# Patient Record
Sex: FEMALE | Race: WHITE | NOT HISPANIC OR LATINO | Employment: UNEMPLOYED | URBAN - METROPOLITAN AREA
[De-identification: names, ages, dates, MRNs, and addresses within clinical notes are randomized per-mention and may not be internally consistent; named-entity substitution may affect disease eponyms.]

---

## 2017-01-26 ENCOUNTER — CONVERSION ENCOUNTER (OUTPATIENT)
Dept: FAMILY MEDICINE CLINIC | Facility: CLINIC | Age: 52
End: 2017-01-26

## 2017-01-26 ENCOUNTER — HOSPITAL ENCOUNTER (OUTPATIENT)
Dept: OTHER | Facility: HOSPITAL | Age: 52
Discharge: HOME | End: 2017-01-26
Attending: INTERNAL MEDICINE | Admitting: INTERNAL MEDICINE

## 2017-01-26 LAB
CRP SERPL-MCNC: 0.57 MG/DL (ref 0–0.7)
ERYTHROCYTE [SEDIMENTATION RATE] IN BLOOD BY WESTERGREN METHOD: 24 MM/HR (ref 0–30)

## 2017-01-27 LAB — CHROMATIN AB SERPL-ACNC: <20 [IU]/ML (ref 0–20)

## 2017-02-17 ENCOUNTER — CONVERSION ENCOUNTER (OUTPATIENT)
Dept: FAMILY MEDICINE CLINIC | Facility: CLINIC | Age: 52
End: 2017-02-17

## 2017-05-17 ENCOUNTER — CONVERSION ENCOUNTER (OUTPATIENT)
Dept: FAMILY MEDICINE CLINIC | Facility: CLINIC | Age: 52
End: 2017-05-17

## 2017-05-17 LAB
ALBUMIN SERPL-MCNC: 4.3 G/DL (ref 3.6–5.1)
ALBUMIN/GLOB SERPL: ABNORMAL {RATIO} (ref 1–2.5)
ALP SERPL-CCNC: 78 UNITS/L (ref 33–130)
ALT SERPL-CCNC: 11 UNITS/L (ref 6–29)
AST SERPL-CCNC: 15 UNITS/L (ref 10–35)
BASOPHILS # BLD AUTO: ABNORMAL 10*3/MM3 (ref 0–200)
BASOPHILS NFR BLD AUTO: 0.6 %
BILIRUB SERPL-MCNC: 0.3 MG/DL (ref 0.2–1.2)
BILIRUB UR QL STRIP: NEGATIVE
BUN SERPL-MCNC: 15 MG/DL (ref 7–25)
BUN/CREAT SERPL: ABNORMAL (ref 6–22)
CALCIUM SERPL-MCNC: 9.2 MG/DL (ref 8.6–10.4)
CHLORIDE SERPL-SCNC: 106 MMOL/L (ref 98–110)
CO2 CONTENT VENOUS: 24 MMOL/L (ref 20–31)
COLOR UR: YELLOW
CONV BACTERIA IN URINE MICRO: ABNORMAL /HPF
CONV HYALINE CASTS IN URINE MICRO: ABNORMAL
CONV NEUTROPHILS/100 LEUKOCYTES IN BODY FLUID BY MANUAL COUNT: 60.8 %
CONV PROTEIN IN URINE BY AUTOMATED TEST STRIP: NEGATIVE
CONV TOTAL PROTEIN: 7.1 G/DL (ref 6.1–8.1)
CREAT UR-MCNC: 1.07 MG/DL (ref 0.5–1.05)
CRP SERPL-MCNC: 0.83 MG/DL
EOSINOPHIL # BLD AUTO: 1.6 %
EOSINOPHIL # BLD AUTO: ABNORMAL 10*3/MM3 (ref 15–500)
ERYTHROCYTE [DISTWIDTH] IN BLOOD BY AUTOMATED COUNT: 14.6 % (ref 11–15)
ERYTHROCYTE [SEDIMENTATION RATE] IN BLOOD BY WESTERGREN METHOD: 22 MM/HR
GLOBULIN UR ELPH-MCNC: ABNORMAL G/DL (ref 1.9–3.7)
GLUCOSE SERPL-MCNC: 83 MG/DL (ref 65–99)
GLUCOSE UR QL: NEGATIVE G/DL
HCT VFR BLD AUTO: 39.2 % (ref 35–45)
HGB BLD-MCNC: 13.2 G/DL (ref 11.7–15.5)
HGB UR QL STRIP: NEGATIVE
KETONES UR QL STRIP: NEGATIVE
LEUKOCYTE ESTERASE UR QL STRIP: NEGATIVE
LYMPHOCYTES # BLD AUTO: ABNORMAL 10*3/MM3 (ref 850–3900)
LYMPHOCYTES NFR BLD AUTO: 31 %
MCH RBC QN AUTO: 30.4 PG (ref 27–33)
MCHC RBC AUTO-ENTMCNC: ABNORMAL % (ref 32–36)
MCV RBC AUTO: 90.3 FL (ref 80–100)
MONOCYTES # BLD AUTO: ABNORMAL 10*3/MICROLITER (ref 200–950)
MONOCYTES NFR BLD AUTO: 6 %
NEUTROPHILS # BLD AUTO: ABNORMAL 10*3/MM3 (ref 1500–7800)
NITRITE UR QL STRIP: NEGATIVE
PH UR STRIP.AUTO: ABNORMAL [PH] (ref 5–8)
PLATELET # BLD AUTO: ABNORMAL 10*3/MM3 (ref 140–400)
PMV BLD AUTO: 10.3 FL (ref 7.5–12.5)
POTASSIUM SERPL-SCNC: 4 MMOL/L (ref 3.5–5.3)
RBC # BLD AUTO: ABNORMAL 10*6/MM3 (ref 3.8–5.1)
RBC #/AREA URNS HPF: ABNORMAL /[HPF]
SODIUM SERPL-SCNC: 139 MMOL/L (ref 135–146)
SP GR UR: 1.02 (ref 1–1.03)
SQUAMOUS #/AREA URNS HPF: ABNORMAL /HPF
WBC # BLD AUTO: ABNORMAL K/UL (ref 3.8–10.8)
WBC #/AREA URNS HPF: ABNORMAL CELLS/HPF

## 2017-05-18 ENCOUNTER — CONVERSION ENCOUNTER (OUTPATIENT)
Dept: FAMILY MEDICINE CLINIC | Facility: CLINIC | Age: 52
End: 2017-05-18

## 2017-05-23 ENCOUNTER — CONVERSION ENCOUNTER (OUTPATIENT)
Dept: FAMILY MEDICINE CLINIC | Facility: CLINIC | Age: 52
End: 2017-05-23

## 2017-06-01 ENCOUNTER — HOSPITAL ENCOUNTER (OUTPATIENT)
Dept: PHYSICAL THERAPY | Facility: HOSPITAL | Age: 52
Setting detail: RECURRING SERIES
Discharge: HOME | End: 2017-07-10
Attending: INTERNAL MEDICINE | Admitting: INTERNAL MEDICINE

## 2017-06-15 ENCOUNTER — CONVERSION ENCOUNTER (OUTPATIENT)
Dept: FAMILY MEDICINE CLINIC | Facility: CLINIC | Age: 52
End: 2017-06-15

## 2017-07-17 ENCOUNTER — CONVERSION ENCOUNTER (OUTPATIENT)
Dept: FAMILY MEDICINE CLINIC | Facility: CLINIC | Age: 52
End: 2017-07-17

## 2017-07-17 ENCOUNTER — HOSPITAL ENCOUNTER (OUTPATIENT)
Dept: ORTHOPEDIC SURGERY | Facility: CLINIC | Age: 52
Discharge: HOME | End: 2017-07-17
Attending: ORTHOPAEDIC SURGERY | Admitting: ORTHOPAEDIC SURGERY

## 2017-08-14 ENCOUNTER — CONVERSION ENCOUNTER (OUTPATIENT)
Dept: FAMILY MEDICINE CLINIC | Facility: CLINIC | Age: 52
End: 2017-08-14

## 2017-08-24 ENCOUNTER — CONVERSION ENCOUNTER (OUTPATIENT)
Dept: FAMILY MEDICINE CLINIC | Facility: CLINIC | Age: 52
End: 2017-08-24

## 2017-08-31 ENCOUNTER — HOSPITAL ENCOUNTER (OUTPATIENT)
Dept: PREADMISSION TESTING | Facility: HOSPITAL | Age: 52
Discharge: HOME | End: 2017-08-31
Attending: ORTHOPAEDIC SURGERY | Admitting: ORTHOPAEDIC SURGERY

## 2017-08-31 LAB
ANION GAP SERPL CALC-SCNC: 13.7 MMOL/L (ref 10–20)
BACTERIA SPEC AEROBE CULT: NORMAL
BASOPHILS # BLD AUTO: 0.1 10*3/UL (ref 0–0.2)
BASOPHILS NFR BLD AUTO: 1 % (ref 0–2)
BUN SERPL-MCNC: 7 MG/DL (ref 8–20)
BUN/CREAT SERPL: 7 (ref 5.4–26.2)
CALCIUM SERPL-MCNC: 9.5 MG/DL (ref 8.9–10.3)
CHLORIDE SERPL-SCNC: 104 MMOL/L (ref 101–111)
CONV CO2: 25 MMOL/L (ref 22–32)
CREAT UR-MCNC: 1 MG/DL (ref 0.4–1)
DIFFERENTIAL METHOD BLD: (no result)
EOSINOPHIL # BLD AUTO: 0.1 10*3/UL (ref 0–0.3)
EOSINOPHIL # BLD AUTO: 1 % (ref 0–3)
ERYTHROCYTE [DISTWIDTH] IN BLOOD BY AUTOMATED COUNT: 13.7 % (ref 11.5–14.5)
GLUCOSE SERPL-MCNC: 92 MG/DL (ref 65–99)
HCT VFR BLD AUTO: 39.2 % (ref 35–49)
HGB BLD-MCNC: 13 G/DL (ref 12–15)
LYMPHOCYTES # BLD AUTO: 3.6 10*3/UL (ref 0.8–4.8)
LYMPHOCYTES NFR BLD AUTO: 36 % (ref 18–42)
Lab: NORMAL
MCH RBC QN AUTO: 30.7 PG (ref 26–32)
MCHC RBC AUTO-ENTMCNC: 33.3 G/DL (ref 32–36)
MCV RBC AUTO: 92.2 FL (ref 80–94)
MICRO REPORT STATUS: NORMAL
MONOCYTES # BLD AUTO: 0.7 10*3/UL (ref 0.1–1.3)
MONOCYTES NFR BLD AUTO: 7 % (ref 2–11)
NEUTROPHILS # BLD AUTO: 5.6 10*3/UL (ref 2.3–8.6)
NEUTROPHILS NFR BLD AUTO: 55 % (ref 50–75)
NRBC BLD AUTO-RTO: 0 /100{WBCS}
NRBC/RBC NFR BLD MANUAL: 0 10*3/UL
PLATELET # BLD AUTO: 297 10*3/UL (ref 150–450)
PMV BLD AUTO: 9 FL (ref 7.4–10.4)
POTASSIUM SERPL-SCNC: 3.7 MMOL/L (ref 3.6–5.1)
RBC # BLD AUTO: 4.25 10*6/UL (ref 4–5.4)
SODIUM SERPL-SCNC: 139 MMOL/L (ref 136–144)
SPECIMEN SOURCE: NORMAL
WBC # BLD AUTO: 10.1 10*3/UL (ref 4.5–11.5)

## 2017-09-01 ENCOUNTER — HOSPITAL ENCOUNTER (OUTPATIENT)
Dept: PREOP | Facility: HOSPITAL | Age: 52
Setting detail: HOSPITAL OUTPATIENT SURGERY
Discharge: HOME | End: 2017-09-01
Attending: ORTHOPAEDIC SURGERY | Admitting: ORTHOPAEDIC SURGERY

## 2017-09-01 LAB
BACTERIA SPEC AEROBE CULT: NORMAL
GRAM STN SPEC: NORMAL
Lab: NORMAL
MICRO REPORT STATUS: NORMAL
SPECIMEN SOURCE: NORMAL

## 2017-09-18 ENCOUNTER — HOSPITAL ENCOUNTER (OUTPATIENT)
Dept: LAB | Facility: HOSPITAL | Age: 52
Discharge: HOME | End: 2017-09-18
Attending: INTERNAL MEDICINE | Admitting: INTERNAL MEDICINE

## 2017-09-18 ENCOUNTER — CONVERSION ENCOUNTER (OUTPATIENT)
Dept: FAMILY MEDICINE CLINIC | Facility: CLINIC | Age: 52
End: 2017-09-18

## 2017-09-18 LAB
ALBUMIN SERPL-MCNC: 3.9 G/DL (ref 3.5–4.8)
ALBUMIN/GLOB SERPL: 1.3 {RATIO} (ref 1–1.7)
ALP SERPL-CCNC: 42 IU/L (ref 32–91)
ALT SERPL-CCNC: 11 IU/L (ref 14–54)
ANION GAP SERPL CALC-SCNC: 10.3 MMOL/L (ref 10–20)
AST SERPL-CCNC: 18 IU/L (ref 15–41)
BASOPHILS # BLD AUTO: 0.1 10*3/UL (ref 0–0.2)
BASOPHILS NFR BLD AUTO: 1 % (ref 0–2)
BILIRUB SERPL-MCNC: 0.3 MG/DL (ref 0.3–1.2)
BILIRUB UR QL STRIP: NEGATIVE MG/DL
BUN SERPL-MCNC: 14 MG/DL (ref 8–20)
BUN/CREAT SERPL: 14 (ref 5.4–26.2)
CALCIUM SERPL-MCNC: 9.8 MG/DL (ref 8.9–10.3)
CASTS URNS QL MICRO: NORMAL /[LPF]
CHLORIDE SERPL-SCNC: 102 MMOL/L (ref 101–111)
COLOR UR: YELLOW
CONV BACTERIA IN URINE MICRO: NEGATIVE
CONV CLARITY OF URINE: CLEAR
CONV CO2: 29 MMOL/L (ref 22–32)
CONV HYALINE CASTS IN URINE MICRO: 2 /[LPF] (ref 0–5)
CONV PROTEIN IN URINE BY AUTOMATED TEST STRIP: NEGATIVE MG/DL
CONV SMALL ROUND CELLS: NORMAL /[HPF]
CONV TOTAL PROTEIN: 6.9 G/DL (ref 6.1–7.9)
CONV UROBILINOGEN IN URINE BY AUTOMATED TEST STRIP: 0.2 MG/DL
CREAT UR-MCNC: 1 MG/DL (ref 0.4–1)
CRP SERPL-MCNC: 1.88 MG/DL (ref 0–0.7)
CULTURE INDICATED?: NORMAL
DIFFERENTIAL METHOD BLD: (no result)
EOSINOPHIL # BLD AUTO: 0.2 10*3/UL (ref 0–0.3)
EOSINOPHIL # BLD AUTO: 2 % (ref 0–3)
ERYTHROCYTE [DISTWIDTH] IN BLOOD BY AUTOMATED COUNT: 13.6 % (ref 11.5–14.5)
ERYTHROCYTE [SEDIMENTATION RATE] IN BLOOD BY WESTERGREN METHOD: 22 MM/HR (ref 0–30)
GLOBULIN UR ELPH-MCNC: 3 G/DL (ref 2.5–3.8)
GLUCOSE SERPL-MCNC: 84 MG/DL (ref 65–99)
GLUCOSE UR QL: NEGATIVE MG/DL
HCT VFR BLD AUTO: 40.6 % (ref 35–49)
HGB BLD-MCNC: 13.6 G/DL (ref 12–15)
HGB UR QL STRIP: NEGATIVE
KETONES UR QL STRIP: NEGATIVE MG/DL
LEUKOCYTE ESTERASE UR QL STRIP: NEGATIVE
LYMPHOCYTES # BLD AUTO: 1.8 10*3/UL (ref 0.8–4.8)
LYMPHOCYTES NFR BLD AUTO: 22 % (ref 18–42)
MCH RBC QN AUTO: 30.9 PG (ref 26–32)
MCHC RBC AUTO-ENTMCNC: 33.5 G/DL (ref 32–36)
MCV RBC AUTO: 92.2 FL (ref 80–94)
MONOCYTES # BLD AUTO: 0.4 10*3/UL (ref 0.1–1.3)
MONOCYTES NFR BLD AUTO: 5 % (ref 2–11)
NEUTROPHILS # BLD AUTO: 5.7 10*3/UL (ref 2.3–8.6)
NEUTROPHILS NFR BLD AUTO: 70 % (ref 50–75)
NITRITE UR QL STRIP: NEGATIVE
NRBC BLD AUTO-RTO: 0 /100{WBCS}
NRBC/RBC NFR BLD MANUAL: 0 10*3/UL
PH UR STRIP.AUTO: 6 [PH] (ref 4.5–8)
PLATELET # BLD AUTO: 281 10*3/UL (ref 150–450)
PMV BLD AUTO: 8.5 FL (ref 7.4–10.4)
POTASSIUM SERPL-SCNC: 4.3 MMOL/L (ref 3.6–5.1)
RBC # BLD AUTO: 4.41 10*6/UL (ref 4–5.4)
RBC #/AREA URNS HPF: 2 /[HPF] (ref 0–3)
SODIUM SERPL-SCNC: 137 MMOL/L (ref 136–144)
SP GR UR: 1.02 (ref 1–1.03)
SPERM URNS QL MICRO: NORMAL /[HPF]
SQUAMOUS SPT QL MICRO: 2 /[HPF] (ref 0–5)
UNIDENT CRYS URNS QL MICRO: NORMAL /[HPF]
WBC # BLD AUTO: 8.1 10*3/UL (ref 4.5–11.5)
WBC #/AREA URNS HPF: 1 /[HPF] (ref 0–5)
YEAST SPEC QL WET PREP: NORMAL /[HPF]

## 2017-10-10 ENCOUNTER — CONVERSION ENCOUNTER (OUTPATIENT)
Dept: FAMILY MEDICINE CLINIC | Facility: CLINIC | Age: 52
End: 2017-10-10

## 2017-10-10 ENCOUNTER — HOSPITAL ENCOUNTER (OUTPATIENT)
Dept: LAB | Facility: HOSPITAL | Age: 52
Discharge: HOME | End: 2017-10-10
Attending: INTERNAL MEDICINE | Admitting: INTERNAL MEDICINE

## 2017-10-10 LAB
ALBUMIN SERPL-MCNC: 4.1 G/DL (ref 3.5–4.8)
ALBUMIN/GLOB SERPL: 1.4 {RATIO} (ref 1–1.7)
ALP SERPL-CCNC: 52 IU/L (ref 32–91)
ALT SERPL-CCNC: 13 IU/L (ref 14–54)
ANION GAP SERPL CALC-SCNC: 13 MMOL/L (ref 10–20)
AST SERPL-CCNC: 18 IU/L (ref 15–41)
BASOPHILS # BLD AUTO: 0.1 10*3/UL (ref 0–0.2)
BASOPHILS NFR BLD AUTO: 1 % (ref 0–2)
BILIRUB SERPL-MCNC: 0.4 MG/DL (ref 0.3–1.2)
BUN SERPL-MCNC: 16 MG/DL (ref 8–20)
BUN/CREAT SERPL: 17.8 (ref 5.4–26.2)
CALCIUM SERPL-MCNC: 9.6 MG/DL (ref 8.9–10.3)
CHLORIDE SERPL-SCNC: 105 MMOL/L (ref 101–111)
CONV CO2: 25 MMOL/L (ref 22–32)
CONV TOTAL PROTEIN: 7.1 G/DL (ref 6.1–7.9)
CREAT UR-MCNC: 0.9 MG/DL (ref 0.4–1)
CRP SERPL-MCNC: 0.23 MG/DL (ref 0–0.7)
DIFFERENTIAL METHOD BLD: (no result)
EOSINOPHIL # BLD AUTO: 0.1 10*3/UL (ref 0–0.3)
EOSINOPHIL # BLD AUTO: 1 % (ref 0–3)
ERYTHROCYTE [DISTWIDTH] IN BLOOD BY AUTOMATED COUNT: 13.4 % (ref 11.5–14.5)
ERYTHROCYTE [SEDIMENTATION RATE] IN BLOOD BY WESTERGREN METHOD: 15 MM/HR (ref 0–30)
GLOBULIN UR ELPH-MCNC: 3 G/DL (ref 2.5–3.8)
GLUCOSE SERPL-MCNC: 88 MG/DL (ref 65–99)
HCT VFR BLD AUTO: 42.1 % (ref 35–49)
HGB BLD-MCNC: 14.2 G/DL (ref 12–15)
LYMPHOCYTES # BLD AUTO: 2.3 10*3/UL (ref 0.8–4.8)
LYMPHOCYTES NFR BLD AUTO: 25 % (ref 18–42)
MCH RBC QN AUTO: 30.4 PG (ref 26–32)
MCHC RBC AUTO-ENTMCNC: 33.7 G/DL (ref 32–36)
MCV RBC AUTO: 90 FL (ref 80–94)
MONOCYTES # BLD AUTO: 0.5 10*3/UL (ref 0.1–1.3)
MONOCYTES NFR BLD AUTO: 5 % (ref 2–11)
NEUTROPHILS # BLD AUTO: 6.3 10*3/UL (ref 2.3–8.6)
NEUTROPHILS NFR BLD AUTO: 68 % (ref 50–75)
NRBC BLD AUTO-RTO: 0 /100{WBCS}
NRBC/RBC NFR BLD MANUAL: 0 10*3/UL
PLATELET # BLD AUTO: 291 10*3/UL (ref 150–450)
PMV BLD AUTO: 8.4 FL (ref 7.4–10.4)
POTASSIUM SERPL-SCNC: 4 MMOL/L (ref 3.6–5.1)
RBC # BLD AUTO: 4.68 10*6/UL (ref 4–5.4)
SODIUM SERPL-SCNC: 139 MMOL/L (ref 136–144)
WBC # BLD AUTO: 9.3 10*3/UL (ref 4.5–11.5)

## 2017-10-10 PROCEDURE — 86481 TB AG RESPONSE T-CELL SUSP: CPT

## 2017-10-11 ENCOUNTER — LAB REQUISITION (OUTPATIENT)
Dept: LAB | Facility: HOSPITAL | Age: 52
End: 2017-10-11

## 2017-10-11 DIAGNOSIS — Z00.00 ROUTINE GENERAL MEDICAL EXAMINATION AT A HEALTH CARE FACILITY: ICD-10-CM

## 2017-10-11 LAB
CONV HIV-1/ HIV-2: NORMAL
CONV HIV-1/ HIV-2: NORMAL
HAV IGM SERPL QL IA: NONREACTIVE
HBV CORE IGM SERPL QL IA: NONREACTIVE
HBV SURFACE AG SERPL QL IA: NONREACTIVE
HCV AB SER DONR QL: NORMAL
HCV AB SER DONR QL: NORMAL

## 2017-10-12 LAB
TSPOT INTERPRETATION: NEGATIVE
TSPOT INTERPRETATION: NORMAL
TSPOT NIL CONTROL: 0
TSPOT PANEL A: 0
TSPOT PANEL B: 0
TSPOT POS CONTROL: 166

## 2017-10-16 ENCOUNTER — CONVERSION ENCOUNTER (OUTPATIENT)
Dept: FAMILY MEDICINE CLINIC | Facility: CLINIC | Age: 52
End: 2017-10-16

## 2017-11-02 ENCOUNTER — CONVERSION ENCOUNTER (OUTPATIENT)
Dept: FAMILY MEDICINE CLINIC | Facility: CLINIC | Age: 52
End: 2017-11-02

## 2018-01-04 ENCOUNTER — CONVERSION ENCOUNTER (OUTPATIENT)
Dept: FAMILY MEDICINE CLINIC | Facility: CLINIC | Age: 53
End: 2018-01-04

## 2018-01-17 ENCOUNTER — CONVERSION ENCOUNTER (OUTPATIENT)
Dept: FAMILY MEDICINE CLINIC | Facility: CLINIC | Age: 53
End: 2018-01-17

## 2018-01-22 ENCOUNTER — HOSPITAL ENCOUNTER (OUTPATIENT)
Dept: PHYSICAL THERAPY | Facility: HOSPITAL | Age: 53
Setting detail: RECURRING SERIES
Discharge: HOME | End: 2018-04-18
Attending: ORTHOPAEDIC SURGERY | Admitting: ORTHOPAEDIC SURGERY

## 2018-01-24 ENCOUNTER — HOSPITAL ENCOUNTER (OUTPATIENT)
Dept: LAB | Facility: HOSPITAL | Age: 53
Discharge: HOME | End: 2018-01-24
Attending: INTERNAL MEDICINE | Admitting: INTERNAL MEDICINE

## 2018-01-24 ENCOUNTER — CONVERSION ENCOUNTER (OUTPATIENT)
Dept: FAMILY MEDICINE CLINIC | Facility: CLINIC | Age: 53
End: 2018-01-24

## 2018-01-24 LAB
ALBUMIN SERPL-MCNC: 4.4 G/DL (ref 3.5–4.8)
ALBUMIN/GLOB SERPL: 1.6 {RATIO} (ref 1–1.7)
ALP SERPL-CCNC: 46 IU/L (ref 32–91)
ALT SERPL-CCNC: 12 IU/L (ref 14–54)
ANION GAP SERPL CALC-SCNC: 11.8 MMOL/L (ref 10–20)
AST SERPL-CCNC: 19 IU/L (ref 15–41)
BASOPHILS # BLD AUTO: 0 10*3/UL (ref 0–0.2)
BASOPHILS NFR BLD AUTO: 0 % (ref 0–2)
BILIRUB SERPL-MCNC: 0.1 MG/DL (ref 0.3–1.2)
BUN SERPL-MCNC: 10 MG/DL (ref 8–20)
BUN/CREAT SERPL: 10 (ref 5.4–26.2)
CALCIUM SERPL-MCNC: 9.9 MG/DL (ref 8.9–10.3)
CHLORIDE SERPL-SCNC: 107 MMOL/L (ref 101–111)
CONV CO2: 25 MMOL/L (ref 22–32)
CONV TOTAL PROTEIN: 7.1 G/DL (ref 6.1–7.9)
CREAT UR-MCNC: 1 MG/DL (ref 0.4–1)
CRP SERPL-MCNC: 0.13 MG/DL (ref 0–0.7)
DIFFERENTIAL METHOD BLD: (no result)
EOSINOPHIL # BLD AUTO: 0.1 10*3/UL (ref 0–0.3)
EOSINOPHIL # BLD AUTO: 1 % (ref 0–3)
ERYTHROCYTE [DISTWIDTH] IN BLOOD BY AUTOMATED COUNT: 13.9 % (ref 11.5–14.5)
ERYTHROCYTE [SEDIMENTATION RATE] IN BLOOD BY WESTERGREN METHOD: 10 MM/HR (ref 0–30)
GLOBULIN UR ELPH-MCNC: 2.7 G/DL (ref 2.5–3.8)
GLUCOSE SERPL-MCNC: 98 MG/DL (ref 65–99)
HCT VFR BLD AUTO: 42.4 % (ref 35–49)
HGB BLD-MCNC: 14.1 G/DL (ref 12–15)
LYMPHOCYTES # BLD AUTO: 2.7 10*3/UL (ref 0.8–4.8)
LYMPHOCYTES NFR BLD AUTO: 33 % (ref 18–42)
MCH RBC QN AUTO: 30.9 PG (ref 26–32)
MCHC RBC AUTO-ENTMCNC: 33.3 G/DL (ref 32–36)
MCV RBC AUTO: 93 FL (ref 80–94)
MONOCYTES # BLD AUTO: 0.5 10*3/UL (ref 0.1–1.3)
MONOCYTES NFR BLD AUTO: 6 % (ref 2–11)
NEUTROPHILS # BLD AUTO: 5 10*3/UL (ref 2.3–8.6)
NEUTROPHILS NFR BLD AUTO: 60 % (ref 50–75)
NRBC BLD AUTO-RTO: 0 /100{WBCS}
NRBC/RBC NFR BLD MANUAL: 0 10*3/UL
PLATELET # BLD AUTO: 307 10*3/UL (ref 150–450)
PMV BLD AUTO: 8.7 FL (ref 7.4–10.4)
POTASSIUM SERPL-SCNC: 3.8 MMOL/L (ref 3.6–5.1)
RBC # BLD AUTO: 4.56 10*6/UL (ref 4–5.4)
SODIUM SERPL-SCNC: 140 MMOL/L (ref 136–144)
WBC # BLD AUTO: 8.4 10*3/UL (ref 4.5–11.5)

## 2018-04-17 ENCOUNTER — CONVERSION ENCOUNTER (OUTPATIENT)
Dept: FAMILY MEDICINE CLINIC | Facility: CLINIC | Age: 53
End: 2018-04-17

## 2018-04-23 ENCOUNTER — HOSPITAL ENCOUNTER (OUTPATIENT)
Dept: PAIN MEDICINE | Facility: HOSPITAL | Age: 53
Discharge: HOME | End: 2018-04-23
Attending: PHYSICAL MEDICINE & REHABILITATION | Admitting: PHYSICAL MEDICINE & REHABILITATION

## 2018-04-23 ENCOUNTER — CONVERSION ENCOUNTER (OUTPATIENT)
Dept: FAMILY MEDICINE CLINIC | Facility: CLINIC | Age: 53
End: 2018-04-23

## 2018-05-14 ENCOUNTER — CONVERSION ENCOUNTER (OUTPATIENT)
Dept: FAMILY MEDICINE CLINIC | Facility: CLINIC | Age: 53
End: 2018-05-14

## 2018-05-14 ENCOUNTER — HOSPITAL ENCOUNTER (OUTPATIENT)
Dept: PAIN MEDICINE | Facility: HOSPITAL | Age: 53
Discharge: HOME | End: 2018-05-14
Attending: PHYSICAL MEDICINE & REHABILITATION | Admitting: PHYSICAL MEDICINE & REHABILITATION

## 2018-08-14 ENCOUNTER — CONVERSION ENCOUNTER (OUTPATIENT)
Dept: FAMILY MEDICINE CLINIC | Facility: CLINIC | Age: 53
End: 2018-08-14

## 2018-10-16 ENCOUNTER — CONVERSION ENCOUNTER (OUTPATIENT)
Dept: FAMILY MEDICINE CLINIC | Facility: CLINIC | Age: 53
End: 2018-10-16

## 2018-10-16 ENCOUNTER — HOSPITAL ENCOUNTER (OUTPATIENT)
Dept: FAMILY MEDICINE CLINIC | Facility: CLINIC | Age: 53
Setting detail: SPECIMEN
Discharge: HOME | End: 2018-10-16
Attending: FAMILY MEDICINE | Admitting: FAMILY MEDICINE

## 2018-10-16 LAB
ALBUMIN SERPL-MCNC: 4.1 G/DL (ref 3.5–4.8)
ALBUMIN/GLOB SERPL: 1.4 {RATIO} (ref 1–1.7)
ALP SERPL-CCNC: 56 IU/L (ref 32–91)
ALT SERPL-CCNC: 15 IU/L (ref 14–54)
ANION GAP SERPL CALC-SCNC: 11.9 MMOL/L (ref 10–20)
AST SERPL-CCNC: 17 IU/L (ref 15–41)
BASOPHILS # BLD AUTO: 0 10*3/UL (ref 0–0.2)
BASOPHILS NFR BLD AUTO: 0 % (ref 0–2)
BILIRUB SERPL-MCNC: 0.6 MG/DL (ref 0.3–1.2)
BUN SERPL-MCNC: 14 MG/DL (ref 8–20)
BUN/CREAT SERPL: 15.6 (ref 5.4–26.2)
CALCIUM SERPL-MCNC: 9.5 MG/DL (ref 8.9–10.3)
CHLORIDE SERPL-SCNC: 105 MMOL/L (ref 101–111)
CHOLEST SERPL-MCNC: 210 MG/DL
CHOLEST/HDLC SERPL: 4.3 {RATIO}
CONV CO2: 26 MMOL/L (ref 22–32)
CONV LDL CHOLESTEROL DIRECT: 152 MG/DL (ref 0–100)
CONV TOTAL PROTEIN: 7.1 G/DL (ref 6.1–7.9)
CREAT UR-MCNC: 0.9 MG/DL (ref 0.4–1)
DIFFERENTIAL METHOD BLD: (no result)
EOSINOPHIL # BLD AUTO: 0.1 10*3/UL (ref 0–0.3)
EOSINOPHIL # BLD AUTO: 1 % (ref 0–3)
ERYTHROCYTE [DISTWIDTH] IN BLOOD BY AUTOMATED COUNT: 15.5 % (ref 11.5–14.5)
GLOBULIN UR ELPH-MCNC: 3 G/DL (ref 2.5–3.8)
GLUCOSE SERPL-MCNC: 87 MG/DL (ref 65–99)
HCT VFR BLD AUTO: 41.4 % (ref 35–49)
HDLC SERPL-MCNC: 49 MG/DL
HGB BLD-MCNC: 14.3 G/DL (ref 12–15)
LDLC/HDLC SERPL: 3.1 {RATIO}
LIPID INTERPRETATION: ABNORMAL
LYMPHOCYTES # BLD AUTO: 2.1 10*3/UL (ref 0.8–4.8)
LYMPHOCYTES NFR BLD AUTO: 26 % (ref 18–42)
MCH RBC QN AUTO: 30.8 PG (ref 26–32)
MCHC RBC AUTO-ENTMCNC: 34.5 G/DL (ref 32–36)
MCV RBC AUTO: 89.4 FL (ref 80–94)
MONOCYTES # BLD AUTO: 0.4 10*3/UL (ref 0.1–1.3)
MONOCYTES NFR BLD AUTO: 6 % (ref 2–11)
NEUTROPHILS # BLD AUTO: 5.5 10*3/UL (ref 2.3–8.6)
NEUTROPHILS NFR BLD AUTO: 67 % (ref 50–75)
NRBC BLD AUTO-RTO: 0 /100{WBCS}
NRBC/RBC NFR BLD MANUAL: 0 10*3/UL
PLATELET # BLD AUTO: 257 10*3/UL (ref 150–450)
PMV BLD AUTO: 7.9 FL (ref 7.4–10.4)
POTASSIUM SERPL-SCNC: 3.9 MMOL/L (ref 3.6–5.1)
RBC # BLD AUTO: 4.63 10*6/UL (ref 4–5.4)
SODIUM SERPL-SCNC: 139 MMOL/L (ref 136–144)
TRIGL SERPL-MCNC: 87 MG/DL
TSH SERPL-ACNC: 0.53 UIU/ML (ref 0.34–5.6)
VLDLC SERPL CALC-MCNC: 9.1 MG/DL
WBC # BLD AUTO: 8.1 10*3/UL (ref 4.5–11.5)

## 2018-10-17 LAB — 25(OH)D3 SERPL-MCNC: 40 NG/ML (ref 30–100)

## 2018-10-25 ENCOUNTER — CONVERSION ENCOUNTER (OUTPATIENT)
Dept: FAMILY MEDICINE CLINIC | Facility: CLINIC | Age: 53
End: 2018-10-25

## 2018-10-25 ENCOUNTER — HOSPITAL ENCOUNTER (OUTPATIENT)
Dept: ORTHOPEDIC SURGERY | Facility: CLINIC | Age: 53
Discharge: HOME | End: 2018-10-25
Attending: NEUROLOGICAL SURGERY | Admitting: NEUROLOGICAL SURGERY

## 2018-11-26 ENCOUNTER — CONVERSION ENCOUNTER (OUTPATIENT)
Dept: FAMILY MEDICINE CLINIC | Facility: CLINIC | Age: 53
End: 2018-11-26

## 2018-12-17 ENCOUNTER — HOSPITAL ENCOUNTER (OUTPATIENT)
Dept: LAB | Facility: HOSPITAL | Age: 53
Discharge: HOME | End: 2018-12-17
Attending: INTERNAL MEDICINE | Admitting: INTERNAL MEDICINE

## 2018-12-17 LAB
ALBUMIN SERPL-MCNC: 4.3 G/DL (ref 3.5–4.8)
ALBUMIN/GLOB SERPL: 1.8 {RATIO} (ref 1–1.7)
ALP SERPL-CCNC: 50 IU/L (ref 32–91)
ALT SERPL-CCNC: 14 IU/L (ref 14–54)
ANION GAP SERPL CALC-SCNC: 13.7 MMOL/L (ref 10–20)
AST SERPL-CCNC: 21 IU/L (ref 15–41)
BASOPHILS # BLD AUTO: 0.1 10*3/UL (ref 0–0.2)
BASOPHILS NFR BLD AUTO: 1 % (ref 0–2)
BILIRUB SERPL-MCNC: 0.5 MG/DL (ref 0.3–1.2)
BILIRUB UR QL STRIP: NEGATIVE MG/DL
BUN SERPL-MCNC: 17 MG/DL (ref 8–20)
BUN/CREAT SERPL: 18.9 (ref 5.4–26.2)
CALCIUM SERPL-MCNC: 9 MG/DL (ref 8.9–10.3)
CASTS URNS QL MICRO: NORMAL /[LPF]
CHLORIDE SERPL-SCNC: 99 MMOL/L (ref 101–111)
COLOR UR: YELLOW
CONV BACTERIA IN URINE MICRO: NEGATIVE
CONV CLARITY OF URINE: CLEAR
CONV CO2: 24 MMOL/L (ref 22–32)
CONV HYALINE CASTS IN URINE MICRO: NORMAL /[LPF] (ref 0–5)
CONV PROTEIN IN URINE BY AUTOMATED TEST STRIP: NEGATIVE MG/DL
CONV SMALL ROUND CELLS: NORMAL /[HPF]
CONV TOTAL PROTEIN: 6.7 G/DL (ref 6.1–7.9)
CONV UROBILINOGEN IN URINE BY AUTOMATED TEST STRIP: 0.2 MG/DL
CREAT UR-MCNC: 0.9 MG/DL (ref 0.4–1)
CRP SERPL-MCNC: 0.19 MG/DL (ref 0–0.7)
CULTURE INDICATED?: NORMAL
DIFFERENTIAL METHOD BLD: (no result)
EOSINOPHIL # BLD AUTO: 0.2 10*3/UL (ref 0–0.3)
EOSINOPHIL # BLD AUTO: 2 % (ref 0–3)
ERYTHROCYTE [DISTWIDTH] IN BLOOD BY AUTOMATED COUNT: 14.7 % (ref 11.5–14.5)
ERYTHROCYTE [SEDIMENTATION RATE] IN BLOOD BY WESTERGREN METHOD: 4 MM/HR (ref 0–30)
GLOBULIN UR ELPH-MCNC: 2.4 G/DL (ref 2.5–3.8)
GLUCOSE SERPL-MCNC: 82 MG/DL (ref 65–99)
GLUCOSE UR QL: NEGATIVE MG/DL
HCT VFR BLD AUTO: 38.2 % (ref 35–49)
HGB BLD-MCNC: 12.9 G/DL (ref 12–15)
HGB UR QL STRIP: NEGATIVE
KETONES UR QL STRIP: NEGATIVE MG/DL
LEUKOCYTE ESTERASE UR QL STRIP: NEGATIVE
LYMPHOCYTES # BLD AUTO: 3.2 10*3/UL (ref 0.8–4.8)
LYMPHOCYTES NFR BLD AUTO: 30 % (ref 18–42)
MCH RBC QN AUTO: 32.9 PG (ref 26–32)
MCHC RBC AUTO-ENTMCNC: 33.7 G/DL (ref 32–36)
MCV RBC AUTO: 97.6 FL (ref 80–94)
MONOCYTES # BLD AUTO: 0.7 10*3/UL (ref 0.1–1.3)
MONOCYTES NFR BLD AUTO: 6 % (ref 2–11)
NEUTROPHILS # BLD AUTO: 6.5 10*3/UL (ref 2.3–8.6)
NEUTROPHILS NFR BLD AUTO: 61 % (ref 50–75)
NITRITE UR QL STRIP: NEGATIVE
NRBC BLD AUTO-RTO: 0 /100{WBCS}
NRBC/RBC NFR BLD MANUAL: 0 10*3/UL
PH UR STRIP.AUTO: 6 [PH] (ref 4.5–8)
PLATELET # BLD AUTO: 263 10*3/UL (ref 150–450)
PMV BLD AUTO: 8.6 FL (ref 7.4–10.4)
POTASSIUM SERPL-SCNC: 3.7 MMOL/L (ref 3.6–5.1)
RBC # BLD AUTO: 3.91 10*6/UL (ref 4–5.4)
RBC #/AREA URNS HPF: 1 /[HPF] (ref 0–3)
SODIUM SERPL-SCNC: 133 MMOL/L (ref 136–144)
SP GR UR: 1 (ref 1–1.03)
SPERM URNS QL MICRO: NORMAL /[HPF]
SQUAMOUS SPT QL MICRO: 2 /[HPF] (ref 0–5)
UNIDENT CRYS URNS QL MICRO: NORMAL /[HPF]
WBC # BLD AUTO: 10.6 10*3/UL (ref 4.5–11.5)
WBC #/AREA URNS HPF: 1 /[HPF] (ref 0–5)
YEAST SPEC QL WET PREP: NORMAL /[HPF]

## 2018-12-24 ENCOUNTER — CONVERSION ENCOUNTER (OUTPATIENT)
Dept: FAMILY MEDICINE CLINIC | Facility: CLINIC | Age: 53
End: 2018-12-24

## 2019-01-17 ENCOUNTER — CONVERSION ENCOUNTER (OUTPATIENT)
Dept: FAMILY MEDICINE CLINIC | Facility: CLINIC | Age: 54
End: 2019-01-17

## 2019-02-20 ENCOUNTER — CONVERSION ENCOUNTER (OUTPATIENT)
Dept: FAMILY MEDICINE CLINIC | Facility: CLINIC | Age: 54
End: 2019-02-20

## 2019-05-07 ENCOUNTER — HOSPITAL ENCOUNTER (OUTPATIENT)
Dept: LAB | Facility: HOSPITAL | Age: 54
Discharge: HOME | End: 2019-05-07
Attending: INTERNAL MEDICINE | Admitting: INTERNAL MEDICINE

## 2019-05-07 LAB — CRP SERPL-MCNC: 0.24 MG/DL (ref 0–0.7)

## 2019-05-13 ENCOUNTER — HOSPITAL ENCOUNTER (OUTPATIENT)
Dept: RHEUMATOLOGY | Facility: CLINIC | Age: 54
Discharge: HOME | End: 2019-05-13
Attending: NURSE PRACTITIONER | Admitting: NURSE PRACTITIONER

## 2019-05-23 ENCOUNTER — CONVERSION ENCOUNTER (OUTPATIENT)
Dept: FAMILY MEDICINE CLINIC | Facility: CLINIC | Age: 54
End: 2019-05-23

## 2019-06-04 VITALS
HEART RATE: 69 BPM | BODY MASS INDEX: 28.29 KG/M2 | WEIGHT: 153.13 LBS | HEART RATE: 84 BPM | DIASTOLIC BLOOD PRESSURE: 65 MMHG | DIASTOLIC BLOOD PRESSURE: 75 MMHG | HEART RATE: 77 BPM | HEART RATE: 60 BPM | OXYGEN SATURATION: 97 % | OXYGEN SATURATION: 100 % | BODY MASS INDEX: 25.44 KG/M2 | DIASTOLIC BLOOD PRESSURE: 75 MMHG | BODY MASS INDEX: 28.09 KG/M2 | HEART RATE: 58 BPM | WEIGHT: 156.2 LBS | BODY MASS INDEX: 26.43 KG/M2 | DIASTOLIC BLOOD PRESSURE: 65 MMHG | BODY MASS INDEX: 28.03 KG/M2 | DIASTOLIC BLOOD PRESSURE: 70 MMHG | BODY MASS INDEX: 27.15 KG/M2 | HEART RATE: 71 BPM | BODY MASS INDEX: 27.69 KG/M2 | SYSTOLIC BLOOD PRESSURE: 109 MMHG | WEIGHT: 158.2 LBS | WEIGHT: 193 LBS | HEIGHT: 64 IN | HEART RATE: 62 BPM | WEIGHT: 194 LBS | SYSTOLIC BLOOD PRESSURE: 105 MMHG | HEIGHT: 64 IN | HEIGHT: 64 IN | WEIGHT: 164.2 LBS | OXYGEN SATURATION: 98 % | DIASTOLIC BLOOD PRESSURE: 69 MMHG | DIASTOLIC BLOOD PRESSURE: 79 MMHG | BODY MASS INDEX: 26.02 KG/M2 | SYSTOLIC BLOOD PRESSURE: 114 MMHG | WEIGHT: 164.8 LBS | HEIGHT: 64 IN | DIASTOLIC BLOOD PRESSURE: 73 MMHG | SYSTOLIC BLOOD PRESSURE: 125 MMHG | HEIGHT: 64 IN | DIASTOLIC BLOOD PRESSURE: 70 MMHG | HEART RATE: 102 BPM | HEIGHT: 64 IN | DIASTOLIC BLOOD PRESSURE: 72 MMHG | HEART RATE: 62 BPM | HEART RATE: 50 BPM | DIASTOLIC BLOOD PRESSURE: 59 MMHG | WEIGHT: 153.8 LBS | BODY MASS INDEX: 28.85 KG/M2 | WEIGHT: 159 LBS | OXYGEN SATURATION: 97 % | SYSTOLIC BLOOD PRESSURE: 137 MMHG | RESPIRATION RATE: 16 BRPM | SYSTOLIC BLOOD PRESSURE: 113 MMHG | WEIGHT: 191 LBS | BODY MASS INDEX: 27.14 KG/M2 | HEIGHT: 64 IN | SYSTOLIC BLOOD PRESSURE: 114 MMHG | WEIGHT: 193 LBS | HEIGHT: 64 IN | SYSTOLIC BLOOD PRESSURE: 107 MMHG | HEART RATE: 94 BPM | HEIGHT: 64 IN | SYSTOLIC BLOOD PRESSURE: 134 MMHG | DIASTOLIC BLOOD PRESSURE: 70 MMHG | HEART RATE: 79 BPM | WEIGHT: 169 LBS | HEART RATE: 69 BPM | HEART RATE: 71 BPM | WEIGHT: 152 LBS | OXYGEN SATURATION: 98 % | WEIGHT: 164.5 LBS | HEART RATE: 61 BPM | WEIGHT: 196 LBS | BODY MASS INDEX: 25.95 KG/M2 | WEIGHT: 153 LBS | SYSTOLIC BLOOD PRESSURE: 128 MMHG | DIASTOLIC BLOOD PRESSURE: 71 MMHG | SYSTOLIC BLOOD PRESSURE: 95 MMHG | HEART RATE: 60 BPM | HEART RATE: 62 BPM | OXYGEN SATURATION: 99 % | SYSTOLIC BLOOD PRESSURE: 118 MMHG | HEART RATE: 53 BPM | HEART RATE: 61 BPM | HEIGHT: 64 IN | DIASTOLIC BLOOD PRESSURE: 72 MMHG | SYSTOLIC BLOOD PRESSURE: 136 MMHG | WEIGHT: 169 LBS | HEART RATE: 47 BPM | BODY MASS INDEX: 26.12 KG/M2 | DIASTOLIC BLOOD PRESSURE: 72 MMHG | WEIGHT: 176 LBS | HEIGHT: 64 IN | DIASTOLIC BLOOD PRESSURE: 75 MMHG | HEART RATE: 73 BPM | SYSTOLIC BLOOD PRESSURE: 117 MMHG | SYSTOLIC BLOOD PRESSURE: 130 MMHG | HEIGHT: 64 IN | WEIGHT: 149 LBS | HEART RATE: 66 BPM | SYSTOLIC BLOOD PRESSURE: 111 MMHG | WEIGHT: 154.8 LBS | DIASTOLIC BLOOD PRESSURE: 71 MMHG | SYSTOLIC BLOOD PRESSURE: 113 MMHG | SYSTOLIC BLOOD PRESSURE: 122 MMHG | BODY MASS INDEX: 26.67 KG/M2 | WEIGHT: 176 LBS | DIASTOLIC BLOOD PRESSURE: 72 MMHG | DIASTOLIC BLOOD PRESSURE: 61 MMHG | SYSTOLIC BLOOD PRESSURE: 106 MMHG | WEIGHT: 152.4 LBS | RESPIRATION RATE: 16 BRPM | DIASTOLIC BLOOD PRESSURE: 75 MMHG | SYSTOLIC BLOOD PRESSURE: 122 MMHG | SYSTOLIC BLOOD PRESSURE: 115 MMHG | DIASTOLIC BLOOD PRESSURE: 78 MMHG | DIASTOLIC BLOOD PRESSURE: 78 MMHG | DIASTOLIC BLOOD PRESSURE: 92 MMHG | BODY MASS INDEX: 28.85 KG/M2 | WEIGHT: 162.2 LBS | SYSTOLIC BLOOD PRESSURE: 124 MMHG | WEIGHT: 195 LBS | OXYGEN SATURATION: 97 % | HEART RATE: 58 BPM | BODY MASS INDEX: 30.05 KG/M2 | HEIGHT: 64 IN | BODY MASS INDEX: 26.26 KG/M2 | WEIGHT: 176 LBS | SYSTOLIC BLOOD PRESSURE: 152 MMHG | SYSTOLIC BLOOD PRESSURE: 107 MMHG | BODY MASS INDEX: 26.28 KG/M2 | HEART RATE: 66 BPM | SYSTOLIC BLOOD PRESSURE: 110 MMHG | HEIGHT: 64 IN | DIASTOLIC BLOOD PRESSURE: 80 MMHG | DIASTOLIC BLOOD PRESSURE: 70 MMHG | OXYGEN SATURATION: 96 % | HEART RATE: 81 BPM

## 2019-06-04 VITALS
DIASTOLIC BLOOD PRESSURE: 61 MMHG | HEART RATE: 53 BPM | OXYGEN SATURATION: 98 % | BODY MASS INDEX: 29.71 KG/M2 | HEIGHT: 64 IN | WEIGHT: 174 LBS | SYSTOLIC BLOOD PRESSURE: 93 MMHG

## 2019-06-06 NOTE — PROGRESS NOTES
Visit Type:  Follow-up Visit  Referring Provider:  Kamran Garcia MD  Primary Provider:  Jose CHOI MD    CC:  chronic pain .    History of Present Illness:            She is in for followup on her chronic pain issues related to DDD.  She also has history of hypertension and depression.  She has been more negative of mood and under more stress.  The patient has not had any chest pain or dyspnea. The patient has   not had any headaches or dizziness. They have not seen any change in bladder function or bowel function.  She is able to do her ADLs independently.  She is in counselling for the depression.  Sleep has been less than okay, aggravated by the poor mood       Past Medical History:     Reviewed history from 01/17/2019 and no changes required:        DDD        Hypertension        right fibula fracture after fall, October 2015        hyperlipidemia        low back pain        left leg pain        scoliosis        9 tattoos        Depression        Inflammatory Arthritis        Anxiety        Physical Therapy-[facility]        Pain Management--Dr. Samuel Facet Block L5-S1        RLS        Fibromyalgia    Past Surgical History:     Reviewed history from 10/25/2018 and no changes required:        bilateral bunyonectomy 1998        tubal ligation 1988        open reduction of right fibula leg 1/2016        Hardware removed in ankle    Family History Summary:      Reviewed history Last on 05/13/2019 and no changes required:05/23/2019  Father - Has FH High Cholesterol - Entered On: 11/16/2016  Father - Has FH Hypertension - Entered On: 11/16/2016  Mother - Has FH High Cholesterol - Entered On: 11/16/2016  Mother - Has FH Hypertension - Entered On: 11/16/2016  Daughter - Has Family History Unknown - Entered On: 10/22/2015    General Comments - FH:  FH Cancer Grandparents  FH Diabetes Grandparents  FH Heart Disease Grandmother    Social History:     Reviewed history from 02/20/2019 and no  changes required:        Patient currently smokes every day.        Patient has been counseled to quit.        Passive Smoke: Y        Alcohol Use: N        Drug Use: N        HIV/High Risk: N        Regular Exercise: N        Hx Domestic Abuse: N        Zoroastrianism Affecting Care: N              Active Medications (reviewed today):  MUCINEX MAXIMUM STRENGTH 1200 MG ORAL TABLET EXTENDED RELEASE 12 HOUR (GUAIFENESIN) take 1 po bid prn cough/congestion  CHANTIX CONTINUING MONTH RENÉ 1 MG ORAL TABLET (VARENICLINE TARTRATE) take 1 po bid  CHANTIX STARTING MONTH RENÉ 0.5 MG X 11 & 1 MG X 42 ORAL TABLET (VARENICLINE TARTRATE) take 1 po daily x 3 days then 1 po bid refill as a continuing rené  LYRICA 150 MG CAPS (PREGABALIN) TAKE ONE CAPSULE BY MOUTH TWICE A DAY  ROPINIROLE HCL 0.5 MG ORAL TABLET (ROPINIROLE HCL) take 1 po qhs  ATORVASTATIN 20 MG TABLET (ATORVASTATIN CALCIUM) TAKE ONE TABLET BY MOUTH EVERY NIGHT AT BEDTIME  STAHIST AD 25-60 MG ORAL TABLET (CHLORCYCLIZINE-PSEUDOEPHED) take 1 po q8 hrs prn congestion or drainage  OXYCODONE HCL 10 MG ORAL TABLET (OXYCODONE HCL) take 1 po qid prn back pain  FOLIC ACID 1 MG TABLET (FOLIC ACID) TAKE ONE TABLET BY MOUTH DAILY  METHOTREXATE 2.5 MG TABLET (METHOTREXATE SODIUM) TAKE 5 TABLETS BY MOUTH WEEKLY ALL AT ONCE  PLAQUENIL 200 MG ORAL TABLET (HYDROXYCHLOROQUINE SULFATE) Take 1 1/2 tabs po qd  VITAMIN D 2000 UNIT ORAL TABLET (CHOLECALCIFEROL) 1 tab po daily  SULFASALAZINE 500 MG ORAL TABLET (SULFASALAZINE) Take 2 tab po bid  ARIPIPRAZOLE 10 MG TABLET (ARIPIPRAZOLE) TAKE ONE TABLET BY MOUTH DAILY  CLONAZEPAM 1 MG ORAL TABLET (CLONAZEPAM) take 1 po tid prn anxiety  BUSPIRONE HCL 15 MG ORAL TABLET (BUSPIRONE HCL) TAKE ONE TABLET BY MOUTH TWICE A DAY  LEXAPRO 20 MG ORAL TABLET (ESCITALOPRAM OXALATE) TAKE ONE TABLET BY MOUTH DAILY  ATENOLOL 50 MG TABLET (ATENOLOL) TAKE ONE TABLET BY MOUTH DAILY    Current Allergies (reviewed today):  PENICILLIN (Critical)    Current Medications  (including medications started today):   MUCINEX MAXIMUM STRENGTH 1200 MG ORAL TABLET EXTENDED RELEASE 12 HOUR (GUAIFENESIN) take 1 po bid prn cough/congestion  CHANTIX CONTINUING MONTH RENÉ 1 MG ORAL TABLET (VARENICLINE TARTRATE) take 1 po bid  CHANTIX STARTING MONTH RENÉ 0.5 MG X 11 & 1 MG X 42 ORAL TABLET (VARENICLINE TARTRATE) take 1 po daily x 3 days then 1 po bid refill as a continuing rené  LYRICA 150 MG CAPS (PREGABALIN) TAKE ONE CAPSULE BY MOUTH TWICE A DAY  ROPINIROLE HCL 0.5 MG ORAL TABLET (ROPINIROLE HCL) take 1 po qhs  ATORVASTATIN 20 MG TABLET (ATORVASTATIN CALCIUM) TAKE ONE TABLET BY MOUTH EVERY NIGHT AT BEDTIME  STAHIST AD 25-60 MG ORAL TABLET (CHLORCYCLIZINE-PSEUDOEPHED) take 1 po q8 hrs prn congestion or drainage  OXYCODONE HCL 10 MG ORAL TABLET (OXYCODONE HCL) take 1 po qid prn back pain  FOLIC ACID 1 MG TABLET (FOLIC ACID) TAKE ONE TABLET BY MOUTH DAILY  METHOTREXATE 2.5 MG TABLET (METHOTREXATE SODIUM) TAKE 5 TABLETS BY MOUTH WEEKLY ALL AT ONCE  PLAQUENIL 200 MG ORAL TABLET (HYDROXYCHLOROQUINE SULFATE) Take 1 1/2 tabs po qd  VITAMIN D 2000 UNIT ORAL TABLET (CHOLECALCIFEROL) 1 tab po daily  SULFASALAZINE 500 MG ORAL TABLET (SULFASALAZINE) Take 2 tab po bid  ARIPIPRAZOLE 10 MG TABLET (ARIPIPRAZOLE) TAKE ONE TABLET BY MOUTH DAILY  CLONAZEPAM 1 MG ORAL TABLET (CLONAZEPAM) take 1 po tid prn anxiety  BUSPIRONE HCL 15 MG ORAL TABLET (BUSPIRONE HCL) TAKE ONE TABLET BY MOUTH TWICE A DAY  LEXAPRO 20 MG ORAL TABLET (ESCITALOPRAM OXALATE) TAKE ONE TABLET BY MOUTH DAILY  ATENOLOL 50 MG TABLET (ATENOLOL) TAKE ONE TABLET BY MOUTH DAILY      Risk Factors:     Smoked Tobacco Use:  Current every day smoker     Cigarettes:  Yes -- <1 pack(s) per day,Smokeless Tobacco Use:  Never     Counseled to quit/cut down:  yes  Passive smoke exposure:  yes  Drug use:  no  HIV high-risk behavior:  no  Caffeine use:  0 drinks per day  Alcohol use:  no  Exercise:  no  Seatbelt use:  100 %  Sun Exposure:  occasionally    Family  History Risk Factors:     Family History of MI in females < 65 years old:  yes     Family History of MI in males < 55 years old:  yes      Review of Systems        See HPI      Vital Signs:    Patient Profile:    53 Years Old Female  Height:     64 inches (162.56 cm)  Weight:     174.0 pounds  BMI:        29.86     O2 Sat:     98 %  Temp:       98.1 degrees F oral  Pulse rate: 53 / minute  BP Sittin / 61  (left arm)    Cuff size:  large      Problems: Active problems were reviewed with the patient during this visit.  Medications: Medications were reviewed with the patient during this visit.  Allergies: Allergies were reviewed with the patient during this visit.        Vitals Entered By: Kaylyn Aleixs CMA (May 23, 2019 3:09 PM)      Physical Exam    General:      well developed, well nourished, in no acute distress.    Ears:      large , painful cerumen plug in R ear  L ear okay  Mouth:      no deformity or lesions with good dentition.    Lungs:      clear bilaterally to auscultation.    Heart:      non-displaced PMI, chest non-tender; regular rate and rhythm, S1, S2 without murmurs, rubs, or gallops  Abdomen:       normal bowel sounds; no hepatosplenomegaly no ventral,umbilical hernias or masses noted.    Msk:      gait normal  Extremities:      no clubbing, cyanosis, edema, or deformity noted with normal full range of motion of joints of all four extremities   Neurologic:      no focal deficits, cranial nerves II-XII grossly intact with normal sensation, reflexes, coordination, muscle strength and tone.    Psych:      depressed affect.        Blood Pressure:  Today's BP: 93/61 mm Hg    Labwork:   Most Recent Lab Results:   LDL: 152 mg/dL 10/16/2018        Impression & Recommendations:    Problem # 1:  Hypertension, essential (ICD-401.9) ([id number])  change lexapro to cymbalta  refer to ENT for the ear issue  see back 4 mos or so  call for concerns  Her updated medication list for this problem includes:      Atenolol 50 Mg Tablet (Atenolol) ..... Take one tablet by mouth daily      Problem # 2:  DEPRESSION (ICD-311) (OCQ94-J30.9)    Her updated medication list for this problem includes:     Clonazepam 1 Mg Oral Tablet (Clonazepam) ..... Take 1 po tid prn anxiety     Buspirone Hcl 15 Mg Oral Tablet (Buspirone hcl) ..... Take one tablet by mouth twice a day     Lexapro 20 Mg Oral Tablet (Escitalopram oxalate) ..... Take one tablet by mouth daily      Problem # 3:  Cerumen impaction, bilateral (ICD-380.4) (KDB00-R85.23)      Patient Instructions:  1)  4 mos                        Medication Administration    Orders Added:  1)  ENT Office Consult [ENTOC]  2)  Ofc Vst, Est Level IV [54257]  ]      Electronically signed by Kamran Gracia MD on 05/23/2019 at 5:25 PM  ________________________________________________________________________       Disclaimer: Converted Note message may not contain all data elements that existed in the legacy source system. Please see [employer] for the original note details.

## 2019-06-27 RX ORDER — FOLIC ACID 1 MG/1
TABLET ORAL
Qty: 30 TABLET | Refills: 3 | Status: SHIPPED | OUTPATIENT
Start: 2019-06-27 | End: 2019-09-16 | Stop reason: SDUPTHER

## 2019-07-10 DIAGNOSIS — G89.4 CHRONIC PAIN SYNDROME: Primary | ICD-10-CM

## 2019-07-10 RX ORDER — OXYCODONE HYDROCHLORIDE 10 MG/1
10 TABLET ORAL 4 TIMES DAILY PRN
Refills: 0 | COMMUNITY
Start: 2019-04-16 | End: 2019-07-10 | Stop reason: SDUPTHER

## 2019-07-10 RX ORDER — OXYCODONE HYDROCHLORIDE 10 MG/1
10 TABLET ORAL EVERY 6 HOURS PRN
Qty: 120 TABLET | Refills: 0 | Status: SHIPPED | OUTPATIENT
Start: 2019-07-10 | End: 2019-08-06 | Stop reason: SDUPTHER

## 2019-07-18 RX ORDER — ROPINIROLE 0.5 MG/1
TABLET, FILM COATED ORAL
Qty: 30 TABLET | Refills: 0 | Status: SHIPPED | OUTPATIENT
Start: 2019-07-18 | End: 2019-08-14 | Stop reason: SDUPTHER

## 2019-07-18 RX ORDER — BUSPIRONE HYDROCHLORIDE 15 MG/1
TABLET ORAL
Qty: 60 TABLET | Refills: 1 | Status: SHIPPED | OUTPATIENT
Start: 2019-07-18 | End: 2019-10-01 | Stop reason: SDUPTHER

## 2019-08-06 DIAGNOSIS — F41.9 ANXIETY: Primary | ICD-10-CM

## 2019-08-06 DIAGNOSIS — G89.4 CHRONIC PAIN SYNDROME: ICD-10-CM

## 2019-08-06 RX ORDER — CLONAZEPAM 1 MG/1
1 TABLET ORAL 3 TIMES DAILY PRN
Qty: 60 TABLET | Refills: 0 | Status: SHIPPED | OUTPATIENT
Start: 2019-08-06 | End: 2019-08-23 | Stop reason: SDUPTHER

## 2019-08-06 RX ORDER — OXYCODONE HYDROCHLORIDE 10 MG/1
10 TABLET ORAL EVERY 6 HOURS PRN
Qty: 120 TABLET | Refills: 0 | Status: SHIPPED | OUTPATIENT
Start: 2019-08-06 | End: 2019-09-05 | Stop reason: SDUPTHER

## 2019-08-06 RX ORDER — CLONAZEPAM 1 MG/1
TABLET ORAL
COMMUNITY
Start: 2015-10-28 | End: 2019-08-06 | Stop reason: SDUPTHER

## 2019-08-14 RX ORDER — ROPINIROLE 0.5 MG/1
TABLET, FILM COATED ORAL
Qty: 30 TABLET | Refills: 0 | Status: SHIPPED | OUTPATIENT
Start: 2019-08-14 | End: 2019-09-13 | Stop reason: SDUPTHER

## 2019-08-20 ENCOUNTER — TELEPHONE (OUTPATIENT)
Dept: FAMILY MEDICINE CLINIC | Facility: CLINIC | Age: 54
End: 2019-08-20

## 2019-08-20 NOTE — TELEPHONE ENCOUNTER
Per [facility] does  not require a PA for this medication dosage form and strength at the quanity days and supply requested .

## 2019-08-21 RX ORDER — ARIPIPRAZOLE 10 MG/1
TABLET ORAL
Qty: 30 TABLET | Refills: 2 | Status: SHIPPED | OUTPATIENT
Start: 2019-08-21 | End: 2019-08-23

## 2019-08-23 ENCOUNTER — OFFICE VISIT (OUTPATIENT)
Dept: FAMILY MEDICINE CLINIC | Facility: CLINIC | Age: 54
End: 2019-08-23

## 2019-08-23 VITALS
DIASTOLIC BLOOD PRESSURE: 71 MMHG | WEIGHT: 177.8 LBS | HEIGHT: 64 IN | BODY MASS INDEX: 30.35 KG/M2 | SYSTOLIC BLOOD PRESSURE: 110 MMHG | HEART RATE: 62 BPM | OXYGEN SATURATION: 94 %

## 2019-08-23 DIAGNOSIS — F41.9 ANXIETY: ICD-10-CM

## 2019-08-23 DIAGNOSIS — F32.A DEPRESSION, UNSPECIFIED DEPRESSION TYPE: ICD-10-CM

## 2019-08-23 DIAGNOSIS — G89.29 CHRONIC MIDLINE LOW BACK PAIN, WITH SCIATICA PRESENCE UNSPECIFIED: Primary | ICD-10-CM

## 2019-08-23 DIAGNOSIS — M54.5 CHRONIC MIDLINE LOW BACK PAIN, WITH SCIATICA PRESENCE UNSPECIFIED: Primary | ICD-10-CM

## 2019-08-23 PROCEDURE — 99213 OFFICE O/P EST LOW 20 MIN: CPT | Performed by: FAMILY MEDICINE

## 2019-08-23 RX ORDER — ARIPIPRAZOLE 20 MG/1
20 TABLET ORAL DAILY
Qty: 30 TABLET | Refills: 3 | Status: SHIPPED | OUTPATIENT
Start: 2019-08-23 | End: 2020-01-08

## 2019-08-23 RX ORDER — CARISOPRODOL 350 MG/1
350 TABLET ORAL 3 TIMES DAILY PRN
Qty: 90 TABLET | Refills: 0 | Status: SHIPPED | OUTPATIENT
Start: 2019-08-23 | End: 2019-09-23 | Stop reason: SDUPTHER

## 2019-08-23 RX ORDER — FOLIC ACID 1 MG/1
TABLET ORAL EVERY 24 HOURS
COMMUNITY
Start: 2018-06-27 | End: 2019-08-23 | Stop reason: SDUPTHER

## 2019-08-23 RX ORDER — ROPINIROLE 0.5 MG/1
TABLET, FILM COATED ORAL EVERY 24 HOURS
COMMUNITY
Start: 2018-12-08 | End: 2019-08-23 | Stop reason: SDUPTHER

## 2019-08-23 RX ORDER — ATORVASTATIN CALCIUM 20 MG/1
TABLET, FILM COATED ORAL
COMMUNITY
Start: 2019-04-10 | End: 2019-12-03

## 2019-08-23 RX ORDER — CLONAZEPAM 1 MG/1
1 TABLET ORAL 3 TIMES DAILY PRN
Qty: 60 TABLET | Refills: 1 | Status: SHIPPED | OUTPATIENT
Start: 2019-08-23 | End: 2019-10-03 | Stop reason: SDUPTHER

## 2019-08-23 RX ORDER — ARIPIPRAZOLE 10 MG/1
TABLET ORAL EVERY 24 HOURS
COMMUNITY
Start: 2017-10-10 | End: 2019-08-23 | Stop reason: SDUPTHER

## 2019-08-23 RX ORDER — DULOXETIN HYDROCHLORIDE 60 MG/1
CAPSULE, DELAYED RELEASE ORAL
COMMUNITY
Start: 2017-09-05 | End: 2019-10-21 | Stop reason: SDUPTHER

## 2019-08-23 RX ORDER — OXYCODONE HYDROCHLORIDE 10 MG/1
TABLET ORAL EVERY 6 HOURS
COMMUNITY
Start: 2016-06-21 | End: 2019-08-23 | Stop reason: SDUPTHER

## 2019-08-23 RX ORDER — BUSPIRONE HYDROCHLORIDE 15 MG/1
TABLET ORAL EVERY 12 HOURS
COMMUNITY
Start: 2017-09-05 | End: 2019-08-23 | Stop reason: SDUPTHER

## 2019-08-23 RX ORDER — HYDROXYCHLOROQUINE SULFATE 200 MG/1
300 TABLET, FILM COATED ORAL DAILY
COMMUNITY
Start: 2017-05-23 | End: 2019-12-14 | Stop reason: SDUPTHER

## 2019-08-23 RX ORDER — ATENOLOL 50 MG/1
TABLET ORAL EVERY 24 HOURS
COMMUNITY
Start: 2017-12-29 | End: 2019-09-13 | Stop reason: SDUPTHER

## 2019-08-23 NOTE — PROGRESS NOTES
"Subjective   Briana Alas is a 53 y.o. female.     She is in for follow-up on her chronic lower back pain.  She does not feel that her depression and anxiety medication are working as well as they had been, as she has been having more episodes of anxiety and emotional outbursts.  She is under a lot of stress and she is not sleeping well as a result.           /71 (BP Location: Left arm, Patient Position: Sitting, Cuff Size: Adult)   Pulse 62   Ht 162.6 cm (64\")   Wt 80.6 kg (177 lb 12.8 oz)   SpO2 94%   BMI 30.52 kg/m²       Chief Complaint   Patient presents with   • Back Pain     chronic pain , 6 th month f/u           Current Outpatient Medications:   •  ARIPiprazole (ABILIFY) 20 MG tablet, Take 1 tablet by mouth Daily., Disp: 30 tablet, Rfl: 3  •  atenolol (TENORMIN) 50 MG tablet, Daily., Disp: , Rfl:   •  atorvastatin (LIPITOR) 20 MG tablet, ATORVASTATIN CALCIUM 20 MG TABS, Disp: , Rfl:   •  busPIRone (BUSPAR) 15 MG tablet, TAKE ONE TABLET BY MOUTH TWICE A DAY, Disp: 60 tablet, Rfl: 1  •  clonazePAM (KlonoPIN) 1 MG tablet, Take 1 tablet by mouth 3 (Three) Times a Day As Needed for Seizures., Disp: 60 tablet, Rfl: 1  •  DULoxetine (CYMBALTA) 60 MG capsule, DULOXETINE HCL 60 MG CPEP, Disp: , Rfl:   •  folic acid (FOLVITE) 1 MG tablet, TAKE ONE TABLET BY MOUTH DAILY, Disp: 30 tablet, Rfl: 3  •  hydroxychloroquine (PLAQUENIL) 200 MG tablet, Daily., Disp: , Rfl:   •  methotrexate 2.5 MG tablet, TAKE 5 TABLETS BY MOUTH WEEKLY ALL AT ONCE, Disp: 60 tablet, Rfl: 0  •  oxyCODONE (ROXICODONE) 10 MG tablet, Take 1 tablet by mouth Every 6 (Six) Hours As Needed for Severe Pain ., Disp: 120 tablet, Rfl: 0  •  rOPINIRole (REQUIP) 0.5 MG tablet, TAKE ONE TABLET BY MOUTH EVERY NIGHT AT BEDTIME, Disp: 30 tablet, Rfl: 0  •  carisoprodol (SOMA) 350 MG tablet, Take 1 tablet by mouth 3 (Three) Times a Day As Needed for Muscle Spasms., Disp: 90 tablet, Rfl: 0        The following portions of the patient's history were " reviewed and updated as appropriate: allergies, current medications, past family history, past medical history, past social history, past surgical history and problem list.    Review of Systems   Constitutional: Negative for activity change, fatigue and fever.   HENT: Negative for congestion, sinus pressure, sinus pain, sore throat and trouble swallowing.    Eyes: Negative for visual disturbance.   Respiratory: Negative for chest tightness, shortness of breath and wheezing.    Cardiovascular: Negative for chest pain.   Gastrointestinal: Negative for abdominal distention, abdominal pain, constipation, diarrhea, nausea and vomiting.   Genitourinary: Negative for difficulty urinating, dysuria, frequency and urgency.   Musculoskeletal: Positive for back pain and myalgias. Negative for neck pain.   Neurological: Negative for dizziness, weakness, light-headedness and numbness.   Psychiatric/Behavioral: Positive for dysphoric mood and sleep disturbance. Negative for agitation, hallucinations and suicidal ideas. The patient is nervous/anxious.        Objective   Physical Exam   Constitutional: She is oriented to person, place, and time. She appears distressed.   Moderate discomfort   Neck: Normal range of motion. Neck supple. No thyromegaly present.   Cardiovascular: Normal rate, regular rhythm and normal heart sounds.   No murmur heard.  Pulmonary/Chest: Effort normal and breath sounds normal. She has no wheezes.   Abdominal: Soft. Bowel sounds are normal. She exhibits no mass. There is no guarding.   Musculoskeletal:   Gait normal despite back pain  Able to get out of chair independently   Lymphadenopathy:     She has no cervical adenopathy.   Neurological: She is alert and oriented to person, place, and time. She displays normal reflexes.   Psychiatric: Judgment and thought content normal.   Anxious and emotional today   Nursing note and vitals reviewed.        Assessment/Plan   Problems Addressed this Visit     None       Visit Diagnoses     Chronic midline low back pain, with sciatica presence unspecified    -  Primary    Depression, unspecified depression type        Relevant Medications    DULoxetine (CYMBALTA) 60 MG capsule    ARIPiprazole (ABILIFY) 20 MG tablet    Anxiety        Relevant Medications    clonazePAM (KlonoPIN) 1 MG tablet        I will adjust her Abilify to 20 mg once a day I will consider upping the Cymbalta but want to give this time first to see if that will work  I am not changing any of her other medication at this time, though I did add some as needed Soma for some episodes of spasm and back pain that are still occurring  I will see her back in 2 months to gauge effect of medication change that we made.

## 2019-08-26 ENCOUNTER — TELEPHONE (OUTPATIENT)
Dept: FAMILY MEDICINE CLINIC | Facility: CLINIC | Age: 54
End: 2019-08-26

## 2019-09-05 DIAGNOSIS — G89.4 CHRONIC PAIN SYNDROME: ICD-10-CM

## 2019-09-05 RX ORDER — OXYCODONE HYDROCHLORIDE 10 MG/1
10 TABLET ORAL EVERY 6 HOURS PRN
Qty: 120 TABLET | Refills: 0 | Status: SHIPPED | OUTPATIENT
Start: 2019-09-05 | End: 2019-10-03 | Stop reason: SDUPTHER

## 2019-09-09 ENCOUNTER — TRANSCRIBE ORDERS (OUTPATIENT)
Dept: LAB | Facility: HOSPITAL | Age: 54
End: 2019-09-09

## 2019-09-09 ENCOUNTER — LAB (OUTPATIENT)
Dept: LAB | Facility: HOSPITAL | Age: 54
End: 2019-09-09

## 2019-09-09 DIAGNOSIS — Z79.899 HIGH RISK MEDICATION USE: Primary | ICD-10-CM

## 2019-09-09 DIAGNOSIS — Z79.899 HIGH RISK MEDICATION USE: ICD-10-CM

## 2019-09-09 LAB
ALBUMIN SERPL-MCNC: 4.1 G/DL (ref 3.5–4.8)
ALBUMIN/GLOB SERPL: 1.4 G/DL (ref 1–1.7)
ALP SERPL-CCNC: 65 U/L (ref 32–91)
ALT SERPL W P-5'-P-CCNC: 19 U/L (ref 14–54)
ANION GAP SERPL CALCULATED.3IONS-SCNC: 13.4 MMOL/L (ref 5–15)
AST SERPL-CCNC: 25 U/L (ref 15–41)
BASOPHILS # BLD AUTO: 0.1 10*3/MM3 (ref 0–0.2)
BASOPHILS NFR BLD AUTO: 0.7 % (ref 0–1.5)
BILIRUB SERPL-MCNC: 0.5 MG/DL (ref 0.3–1.2)
BILIRUB UR QL STRIP: NEGATIVE
BUN BLD-MCNC: 12 MG/DL (ref 8–20)
BUN/CREAT SERPL: 10.9 (ref 5.4–26.2)
CALCIUM SPEC-SCNC: 9.1 MG/DL (ref 8.9–10.3)
CHLORIDE SERPL-SCNC: 103 MMOL/L (ref 101–111)
CLARITY UR: CLEAR
CO2 SERPL-SCNC: 26 MMOL/L (ref 22–32)
COLOR UR: YELLOW
CREAT BLD-MCNC: 1.1 MG/DL (ref 0.4–1)
CRP SERPL-MCNC: 1.93 MG/DL (ref 0–0.7)
DEPRECATED RDW RBC AUTO: 46.8 FL (ref 37–54)
EOSINOPHIL # BLD AUTO: 0.2 10*3/MM3 (ref 0–0.4)
EOSINOPHIL NFR BLD AUTO: 2.6 % (ref 0.3–6.2)
ERYTHROCYTE [DISTWIDTH] IN BLOOD BY AUTOMATED COUNT: 14.1 % (ref 12.3–15.4)
ERYTHROCYTE [SEDIMENTATION RATE] IN BLOOD: 11 MM/HR (ref 0–30)
GFR SERPL CREATININE-BSD FRML MDRD: 52 ML/MIN/1.73
GLOBULIN UR ELPH-MCNC: 2.9 GM/DL (ref 2.5–3.8)
GLUCOSE BLD-MCNC: 95 MG/DL (ref 65–99)
GLUCOSE UR STRIP-MCNC: NEGATIVE MG/DL
HCT VFR BLD AUTO: 40.3 % (ref 34–46.6)
HGB BLD-MCNC: 13.7 G/DL (ref 12–15.9)
HGB UR QL STRIP.AUTO: NEGATIVE
KETONES UR QL STRIP: ABNORMAL
LEUKOCYTE ESTERASE UR QL STRIP.AUTO: NEGATIVE
LYMPHOCYTES # BLD AUTO: 2.4 10*3/MM3 (ref 0.7–3.1)
LYMPHOCYTES NFR BLD AUTO: 27.8 % (ref 19.6–45.3)
MCH RBC QN AUTO: 32.1 PG (ref 26.6–33)
MCHC RBC AUTO-ENTMCNC: 33.9 G/DL (ref 31.5–35.7)
MCV RBC AUTO: 94.8 FL (ref 79–97)
MONOCYTES # BLD AUTO: 0.6 10*3/MM3 (ref 0.1–0.9)
MONOCYTES NFR BLD AUTO: 7.3 % (ref 5–12)
NEUTROPHILS # BLD AUTO: 5.3 10*3/MM3 (ref 1.7–7)
NEUTROPHILS NFR BLD AUTO: 61.6 % (ref 42.7–76)
NITRITE UR QL STRIP: NEGATIVE
NRBC BLD AUTO-RTO: 0.1 /100 WBC (ref 0–0.2)
PH UR STRIP.AUTO: 5.5 [PH] (ref 5–8)
PLATELET # BLD AUTO: 241 10*3/MM3 (ref 140–450)
PMV BLD AUTO: 8.6 FL (ref 6–12)
POTASSIUM BLD-SCNC: 4.4 MMOL/L (ref 3.6–5.1)
PROT SERPL-MCNC: 7 G/DL (ref 6.1–7.9)
PROT UR QL STRIP: NEGATIVE
RBC # BLD AUTO: 4.25 10*6/MM3 (ref 3.77–5.28)
SODIUM BLD-SCNC: 138 MMOL/L (ref 136–144)
SP GR UR STRIP: 1.02 (ref 1–1.03)
UROBILINOGEN UR QL STRIP: ABNORMAL
WBC NRBC COR # BLD: 8.6 10*3/MM3 (ref 3.4–10.8)

## 2019-09-09 PROCEDURE — 81003 URINALYSIS AUTO W/O SCOPE: CPT

## 2019-09-09 PROCEDURE — 85652 RBC SED RATE AUTOMATED: CPT

## 2019-09-09 PROCEDURE — 86140 C-REACTIVE PROTEIN: CPT

## 2019-09-09 PROCEDURE — 85025 COMPLETE CBC W/AUTO DIFF WBC: CPT

## 2019-09-09 PROCEDURE — 36415 COLL VENOUS BLD VENIPUNCTURE: CPT

## 2019-09-09 PROCEDURE — 80053 COMPREHEN METABOLIC PANEL: CPT

## 2019-09-13 RX ORDER — ATENOLOL 50 MG/1
TABLET ORAL
Qty: 30 TABLET | Refills: 2 | Status: SHIPPED | OUTPATIENT
Start: 2019-09-13 | End: 2019-12-03

## 2019-09-13 RX ORDER — PREGABALIN 150 MG/1
CAPSULE ORAL
Qty: 60 CAPSULE | Refills: 2 | Status: SHIPPED | OUTPATIENT
Start: 2019-09-13 | End: 2019-12-03

## 2019-09-13 RX ORDER — ROPINIROLE 0.5 MG/1
TABLET, FILM COATED ORAL
Qty: 30 TABLET | Refills: 0 | Status: SHIPPED | OUTPATIENT
Start: 2019-09-13 | End: 2019-10-14 | Stop reason: SDUPTHER

## 2019-09-16 ENCOUNTER — OFFICE VISIT (OUTPATIENT)
Dept: RHEUMATOLOGY | Facility: CLINIC | Age: 54
End: 2019-09-16

## 2019-09-16 VITALS
BODY MASS INDEX: 30.39 KG/M2 | HEART RATE: 64 BPM | HEIGHT: 64 IN | DIASTOLIC BLOOD PRESSURE: 77 MMHG | SYSTOLIC BLOOD PRESSURE: 121 MMHG | WEIGHT: 178 LBS

## 2019-09-16 DIAGNOSIS — M12.811 ROTATOR CUFF ARTHROPATHY OF RIGHT SHOULDER: ICD-10-CM

## 2019-09-16 DIAGNOSIS — Z79.899 LONG-TERM USE OF HIGH-RISK MEDICATION: ICD-10-CM

## 2019-09-16 DIAGNOSIS — M19.90 INFLAMMATORY ARTHRITIS: Primary | ICD-10-CM

## 2019-09-16 DIAGNOSIS — R79.89 CREATININE ELEVATION: ICD-10-CM

## 2019-09-16 DIAGNOSIS — M35.00 SECONDARY SJOGREN'S SYNDROME (HCC): ICD-10-CM

## 2019-09-16 PROCEDURE — 99214 OFFICE O/P EST MOD 30 MIN: CPT | Performed by: INTERNAL MEDICINE

## 2019-09-16 RX ORDER — SULFASALAZINE 500 MG/1
TABLET ORAL
COMMUNITY
Start: 2017-02-17 | End: 2019-12-03

## 2019-09-16 RX ORDER — FOLIC ACID 1 MG/1
1000 TABLET ORAL DAILY
Qty: 90 TABLET | Refills: 0 | Status: SHIPPED | OUTPATIENT
Start: 2019-09-16 | End: 2022-04-14

## 2019-09-16 NOTE — PROGRESS NOTES
74-year-old female with inflammatory arthritis, history of fibromyalgia syndrome, her treatment consists of methotrexate 12.5 mg p.o. once a week, sulfasalazine 1000 g p.o. twice daily and Plaquenil 300 g p.o. daily.    Today she reports arthralgia present most of the times, the pain is achy rated 5 out of 10, she has about 60 minutes of morning stiffness, worst joints are in her hands, her feet, her ankles and her right shoulder.  This pain is  on and off.  She has not noticed any major or recent joint swelling.    Review of systems is positive for dry eyes and dry mouth.  Otherwise denies fever or chills, no chest pain, shortness of breath or cough, abdominal pain, nausea, vomiting, diarrhea constipation.    Laboratories reviewed for this visit show ESR of 11, CRP 1.13, creatinine 1.1, GFR 52, CBC normal.    Social family history reviewed and unchanged.    Active Ambulatory Problems     Diagnosis Date Noted   • No Active Ambulatory Problems     Resolved Ambulatory Problems     Diagnosis Date Noted   • No Resolved Ambulatory Problems     Past Medical History:   Diagnosis Date   • Anxiety    • Back pain    • Depression    • Hyperlipidemia    • Hypertension    • Restless leg syndrome        Physical examination:  Vitals:    09/16/19 0949   BP: 121/77   Pulse: 64     GENERAL: Well-developed, well-nourished in no acute distress. Alert and oriented x3.  HEENT: Normocephalic, atraumatic. Pupils are equal, round, and reactive to light. Extraocular muscles are intact. Mucous membranes are pink and moist. Nostrils are clear.   NECK: Supple without lymphadenopathy.  LUNGS: Clear to auscultation bilaterally.  HEART: Regular rate and rhythm without murmur, rub or gallop.  CHEST: Respirations easy and unlabored.  EXTREMITIES: No cyanosis, edema or clubbing.  SKIN: Warm, dry and intact.  MSK: Multiple myofascial tender points.  She has generalized tenderness nonspecific to the joints.    Assessment:  1.  Inflammatory arthritis:  Patient currently on triple therapy.  Her examination is challenging because of her active fibromyalgia syndrome.  We will proceed with ultrasound of the hands to evaluate for subclinical synovitis.  Labs reviewed today ESR normal, CRP elevated.  For now we will continue with the same treatment with no changes.    2.  Long-term high-risk medications, medication for management of inflammatory arthritis, monitor for side effects.    3  Elevated creatinine 1.1 GFR 52.  Will monitor creatinine levels on the next visit, recommended to avoid NSAIDs.    4.  Secondary Sjogren's syndrome.  Recommended Systane eyedrops and Biotene to be used regularly.    5.  Right shoulder pain. Rotator cuff tendinopathy.  Discussed about corticosteroid injection, she declines, x-ray was insignificant.  Discussed regarding PT, she will think about it.    Plan:  Continue with sulfasalazine 1000 g p.o. twice daily  Continue Plaquenil 300 g p.o. daily  Continue methotrexate 12.5 mg once a week plus folic acid 1 mg p.o. daily  Ultrasound of the hands to look for subclinical synovitis  RTC in 3 months or sooner if needed.

## 2019-09-20 ENCOUNTER — TELEPHONE (OUTPATIENT)
Dept: FAMILY MEDICINE CLINIC | Facility: CLINIC | Age: 54
End: 2019-09-20

## 2019-09-23 RX ORDER — CARISOPRODOL 350 MG/1
TABLET ORAL
Qty: 90 TABLET | Refills: 0 | Status: SHIPPED | OUTPATIENT
Start: 2019-09-23 | End: 2019-10-21 | Stop reason: SDUPTHER

## 2019-09-24 PROBLEM — M79.7 FIBROMYALGIA: Status: ACTIVE | Noted: 2018-12-24

## 2019-10-01 RX ORDER — BUSPIRONE HYDROCHLORIDE 15 MG/1
TABLET ORAL
Qty: 60 TABLET | Refills: 3 | Status: SHIPPED | OUTPATIENT
Start: 2019-10-01 | End: 2019-12-03

## 2019-10-03 DIAGNOSIS — F41.9 ANXIETY: ICD-10-CM

## 2019-10-03 DIAGNOSIS — G89.4 CHRONIC PAIN SYNDROME: ICD-10-CM

## 2019-10-03 RX ORDER — CLONAZEPAM 1 MG/1
1 TABLET ORAL 3 TIMES DAILY PRN
Qty: 60 TABLET | Refills: 1 | Status: SHIPPED | OUTPATIENT
Start: 2019-10-03 | End: 2019-11-11 | Stop reason: SDUPTHER

## 2019-10-03 RX ORDER — OXYCODONE HYDROCHLORIDE 10 MG/1
10 TABLET ORAL EVERY 6 HOURS PRN
Qty: 120 TABLET | Refills: 0 | Status: SHIPPED | OUTPATIENT
Start: 2019-10-03 | End: 2019-11-04 | Stop reason: SDUPTHER

## 2019-10-10 DIAGNOSIS — M19.90 INFLAMMATORY ARTHRITIS: Primary | ICD-10-CM

## 2019-10-14 RX ORDER — ROPINIROLE 0.5 MG/1
TABLET, FILM COATED ORAL
Qty: 30 TABLET | Refills: 3 | Status: SHIPPED | OUTPATIENT
Start: 2019-10-14 | End: 2019-12-03

## 2019-10-21 RX ORDER — CARISOPRODOL 350 MG/1
TABLET ORAL
Qty: 90 TABLET | Refills: 0 | Status: SHIPPED | OUTPATIENT
Start: 2019-10-21 | End: 2019-11-20 | Stop reason: SDUPTHER

## 2019-10-21 RX ORDER — DULOXETIN HYDROCHLORIDE 60 MG/1
CAPSULE, DELAYED RELEASE ORAL
Qty: 30 CAPSULE | Refills: 3 | Status: SHIPPED | OUTPATIENT
Start: 2019-10-21 | End: 2019-11-13

## 2019-10-24 ENCOUNTER — OFFICE VISIT (OUTPATIENT)
Dept: FAMILY MEDICINE CLINIC | Facility: CLINIC | Age: 54
End: 2019-10-24

## 2019-10-24 VITALS
HEART RATE: 57 BPM | BODY MASS INDEX: 31.18 KG/M2 | DIASTOLIC BLOOD PRESSURE: 70 MMHG | SYSTOLIC BLOOD PRESSURE: 105 MMHG | HEIGHT: 64 IN | WEIGHT: 182.6 LBS | OXYGEN SATURATION: 96 %

## 2019-10-24 DIAGNOSIS — G89.29 CHRONIC PAIN OF BOTH SHOULDERS: Primary | ICD-10-CM

## 2019-10-24 DIAGNOSIS — M25.511 CHRONIC PAIN OF BOTH SHOULDERS: Primary | ICD-10-CM

## 2019-10-24 DIAGNOSIS — E78.5 HYPERLIPIDEMIA, UNSPECIFIED HYPERLIPIDEMIA TYPE: ICD-10-CM

## 2019-10-24 DIAGNOSIS — M25.512 CHRONIC PAIN OF BOTH SHOULDERS: Primary | ICD-10-CM

## 2019-10-24 LAB
ALBUMIN SERPL-MCNC: 4.3 G/DL (ref 3.5–5.2)
ALBUMIN/GLOB SERPL: 1.7 G/DL
ALP SERPL-CCNC: 62 U/L (ref 39–117)
ALT SERPL W P-5'-P-CCNC: 12 U/L (ref 1–33)
ANION GAP SERPL CALCULATED.3IONS-SCNC: 7.9 MMOL/L (ref 5–15)
AST SERPL-CCNC: 16 U/L (ref 1–32)
BILIRUB SERPL-MCNC: 0.2 MG/DL (ref 0.2–1.2)
BUN BLD-MCNC: 14 MG/DL (ref 6–20)
BUN/CREAT SERPL: 13 (ref 7–25)
CALCIUM SPEC-SCNC: 8.9 MG/DL (ref 8.6–10.5)
CHLORIDE SERPL-SCNC: 104 MMOL/L (ref 98–107)
CHOLEST SERPL-MCNC: 129 MG/DL (ref 0–200)
CO2 SERPL-SCNC: 28.1 MMOL/L (ref 22–29)
CREAT BLD-MCNC: 1.08 MG/DL (ref 0.57–1)
GFR SERPL CREATININE-BSD FRML MDRD: 53 ML/MIN/1.73
GLOBULIN UR ELPH-MCNC: 2.5 GM/DL
GLUCOSE BLD-MCNC: 90 MG/DL (ref 65–99)
HDLC SERPL-MCNC: 48 MG/DL (ref 40–60)
LDLC SERPL CALC-MCNC: 62 MG/DL (ref 0–100)
LDLC/HDLC SERPL: 1.29 {RATIO}
POTASSIUM BLD-SCNC: 4.6 MMOL/L (ref 3.5–5.2)
PROT SERPL-MCNC: 6.8 G/DL (ref 6–8.5)
SODIUM BLD-SCNC: 140 MMOL/L (ref 136–145)
TRIGL SERPL-MCNC: 96 MG/DL (ref 0–150)
VLDLC SERPL-MCNC: 19.2 MG/DL (ref 5–40)

## 2019-10-24 PROCEDURE — 36415 COLL VENOUS BLD VENIPUNCTURE: CPT | Performed by: FAMILY MEDICINE

## 2019-10-24 PROCEDURE — 80061 LIPID PANEL: CPT | Performed by: FAMILY MEDICINE

## 2019-10-24 PROCEDURE — 99213 OFFICE O/P EST LOW 20 MIN: CPT | Performed by: FAMILY MEDICINE

## 2019-10-24 PROCEDURE — 80053 COMPREHEN METABOLIC PANEL: CPT | Performed by: FAMILY MEDICINE

## 2019-10-24 NOTE — PROGRESS NOTES
"Subjective   Briana Alas is a 54 y.o. female.     She is in for follow-up on her chronic pain issues,  particularly in her shoulders.  She is not sleeping well due to the pain issues.  She has not had any problem with the increase in Abilify to 20 but still has some mood issues.  She has seen positive response to the Soma for spasms.           /70   Pulse 57   Ht 162.6 cm (64.02\")   Wt 82.8 kg (182 lb 9.6 oz)   SpO2 96%   BMI 31.33 kg/m²       Chief Complaint   Patient presents with   • Pain     medication 2 month f/u   • Shoulder Pain     bilateral           Current Outpatient Medications:   •  ARIPiprazole (ABILIFY) 20 MG tablet, Take 1 tablet by mouth Daily., Disp: 30 tablet, Rfl: 3  •  atenolol (TENORMIN) 50 MG tablet, TAKE ONE TABLET BY MOUTH DAILY, Disp: 30 tablet, Rfl: 2  •  atorvastatin (LIPITOR) 20 MG tablet, ATORVASTATIN CALCIUM 20 MG TABS, Disp: , Rfl:   •  busPIRone (BUSPAR) 15 MG tablet, TAKE ONE TABLET BY MOUTH TWICE A DAY, Disp: 60 tablet, Rfl: 3  •  carisoprodol (SOMA) 350 MG tablet, TAKE ONE TABLET BY MOUTH THREE TIMES A DAY AS NEEDED FOR MUSCLE SPASMS, Disp: 90 tablet, Rfl: 0  •  clonazePAM (KlonoPIN) 1 MG tablet, Take 1 tablet by mouth 3 (Three) Times a Day As Needed for Seizures., Disp: 60 tablet, Rfl: 1  •  DULoxetine (CYMBALTA) 60 MG capsule, TAKE ONE CAPSULE BY MOUTH DAILY, Disp: 30 capsule, Rfl: 3  •  folic acid (FOLVITE) 1 MG tablet, Take 1 tablet by mouth Daily., Disp: 90 tablet, Rfl: 0  •  hydroxychloroquine (PLAQUENIL) 200 MG tablet, Daily., Disp: , Rfl:   •  methotrexate 2.5 MG tablet, TAKE 5 TABLETS BY MOUTH WEEKLY ALL AT ONCE, Disp: 60 tablet, Rfl: 0  •  oxyCODONE (ROXICODONE) 10 MG tablet, Take 1 tablet by mouth Every 6 (Six) Hours As Needed for Severe Pain ., Disp: 120 tablet, Rfl: 0  •  pregabalin (LYRICA) 150 MG capsule, TAKE ONE CAPSULE BY MOUTH TWICE A DAY, Disp: 60 capsule, Rfl: 2  •  rOPINIRole (REQUIP) 0.5 MG tablet, TAKE ONE TABLET BY MOUTH EVERY NIGHT AT " BEDTIME, Disp: 30 tablet, Rfl: 3  •  sulfaSALAzine (AZULFIDINE) 500 MG tablet, sulfasalazine 500 mg tablet  Take 1 tablet twice a day by oral route for 30 days., Disp: , Rfl:         The following portions of the patient's history were reviewed and updated as appropriate: allergies, current medications, past family history, past medical history, past social history, past surgical history and problem list.    Review of Systems   Constitutional: Negative for activity change, fatigue and fever.   HENT: Negative for congestion, sinus pressure, sinus pain, sore throat and trouble swallowing.    Eyes: Negative for visual disturbance.   Respiratory: Negative for chest tightness, shortness of breath and wheezing.    Cardiovascular: Negative for chest pain.   Gastrointestinal: Negative for abdominal distention, abdominal pain, constipation, diarrhea, nausea and vomiting.   Genitourinary: Negative for difficulty urinating, dysuria, frequency and urgency.   Musculoskeletal: Negative for back pain and neck pain.   Neurological: Negative for dizziness, weakness, light-headedness and numbness.   Psychiatric/Behavioral: Negative for agitation, hallucinations, sleep disturbance and suicidal ideas.       Objective   Physical Exam   Constitutional: She is oriented to person, place, and time. She appears distressed.   Neck: Normal range of motion. Neck supple. No thyromegaly present.   Cardiovascular: Normal rate, regular rhythm and normal heart sounds.   Pulmonary/Chest: Effort normal and breath sounds normal.   Abdominal: Soft. Bowel sounds are normal.   Musculoskeletal:   Pain in both shoulders though the right is worse  Range of motion in the right shoulder is much more limited than in the left  No defect or deformity noted in either  Gait normal and independent   Neurological: She is alert and oriented to person, place, and time. She displays normal reflexes.   Psychiatric: She has a normal mood and affect.   Nursing note and  vitals reviewed.        Assessment/Plan   Problems Addressed this Visit     None      Visit Diagnoses     Chronic pain of both shoulders    -  Primary    Relevant Orders    Ambulatory Referral to Orthopedic Surgery    Hyperlipidemia, unspecified hyperlipidemia type        Relevant Orders    Comprehensive metabolic panel (Completed)    Lipid panel (Completed)        I will update appropriate labs  I am going to refer her to orthopedics for the shoulder issue  She is to call for new concerns and see me again in a few months.

## 2019-10-29 ENCOUNTER — TELEPHONE (OUTPATIENT)
Dept: FAMILY MEDICINE CLINIC | Facility: CLINIC | Age: 54
End: 2019-10-29

## 2019-10-29 NOTE — TELEPHONE ENCOUNTER
MD Venegas/ Jonathon Glover  has approved pregabalin 150 mg  thru 10/28/19 to 10/21/20  RID # [id number]

## 2019-11-04 DIAGNOSIS — G89.4 CHRONIC PAIN SYNDROME: ICD-10-CM

## 2019-11-04 RX ORDER — OXYCODONE HYDROCHLORIDE 10 MG/1
10 TABLET ORAL EVERY 6 HOURS PRN
Qty: 120 TABLET | Refills: 0 | Status: SHIPPED | OUTPATIENT
Start: 2019-11-04 | End: 2019-12-04 | Stop reason: SDUPTHER

## 2019-11-06 ENCOUNTER — OFFICE VISIT (OUTPATIENT)
Dept: ORTHOPEDIC SURGERY | Facility: CLINIC | Age: 54
End: 2019-11-06

## 2019-11-06 VITALS
WEIGHT: 182 LBS | HEART RATE: 73 BPM | DIASTOLIC BLOOD PRESSURE: 70 MMHG | HEIGHT: 64 IN | BODY MASS INDEX: 31.07 KG/M2 | SYSTOLIC BLOOD PRESSURE: 110 MMHG

## 2019-11-06 DIAGNOSIS — M25.511 RIGHT SHOULDER PAIN, UNSPECIFIED CHRONICITY: Primary | ICD-10-CM

## 2019-11-06 PROCEDURE — 99213 OFFICE O/P EST LOW 20 MIN: CPT | Performed by: ORTHOPAEDIC SURGERY

## 2019-11-06 NOTE — PROGRESS NOTES
"     Patient ID: Briana Alas is a 54 y.o. female.      Review of Systems:        Objective:    /70   Pulse 73   Ht 162.6 cm (64\")   Wt 82.6 kg (182 lb)   BMI 31.24 kg/m²     Physical Examination:         Imaging:       Assessment:        Plan:            Disclaimer: Please note that areas of this note were completed with computer voice recognition software.  Quite often unanticipated grammatical, syntax, homophones, and other interpretive errors are inadvertently transcribed by the computer software. Please excuse any errors that have escaped final proofreading.  "

## 2019-11-11 DIAGNOSIS — F41.9 ANXIETY: ICD-10-CM

## 2019-11-11 RX ORDER — CLONAZEPAM 1 MG/1
TABLET ORAL
Qty: 60 TABLET | Refills: 0 | Status: SHIPPED | OUTPATIENT
Start: 2019-11-11 | End: 2019-12-03

## 2019-11-13 ENCOUNTER — TELEPHONE (OUTPATIENT)
Dept: FAMILY MEDICINE CLINIC | Facility: CLINIC | Age: 54
End: 2019-11-13

## 2019-11-13 ENCOUNTER — APPOINTMENT (OUTPATIENT)
Dept: MRI IMAGING | Facility: HOSPITAL | Age: 54
End: 2019-11-13

## 2019-11-13 RX ORDER — DULOXETIN HYDROCHLORIDE 60 MG/1
60 CAPSULE, DELAYED RELEASE ORAL 2 TIMES DAILY
Qty: 60 CAPSULE | Refills: 3 | Status: SHIPPED | OUTPATIENT
Start: 2019-11-13 | End: 2020-04-17

## 2019-11-18 ENCOUNTER — TELEPHONE (OUTPATIENT)
Dept: RHEUMATOLOGY | Facility: CLINIC | Age: 54
End: 2019-11-18

## 2019-11-20 RX ORDER — CARISOPRODOL 350 MG/1
TABLET ORAL
Qty: 90 TABLET | Refills: 0 | Status: SHIPPED | OUTPATIENT
Start: 2019-11-20 | End: 2019-12-03

## 2019-11-21 ENCOUNTER — HOSPITAL ENCOUNTER (OUTPATIENT)
Dept: MRI IMAGING | Facility: HOSPITAL | Age: 54
Discharge: HOME | End: 2019-11-21
Admitting: ORTHOPAEDIC SURGERY

## 2019-11-21 DIAGNOSIS — M25.511 RIGHT SHOULDER PAIN, UNSPECIFIED CHRONICITY: ICD-10-CM

## 2019-11-21 PROCEDURE — 73221 MRI JOINT UPR EXTREM W/O DYE: CPT

## 2019-11-22 ENCOUNTER — TELEPHONE (OUTPATIENT)
Dept: FAMILY MEDICINE CLINIC | Facility: CLINIC | Age: 54
End: 2019-11-22

## 2019-11-25 ENCOUNTER — PREP FOR SURGERY (OUTPATIENT)
Dept: OTHER | Facility: HOSPITAL | Age: 54
End: 2019-11-25

## 2019-11-25 ENCOUNTER — OFFICE VISIT (OUTPATIENT)
Dept: ORTHOPEDIC SURGERY | Facility: CLINIC | Age: 54
End: 2019-11-25

## 2019-11-25 VITALS
HEIGHT: 64 IN | WEIGHT: 182 LBS | DIASTOLIC BLOOD PRESSURE: 64 MMHG | BODY MASS INDEX: 31.07 KG/M2 | HEART RATE: 59 BPM | SYSTOLIC BLOOD PRESSURE: 114 MMHG

## 2019-11-25 DIAGNOSIS — M75.121 COMPLETE TEAR OF RIGHT ROTATOR CUFF, UNSPECIFIED WHETHER TRAUMATIC: Primary | ICD-10-CM

## 2019-11-25 PROBLEM — M47.817 SPONDYLOSIS OF LUMBOSACRAL REGION: Status: ACTIVE | Noted: 2018-04-23

## 2019-11-25 PROBLEM — M25.569 KNEE PAIN: Status: ACTIVE | Noted: 2017-05-18

## 2019-11-25 PROBLEM — M19.90 INFLAMMATORY ARTHRITIS: Status: ACTIVE | Noted: 2017-02-17

## 2019-11-25 PROBLEM — M25.50 JOINT PAIN: Status: ACTIVE | Noted: 2017-01-26

## 2019-11-25 PROBLEM — E55.9 HYPOVITAMINOSIS D: Status: ACTIVE | Noted: 2017-02-17

## 2019-11-25 PROBLEM — M51.369 DEGENERATION OF LUMBAR INTERVERTEBRAL DISC: Status: ACTIVE | Noted: 2019-01-02

## 2019-11-25 PROBLEM — M75.80 ROTATOR CUFF TENDONITIS: Status: ACTIVE | Noted: 2017-05-23

## 2019-11-25 PROBLEM — Z87.81 HX TRAUMATIC FRACTURE: Status: ACTIVE | Noted: 2017-01-26

## 2019-11-25 PROBLEM — M47.817 OSTEOARTHRITIS OF LUMBOSACRAL SPINE WITHOUT MYELOPATHY: Status: ACTIVE | Noted: 2018-04-23

## 2019-11-25 PROBLEM — M17.9 OSTEOARTHRITIS OF KNEE: Status: ACTIVE | Noted: 2017-05-23

## 2019-11-25 PROBLEM — N39.0 URINARY TRACT INFECTION: Status: ACTIVE | Noted: 2018-01-24

## 2019-11-25 PROBLEM — J06.9 UPPER RESPIRATORY TRACT INFECTION: Status: ACTIVE | Noted: 2019-02-20

## 2019-11-25 PROBLEM — M51.36 DEGENERATION OF LUMBAR INTERVERTEBRAL DISC: Status: ACTIVE | Noted: 2019-01-02

## 2019-11-25 PROBLEM — M54.89 OTHER DORSALGIA: Status: ACTIVE | Noted: 2018-12-24

## 2019-11-25 PROBLEM — M25.511 PAIN IN RIGHT SHOULDER: Status: ACTIVE | Noted: 2019-05-13

## 2019-11-25 PROBLEM — H61.23 BILATERAL IMPACTED CERUMEN: Status: ACTIVE | Noted: 2017-11-02

## 2019-11-25 PROBLEM — M21.612 BUNION, LEFT FOOT: Status: ACTIVE | Noted: 2019-05-13

## 2019-11-25 PROBLEM — Z71.6 TOBACCO ABUSE COUNSELING: Status: ACTIVE | Noted: 2017-02-17

## 2019-11-25 PROBLEM — Z79.899 OTHER LONG TERM (CURRENT) DRUG THERAPY: Status: ACTIVE | Noted: 2018-12-24

## 2019-11-25 PROBLEM — T84.84XD PAIN DUE TO INTERNAL ORTHOPEDIC PROSTHETIC DEVICES, IMPLANTS AND GRAFTS, SUBSEQUENT ENCOUNTER: Status: ACTIVE | Noted: 2017-08-24

## 2019-11-25 PROCEDURE — 99214 OFFICE O/P EST MOD 30 MIN: CPT | Performed by: ORTHOPAEDIC SURGERY

## 2019-11-25 RX ORDER — CLINDAMYCIN PHOSPHATE 900 MG/50ML
900 INJECTION, SOLUTION INTRAVENOUS ONCE
Status: CANCELLED | OUTPATIENT
Start: 2019-11-25 | End: 2019-11-25

## 2019-11-25 NOTE — PROGRESS NOTES
"     Patient ID: Briana Alas is a 54 y.o. female.  Right shoulder pain  Continued right shoulder pain and weakness    Review of Systems:  Right shoulder pain  Denies chest pain      Objective:    /64   Pulse 59   Ht 162.6 cm (64\")   Wt 82.6 kg (182 lb)   BMI 31.24 kg/m²     Physical Examination:     She is a pleasant female in no distress. She is alert and oriented x3 and appears her stated age.  Right shoulder demonstrates no scars and no atrophy with pain in the impingement area and bicep groove.  Passive elevation 150 degrees abduction 120 degrees external rotation 30 degrees internal rotation is L5.  She has pain weakness on Speed, Rochelle, and supraspinatus testing.  Belly press and liftoff are 4/5.Sensory and motor exam are intact all distributions. Radial pulse is palpable and capillary refill is less than two seconds to all digits    Imaging:   Right shoulder demonstrates high-grade near full-thickness tears of the upper subscapularis with possible bicep instability and about 75% tear of the anterior supraspinatus    Assessment:    Right shoulder high-grade cuff tear and bicep tendinopathy    Plan:  Options discussed.  She would like to proceed with shoulder arthroscopy with possible cuff repair and possible biceps tenodesis  Risks and benefits, specifically risks of bleeding, scar, infection, fracture, stiffness, retear, nerve, tendon, artery damage, the need for further surgery, DVT, and loss of life or limb and rehab were discussed. All questions were answered and addressed.          Disclaimer: Please note that areas of this note were completed with computer voice recognition software.  Quite often unanticipated grammatical, syntax, homophones, and other interpretive errors are inadvertently transcribed by the computer software. Please excuse any errors that have escaped final proofreading.  "

## 2019-12-03 ENCOUNTER — HOSPITAL ENCOUNTER (OUTPATIENT)
Dept: GENERAL RADIOLOGY | Facility: HOSPITAL | Age: 54
Discharge: HOME | End: 2019-12-03
Admitting: ORTHOPAEDIC SURGERY

## 2019-12-03 ENCOUNTER — APPOINTMENT (OUTPATIENT)
Dept: PREADMISSION TESTING | Facility: HOSPITAL | Age: 54
End: 2019-12-03

## 2019-12-03 VITALS
DIASTOLIC BLOOD PRESSURE: 77 MMHG | BODY MASS INDEX: 31.45 KG/M2 | HEIGHT: 64 IN | SYSTOLIC BLOOD PRESSURE: 127 MMHG | OXYGEN SATURATION: 96 % | HEART RATE: 63 BPM | WEIGHT: 184.2 LBS

## 2019-12-03 DIAGNOSIS — M75.121 COMPLETE TEAR OF RIGHT ROTATOR CUFF, UNSPECIFIED WHETHER TRAUMATIC: ICD-10-CM

## 2019-12-03 LAB
ANION GAP SERPL CALCULATED.3IONS-SCNC: 9 MMOL/L (ref 5–15)
APTT PPP: 26.2 SECONDS (ref 24–31)
BASOPHILS # BLD AUTO: 0.1 10*3/MM3 (ref 0–0.2)
BASOPHILS NFR BLD AUTO: 0.7 % (ref 0–1.5)
BUN BLD-MCNC: 15 MG/DL (ref 6–20)
BUN/CREAT SERPL: 16.3 (ref 7–25)
CALCIUM SPEC-SCNC: 9.2 MG/DL (ref 8.6–10.5)
CHLORIDE SERPL-SCNC: 105 MMOL/L (ref 98–107)
CO2 SERPL-SCNC: 28 MMOL/L (ref 22–29)
CREAT BLD-MCNC: 0.92 MG/DL (ref 0.57–1)
DEPRECATED RDW RBC AUTO: 50.8 FL (ref 37–54)
EOSINOPHIL # BLD AUTO: 0.1 10*3/MM3 (ref 0–0.4)
EOSINOPHIL NFR BLD AUTO: 1.8 % (ref 0.3–6.2)
ERYTHROCYTE [DISTWIDTH] IN BLOOD BY AUTOMATED COUNT: 14.8 % (ref 12.3–15.4)
GFR SERPL CREATININE-BSD FRML MDRD: 63 ML/MIN/1.73
GLUCOSE BLD-MCNC: 93 MG/DL (ref 65–99)
HCT VFR BLD AUTO: 39 % (ref 34–46.6)
HGB BLD-MCNC: 13.2 G/DL (ref 12–15.9)
INR PPP: 1 (ref 0.9–1.1)
LYMPHOCYTES # BLD AUTO: 2.2 10*3/MM3 (ref 0.7–3.1)
LYMPHOCYTES NFR BLD AUTO: 28.5 % (ref 19.6–45.3)
MCH RBC QN AUTO: 32.7 PG (ref 26.6–33)
MCHC RBC AUTO-ENTMCNC: 33.8 G/DL (ref 31.5–35.7)
MCV RBC AUTO: 96.6 FL (ref 79–97)
MONOCYTES # BLD AUTO: 0.5 10*3/MM3 (ref 0.1–0.9)
MONOCYTES NFR BLD AUTO: 6.4 % (ref 5–12)
NEUTROPHILS # BLD AUTO: 4.8 10*3/MM3 (ref 1.7–7)
NEUTROPHILS NFR BLD AUTO: 62.6 % (ref 42.7–76)
NRBC BLD AUTO-RTO: 0 /100 WBC (ref 0–0.2)
PLATELET # BLD AUTO: 259 10*3/MM3 (ref 140–450)
PMV BLD AUTO: 8.2 FL (ref 6–12)
POTASSIUM BLD-SCNC: 4.6 MMOL/L (ref 3.5–5.2)
PROTHROMBIN TIME: 10.5 SECONDS (ref 9.6–11.7)
RBC # BLD AUTO: 4.04 10*6/MM3 (ref 3.77–5.28)
SODIUM BLD-SCNC: 142 MMOL/L (ref 136–145)
WBC NRBC COR # BLD: 7.7 10*3/MM3 (ref 3.4–10.8)

## 2019-12-03 PROCEDURE — 85025 COMPLETE CBC W/AUTO DIFF WBC: CPT | Performed by: ORTHOPAEDIC SURGERY

## 2019-12-03 PROCEDURE — 36415 COLL VENOUS BLD VENIPUNCTURE: CPT

## 2019-12-03 PROCEDURE — 71046 X-RAY EXAM CHEST 2 VIEWS: CPT

## 2019-12-03 PROCEDURE — 85610 PROTHROMBIN TIME: CPT | Performed by: ORTHOPAEDIC SURGERY

## 2019-12-03 PROCEDURE — 85730 THROMBOPLASTIN TIME PARTIAL: CPT | Performed by: ORTHOPAEDIC SURGERY

## 2019-12-03 PROCEDURE — 93005 ELECTROCARDIOGRAM TRACING: CPT

## 2019-12-03 PROCEDURE — 87081 CULTURE SCREEN ONLY: CPT | Performed by: ORTHOPAEDIC SURGERY

## 2019-12-03 PROCEDURE — 80048 BASIC METABOLIC PNL TOTAL CA: CPT | Performed by: ORTHOPAEDIC SURGERY

## 2019-12-03 RX ORDER — ATORVASTATIN CALCIUM 20 MG/1
20 TABLET, FILM COATED ORAL DAILY
COMMUNITY
End: 2020-05-26

## 2019-12-03 RX ORDER — PREGABALIN 150 MG/1
150 CAPSULE ORAL DAILY
COMMUNITY
End: 2019-12-23

## 2019-12-03 RX ORDER — BUSPIRONE HYDROCHLORIDE 15 MG/1
15 TABLET ORAL 2 TIMES DAILY
COMMUNITY
End: 2020-02-28

## 2019-12-03 RX ORDER — CARISOPRODOL 350 MG/1
350 TABLET ORAL 3 TIMES DAILY PRN
COMMUNITY
End: 2019-12-20

## 2019-12-03 RX ORDER — CLONAZEPAM 1 MG/1
1 TABLET ORAL 3 TIMES DAILY PRN
COMMUNITY
End: 2019-12-04 | Stop reason: SDUPTHER

## 2019-12-03 RX ORDER — SULFASALAZINE 500 MG/1
1000 TABLET ORAL 2 TIMES DAILY
COMMUNITY
End: 2019-12-03 | Stop reason: SDUPTHER

## 2019-12-03 RX ORDER — ATENOLOL 50 MG/1
50 TABLET ORAL DAILY
COMMUNITY
End: 2020-06-14 | Stop reason: SDUPTHER

## 2019-12-03 RX ORDER — SULFASALAZINE 500 MG/1
TABLET ORAL
Qty: 360 TABLET | Refills: 2 | Status: SHIPPED | OUTPATIENT
Start: 2019-12-03 | End: 2020-06-14

## 2019-12-03 RX ORDER — ROPINIROLE 0.5 MG/1
0.5 TABLET, FILM COATED ORAL NIGHTLY
COMMUNITY
End: 2019-12-04

## 2019-12-04 ENCOUNTER — OFFICE VISIT (OUTPATIENT)
Dept: FAMILY MEDICINE CLINIC | Facility: CLINIC | Age: 54
End: 2019-12-04

## 2019-12-04 ENCOUNTER — TELEPHONE (OUTPATIENT)
Dept: FAMILY MEDICINE CLINIC | Facility: CLINIC | Age: 54
End: 2019-12-04

## 2019-12-04 VITALS
BODY MASS INDEX: 31.41 KG/M2 | OXYGEN SATURATION: 95 % | TEMPERATURE: 98.1 F | SYSTOLIC BLOOD PRESSURE: 133 MMHG | DIASTOLIC BLOOD PRESSURE: 84 MMHG | WEIGHT: 183 LBS | HEART RATE: 61 BPM

## 2019-12-04 DIAGNOSIS — G89.4 CHRONIC PAIN SYNDROME: ICD-10-CM

## 2019-12-04 DIAGNOSIS — G89.29 CHRONIC RIGHT SHOULDER PAIN: Primary | ICD-10-CM

## 2019-12-04 DIAGNOSIS — M25.511 CHRONIC RIGHT SHOULDER PAIN: Primary | ICD-10-CM

## 2019-12-04 LAB — MRSA SPEC QL CULT: NORMAL

## 2019-12-04 PROCEDURE — 99213 OFFICE O/P EST LOW 20 MIN: CPT | Performed by: FAMILY MEDICINE

## 2019-12-04 RX ORDER — CLONAZEPAM 1 MG/1
1 TABLET ORAL 3 TIMES DAILY PRN
Qty: 90 TABLET | Refills: 1 | Status: SHIPPED | OUTPATIENT
Start: 2019-12-04 | End: 2020-01-30 | Stop reason: SDUPTHER

## 2019-12-04 RX ORDER — ROPINIROLE 0.5 MG/1
0.5 TABLET, FILM COATED ORAL 2 TIMES DAILY
Qty: 60 TABLET | Refills: 5 | Status: SHIPPED | OUTPATIENT
Start: 2019-12-04 | End: 2020-06-18

## 2019-12-04 RX ORDER — ROPINIROLE 0.5 MG/1
0.5 TABLET, FILM COATED ORAL 2 TIMES DAILY
Qty: 60 TABLET | Refills: 5 | Status: SHIPPED | OUTPATIENT
Start: 2019-12-04 | End: 2019-12-04 | Stop reason: SDUPTHER

## 2019-12-04 RX ORDER — OXYCODONE HYDROCHLORIDE 10 MG/1
10 TABLET ORAL EVERY 6 HOURS PRN
Qty: 120 TABLET | Refills: 0 | Status: SHIPPED | OUTPATIENT
Start: 2019-12-04 | End: 2020-01-02 | Stop reason: SDUPTHER

## 2019-12-04 NOTE — PROGRESS NOTES
Subjective   Briana Alas is a 54 y.o. female.     She is in with questions regarding upcoming shoulder surgery.  She has chronic pain and does not tolerate pain well at all, and she is very worried about how much pain she will be in postoperatively.  She has no new issues of note.  And her shoulder surgery is scheduled for next week, and should remedy a lot of her current pain issue.  She is due for refills on her pain medication and anxiety medication today while here.  The pain medication allows her to do her daily activities, however they are getting more difficult and that is why the need for surgery has finally arisen.           /84 (BP Location: Left arm, Patient Position: Sitting, Cuff Size: Adult)   Pulse 61   Temp 98.1 °F (36.7 °C) (Oral)   Wt 83 kg (183 lb)   SpO2 95%   BMI 31.41 kg/m²       Chief Complaint   Patient presents with   • Shoulder Pain     discuss pain med (oxycodone)/shoulder repair surg12/13           Current Outpatient Medications:   •  ARIPiprazole (ABILIFY) 20 MG tablet, Take 1 tablet by mouth Daily., Disp: 30 tablet, Rfl: 3  •  atenolol (TENORMIN) 50 MG tablet, Take 50 mg by mouth Daily., Disp: , Rfl:   •  atorvastatin (LIPITOR) 20 MG tablet, Take 20 mg by mouth Daily., Disp: , Rfl:   •  busPIRone (BUSPAR) 15 MG tablet, Take 15 mg by mouth 2 (Two) Times a Day., Disp: , Rfl:   •  carisoprodol (SOMA) 350 MG tablet, Take 350 mg by mouth 3 (Three) Times a Day As Needed for Muscle Spasms., Disp: , Rfl:   •  clonazePAM (KlonoPIN) 1 MG tablet, Take 1 tablet by mouth 3 (Three) Times a Day As Needed for Seizures., Disp: 90 tablet, Rfl: 1  •  DULoxetine (CYMBALTA) 60 MG capsule, Take 1 capsule by mouth 2 (Two) Times a Day., Disp: 60 capsule, Rfl: 3  •  folic acid (FOLVITE) 1 MG tablet, Take 1 tablet by mouth Daily., Disp: 90 tablet, Rfl: 0  •  hydroxychloroquine (PLAQUENIL) 200 MG tablet, Take 300 mg by mouth Daily., Disp: , Rfl:   •  methotrexate 2.5 MG tablet, Take 15 mg by mouth 1  (One) Time Per Week., Disp: , Rfl:   •  oxyCODONE (ROXICODONE) 10 MG tablet, Take 1 tablet by mouth Every 6 (Six) Hours As Needed for Severe Pain ., Disp: 120 tablet, Rfl: 0  •  pregabalin (LYRICA) 150 MG capsule, Take 150 mg by mouth Daily., Disp: , Rfl:   •  rOPINIRole (REQUIP) 0.5 MG tablet, Take 1 tablet by mouth 2 (Two) Times a Day. Take 1 hour before bedtime., Disp: 60 tablet, Rfl: 5  •  sulfaSALAzine (AZULFIDINE) 500 MG tablet, TAKE TWO TABLETS BY MOUTH TWICE A DAY, Disp: 360 tablet, Rfl: 2        The following portions of the patient's history were reviewed and updated as appropriate: allergies, current medications, past family history, past medical history, past social history, past surgical history and problem list.    Review of Systems   Constitutional: Negative for activity change, fatigue and fever.   HENT: Negative for congestion, sinus pressure, sinus pain, sore throat and trouble swallowing.    Eyes: Negative for visual disturbance.   Respiratory: Negative for chest tightness, shortness of breath and wheezing.    Cardiovascular: Negative for chest pain.   Gastrointestinal: Negative for abdominal distention, abdominal pain, constipation, diarrhea, nausea and vomiting.   Genitourinary: Negative for difficulty urinating, dysuria, frequency and urgency.   Musculoskeletal: Positive for arthralgias. Negative for back pain and neck pain.   Neurological: Negative for dizziness, weakness, light-headedness and numbness.   Psychiatric/Behavioral: Negative for agitation, hallucinations, sleep disturbance and suicidal ideas.       Objective   Physical Exam   Constitutional: She appears distressed.   Neck: Normal range of motion. Neck supple.   Cardiovascular: Normal rate, regular rhythm and normal heart sounds.   Pulmonary/Chest: Effort normal and breath sounds normal.   Abdominal: Soft. Bowel sounds are normal.   Musculoskeletal:   Weakness and limited range in the right shoulder   Nursing note and vitals  reviewed.        Assessment/Plan   Problems Addressed this Visit        Nervous and Auditory    Pain in right shoulder - Primary      Other Visit Diagnoses     Chronic pain syndrome        Relevant Medications    oxyCODONE (ROXICODONE) 10 MG tablet          I will continue her current medication and see her back in a few months postoperatively  She is to call for new concerns, especially if the pain postoperatively is more than what she can tolerate on current medication

## 2019-12-06 PROCEDURE — 93010 ELECTROCARDIOGRAM REPORT: CPT | Performed by: INTERNAL MEDICINE

## 2019-12-12 ENCOUNTER — ANESTHESIA EVENT (OUTPATIENT)
Dept: PERIOP | Facility: HOSPITAL | Age: 54
End: 2019-12-12

## 2019-12-12 DIAGNOSIS — M75.121 COMPLETE TEAR OF RIGHT ROTATOR CUFF, UNSPECIFIED WHETHER TRAUMATIC: Primary | ICD-10-CM

## 2019-12-12 RX ORDER — PROMETHAZINE HYDROCHLORIDE 25 MG/1
25 TABLET ORAL EVERY 6 HOURS PRN
Qty: 21 TABLET | Refills: 1 | Status: SHIPPED | OUTPATIENT
Start: 2019-12-12 | End: 2020-03-23

## 2019-12-12 RX ORDER — CLINDAMYCIN HYDROCHLORIDE 300 MG/1
600 CAPSULE ORAL 3 TIMES DAILY
Qty: 6 CAPSULE | Refills: 0 | Status: ON HOLD | OUTPATIENT
Start: 2019-12-12 | End: 2019-12-13

## 2019-12-12 RX ORDER — OXYCODONE AND ACETAMINOPHEN 10; 325 MG/1; MG/1
1 TABLET ORAL EVERY 6 HOURS PRN
Qty: 28 TABLET | Refills: 0 | Status: ON HOLD | OUTPATIENT
Start: 2019-12-12 | End: 2019-12-13

## 2019-12-12 RX ORDER — NAPROXEN 500 MG/1
500 TABLET ORAL 2 TIMES DAILY WITH MEALS
Qty: 28 TABLET | Refills: 0 | Status: ON HOLD | OUTPATIENT
Start: 2019-12-12 | End: 2019-12-13

## 2019-12-12 NOTE — H&P
Patient Care Team:  Kamran Garcia MD as PCP - General  Kamran Garcia MD as PCP - Family Medicine    Chief complaint right shoulder pain  Briana is a 54-year-old female with right shoulder pain for the majority of this year.  She is now respond to conservative treatment and is presenting for shoulder arthroscopy    Review of Systems   Respiratory: Negative for chest tightness.    Musculoskeletal: Positive for arthralgias.        Past Medical History:   Diagnosis Date   • Anxiety    • Arthritis    • Back pain    • Depression    • Hyperlipidemia    • Hypertension    • Restless leg syndrome      Past Surgical History:   Procedure Laterality Date   • ANKLE OPEN REDUCTION INTERNAL FIXATION Right    • FOOT SURGERY Bilateral     bunionectomy   • TUBAL ABDOMINAL LIGATION       Family History   Problem Relation Age of Onset   • Hypertension Mother    • Hyperlipidemia Mother    • Depression Mother    • Thyroid disease Mother    • Hypertension Father      Social History     Tobacco Use   • Smoking status: Current Every Day Smoker     Packs/day: 0.50     Types: Cigarettes   • Smokeless tobacco: Never Used   Substance Use Topics   • Alcohol use: No     Frequency: Never   • Drug use: No     No medications prior to admission.     Allergies:  Penicillins    She is a pleasant female in no distress. She is alert and oriented x3 and appears her stated age.  Right shoulder demonstrates no scars and no atrophy with pain in the impingement area and bicep groove.  Passive elevation 150 degrees abduction 120 degrees external rotation 30 degrees internal rotation is L5.  She has pain weakness on Speed, Baker, and supraspinatus testing.  Belly press and liftoff are 4/5.Sensory and motor exam are intact all distributions. Radial pulse is palpable and capillary refill is less than two seconds to all digits     Imaging:   Right shoulder demonstrates high-grade near full-thickness tears of the upper subscapularis  with possible bicep instability and about 75% tear of the anterior supraspinatus     Assessment:    Right shoulder high-grade cuff tear and bicep tendinopathy     Plan:  Options discussed.  She would like to proceed with shoulder arthroscopy with possible cuff repair and possible biceps tenodesis  Risks and benefits, specifically risks of bleeding, scar, infection, fracture, stiffness, retear, nerve, tendon, artery damage, the need for further surgery, DVT, and loss of life or limb and rehab were discussed. All questions were answered and addressed.

## 2019-12-13 ENCOUNTER — ANESTHESIA (OUTPATIENT)
Dept: PERIOP | Facility: HOSPITAL | Age: 54
End: 2019-12-13

## 2019-12-13 ENCOUNTER — HOSPITAL ENCOUNTER (OUTPATIENT)
Facility: HOSPITAL | Age: 54
Setting detail: HOSPITAL OUTPATIENT SURGERY
Discharge: HOME | End: 2019-12-13
Attending: ORTHOPAEDIC SURGERY | Admitting: ORTHOPAEDIC SURGERY

## 2019-12-13 VITALS
RESPIRATION RATE: 16 BRPM | DIASTOLIC BLOOD PRESSURE: 58 MMHG | BODY MASS INDEX: 31.39 KG/M2 | TEMPERATURE: 97.1 F | SYSTOLIC BLOOD PRESSURE: 105 MMHG | HEIGHT: 64 IN | WEIGHT: 183.86 LBS | OXYGEN SATURATION: 99 % | HEART RATE: 58 BPM

## 2019-12-13 DIAGNOSIS — M75.121 COMPLETE TEAR OF RIGHT ROTATOR CUFF, UNSPECIFIED WHETHER TRAUMATIC: ICD-10-CM

## 2019-12-13 PROCEDURE — 29827 SHO ARTHRS SRG RT8TR CUF RPR: CPT | Performed by: ORTHOPAEDIC SURGERY

## 2019-12-13 PROCEDURE — 25010000002 ONDANSETRON PER 1 MG: Performed by: ANESTHESIOLOGIST ASSISTANT

## 2019-12-13 PROCEDURE — 25010000002 EPINEPHRINE PER 0.1 MG: Performed by: ORTHOPAEDIC SURGERY

## 2019-12-13 PROCEDURE — L3660 SO 8 AB RSTR CAN/WEB PRE OTS: HCPCS | Performed by: ORTHOPAEDIC SURGERY

## 2019-12-13 PROCEDURE — 25010000002 MIDAZOLAM PER 1 MG: Performed by: ANESTHESIOLOGY

## 2019-12-13 PROCEDURE — 29826 SHO ARTHRS SRG DECOMPRESSION: CPT | Performed by: ORTHOPAEDIC SURGERY

## 2019-12-13 PROCEDURE — C1713 ANCHOR/SCREW BN/BN,TIS/BN: HCPCS | Performed by: ORTHOPAEDIC SURGERY

## 2019-12-13 PROCEDURE — 25010000002 KETOROLAC TROMETHAMINE PER 15 MG: Performed by: ORTHOPAEDIC SURGERY

## 2019-12-13 PROCEDURE — 25010000002 DEXAMETHASONE PER 1 MG: Performed by: ANESTHESIOLOGY

## 2019-12-13 PROCEDURE — 25010000002 ROPIVACAINE PER 1 MG: Performed by: ANESTHESIOLOGY

## 2019-12-13 PROCEDURE — 94002 VENT MGMT INPAT INIT DAY: CPT

## 2019-12-13 PROCEDURE — 25010000002 DEXAMETHASONE PER 1 MG: Performed by: ANESTHESIOLOGIST ASSISTANT

## 2019-12-13 PROCEDURE — 94799 UNLISTED PULMONARY SVC/PX: CPT

## 2019-12-13 PROCEDURE — 25010000002 PROPOFOL 10 MG/ML EMULSION: Performed by: ANESTHESIOLOGIST ASSISTANT

## 2019-12-13 DEVICE — SUT/ANCH BIOCOMP SWIVELOCK/C 4.75X19.1MM: Type: IMPLANTABLE DEVICE | Site: SHOULDER | Status: FUNCTIONAL

## 2019-12-13 DEVICE — SUT FIBERTAPE FW 2MM 54IN BLU AR7237: Type: IMPLANTABLE DEVICE | Site: SHOULDER | Status: FUNCTIONAL

## 2019-12-13 DEVICE — SUT FIBERLINK BR PB NO2 W/CLSDLP 1.5IN: Type: IMPLANTABLE DEVICE | Site: SHOULDER | Status: FUNCTIONAL

## 2019-12-13 RX ORDER — SODIUM CHLORIDE 0.9 % (FLUSH) 0.9 %
10 SYRINGE (ML) INJECTION EVERY 12 HOURS SCHEDULED
Status: DISCONTINUED | OUTPATIENT
Start: 2019-12-13 | End: 2019-12-13 | Stop reason: HOSPADM

## 2019-12-13 RX ORDER — MIDAZOLAM HYDROCHLORIDE 1 MG/ML
INJECTION INTRAMUSCULAR; INTRAVENOUS
Status: COMPLETED | OUTPATIENT
Start: 2019-12-13 | End: 2019-12-13

## 2019-12-13 RX ORDER — DEXAMETHASONE SODIUM PHOSPHATE 4 MG/ML
INJECTION, SOLUTION INTRA-ARTICULAR; INTRALESIONAL; INTRAMUSCULAR; INTRAVENOUS; SOFT TISSUE AS NEEDED
Status: DISCONTINUED | OUTPATIENT
Start: 2019-12-13 | End: 2019-12-13 | Stop reason: SURG

## 2019-12-13 RX ORDER — PROMETHAZINE HYDROCHLORIDE 25 MG/1
25 TABLET ORAL ONCE AS NEEDED
Status: DISCONTINUED | OUTPATIENT
Start: 2019-12-13 | End: 2019-12-13 | Stop reason: HOSPADM

## 2019-12-13 RX ORDER — PROMETHAZINE HYDROCHLORIDE 25 MG/1
25 SUPPOSITORY RECTAL ONCE AS NEEDED
Status: DISCONTINUED | OUTPATIENT
Start: 2019-12-13 | End: 2019-12-13 | Stop reason: HOSPADM

## 2019-12-13 RX ORDER — SODIUM CHLORIDE 0.9 % (FLUSH) 0.9 %
10 SYRINGE (ML) INJECTION AS NEEDED
Status: DISCONTINUED | OUTPATIENT
Start: 2019-12-13 | End: 2019-12-13 | Stop reason: HOSPADM

## 2019-12-13 RX ORDER — CLINDAMYCIN PHOSPHATE 900 MG/50ML
900 INJECTION, SOLUTION INTRAVENOUS ONCE
Status: COMPLETED | OUTPATIENT
Start: 2019-12-13 | End: 2019-12-13

## 2019-12-13 RX ORDER — NALOXONE HCL 0.4 MG/ML
0.4 VIAL (ML) INJECTION AS NEEDED
Status: DISCONTINUED | OUTPATIENT
Start: 2019-12-13 | End: 2019-12-13 | Stop reason: HOSPADM

## 2019-12-13 RX ORDER — MEPERIDINE HYDROCHLORIDE 25 MG/ML
12.5 INJECTION INTRAMUSCULAR; INTRAVENOUS; SUBCUTANEOUS
Status: DISCONTINUED | OUTPATIENT
Start: 2019-12-13 | End: 2019-12-13 | Stop reason: HOSPADM

## 2019-12-13 RX ORDER — LIDOCAINE HYDROCHLORIDE 10 MG/ML
INJECTION, SOLUTION EPIDURAL; INFILTRATION; INTRACAUDAL; PERINEURAL AS NEEDED
Status: DISCONTINUED | OUTPATIENT
Start: 2019-12-13 | End: 2019-12-13 | Stop reason: SURG

## 2019-12-13 RX ORDER — PROMETHAZINE HYDROCHLORIDE 25 MG/ML
6.25 INJECTION, SOLUTION INTRAMUSCULAR; INTRAVENOUS ONCE AS NEEDED
Status: DISCONTINUED | OUTPATIENT
Start: 2019-12-13 | End: 2019-12-13 | Stop reason: HOSPADM

## 2019-12-13 RX ORDER — PHENYLEPHRINE HCL IN 0.9% NACL 0.5 MG/5ML
SYRINGE (ML) INTRAVENOUS AS NEEDED
Status: DISCONTINUED | OUTPATIENT
Start: 2019-12-13 | End: 2019-12-13 | Stop reason: SURG

## 2019-12-13 RX ORDER — SODIUM CHLORIDE, SODIUM LACTATE, POTASSIUM CHLORIDE, CALCIUM CHLORIDE 600; 310; 30; 20 MG/100ML; MG/100ML; MG/100ML; MG/100ML
9 INJECTION, SOLUTION INTRAVENOUS CONTINUOUS PRN
Status: DISCONTINUED | OUTPATIENT
Start: 2019-12-13 | End: 2019-12-13 | Stop reason: HOSPADM

## 2019-12-13 RX ORDER — ONDANSETRON 2 MG/ML
4 INJECTION INTRAMUSCULAR; INTRAVENOUS ONCE AS NEEDED
Status: DISCONTINUED | OUTPATIENT
Start: 2019-12-13 | End: 2019-12-13 | Stop reason: HOSPADM

## 2019-12-13 RX ORDER — EPINEPHRINE 1 MG/ML
INJECTION, SOLUTION, CONCENTRATE INTRAVENOUS AS NEEDED
Status: DISCONTINUED | OUTPATIENT
Start: 2019-12-13 | End: 2019-12-13 | Stop reason: HOSPADM

## 2019-12-13 RX ORDER — IPRATROPIUM BROMIDE AND ALBUTEROL SULFATE 2.5; .5 MG/3ML; MG/3ML
3 SOLUTION RESPIRATORY (INHALATION) ONCE AS NEEDED
Status: DISCONTINUED | OUTPATIENT
Start: 2019-12-13 | End: 2019-12-13 | Stop reason: HOSPADM

## 2019-12-13 RX ORDER — ROCURONIUM BROMIDE 10 MG/ML
INJECTION, SOLUTION INTRAVENOUS AS NEEDED
Status: DISCONTINUED | OUTPATIENT
Start: 2019-12-13 | End: 2019-12-13 | Stop reason: SURG

## 2019-12-13 RX ORDER — NEOSTIGMINE METHYLSULFATE 5 MG/5 ML
SYRINGE (ML) INTRAVENOUS AS NEEDED
Status: DISCONTINUED | OUTPATIENT
Start: 2019-12-13 | End: 2019-12-13 | Stop reason: SURG

## 2019-12-13 RX ORDER — MORPHINE SULFATE 4 MG/ML
4 INJECTION, SOLUTION INTRAMUSCULAR; INTRAVENOUS
Status: DISCONTINUED | OUTPATIENT
Start: 2019-12-13 | End: 2019-12-13 | Stop reason: HOSPADM

## 2019-12-13 RX ORDER — PROPOFOL 10 MG/ML
VIAL (ML) INTRAVENOUS AS NEEDED
Status: DISCONTINUED | OUTPATIENT
Start: 2019-12-13 | End: 2019-12-13 | Stop reason: SURG

## 2019-12-13 RX ORDER — GLYCOPYRROLATE 1 MG/5 ML
SYRINGE (ML) INTRAVENOUS AS NEEDED
Status: DISCONTINUED | OUTPATIENT
Start: 2019-12-13 | End: 2019-12-13 | Stop reason: SURG

## 2019-12-13 RX ORDER — LABETALOL HYDROCHLORIDE 5 MG/ML
5 INJECTION, SOLUTION INTRAVENOUS
Status: DISCONTINUED | OUTPATIENT
Start: 2019-12-13 | End: 2019-12-13 | Stop reason: HOSPADM

## 2019-12-13 RX ORDER — DEXAMETHASONE SODIUM PHOSPHATE 4 MG/ML
INJECTION, SOLUTION INTRA-ARTICULAR; INTRALESIONAL; INTRAMUSCULAR; INTRAVENOUS; SOFT TISSUE
Status: COMPLETED | OUTPATIENT
Start: 2019-12-13 | End: 2019-12-13

## 2019-12-13 RX ORDER — EPHEDRINE SULFATE/0.9% NACL/PF 25 MG/5 ML
SYRINGE (ML) INTRAVENOUS AS NEEDED
Status: DISCONTINUED | OUTPATIENT
Start: 2019-12-13 | End: 2019-12-13 | Stop reason: SURG

## 2019-12-13 RX ORDER — KETOROLAC TROMETHAMINE 30 MG/ML
INJECTION, SOLUTION INTRAMUSCULAR; INTRAVENOUS AS NEEDED
Status: DISCONTINUED | OUTPATIENT
Start: 2019-12-13 | End: 2019-12-13 | Stop reason: HOSPADM

## 2019-12-13 RX ORDER — OXYCODONE HYDROCHLORIDE 5 MG/1
7.5 TABLET ORAL ONCE AS NEEDED
Status: DISCONTINUED | OUTPATIENT
Start: 2019-12-13 | End: 2019-12-13 | Stop reason: HOSPADM

## 2019-12-13 RX ORDER — ROPIVACAINE HYDROCHLORIDE 5 MG/ML
INJECTION, SOLUTION EPIDURAL; INFILTRATION; PERINEURAL
Status: COMPLETED | OUTPATIENT
Start: 2019-12-13 | End: 2019-12-13

## 2019-12-13 RX ORDER — MORPHINE SULFATE 4 MG/ML
2 INJECTION, SOLUTION INTRAMUSCULAR; INTRAVENOUS
Status: DISCONTINUED | OUTPATIENT
Start: 2019-12-13 | End: 2019-12-13 | Stop reason: HOSPADM

## 2019-12-13 RX ORDER — HYDRALAZINE HYDROCHLORIDE 20 MG/ML
5 INJECTION INTRAMUSCULAR; INTRAVENOUS
Status: DISCONTINUED | OUTPATIENT
Start: 2019-12-13 | End: 2019-12-13 | Stop reason: HOSPADM

## 2019-12-13 RX ORDER — MORPHINE SULFATE 4 MG/ML
1 INJECTION, SOLUTION INTRAMUSCULAR; INTRAVENOUS
Status: DISCONTINUED | OUTPATIENT
Start: 2019-12-13 | End: 2019-12-13 | Stop reason: HOSPADM

## 2019-12-13 RX ORDER — ONDANSETRON 2 MG/ML
INJECTION INTRAMUSCULAR; INTRAVENOUS AS NEEDED
Status: DISCONTINUED | OUTPATIENT
Start: 2019-12-13 | End: 2019-12-13 | Stop reason: SURG

## 2019-12-13 RX ORDER — FLUMAZENIL 0.1 MG/ML
0.2 INJECTION INTRAVENOUS AS NEEDED
Status: DISCONTINUED | OUTPATIENT
Start: 2019-12-13 | End: 2019-12-13 | Stop reason: HOSPADM

## 2019-12-13 RX ADMIN — SODIUM CHLORIDE, SODIUM LACTATE, POTASSIUM CHLORIDE, AND CALCIUM CHLORIDE: 600; 310; 30; 20 INJECTION, SOLUTION INTRAVENOUS at 10:15

## 2019-12-13 RX ADMIN — ONDANSETRON 4 MG: 2 INJECTION INTRAMUSCULAR; INTRAVENOUS at 10:35

## 2019-12-13 RX ADMIN — PHENYLEPHRINE HYDROCHLORIDE 200 MCG: 10 INJECTION INTRAVENOUS at 10:04

## 2019-12-13 RX ADMIN — MIDAZOLAM 2 MG: 1 INJECTION INTRAMUSCULAR; INTRAVENOUS at 08:41

## 2019-12-13 RX ADMIN — ROCURONIUM BROMIDE 40 MG: 10 INJECTION, SOLUTION INTRAVENOUS at 09:40

## 2019-12-13 RX ADMIN — PHENYLEPHRINE HYDROCHLORIDE 100 MCG: 10 INJECTION INTRAVENOUS at 10:36

## 2019-12-13 RX ADMIN — Medication 4 MG: at 10:35

## 2019-12-13 RX ADMIN — PHENYLEPHRINE HYDROCHLORIDE 100 MCG: 10 INJECTION INTRAVENOUS at 09:55

## 2019-12-13 RX ADMIN — PHENYLEPHRINE HYDROCHLORIDE 100 MCG: 10 INJECTION INTRAVENOUS at 10:23

## 2019-12-13 RX ADMIN — Medication 25 MG: at 09:54

## 2019-12-13 RX ADMIN — SODIUM CHLORIDE, SODIUM LACTATE, POTASSIUM CHLORIDE, AND CALCIUM CHLORIDE 9 ML/HR: 600; 310; 30; 20 INJECTION, SOLUTION INTRAVENOUS at 08:56

## 2019-12-13 RX ADMIN — LIDOCAINE HYDROCHLORIDE 50 MG: 10 INJECTION, SOLUTION EPIDURAL; INFILTRATION; INTRACAUDAL; PERINEURAL at 09:40

## 2019-12-13 RX ADMIN — PROPOFOL 200 MG: 10 INJECTION, EMULSION INTRAVENOUS at 09:40

## 2019-12-13 RX ADMIN — Medication 0.8 MG: at 10:35

## 2019-12-13 RX ADMIN — DEXAMETHASONE SODIUM PHOSPHATE 4 MG: 4 INJECTION, SOLUTION INTRAMUSCULAR; INTRAVENOUS at 09:45

## 2019-12-13 RX ADMIN — DEXAMETHASONE SODIUM PHOSPHATE 4 MG: 4 INJECTION, SOLUTION INTRAMUSCULAR; INTRAVENOUS at 08:41

## 2019-12-13 RX ADMIN — CLINDAMYCIN PHOSPHATE 900 MG: 900 INJECTION, SOLUTION INTRAVENOUS at 09:38

## 2019-12-13 RX ADMIN — PHENYLEPHRINE HYDROCHLORIDE 100 MCG: 10 INJECTION INTRAVENOUS at 10:20

## 2019-12-13 RX ADMIN — Medication 0.2 MG: at 09:40

## 2019-12-13 RX ADMIN — ROPIVACAINE HYDROCHLORIDE 30 ML: 5 INJECTION, SOLUTION EPIDURAL; INFILTRATION; PERINEURAL at 08:41

## 2019-12-13 NOTE — ANESTHESIA POSTPROCEDURE EVALUATION
Patient: Briana Alas    Procedure Summary     Date:  12/13/19 Room / Location:  [facility] OR 08 / [facility] MAIN OR    Anesthesia Start:  0931 Anesthesia Stop:  1106    Procedure:  RIGHT SHOULDER ARTHROSCOPY WITH ROTATOR CUFF REPAIR and Subacromial decompression (Right Shoulder) Diagnosis:       Complete tear of right rotator cuff, unspecified whether traumatic      (Complete tear of right rotator cuff, unspecified whether traumatic [M75.121])    Surgeon:  Gino Ackerman MD Provider:  Feliz Henning MD    Anesthesia Type:  general, general with block ASA Status:  2          Anesthesia Type: general, general with block    Vitals  Vitals Value Taken Time   /41 12/13/2019 11:21 AM   Temp     Pulse 60 12/13/2019 11:24 AM   Resp 22 12/13/2019 11:15 AM   SpO2 95 % 12/13/2019 11:24 AM   Vitals shown include unvalidated device data.        Post Anesthesia Care and Evaluation    Patient location during evaluation: bedside  Patient participation: complete - patient participated  Level of consciousness: awake and alert  Pain score: 1  Pain management: adequate  Airway patency: patent  Anesthetic complications: No anesthetic complications  PONV Status: none  Cardiovascular status: acceptable  Respiratory status: acceptable  Hydration status: acceptable  Post Neuraxial Block status: Motor and sensory function returned to baseline

## 2019-12-13 NOTE — DISCHARGE INSTRUCTIONS
Follow up in 3 days, stay in sling, DO NOT remove dressing, keep it clean dry and intact, ice affected shoulder every 2 hours

## 2019-12-13 NOTE — ANESTHESIA PROCEDURE NOTES
Airway  Urgency: elective    Date/Time: 12/13/2019 9:42 AM  Airway not difficult    General Information and Staff    Patient location during procedure: OR  Anesthesiologist: Feliz Henning MD  CRNA: Greer Nieto AA    Indications and Patient Condition  Indications for airway management: airway protection    Preoxygenated: yes  Mask difficulty assessment: 1 - vent by mask    Final Airway Details  Final airway type: endotracheal airway      Successful airway: ETT  Cuffed: yes   Successful intubation technique: direct laryngoscopy  Endotracheal tube insertion site: oral  Blade: Wayne  Blade size: 4  ETT size (mm): 7.0  Cormack-Lehane Classification: grade I - full view of glottis  Placement verified by: chest auscultation and capnometry   Measured from: teeth  ETT/EBT  to teeth (cm): 20  Number of attempts at approach: 1  Assessment: lips, teeth, and gum same as pre-op and atraumatic intubation

## 2019-12-13 NOTE — ANESTHESIA PREPROCEDURE EVALUATION
Anesthesia Evaluation     Patient summary reviewed and Nursing notes reviewed   NPO Solid Status: > 6 hours  NPO Liquid Status: > 6 hours           Airway   Mallampati: II  TM distance: >3 FB  Neck ROM: full  No difficulty expected  Dental - normal exam     Pulmonary - normal exam    breath sounds clear to auscultation  (+) recent URI,   Cardiovascular - normal exam    ECG reviewed  Rhythm: regular  Rate: normal    (+) hypertension, hyperlipidemia,       Neuro/Psych  (+) numbness, psychiatric history,     GI/Hepatic/Renal/Endo - negative ROS     Musculoskeletal     (+) back pain, neck pain,   Abdominal  - normal exam    Abdomen: soft.  Bowel sounds: normal.   Substance History - negative use     OB/GYN negative ob/gyn ROS         Other   arthritis,                      Anesthesia Plan    ASA 2     general and general with block     intravenous induction     Anesthetic plan, all risks, benefits, and alternatives have been provided, discussed and informed consent has been obtained with: patient.  Use of blood products discussed with patient .   Plan discussed with attending.

## 2019-12-13 NOTE — ANESTHESIA PROCEDURE NOTES
Peripheral Block      Patient reassessed immediately prior to procedure    Patient location during procedure: pre-op  Start time: 12/13/2019 8:20 AM  Stop time: 12/13/2019 8:30 AM  Reason for block: procedure for pain, at surgeon's request and post-op pain management  Performed by  Anesthesiologist: Feliz Henning MD  Assisted by: Josemanuel Thompson RN  Preanesthetic Checklist  Completed: patient identified, site marked, surgical consent, pre-op evaluation, timeout performed, IV checked, risks and benefits discussed and monitors and equipment checked  Prep:  Pt Position: supine  Sterile barriers:cap, gloves, sterile barriers, mask and gown  Prep: ChloraPrep  Patient monitoring: blood pressure monitoring, continuous pulse oximetry and EKG  Procedure  Sedation:yes  Performed under: local infiltration  Guidance:ultrasound guided  ULTRASOUND INTERPRETATION. Using ultrasound guidance a 20 G gauge needle was placed in close proximity to the nerve, at which point, under ultrasound guidance anesthetic was injected in the area of the nerve and spread of the anesthesia was seen on ultrasound in close proximity thereto.  There were no abnormalities seen on ultrasound; a digital image was taken; and the patient tolerated the procedure with no complications. Images:still images obtained, printed/placed on chart    Laterality:right  Block Type:supraclavicular  Injection Technique:single-shot  Needle Type:echogenic  Needle Gauge:20 G  Resistance on Injection: less than 15 psi  Sedation medications used: midazolam (VERSED) injection, 2 mg  Medications Used: ropivacaine (NAROPIN) 0.5 % injection, 30 mL  dexamethasone (DECADRON) injection, 4 mg      Post Assessment  Injection Assessment: negative aspiration for heme, no paresthesia on injection and incremental injection  Patient Tolerance:comfortable throughout block  Complications:no

## 2019-12-13 NOTE — OP NOTE
SHOULDER ARTHROSCOPY WITH ROTATOR CUFF REPAIR  Procedure Report    Patient Name:  Briana Alas  YOB: 1965    Date of Surgery:  12/13/2019     Indications: This is a 54 y.o. female with pain to the right shoulder.  Imaging demonstrated rotator cuff tear.Treatment options were discussed.  They desired to proceed with shoulder arthroscopy with rotator cuff repair after discussing the risks including bleeding, scarring, infection, stiffness, nerve damage, tendon damage, artery damage, continued pain, DVT, loss of life or limb, and a need for further surgery.      Pre-op Diagnosis:   Complete tear of right rotator cuff, unspecified whether traumatic [M75.121]       Post-Op Diagnosis Codes:     Same plus impingement    Procedure/CPT® Codes: [id number]    Procedure(s):  RIGHT SHOULDER ARTHROSCOPY WITH ROTATOR CUFF REPAIR and Subacromial decompression    Assistant: Shahid Johnson certified first assist    Assistant: Wilbur Johnson CSA    Anesthesia: General with Block    IV fluids: See anesthesia record    Estimated Blood Loss: minimal    Implants:    Implant Name Type Inv. Item Serial No.  Lot No. LRB No. Used   SUT FIBERLINK BR PB NO2 W/CLSDLP 1.5IN - [id number] Implant SUT FIBERLINK BR PB NO2 W/CLSDLP 1.5IN  [employer] . Right 2   SUT/ANCH BIOCOMP SWIVELOCK/C 4.75X19.1MM - [id number] Implant SUT/ANCH BIOCOMP SWIVELOCK/C 4.75X19.1MM  [employer] [id number] Right 1   SUT FIBERTAPE FW 2MM 54IN BRIAN [id number] - [id number] Implant SUT FIBERTAPE FW 2MM 54IN BRIAN [id number]  [employer] . Right 1   SUT/ANCH BIOCOMP SWIVELOCK/C 4.75X19.1MM - [id number] Implant SUT/ANCH BIOCOMP SWIVELOCK/C 4.75X19.1MM  [employer] [id number] Right 1         Complications: None    Description of Procedure: The patient's operative site was marked.  Regional anesthesia was administered.  They were brought to the operating room and placed  on the operating room table.  General anesthesia was administered. Antibiotics were dosed.  A timeout was  taken, confirming the correct operative site and procedure.  Exam under anesthesia indicated full range of motion and no instability.  They were placed in a semilateral position.  An axillary roll and SCDs were placed.  The right shoulder was prepped and draped in the standard surgical fashion.  The shoulder was injected with local anesthetic and was placed into traction.  A posterior portal was created.  A camera was inserted.  The glenoid chondral surface was intact.  The humerus surface was intact.  The subscapularis was torn at its upper border.  The biceps was intact.  The labrum was mildly frayed anteriorly.  The undersurface of the cuff demonstrated full-thickness supraspinatus tear. A shaver was inserted.  The labrum was debrided.  The biceps was pulled into the shoulder and found to be intact.  The axillary pouch was free of synovitis or loose bodies.  The upper subscapularis was debrided to stable tissue and a 3 sided release was performed.  Tuberosity was skeletonized to bleeding bone and a fiber tape suture placed at the upper rolled border, attached to an anchor and placed into the lesser tuberosity restoring the tendon to its footprint. The instruments were placed in the subacromial space.  A full-thickness bursectomy was performed.  The CA ligament was actually torn and resected revealing an underlying acromial spur which was removed with a bur to complete the acromioplasty.  An accessory portal was created. Tear was visualized and prepared.  It was a 1.5 cm crescent shape tear. Tuberosity skeletonized and a microfracture performed.   Fiber tape suture as well as a fiber link suture were used to create a mattress and ripstop configuration. These were secured to a anchor off the lateral edge of the tuberosity restoring the tendon to its footprint. The tuberosity was skeletonized to bleeding bone.   The instruments were removed.  The wounds were closed with suture and Steri-Strips.    30 mg of Toradol  and lidocaine were injected into the deltoid  and a sterile dressing was applied to the rest of the shoulder.  They were placed in a sling and taken to the recovery room.  There were no complications.  I was present for all portions.  All counts were correct.  Good capillary refill was noted to the hand.      Gino Ackerman MD     Date: 12/13/2019  Time: 10:44 AM

## 2019-12-14 RX ORDER — ATENOLOL 50 MG/1
TABLET ORAL
Qty: 30 TABLET | Refills: 3 | Status: SHIPPED | OUTPATIENT
Start: 2019-12-14 | End: 2020-05-04 | Stop reason: SDUPTHER

## 2019-12-16 ENCOUNTER — OFFICE VISIT (OUTPATIENT)
Dept: ORTHOPEDIC SURGERY | Facility: CLINIC | Age: 54
End: 2019-12-16

## 2019-12-16 VITALS
BODY MASS INDEX: 31.24 KG/M2 | WEIGHT: 183 LBS | HEIGHT: 64 IN | DIASTOLIC BLOOD PRESSURE: 75 MMHG | HEART RATE: 69 BPM | SYSTOLIC BLOOD PRESSURE: 129 MMHG

## 2019-12-16 DIAGNOSIS — Z47.89 ORTHOPEDIC AFTERCARE: Primary | ICD-10-CM

## 2019-12-16 DIAGNOSIS — M75.121 COMPLETE TEAR OF RIGHT ROTATOR CUFF, UNSPECIFIED WHETHER TRAUMATIC: ICD-10-CM

## 2019-12-16 PROCEDURE — 99024 POSTOP FOLLOW-UP VISIT: CPT | Performed by: ORTHOPAEDIC SURGERY

## 2019-12-16 NOTE — PROGRESS NOTES
"     Patient ID: Briana Alas is a 54 y.o. female.  12/13/19 right shoulder arthroscopy SAD, upper subscap repair, suprasprinatus repair  Pain controlled      Objective:    /75   Pulse 69   Ht 162.6 cm (64\")   Wt 83 kg (183 lb)   BMI 31.41 kg/m²     Physical Examination:    Wounds are well approximated without infection.  Sensory and motor exam are intact all distributions. Radial pulse is palpable and capillary refill is less than two seconds to all digits    Imaging:      Assessment:  Doing well after cuff repair    Plan:  Wounds were cleaned and redressed. Restrictions discussed.  Begin therapy and see me in 4 weeks.  Remove dressings in two weeks  "

## 2019-12-16 NOTE — PATIENT INSTRUCTIONS
Shoulder Arthroscopy: Post- Operative Visit Objectives    1) Review the operative findings, procedures and photos.  2) Make sure medications are effective and not causing problems.  a) Pain: Oxycodone or hydrocodone is for pain. You may take 1 tablet every 6 hours as necessary.  Some patients don’t require the use of these…in those circumstances just use Tylenol extra-strength 1 or 2 tablets every 4-6 hours.   b) Naproxen 500 mg. For pain and inflammation.  You should take 1 every twice a day.  Do not use Aleve, Ibuprofen, Motrin or Advil during this time.  If you have had any problems stop taking these medicines and please tell us!  c) Keflex (cephalexin). This is an antibiotic to be taken as a preventive medicine.  Take 1 pill every 6 hours for 1 day. If you have a penicillin allergy this will be replaced by other options.  3) Wound Care:  a) Today we will change your dressings and cover your wounds with a plastic covering called Tegaderm. This will allow you to shower immediately.  Remove the tegaderm and underlying dressings in two weeks then ok to get wet in a shower. No Baths or swimming until 4 weeks after surgery.  Keep a towel on the dressing while applying ice.  4) Exercises and Physical Therapy Remember the protocol is 3 phases:  a) Healing (6 wks)  Continue the use of the sling for 6 weeks; depending on procedure utilized this may be shorter. You can do gentle range of motion exercise of the elbow and wrist immediately with the arm at the side.  Formal physical therapy will usually start in two weeks.    b) Rehabilitative (6 wks) You begin a more aggressive phase of physical therapy at the 6 week treva. Light strengthening and a continued emphasis on protecting the repair are important at this stage.  c) Restorative (4 wks)  Back to your sports and job activities gradually   5) Follow Up appointments Schedule Follow up visits as directed usually in 4 weeks. Physical therapy will be ordered today and you  should receive a phone call or can schedule yourself if appropriate.  6) Notes  Make sure you have all necessary notes and documentation for school or work.  7) Issues: Remember our goal is to make this process smooth and easy if there is any thing you need please ask us or call back [phone]  8)

## 2019-12-20 DIAGNOSIS — M79.7 FIBROMYALGIA: Primary | ICD-10-CM

## 2019-12-20 RX ORDER — CARISOPRODOL 350 MG/1
TABLET ORAL
Qty: 90 TABLET | Refills: 0 | Status: SHIPPED | OUTPATIENT
Start: 2019-12-20 | End: 2020-01-21

## 2019-12-23 DIAGNOSIS — M79.7 FIBROMYALGIA: Primary | ICD-10-CM

## 2019-12-23 RX ORDER — PREGABALIN 150 MG/1
CAPSULE ORAL
Qty: 60 CAPSULE | Refills: 1 | Status: SHIPPED | OUTPATIENT
Start: 2019-12-23 | End: 2020-02-28

## 2019-12-27 NOTE — ADDENDUM NOTE
Addendum  created 12/27/19 0910 by Feliz Henning MD    Diagnosis association updated, Intraprocedure Blocks edited, Sign clinical note

## 2020-01-02 ENCOUNTER — TREATMENT (OUTPATIENT)
Dept: PHYSICAL THERAPY | Facility: CLINIC | Age: 55
End: 2020-01-02

## 2020-01-02 DIAGNOSIS — M75.121 COMPLETE TEAR OF RIGHT ROTATOR CUFF, UNSPECIFIED WHETHER TRAUMATIC: ICD-10-CM

## 2020-01-02 DIAGNOSIS — G89.4 CHRONIC PAIN SYNDROME: ICD-10-CM

## 2020-01-02 DIAGNOSIS — Z47.89 ORTHOPEDIC AFTERCARE: Primary | ICD-10-CM

## 2020-01-02 PROCEDURE — 97161 PT EVAL LOW COMPLEX 20 MIN: CPT | Performed by: PHYSICAL THERAPIST

## 2020-01-02 PROCEDURE — 97110 THERAPEUTIC EXERCISES: CPT | Performed by: PHYSICAL THERAPIST

## 2020-01-02 RX ORDER — OXYCODONE HYDROCHLORIDE 10 MG/1
10 TABLET ORAL EVERY 6 HOURS PRN
Qty: 120 TABLET | Refills: 0 | Status: SHIPPED | OUTPATIENT
Start: 2020-01-02 | End: 2020-01-02 | Stop reason: SDUPTHER

## 2020-01-02 RX ORDER — OXYCODONE HYDROCHLORIDE 10 MG/1
10 TABLET ORAL EVERY 6 HOURS PRN
Qty: 120 TABLET | Refills: 0 | Status: SHIPPED | OUTPATIENT
Start: 2020-01-02 | End: 2020-01-30 | Stop reason: SDUPTHER

## 2020-01-02 NOTE — PROGRESS NOTES
Physical Therapy Initial Evaluation and Plan of Care    Patient: Briana Alas   : 1965  Diagnosis/ICD-10 Code:  Orthopedic aftercare [Z47.89]  Referring practitioner: Gino Ackerman, *  Date of Initial Visit: 2020  Today's Date: 2020  Patient seen for 1 sessions           Subjective Questionnaire: QuickDASH: 59      Subjective Evaluation    History of Present Illness  Mechanism of injury: Patient reports that on 19 she had a right shoulder RTC repair. Since the surgery, pain has been low when she is in the sling and not moving the shoulder. However, if she has to take the sling off to dress or shower she has pain. Dressing, bathing, grooming, sleeping throughout the night, feeding, and almost any other ADL is difficulty and causes an increase in pain. Resting and supporting the arm tend to reduce symptoms into the shoulder. Patient denies any numbness or tingling. Patient has mild bruising in the right biceps region.    Quality of life: good    Pain  Current pain ratin  At best pain ratin  At worst pain ratin  Quality: sharp  Relieving factors: change in position, relaxation, rest and support  Aggravating factors: overhead activity, lifting, outstretched reach, movement and sleeping  Progression: no change    Hand dominance: right    Patient Goals  Patient goals for therapy: decreased edema, decreased pain, increased motion, increased strength, independence with ADLs/IADLs and return to sport/leisure activities             Objective       Observations     Additional Observation Details  Steri-strips attached around incisions; incisions are clean and dry no signs of infection or redness; patient has mild bruising along biceps area.    Palpation     Right   Hypertonic in the biceps and supraspinatus. Tenderness of the anterior deltoid, biceps, infraspinatus, supraspinatus, teres major, teres minor and upper trapezius.     Active Range of Motion   Left Shoulder   Normal active  range of motion    Additional Active Range of Motion Details  Unable to assess on right due to recent surgery    Passive Range of Motion   Left Shoulder   Normal passive range of motion    Additional Passive Range of Motion Details  Unable to fully assess on right arm due to patient fear and guarding of movement    Strength/Myotome Testing     Left Shoulder   Normal muscle strength    Additional Strength Details  Not assessed on right due to recent surgery         Assessment & Plan     Assessment  Impairments: abnormal coordination, abnormal muscle firing, abnormal muscle tone, abnormal or restricted ROM, activity intolerance, impaired physical strength, lacks appropriate home exercise program and pain with function  Assessment details: The patient is a 54 y.o. female who presents to physical therapy today s/p right RTC repair on 12/13/19. Upon initial evaluation, the patient demonstrates the following impairments: increased pain, increased muscle guarding, poor posture, decreased strength, decreased joint mobility, and decreased ROM. Due to these impairments, the patient is unable to sleep throughout the night, dress, bathe, and groom, and perform ADLs without an increase in symptoms. The patient would benefit from skilled PT services to address functional limitations and impairments and to improve patient quality of life.      Prognosis: good  Functional Limitations: carrying objects, lifting, sleeping, pulling, pushing, uncomfortable because of pain, reaching behind back and reaching overhead  Goals  Plan Goals: STG's: 2 weeks   1. Patient will report a reduction in pain by >25%  2. Patient will be able to perform HEP with minimal verbal cues     LTG's: By discharge   1. Patient will report a reduction in pain by >90%  2. Patient will be able to write without increased shoulder pain  3. Patient will have a 10 point improvement on the QuickDash to demonstrate overall improvement in daily functional level  4.  Patient will be able to reach into overhead cabinet to get a dish without pain or difficulty  5. Patient will be independent with undergarment dressing without pain   6. Patient will be able to open a jar without pain or difficulty  7. Patient will be able to sleep > 5 hours without waking in pain   8. Patient will be independent with HEP        Plan  Therapy options: will be seen for skilled physical therapy services  Planned modality interventions: cryotherapy, electrical stimulation/Russian stimulation, TENS and thermotherapy (hydrocollator packs)  Planned therapy interventions: abdominal trunk stabilization, fine motor coordination training, flexibility, functional ROM exercises, home exercise program, IADL retraining, joint mobilization, manual therapy, neuromuscular re-education, postural training, soft tissue mobilization, spinal/joint mobilization, strengthening, therapeutic activities and stretching  Duration in visits: 14  Treatment plan discussed with: patient        History # of Personal Factors and/or Comorbidities: MODERATE (1-2)  Examination of Body System(s): # of elements: MODERATE (3)  Clinical Presentation: STABLE   Clinical Decision Making: LOW       Timed:         Manual Therapy:         mins  94260;     Therapeutic Exercise:    10     mins  03597;     Neuromuscular [place]:        mins  96747;    Therapeutic Activity:          mins  35797;     Gait Training:           mins  03164;     Ultrasound:          mins  25212;    Ionto                                   mins   67424  Self Care                            mins   32302  Canalith Repos         mins 48928      Un-Timed:  Electrical Stimulation:         mins  49679 ( );  Dry Needling          mins self-pay  Traction          mins 13268  Low Eval     25     Mins  99524  Mod Eval          Mins  01530  High Eval                            Mins  22347  Re-Eval                               mins  01532        Timed Treatment:   10   mins    Total Treatment:     45   mins    PT SIGNATURE: Vanesa Mayer PT   DATE TREATMENT INITIATED: 1/2/2020    Initial Certification  Certification Period: 4/1/2020  I certify that the therapy services are furnished while this patient is under my care.  The services outlined above are required by this patient, and will be reviewed every 90 days.     PHYSICIAN: Gino Ackerman MD      DATE:     Please sign and return via fax to [phone].. Thank you, [facility].

## 2020-01-06 RX ORDER — HYDROXYCHLOROQUINE SULFATE 200 MG/1
TABLET, FILM COATED ORAL
Qty: 45 TABLET | Refills: 3 | Status: SHIPPED | OUTPATIENT
Start: 2020-01-06 | End: 2020-07-15

## 2020-01-08 RX ORDER — ARIPIPRAZOLE 20 MG/1
TABLET ORAL
Qty: 30 TABLET | Refills: 2 | Status: SHIPPED | OUTPATIENT
Start: 2020-01-08 | End: 2020-04-17

## 2020-01-15 ENCOUNTER — TREATMENT (OUTPATIENT)
Dept: PHYSICAL THERAPY | Facility: CLINIC | Age: 55
End: 2020-01-15

## 2020-01-15 DIAGNOSIS — M75.121 COMPLETE TEAR OF RIGHT ROTATOR CUFF, UNSPECIFIED WHETHER TRAUMATIC: ICD-10-CM

## 2020-01-15 DIAGNOSIS — Z47.89 ORTHOPEDIC AFTERCARE: Primary | ICD-10-CM

## 2020-01-15 PROCEDURE — 97140 MANUAL THERAPY 1/> REGIONS: CPT | Performed by: PHYSICAL THERAPIST

## 2020-01-15 PROCEDURE — 97110 THERAPEUTIC EXERCISES: CPT | Performed by: PHYSICAL THERAPIST

## 2020-01-15 NOTE — PROGRESS NOTES
Physical Therapy Daily Progress Note    VISIT#: 2    Subjective   Briana Alas reports that when she rests her pain is low/absent but when she starts doing things her shoulder starts to hurt.  Pain Ratin    Objective     See Exercise, Manual, and Modality Logs for complete treatment.     Patient Education: tissue healing time, importance of movement, importance of early mobilization     Assessment & Plan     Assessment  Assessment details: The patient is very fearful in passively moving the shoulder with exercises and manual therapy. She tolerated the pulleys well today and was able to achieve 80 degrees of flexion before feeling discomfort. The patient and PT discussed the extreme importance of passive ROM of the shoulder and her HEP.        Progress per Plan of Care and Progress strengthening /stabilization /functional activity          Timed:         Manual Therapy:    18     mins  50773;     Therapeutic Exercise:    20     mins  25553;     Neuromuscular [place]:        mins  45337;    Therapeutic Activity:          mins  92205;     Gait Training:           mins  75247;     Ultrasound:          mins  97406;    Ionto                                   mins   80778  Self Care                            mins   56402  Canalith Repos                   mins  77736    Un-Timed:  Electrical Stimulation:         mins  53047 ( );  Dry Needling          mins self-pay  Traction          mins 35728  Low Eval          Mins  20059  Mod Eval          Mins  76244  High Eval                            Mins  46207  Re-Eval                               mins  39940    Timed Treatment:   38   mins   Total Treatment:     48   mins    Vanesa Mayer PT  Physical Therapist  [place] License # [id number]

## 2020-01-20 ENCOUNTER — OFFICE VISIT (OUTPATIENT)
Dept: ORTHOPEDIC SURGERY | Facility: CLINIC | Age: 55
End: 2020-01-20

## 2020-01-20 VITALS
HEIGHT: 64 IN | SYSTOLIC BLOOD PRESSURE: 141 MMHG | DIASTOLIC BLOOD PRESSURE: 83 MMHG | BODY MASS INDEX: 31.24 KG/M2 | HEART RATE: 88 BPM | WEIGHT: 183 LBS

## 2020-01-20 DIAGNOSIS — Z47.89 ORTHOPEDIC AFTERCARE: Primary | ICD-10-CM

## 2020-01-20 PROCEDURE — 99024 POSTOP FOLLOW-UP VISIT: CPT | Performed by: ORTHOPAEDIC SURGERY

## 2020-01-20 NOTE — PROGRESS NOTES
"     Patient ID: Briana Alas is a 54 y.o. female.    12/13/19 right shoulder arthroscopy SAD, upper subscap repair, suprasprinatus repair  Pain controlled    Objective:    /83   Pulse 88   Ht 162.6 cm (64\")   Wt 83 kg (183 lb)   BMI 31.41 kg/m²     Physical Examination:    Incisions are healed, passive elevation 120 degrees external rotation 20 degrees    Imaging:      Assessment:  Doing well after cuff repair    Plan:  Restrictions discussed. Continue physical therapy. See me in 8 weeks. Sling for 2 weeks  "

## 2020-01-21 ENCOUNTER — TREATMENT (OUTPATIENT)
Dept: PHYSICAL THERAPY | Facility: CLINIC | Age: 55
End: 2020-01-21

## 2020-01-21 DIAGNOSIS — M79.7 FIBROMYALGIA: ICD-10-CM

## 2020-01-21 DIAGNOSIS — Z47.89 ORTHOPEDIC AFTERCARE: Primary | ICD-10-CM

## 2020-01-21 DIAGNOSIS — M75.121 COMPLETE TEAR OF RIGHT ROTATOR CUFF, UNSPECIFIED WHETHER TRAUMATIC: ICD-10-CM

## 2020-01-21 PROCEDURE — 97140 MANUAL THERAPY 1/> REGIONS: CPT | Performed by: PHYSICAL THERAPIST

## 2020-01-21 PROCEDURE — 97110 THERAPEUTIC EXERCISES: CPT | Performed by: PHYSICAL THERAPIST

## 2020-01-21 RX ORDER — CARISOPRODOL 350 MG/1
TABLET ORAL
Qty: 90 TABLET | Refills: 0 | Status: SHIPPED | OUTPATIENT
Start: 2020-01-21 | End: 2020-02-18

## 2020-01-21 NOTE — PROGRESS NOTES
"MY TREATMENT INFORMATION FOR SLEEP APNEA-  Petey Archibald    DOCTOR : Bennett Ezra Goltz, PA-C  SLEEP CENTER : [facility]     MY CONTACT NUMBER: [phone]  If I haven't had a sleep study yet, what can I expect?  A personal story from Ganesh  https://www.[employer]prints.com/watch?v=AxPLmlRpnCs    Am I having a home sleep study?  Here is a video in case you get home and want to make sure you have done it correctly  https://www.[employer] Technology[employer].com/watch?v=GIM2C9pEam9&feature=youtu.be    Frequently asked questions:  1. What is Obstructive Sleep Apnea (NEL)? NEL is the most common type of sleep apnea. Apnea literally means, \"without breath.\" It is characterized by repetitive pauses in breathing, despite continued effort to breathe, and is usually associated with a reduction in blood oxygen saturation. Apneas can last 10 to over 60 seconds. It is caused by narrowing or collapse of the upper airway as muscles relax during sleep. Severity of sleep apnea is determined by frequency of breathing events and their effect on your sleep and oxygen levels determined during sleep testing.   2. What are the consequences of NEL? Symptoms include: daytime sleepiness- possibly increasing the risk of falling asleep while driving, unrefreshing/restless sleep, snoring, insomnia, waking frequently to urinate, waking with heartburn or reflux, reduced concentration and memory, and morning headaches. Other health consequences may include development of high blood pressure and other cardiovascular disease in persons who are susceptible. Untreated NEL  can contribute to heart disease, stroke and diabetes.   3. What are the treatment options? In most situations, sleep apnea is a lifelong disease that must be managed with daily therapy. Medications are not effective for sleep apnea and surgery is generally not performed until other therapies have been tried. Therapy is usually tailored to the individual patient based on many factors including your wishes as well " Physical Therapy Daily Progress Note    VISIT#: 3    Subjective   Briana Alas reports that she is able to leave her arm down by her side with no increase in pain. She has to wear her sling for another week.  Pain Ratin    Objective     See Exercise, Manual, and Modality Logs for complete treatment.     Patient Education: exercise rationale, exercise cueing    Assessment & Plan     Assessment  Assessment details: The patient was able to demonstrate about 95 degrees of shoulder flexion with pulleys today with no increase in pain. Patient has made improvement in ROM of the elbow joint and is able to relax the arm by her side.        Progress per Plan of Care and Progress strengthening /stabilization /functional activity          Timed:         Manual Therapy:    20     mins  36945;     Therapeutic Exercise:    20     mins  58455;     Neuromuscular [place]:        mins  31967;    Therapeutic Activity:          mins  60146;     Gait Training:           mins  59722;     Ultrasound:          mins  55116;    Ionto                                   mins   84436  Self Care                            mins   03929  Canalith Repos                   mins  52223    Un-Timed:  Electrical Stimulation:         mins  90621 ( );  Dry Needling          mins self-pay  Traction          mins 61223  Low Eval          Mins  46815  Mod Eval          Mins  66769  High Eval                            Mins  95951  Re-Eval                               mins  81557    Timed Treatment:   40   mins   Total Treatment:     50   mins    Vanesa Mayer PT  Physical Therapist  [place] License # [id number]   as severity of sleep apnea and severity of obesity. Continuous Positive Airway (CPAP) is the most reliable treatment. An oral device to hold your jaw forward is usually the next most reliable option. Other options include postioning devices (to keep you off your back), weight loss, and surgery including a tongue pacing device. There is more detail about some of these options below.    1. CPAP-  WHAT DOES IT DO AND HOW CAN I LEARN TO WEAR IT?                               BEFORE I START, CAN I WATCH A MOVIE TO GET A PLAN ON HOW TO USE CPAP?  https://www.Jobspot.com/watch?s=q8X10zf252G  Continuous positive airway pressure, or CPAP, is the most effective treatment for obstructive sleep apnea. It works by blowing room air, through a mask, to hold your throat open. A decision to use CPAP is a major step forward in the pursuit of a healthier life. The successful use of CPAP will help you breathe easier, sleep better and live healthier. You can choose CPAP equipment from any durable medical equipment provider that meets your needs.  Using CPAP can be a positive experience if you keep these vaughan points in mind:  1. Commitment  CPAP is not a quick fix for your problem. It involves a long-term commitment to improve your sleep and your health.    2. Communication  Stay in close communication with both your sleep doctor and your CPAP supplier. Ask lots of questions and seek help when you need it.    3. Consistency  Use CPAP all night, every night and for every nap. You will receive the maximum health benefits from CPAP when you use it every time that you sleep. This will also make it easier for your body to adjust to the treatment.    4. Correction  The first machine and mask that you try may not be the best ones for you. Work with your sleep doctor and your CPAP supplier to make corrections to your equipment selection. Ask about trying a different type of machine or mask if you have ongoing problems. Make sure that your mask is  "a good fit and learn to use your equipment properly.    5. Challenge  Tell a family member or close friend to ask you each morning if you used your CPAP the previous night. Have someone to challenge you to give it your best effort.    6. Connection   Your adjustment to CPAP will be easier if you are able to connect with others who use the same treatment. Ask your sleep doctor if there is a support group in your area for people who have sleep apnea, or look for one on the Internet.  7. Comfort   Increase your level of comfort by using a saline spray, decongestant or heated humidifier if CPAP irritates your nose, mouth or throat. Use your unit's \"ramp\" setting to slowly get used to the air pressure level. There may be soft pads you can buy that will fit over your mask straps. Look on www.CPAP.com for accessories that can help make CPAP use more comfortable.  8. Cleaning   Clean your mask, tubing and headgear on a regular basis. Put this time in your schedule so that you don't forget to do it. Check and replace the filters for your CPAP unit and humidifier.    9. Completion   Although you are never finished with CPAP therapy, you should reward yourself by celebrating the completion of your first month of treatment. Expect this first month to be your hardest period of adjustment. It will involve some trial and error as you find the machine, mask and pressure settings that are right for you.    10. Continuation  After your first month of treatment, continue to make a daily commitment to use your CPAP all night, every night and for every nap.    CPAP-Tips to starting with success:  Begin using your CPAP for short periods of time during the day while you watch TV or read.    Use CPAP every night and for every nap. Using it less often reduces the health benefits and makes it harder for your body to get used to it.    Make small adjustments to your mask, tubing, straps and headgear until you get the right fit. Tightening the " mask may actually worsen the leak.  If it leaves significant marks on your face or irritates the bridge of your nose, it may not be the best mask for you.  Speak with the person who supplied the mask and consider trying other masks. Insurances will allow you to try different masks during the first month of starting CPAP.  Insurance also covers a new mask, hose and filter about every 6 months.    Use a saline nasal spray to ease mild nasal congestion. Neti-Pot or saline nasal rinses may also help. Nasal gel sprays can help reduce nasal dryness.  Biotene mouthwash can be helpful to protect your teeth if you experience frequent dry mouth.  Dry mouth may be a sign of air escaping out of your mouth or out of the mask in the case of a full face mask.  Speak with your provider if you expect that is the case.     Take a nasal decongestant to relieve more severe nasal or sinus congestion.  Do not use Afrin (oxymetazoline) nasal spray more than 3 days in a row.  Speak with your sleep doctor if your nasal congestion is chronic.    Use a heated humidifier that fits your CPAP model to enhance your breathing comfort. Adjust the heat setting up if you get a dry nose or throat, down if you get condensation in the hose or mask.  Position the CPAP lower than you so that any condensation in the hose drains back into the machine rather than towards the mask.    Try a system that uses nasal pillows if traditional masks give you problems.    Clean your mask, tubing and headgear once a week. Make sure the equipment dries fully.    Regularly check and replace the filters for your CPAP unit and humidifier.    Work closely with your sleep provider and your CPAP supplier to make sure that you have the machine, mask and air pressure setting that works best for you. It is better to stop using it and call your provider to solve problems than to lay awake all night frustrated with the device.    BESIDES CPAP, WHAT OTHER THERAPIES ARE THERE?   Positioning Device  Positioning devices are generally used when sleep apnea is mild and only occurs on your back.This example shows a pillow that straps around the waist. It may be appropriate for those whose sleep study shows milder sleep apnea that occurs primarily when lying flat on one's back. Preliminary studies have shown benefit but effectiveness at home may need to be verified by a home sleep test. These devices are generally not covered by medical insurance.                      Oral Appliance  What is oral appliance therapy?  An oral appliance is a small acrylic device that fits over the upper and lower teeth or tongue (similar to an orthodontic retainer or a mouth guard). This device slightly advances the lower jaw or tongue, which moves the base of the tongue forward, opens the airway, improves breathing and can effectively treat snoring and obstructive sleep apnea sleep apnea. The appliance is fabricated and customized by a qualified dentist with experience in treating snoring and sleep apnea. Oral appliances are usually well tolerated and have relatively high compliance by patients1, 2, 3.  When is an oral appliance indicated?  Oral appliance therapy is recommended as a first-line treatment for patients with primary snoring, mild sleep apnea, and for patients with moderate sleep apnea who prefer appliance therapy to use of CPAP4, 5. Severity of sleep apnea is determined by sleep testing and is based on the number of respiratory events per hour of sleep.   How successful is oral appliance therapy?  The success rate of oral appliance therapy in patients with mild sleep apnea is 75-80% while in patients with moderate sleep apnea it is 50-70%. The chance of success in patients with severe sleep apnea is 40-50%. The research also shows that oral appliances have a beneficial effect on the cardiovascular health of NEL patients at the same magnitude as CPAP therapy7.  Oral appliances should be a second-line  treatment in cases of severe sleep apnea, but if not completely successful then a combination therapy utilizing CPAP plus oral appliance therapy may be effective. Oral appliances tend to be effective in a broad range of patients although studies show that the patients who have the highest success are females, younger patients, those with milder disease, and less severe obesity. 3, 6.   The chances of success are lower in patients who have more severe NEL, are older, and those who are morbidly obese.     Example of an oral appliance   Finding a dentist that practices dental sleep medicine  Specific training is available through the American Academy of Dental Sleep Medicine for dentists interested in working in the field of sleep. To find a dentist who is educated in the field of sleep and the use of oral appliances, near you, visit the Web site of the American Academy of Dental Sleep Medicine; also see   http://www.accpstorage.org/newOrganization/patients/oralAppliances.pdf  To search for a dentist certified in these practices:  Http://aadsm.org/FindADentist.aspx?1  1. Gelacio et al. Objectively measured vs self-reported compliance during oral appliance therapy for sleep-disordered breathing. Chest 2013; 144(5): 7965-7897.  2. Garth, et al. Objective measurement of compliance during oral appliance therapy for sleep-disordered breathing. Thorax 2013; 68(1): 91-96.  3. Nanci et al. Mandibular advancement devices in 620 men and women with NEL and snoring: tolerability and predictors of treatment success. Chest 2004; 125: 9437-0015.  4. Coronado, et al. Oral appliances for snoring and NEL: a review. Sleep 2006; 29: 244-262.  5. Mario, et al. Oral appliance treatment for NEL: an update. J Clin Sleep Med 2014; 10(2): 215-227.  6. Hortencia, et al. Predictors of OSAH treatment outcome. J Dent Res 2007; 86: 0486-0125.  Weight Loss:    Weight management is a personal decision.  If you are interested in  exploring weight loss strategies, the following discussion covers the impact on weight loss on sleep apnea and the approaches that may be successful.    Weight loss decreases severity of sleep apnea in most people with obesity. For those with mild obesity who have developed snoring with weight gain, even 15-30 pound weight loss can improve and occasionally eliminate sleep apnea.  Structured and life-long dietary and health habits are necessary to lose weight and keep healthier weight levels.     Though there may be significant health benefits from weight loss, long-term weight loss is very difficult to achieve- studies show success with dietary management in less than 10% of people. In addition, substantial weight loss may require years of dietary control and may be difficult if patients have severe obesity. In these cases, surgical management may be considered.  Finally, older individuals who have tolerated obesity without health complications may be less likely to benefit from weight loss strategies.    Your BMI is There is no weight on file to calculate BMI.  Body mass index (BMI) is one way to tell whether you are at a healthy weight, overweight, or obese. It measures your weight in relation to your height.  A BMI of 18.5 to 24.9 is in the healthy range. A person with a BMI of 25 to 29.9 is considered overweight, and someone with a BMI of 30 or greater is considered obese. More than two-thirds of American adults are considered overweight or obese.  Being overweight or obese increases the risk for further weight gain. Excess weight may lead to heart disease and diabetes.  Creating and following plans for healthy eating and physical activity may help you improve your health.  Weight control is part of healthy lifestyle and includes exercise, emotional health, and healthy eating habits. Careful eating habits lifelong are the mainstay of weight control. Though there are significant health benefits from weight loss,  long-term weight loss with diet alone may be very difficult to achieve- studies show long-term success with dietary management in less than 10% of people. Attaining a healthy weight may be especially difficult to achieve in those with severe obesity. In some cases, medications, devices and surgical management might be considered.  What can you do?  If you are overweight or obese and are interested in methods for weight loss, you should discuss this with your provider.     Consider reducing daily calorie intake by 500 calories.     Keep a food journal.     Avoiding skipping meals, consider cutting portions instead.    Diet combined with exercise helps maintain muscle while optimizing fat loss. Strength training is particularly important for building and maintaining muscle mass. Exercise helps reduce stress, increase energy, and improves fitness. Increasing exercise without diet control, however, may not burn enough calories to loose weight.       Start walking three days a week 10-20 minutes at a time    Work towards walking thirty minutes five days a week     Eventually, increase the speed of your walking for 1-2 minutes at time    In addition, we recommend that you review healthy lifestyles and methods for weight loss available through the National Institutes of Health patient information sites:  http://win.niddk.nih.gov/publications/index.htm    And look into health and wellness programs that may be available through your health insurance provider, employer, local community center, or robert club.    Weight management plan: Patient was referred to their PCP to discuss a diet and exercise plan.  Surgery:  Upper Airway Surgery for NEL  Surgery for NEL is a second-line treatment option in the management of sleep apnea.  Surgery should be considered for patients who are having a difficult time tolerating CPAP.    Surgery for NEL is directed at areas that are responsible for narrowing or complete obstruction of the  airway during sleep.  There are a wide range of procedures available to enlarge and/or stabilize the airway to prevent blockage of breathing in the three major areas where it can occur: the palate, tongue, and nasal regions.  Successful surgical treatment depends on the accurate identification of the factors responsible for obstructive sleep apnea in each person.  A personalized approach is required because there is no single treatment that works well for everyone.  Because of anatomic variation, consultation with an examination by a sleep surgeon is a critical first step in determining what surgical options are best for each patient.  In some cases, examination during sedation may be recommended in order to guide the selection of procedures.  Patients will be counseled about risks and benefits as well as the typical recovery course after surgery. Surgery is typically not a cure for a person s NEL.  However, surgery will often significantly improve one s NEL severity (termed  success rate ).  Even in the absence of a cure, surgery will decrease the cardiovascular risk associated with OSA7; improve overall quality of life8 (sleepiness, functionality, sleep quality, etc).      Palate Procedures:  Patients with NEL often have narrowing of their airway in the region of their tonsils and uvula.  The goals of palate procedures are to widen the airway in this region as well as to help the tissues resist collapse.  Modern palate procedure techniques focus on tissue conservation and soft tissue rearrangement, rather than tissue removal.  Often the uvula is preserved in this procedure. Residual sleep apnea is common in patient after pharyngoplasty with an average reduction in sleep apnea events of 33%2.      Tongue Procedures:  While patients are awake, the muscles that surround the throat are active and keep this region open for breathing. These muscles relax during sleep, allowing the tongue and other structures to collapse  and block breathing.  There are several different tongue procedures available.  Selection of a tongue base procedure depends on characteristics seen on physical exam.  Generally, procedures are aimed at removing bulky tissues in this area or preventing the back of the tongue from falling back during sleep.  Success rates for tongue surgery range from 50-62%3.    Hypoglossal Nerve Stimulation:  Hypoglossal nerve stimulation has recently received approval from the [place] Food and Drug Administration for the treatment of obstructive sleep apnea.  This is based on research showing that the system was safe and effective in treating sleep apnea6.  Results showed that the median AHI score decreased 68%, from 29.3 to 9.0. This therapy uses an implant system that senses breathing patterns and delivers mild stimulation to airway muscles, which keeps the airway open during sleep.  The system consists of three fully implanted components: a small generator (similar in size to a pacemaker), a breathing sensor, and a stimulation lead.  Using a small handheld remote, a patient turns the therapy on before bed and off upon awakening.    Candidates for this device must be greater than 22 years of age, have moderate to severe NEL (AHI between 20-65), BMI less than 32, have tried CPAP/oral appliance without success, and have appropriate upper airway anatomy (determined by a sleep endoscopy performed by Dr. Jean).    Hypoglossal Nerve Stimulation Pathway:    The sleep surgeon s office will work with the patient through the insurance prior-authorization process (including communications and appeals).    Nasal Procedures:  Nasal obstruction can interfere with nasal breathing during the day and night.  Studies have shown that relief of nasal obstruction can improve the ability of some patients to tolerate positive airway pressure therapy for obstructive sleep apnea1.  Treatment options include medications such as nasal saline,  topical corticosteroid and antihistamine sprays, and oral medications such as antihistamines or decongestants. Non-surgical treatments can include external nasal dilators for selected patients. If these are not successful by themselves, surgery can improve the nasal airway either alone or in combination with these other options.  Combination Procedures:  Combination of surgical procedures and other treatments may be recommended, particularly if patients have more than one area of narrowing or persistent positional disease.  The success rate of combination surgery ranges from 66-80%2,3.    1. Brian CARDENAS. The Role of the Nose in Snoring and Obstructive Sleep Apnoea: An Update.  Eur Arch Otorhinolaryngol. 2011; 268: 1365-73.  2.  Elysia SM; Barney JA; Mesha JR; Pallanch JF; Clary MB; Jess SG; Jayson ADAMES. Surgical modifications of the upper airway for obstructive sleep apnea in adults: a systematic review and meta-analysis. SLEEP 2010;33(10):3381-3432. Oksana CONTRERAS. Hypopharyngeal surgery in obstructive sleep apnea: an evidence-based medicine review.  Arch Otolaryngol Head Neck Surg. 2006 Feb;132(2):206-13.  3. Kendrick YH1, Silverio Y, Asad KASHMIR. The efficacy of anatomically based multilevel surgery for obstructive sleep apnea. Otolaryngol Head Neck Surg. 2003 Oct;129(4):327-35.  4. Oksana CONTRERAS, Goldberg A. Hypopharyngeal Surgery in Obstructive Sleep Apnea: An Evidence-Based Medicine Review. Arch Otolaryngol Head Neck Surg. 2006 Feb;132(2):206-13.  5. Estela ORELLANA et al. Upper-Airway Stimulation for Obstructive Sleep Apnea.  N Engl J Med. 2014 Jan 9;370(2):139-49.  6. Pesonu Y et al. Increased Incidence of Cardiovascular Disease in Middle-aged Men with Obstructive Sleep Apnea. Am J Respir Crit Care Med; 2002 166: 159-165  7. Sammie MOTT et al. Studying Life Effects and Effectiveness of Palatopharyngoplasty (SLEEP) study: Subjective Outcomes of Isolated Uvulopalatopharyngoplasty. Otolaryngol Head Neck Surg. 2011; 144: 623-631.         Your BMI is Body mass index is 45.57 kg/(m^2).  Weight management is a personal decision.  If you are interested in exploring weight loss strategies, the following discussion covers the approaches that may be successful. Body mass index (BMI) is one way to tell whether you are at a healthy weight, overweight, or obese. It measures your weight in relation to your height.  A BMI of 18.5 to 24.9 is in the healthy range. A person with a BMI of 25 to 29.9 is considered overweight, and someone with a BMI of 30 or greater is considered obese. More than two-thirds of American adults are considered overweight or obese.  Being overweight or obese increases the risk for further weight gain. Excess weight may lead to heart disease and diabetes.  Creating and following plans for healthy eating and physical activity may help you improve your health.  Weight control is part of healthy lifestyle and includes exercise, emotional health, and healthy eating habits. Careful eating habits lifelong are the mainstay of weight control. Though there are significant health benefits from weight loss, long-term weight loss with diet alone may be very difficult to achieve- studies show long-term success with dietary management in less than 10% of people. Attaining a healthy weight may be especially difficult to achieve in those with severe obesity. In some cases, medications, devices and surgical management might be considered.  What can you do?  If you are overweight or obese and are interested in methods for weight loss, you should discuss this with your provider.     Consider reducing daily calorie intake by 500 calories.     Keep a food journal.     Avoiding skipping meals, consider cutting portions instead.    Diet combined with exercise helps maintain muscle while optimizing fat loss. Strength training is particularly important for building and maintaining muscle mass. Exercise helps reduce stress, increase energy, and improves  fitness. Increasing exercise without diet control, however, may not burn enough calories to loose weight.       Start walking three days a week 10-20 minutes at a time    Work towards walking thirty minutes five days a week     Eventually, increase the speed of your walking for 1-2 minutes at time    In addition, we recommend that you review healthy lifestyles and methods for weight loss available through the National Institutes of Health patient information sites:  http://win.niddk.nih.gov/publications/index.htm    And look into health and wellness programs that may be available through your health insurance provider, employer, local community center, or [employer]bert [employer].    Weight management plan: Patient was referred to their PCP to discuss a diet and exercise plan.

## 2020-01-23 ENCOUNTER — TREATMENT (OUTPATIENT)
Dept: PHYSICAL THERAPY | Facility: CLINIC | Age: 55
End: 2020-01-23

## 2020-01-23 DIAGNOSIS — Z47.89 ORTHOPEDIC AFTERCARE: Primary | ICD-10-CM

## 2020-01-23 DIAGNOSIS — M75.121 COMPLETE TEAR OF RIGHT ROTATOR CUFF, UNSPECIFIED WHETHER TRAUMATIC: ICD-10-CM

## 2020-01-23 PROCEDURE — 97110 THERAPEUTIC EXERCISES: CPT | Performed by: PHYSICAL THERAPIST

## 2020-01-23 PROCEDURE — 97140 MANUAL THERAPY 1/> REGIONS: CPT | Performed by: PHYSICAL THERAPIST

## 2020-01-23 NOTE — PROGRESS NOTES
Physical Therapy Daily Progress Note    VISIT#: 4    Subjective   Briana Alas reports that she is feeling good today. She took her pain pill before coming to therapy.  Pain Ratin    Objective     See Exercise, Manual, and Modality Logs for complete treatment.     Patient Education: HEP, treatment/exercise rationale    Assessment & Plan     Assessment  Assessment details: The patient was able to achieve 130 degrees of passive flexion with the pulleys today with only slight discomfort. She continues to make progress in ROM and pain reduction with each visit. PT to continue progressing per patient tolerance.        Progress per Plan of Care and Progress strengthening /stabilization /functional activity          Timed:         Manual Therapy:    18     mins  77529;     Therapeutic Exercise:    20     mins  99911;     Neuromuscular [place]:        mins  35139;    Therapeutic Activity:          mins  52552;     Gait Training:           mins  17274;     Ultrasound:          mins  88028;    Ionto                                   mins   04740  Self Care                            mins   24421  Canalith Repos                   mins  29061    Un-Timed:  Electrical Stimulation:         mins  13302 ( );  Dry Needling          mins self-pay  Traction          mins 96034  Low Eval          Mins  58021  Mod Eval          Mins  88560  High Eval                            Mins  55746  Re-Eval                               mins  52628    Timed Treatment:   38   mins   Total Treatment:     55   mins    Vanesa Mayer, PT  Physical Therapist  [place] License # [id number]

## 2020-01-30 ENCOUNTER — TREATMENT (OUTPATIENT)
Dept: PHYSICAL THERAPY | Facility: CLINIC | Age: 55
End: 2020-01-30

## 2020-01-30 DIAGNOSIS — M75.121 COMPLETE TEAR OF RIGHT ROTATOR CUFF, UNSPECIFIED WHETHER TRAUMATIC: ICD-10-CM

## 2020-01-30 DIAGNOSIS — G89.4 CHRONIC PAIN SYNDROME: ICD-10-CM

## 2020-01-30 DIAGNOSIS — Z47.89 ORTHOPEDIC AFTERCARE: Primary | ICD-10-CM

## 2020-01-30 PROCEDURE — 97140 MANUAL THERAPY 1/> REGIONS: CPT | Performed by: PHYSICAL THERAPIST

## 2020-01-30 PROCEDURE — 97110 THERAPEUTIC EXERCISES: CPT | Performed by: PHYSICAL THERAPIST

## 2020-01-30 RX ORDER — CLONAZEPAM 1 MG/1
1 TABLET ORAL 3 TIMES DAILY PRN
Qty: 90 TABLET | Refills: 1 | Status: SHIPPED | OUTPATIENT
Start: 2020-01-30 | End: 2020-03-31

## 2020-01-30 RX ORDER — OXYCODONE HYDROCHLORIDE 10 MG/1
10 TABLET ORAL EVERY 6 HOURS PRN
Qty: 120 TABLET | Refills: 0 | Status: SHIPPED | OUTPATIENT
Start: 2020-01-30 | End: 2020-02-24 | Stop reason: SDUPTHER

## 2020-01-30 RX ORDER — CLONAZEPAM 1 MG/1
1 TABLET ORAL 3 TIMES DAILY PRN
Qty: 90 TABLET | Refills: 1 | Status: SHIPPED | OUTPATIENT
Start: 2020-01-30 | End: 2020-01-30 | Stop reason: SDUPTHER

## 2020-01-30 NOTE — PROGRESS NOTES
Physical Therapy Daily Progress Note    VISIT#: 5    Subjective   Briana Alas reports that she is starting to use her shoulder a little bit more. However, she is letting pain be her guide. She is able to sleep better.  Pain Ratin    Objective     See Exercise, Manual, and Modality Logs for complete treatment.     Patient Education: HEP, safety out of sling    Assessment & Plan     Assessment  Assessment details: The patient demonstrated active shoulder flexion to around 100 degrees and full passive ROM. The patient tolerated the addition of resisted exercises today with no increase in symptoms. PT to progress per protocol and patient tolerance.        Progress per Plan of Care and Progress strengthening /stabilization /functional activity          Timed:         Manual Therapy:    17     mins  93563;     Therapeutic Exercise:    23     mins  76922;     Neuromuscular [place]:        mins  97026;    Therapeutic Activity:          mins  18021;     Gait Training:           mins  19771;     Ultrasound:          mins  77368;    Ionto                                   mins   01922  Self Care                            mins   53063  Canalith Repos                   mins  79460    Un-Timed:  Electrical Stimulation:         mins  66853 ( );  Dry Needling          mins self-pay  Traction          mins 06541  Low Eval          Mins  81297  Mod Eval          Mins  34858  High Eval                            Mins  43544  Re-Eval                               mins  50418    Timed Treatment:   40   mins   Total Treatment:     50   mins    Vanesa Mayer, PT  Physical Therapist  [place] License # [id number]

## 2020-02-04 ENCOUNTER — TREATMENT (OUTPATIENT)
Dept: PHYSICAL THERAPY | Facility: CLINIC | Age: 55
End: 2020-02-04

## 2020-02-04 ENCOUNTER — TELEPHONE (OUTPATIENT)
Dept: FAMILY MEDICINE CLINIC | Facility: CLINIC | Age: 55
End: 2020-02-04

## 2020-02-04 DIAGNOSIS — Z47.89 ORTHOPEDIC AFTERCARE: Primary | ICD-10-CM

## 2020-02-04 DIAGNOSIS — M75.121 COMPLETE TEAR OF RIGHT ROTATOR CUFF, UNSPECIFIED WHETHER TRAUMATIC: ICD-10-CM

## 2020-02-04 PROCEDURE — 97110 THERAPEUTIC EXERCISES: CPT | Performed by: PHYSICAL THERAPIST

## 2020-02-04 PROCEDURE — 97140 MANUAL THERAPY 1/> REGIONS: CPT | Performed by: PHYSICAL THERAPIST

## 2020-02-04 NOTE — TELEPHONE ENCOUNTER
Prior Authorization was denied for Carisoprodol. Pt must try Baclofen,Chlorzoxazone,Cyclobenzaprine immediate release,methocarbamol,Tizanidine. Do you want to change or have pt use good rx. Can get it for $15.17.

## 2020-02-04 NOTE — TELEPHONE ENCOUNTER
It is okay to give her the option to use good Rx  If that price is too expensive then I can change the medication

## 2020-02-04 NOTE — PROGRESS NOTES
Physical Therapy Daily Progress Note    VISIT#: 6    Subjective   Briana Alas reports that she is sore today because of over-using her arm.  She states that she is R hand dominant and it is hard to rest her arm.  Pain Rating (0-10): 7    Objective     See Exercise, Manual, and Modality Logs for complete treatment.     Patient Education: Cautioned against overuse of R arm.  Discussed use of ice and heat for pain reduction.    Assessment & Plan     Assessment  Assessment details: Patient continues with moderate daily pain but she is demonstrating great gains in active and passive ROM.        Progress per Plan of Care and Progress strengthening /stabilization /functional activity            Timed:         Manual Therapy:    18    mins  96564;     Therapeutic Exercise:    19     mins  05721;     Neuromuscular [place]:        mins  81789;    Therapeutic Activity:          mins  31367;     Gait Training:           mins  66445;     Ultrasound:          mins  85623;    Ionto                                   mins   26070  Self Care                            mins   73663  Canalith Repos                   mins  92220    Un-Timed:  Electrical Stimulation:         mins  71913 ( );  Dry Needling          mins self-pay  Traction          mins 01976  Low Eval          Mins  89938  Mod Eval          Mins  34040  High Eval                            Mins  14655  Re-Eval                               mins  47484    Timed Treatment:   37   mins   Total Treatment:     47   mins    Angelica Vail PT  [place] License # [id number]  Physical Therapist

## 2020-02-13 ENCOUNTER — TREATMENT (OUTPATIENT)
Dept: PHYSICAL THERAPY | Facility: CLINIC | Age: 55
End: 2020-02-13

## 2020-02-13 DIAGNOSIS — Z47.89 ORTHOPEDIC AFTERCARE: Primary | ICD-10-CM

## 2020-02-13 DIAGNOSIS — M75.121 COMPLETE TEAR OF RIGHT ROTATOR CUFF, UNSPECIFIED WHETHER TRAUMATIC: ICD-10-CM

## 2020-02-13 PROCEDURE — 97110 THERAPEUTIC EXERCISES: CPT | Performed by: PHYSICAL THERAPIST

## 2020-02-13 PROCEDURE — 97140 MANUAL THERAPY 1/> REGIONS: CPT | Performed by: PHYSICAL THERAPIST

## 2020-02-13 NOTE — PROGRESS NOTES
Physical Therapy Daily Progress Note    VISIT#: 7    Subjective   Briana Alas reports that sometimes her shoulder really hurts/bothers her and other times she doesn't have pain. Doing dishes and folding clothes aggravated her shoulder a little bit.  Pain Ratin    Objective     See Exercise, Manual, and Modality Logs for complete treatment.     Patient Education: HEP, progression of exercises    Assessment & Plan     Assessment  Assessment details: The patient has made excellent improvements in ROM. She demonstrates full active flexion of the right shoulder. The patient has slight discomfort with abduction which is why pulleys into abduction was added today. Patient demonstrated full passive abduction with pulleys.        Progress per Plan of Care and Progress strengthening /stabilization /functional activity          Timed:         Manual Therapy:    23     mins  73615;     Therapeutic Exercise:    17     mins  34648;     Neuromuscular [place]:        mins  04184;    Therapeutic Activity:          mins  30497;     Gait Training:           mins  33024;     Ultrasound:          mins  76723;    Ionto                                   mins   18632  Self Care                            mins   72201  Canalith Repos                   mins  89667    Un-Timed:  Electrical Stimulation:         mins  66072 ( );  Dry Needling          mins self-pay  Traction          mins 52177  Low Eval          Mins  80114  Mod Eval          Mins  99992  High Eval                            Mins  45213  Re-Eval                               mins  34066    Timed Treatment:   40   mins   Total Treatment:     54   mins    Vanesa Mayer PT  Physical Therapist  [place] License # [id number]

## 2020-02-18 ENCOUNTER — TREATMENT (OUTPATIENT)
Dept: PHYSICAL THERAPY | Facility: CLINIC | Age: 55
End: 2020-02-18

## 2020-02-18 DIAGNOSIS — Z47.89 ORTHOPEDIC AFTERCARE: Primary | ICD-10-CM

## 2020-02-18 DIAGNOSIS — M79.7 FIBROMYALGIA: ICD-10-CM

## 2020-02-18 DIAGNOSIS — M75.121 COMPLETE TEAR OF RIGHT ROTATOR CUFF, UNSPECIFIED WHETHER TRAUMATIC: ICD-10-CM

## 2020-02-18 PROCEDURE — 97140 MANUAL THERAPY 1/> REGIONS: CPT | Performed by: PHYSICAL THERAPIST

## 2020-02-18 PROCEDURE — 97110 THERAPEUTIC EXERCISES: CPT | Performed by: PHYSICAL THERAPIST

## 2020-02-18 PROCEDURE — 97530 THERAPEUTIC ACTIVITIES: CPT | Performed by: PHYSICAL THERAPIST

## 2020-02-18 RX ORDER — CARISOPRODOL 350 MG/1
TABLET ORAL
Qty: 90 TABLET | Refills: 0 | Status: SHIPPED | OUTPATIENT
Start: 2020-02-18 | End: 2020-03-14

## 2020-02-18 NOTE — PROGRESS NOTES
Physical Therapy Daily Progress Note    VISIT#: 8    Subjective   Briana Alas reports that her shoulder has been really sore lately. The soreness is located in the front of the shoulder. She took a pain pill today prior to PT.  Pain Ratin    Objective     See Exercise, Manual, and Modality Logs for complete treatment.     Patient Education: HEP, use of range    Assessment & Plan     Assessment  Assessment details: The patient is able to achieve functional ROM with slight pain at 90 degrees. The patient tolerated treatment well with no increase in pain at today's visit. The PT and patient discussed importance of use of range at this time.        Progress per Plan of Care and Progress strengthening /stabilization /functional activity          Timed:         Manual Therapy:    18     mins  17873;     Therapeutic Exercise:    15     mins  52229;     Neuromuscular [place]:        mins  80711;    Therapeutic Activity:     15     mins  44448;     Gait Training:           mins  86291;     Ultrasound:          mins  30188;    Ionto                                   mins   26816  Self Care                            mins   40707  Canalith Repos                   mins  66930    Un-Timed:  Electrical Stimulation:         mins  22313 ( );  Dry Needling          mins self-pay  Traction          mins 81744  Low Eval          Mins  43735  Mod Eval          Mins  58524  High Eval                            Mins  64161  Re-Eval                               mins  19213    Timed Treatment:   48   mins   Total Treatment:     60   mins    Vanesa Mayer, PT  Physical Therapist  [place] License # [id number]

## 2020-02-20 ENCOUNTER — TREATMENT (OUTPATIENT)
Dept: PHYSICAL THERAPY | Facility: CLINIC | Age: 55
End: 2020-02-20

## 2020-02-20 DIAGNOSIS — M75.121 COMPLETE TEAR OF RIGHT ROTATOR CUFF, UNSPECIFIED WHETHER TRAUMATIC: ICD-10-CM

## 2020-02-20 DIAGNOSIS — Z47.89 ORTHOPEDIC AFTERCARE: Primary | ICD-10-CM

## 2020-02-20 PROCEDURE — 97140 MANUAL THERAPY 1/> REGIONS: CPT | Performed by: PHYSICAL THERAPIST

## 2020-02-20 PROCEDURE — 97110 THERAPEUTIC EXERCISES: CPT | Performed by: PHYSICAL THERAPIST

## 2020-02-20 PROCEDURE — 97530 THERAPEUTIC ACTIVITIES: CPT | Performed by: PHYSICAL THERAPIST

## 2020-02-20 NOTE — PROGRESS NOTES
Physical Therapy Daily Progress Note    VISIT#: 9    Subjective   Briana Alas reports that she was ripping off a paper towel the previous day when she had a feeling of instability in the front of the shoulder with slight pain. The pain decreased after moving out of the position and she is not having a lot of pain today. She was able to sleep throughout the night last night with no pain.  Pain Rating: 3    Objective     See Exercise, Manual, and Modality Logs for complete treatment.     Patient Education: functional use of shoulder and range, HEP, exercise cueing and rationale    Assessment & Plan     Assessment  Assessment details: The patient demonstrated slight discomfort with full ER at 0 degrees with the TB, exercise was modified to reduce ROM with this exercise. The patient continues to demonstrate full passive ROM and is able to achieve full functional flexion and abduction. PT to continue progressing per patient tolerance and protocol.      Progress per Plan of Care and Progress strengthening /stabilization /functional activity     Timed:         Manual Therapy:    16     mins  70525;     Therapeutic Exercise:    14     mins  57242;     Neuromuscular [place]:        mins  96996;    Therapeutic Activity:     10     mins  69824;     Gait Training:           mins  82949;     Ultrasound:          mins  61028;    Ionto                                   mins   88788  Self Care                            mins   70162  Canalith Repos                   mins  33861    Un-Timed:  Electrical Stimulation:         mins  41975 ( );  Dry Needling          mins self-pay  Traction          mins 00852  Low Eval          Mins  83893  Mod Eval          Mins  08027  High Eval                            Mins  47822  Re-Eval                               mins  78249    Timed Treatment:   40   mins   Total Treatment:     53   mins    Vanesa Mayer PT  Physical Therapist  [place] License # [id number]

## 2020-02-24 ENCOUNTER — OFFICE VISIT (OUTPATIENT)
Dept: FAMILY MEDICINE CLINIC | Facility: CLINIC | Age: 55
End: 2020-02-24

## 2020-02-24 VITALS
TEMPERATURE: 98.4 F | WEIGHT: 192 LBS | DIASTOLIC BLOOD PRESSURE: 65 MMHG | BODY MASS INDEX: 32.96 KG/M2 | HEART RATE: 90 BPM | SYSTOLIC BLOOD PRESSURE: 98 MMHG | OXYGEN SATURATION: 94 %

## 2020-02-24 DIAGNOSIS — G89.4 CHRONIC PAIN SYNDROME: ICD-10-CM

## 2020-02-24 PROCEDURE — 99213 OFFICE O/P EST LOW 20 MIN: CPT | Performed by: FAMILY MEDICINE

## 2020-02-24 RX ORDER — OXYCODONE HYDROCHLORIDE 10 MG/1
10 TABLET ORAL EVERY 6 HOURS PRN
Qty: 120 TABLET | Refills: 0 | Status: SHIPPED | OUTPATIENT
Start: 2020-02-24 | End: 2020-03-26 | Stop reason: SDUPTHER

## 2020-02-24 NOTE — PROGRESS NOTES
Subjective   Briana Alas is a 54 y.o. female.     She is in for follow-up today on her chronic pain issues.  She had surgery on her right shoulder 2 months ago and is still doing regular physical therapy for it.  She has improved her range of motion but her pain is still an issue.  She denies any issues with bowel or bladder function.  She denies any headache or chest pain.  She denies any issues with sleep despite the shoulder pain.  She is still taking her pain medication 4 doses per day on average with positive affect.  This allows her to maintain her activities of daily living independently and allows her to do her physical therapy comfortably.         BP 98/65 (BP Location: Right arm, Patient Position: Sitting, Cuff Size: Adult)   Pulse 90   Temp 98.4 °F (36.9 °C) (Oral)   Wt 87.1 kg (192 lb)   SpO2 94%   BMI 32.96 kg/m²       Chief Complaint   Patient presents with   • Shoulder Pain     4 month f/u           Current Outpatient Medications:   •  ARIPiprazole (ABILIFY) 20 MG tablet, TAKE ONE TABLET BY MOUTH DAILY, Disp: 30 tablet, Rfl: 2  •  atenolol (TENORMIN) 50 MG tablet, Take 50 mg by mouth Daily., Disp: , Rfl:   •  atenolol (TENORMIN) 50 MG tablet, TAKE ONE TABLET BY MOUTH DAILY, Disp: 30 tablet, Rfl: 3  •  atorvastatin (LIPITOR) 20 MG tablet, Take 20 mg by mouth Daily., Disp: , Rfl:   •  busPIRone (BUSPAR) 15 MG tablet, Take 15 mg by mouth 2 (Two) Times a Day., Disp: , Rfl:   •  carisoprodol (SOMA) 350 MG tablet, TAKE ONE TABLET BY MOUTH THREE TIMES A DAY AS NEEDED FOR MUSCLE SPASMS, Disp: 90 tablet, Rfl: 0  •  clonazePAM (KlonoPIN) 1 MG tablet, Take 1 tablet by mouth 3 (Three) Times a Day As Needed for Anxiety or Seizures., Disp: 90 tablet, Rfl: 1  •  DULoxetine (CYMBALTA) 60 MG capsule, Take 1 capsule by mouth 2 (Two) Times a Day., Disp: 60 capsule, Rfl: 3  •  folic acid (FOLVITE) 1 MG tablet, Take 1 tablet by mouth Daily., Disp: 90 tablet, Rfl: 0  •  hydroxychloroquine (PLAQUENIL) 200 MG tablet,  TAKE ONE AND ONE-HALF TABLETS BY MOUTH DAILY, Disp: 45 tablet, Rfl: 3  •  methotrexate 2.5 MG tablet, Take 15 mg by mouth 1 (One) Time Per Week., Disp: , Rfl:   •  oxyCODONE (ROXICODONE) 10 MG tablet, Take 1 tablet by mouth Every 6 (Six) Hours As Needed for Severe Pain ., Disp: 120 tablet, Rfl: 0  •  pregabalin (LYRICA) 150 MG capsule, TAKE ONE CAPSULE BY MOUTH TWICE A DAY, Disp: 60 capsule, Rfl: 1  •  promethazine (PHENERGAN) 25 MG tablet, Take 1 tablet by mouth Every 6 (Six) Hours As Needed for Nausea or Vomiting., Disp: 21 tablet, Rfl: 1  •  rOPINIRole (REQUIP) 0.5 MG tablet, Take 1 tablet by mouth 2 (Two) Times a Day. Take 1 hour before bedtime., Disp: 60 tablet, Rfl: 5  •  sulfaSALAzine (AZULFIDINE) 500 MG tablet, TAKE TWO TABLETS BY MOUTH TWICE A DAY, Disp: 360 tablet, Rfl: 2        The following portions of the patient's history were reviewed and updated as appropriate: allergies, current medications, past family history, past medical history, past social history, past surgical history and problem list.    Review of Systems   Constitutional: Negative for activity change, fatigue and fever.   HENT: Negative for congestion, sinus pressure, sinus pain, sore throat and trouble swallowing.    Eyes: Negative for visual disturbance.   Respiratory: Negative for chest tightness, shortness of breath and wheezing.    Cardiovascular: Negative for chest pain.   Gastrointestinal: Negative for abdominal distention, abdominal pain, constipation, diarrhea, nausea and vomiting.   Genitourinary: Negative for difficulty urinating, dysuria, frequency and urgency.   Musculoskeletal: Positive for arthralgias. Negative for back pain and neck pain.   Neurological: Negative for dizziness, weakness, light-headedness and numbness.   Psychiatric/Behavioral: Negative for agitation, hallucinations, sleep disturbance and suicidal ideas.       Objective   Physical Exam   Constitutional: She is oriented to person, place, and time.   Neck: No  thyromegaly present.   Cardiovascular: Normal rate, regular rhythm and normal heart sounds.   No murmur heard.  Pulmonary/Chest: Effort normal and breath sounds normal. She has no wheezes.   Abdominal: Soft. Bowel sounds are normal. There is no guarding.   Musculoskeletal:   Improved range of motion right shoulder  Stiffness lower back  Gait independent   Lymphadenopathy:     She has no cervical adenopathy.   Neurological: She is alert and oriented to person, place, and time. She displays normal reflexes.   Psychiatric: She has a normal mood and affect.   Nursing note and vitals reviewed.        Assessment/Plan   Problems Addressed this Visit     None      Visit Diagnoses     Chronic pain syndrome        Relevant Medications    oxyCODONE (ROXICODONE) 10 MG tablet        I will keep her on her current pain medication and follow with orthopedics  I will see her back in a few months and will get labs at her next visit  She is to call for new concerns, especially if she struggles to do the physical therapy  We discussed some exercise options that she could try now and some others that she could look at in the future once her shoulder is fully healed

## 2020-02-24 NOTE — TELEPHONE ENCOUNTER
She came in for an appointment and she said yes to the [facility] Coupon for Carisoprodol 350mg. Printed it out for her and gave it to her.

## 2020-02-25 ENCOUNTER — TREATMENT (OUTPATIENT)
Dept: PHYSICAL THERAPY | Facility: CLINIC | Age: 55
End: 2020-02-25

## 2020-02-25 DIAGNOSIS — M75.121 COMPLETE TEAR OF RIGHT ROTATOR CUFF, UNSPECIFIED WHETHER TRAUMATIC: ICD-10-CM

## 2020-02-25 DIAGNOSIS — Z47.89 ORTHOPEDIC AFTERCARE: Primary | ICD-10-CM

## 2020-02-25 PROCEDURE — 97530 THERAPEUTIC ACTIVITIES: CPT | Performed by: PHYSICAL THERAPIST

## 2020-02-25 PROCEDURE — 97110 THERAPEUTIC EXERCISES: CPT | Performed by: PHYSICAL THERAPIST

## 2020-02-25 PROCEDURE — 97140 MANUAL THERAPY 1/> REGIONS: CPT | Performed by: PHYSICAL THERAPIST

## 2020-02-25 NOTE — PROGRESS NOTES
Physical Therapy Daily Progress Note    VISIT#: 10    Subjective   Briana Alas reports that her pain has been okay lately, she is doing more things around the house and is using the shoulder more.  Pain Ratin    Objective     See Exercise, Manual, and Modality Logs for complete treatment.     Patient Education: functional use of range, HEP    Assessment & Plan     Assessment  Assessment details: The patient continues to tolerate treatment well with controlled pain within session. Patient is able to achieve full passive ROM in flexion, scaption, and abduction. PT to continue progressing per protocol and patient tolerance.        Progress per Plan of Care and Progress strengthening /stabilization /functional activity          Timed:         Manual Therapy:    15     mins  47757;     Therapeutic Exercise:    15     mins  62877;     Neuromuscular [place]:        mins  85359;    Therapeutic Activity:     10     mins  90909;     Gait Training:           mins  54675;     Ultrasound:          mins  21547;    Ionto                                   mins   13198  Self Care                            mins   96369  Canalith Repos                   mins  13828    Un-Timed:  Electrical Stimulation:         mins  10022 ( );  Dry Needling          mins self-pay  Traction          mins 35046  Low Eval          Mins  82281  Mod Eval          Mins  90756  High Eval                            Mins  66025  Re-Eval                               mins  86452    Timed Treatment:   40   mins   Total Treatment:     50   mins    Vanesa Mayer PT  Physical Therapist  [place] License # [id number]

## 2020-02-28 DIAGNOSIS — M79.7 FIBROMYALGIA: ICD-10-CM

## 2020-02-28 RX ORDER — PREGABALIN 150 MG/1
CAPSULE ORAL
Qty: 60 CAPSULE | Refills: 2 | Status: SHIPPED | OUTPATIENT
Start: 2020-02-28 | End: 2020-03-02

## 2020-02-28 RX ORDER — BUSPIRONE HYDROCHLORIDE 15 MG/1
TABLET ORAL
Qty: 60 TABLET | Refills: 2 | Status: SHIPPED | OUTPATIENT
Start: 2020-02-28 | End: 2020-03-02

## 2020-03-02 DIAGNOSIS — M79.7 FIBROMYALGIA: ICD-10-CM

## 2020-03-02 RX ORDER — PREGABALIN 150 MG/1
CAPSULE ORAL
Qty: 60 CAPSULE | Refills: 2 | Status: SHIPPED | OUTPATIENT
Start: 2020-03-02 | End: 2020-05-29

## 2020-03-02 RX ORDER — BUSPIRONE HYDROCHLORIDE 15 MG/1
TABLET ORAL
Qty: 60 TABLET | Refills: 2 | Status: SHIPPED | OUTPATIENT
Start: 2020-03-02 | End: 2020-06-14

## 2020-03-10 ENCOUNTER — TREATMENT (OUTPATIENT)
Dept: PHYSICAL THERAPY | Facility: CLINIC | Age: 55
End: 2020-03-10

## 2020-03-10 DIAGNOSIS — M75.121 COMPLETE TEAR OF RIGHT ROTATOR CUFF, UNSPECIFIED WHETHER TRAUMATIC: ICD-10-CM

## 2020-03-10 DIAGNOSIS — Z47.89 ORTHOPEDIC AFTERCARE: Primary | ICD-10-CM

## 2020-03-10 PROCEDURE — 97140 MANUAL THERAPY 1/> REGIONS: CPT | Performed by: PHYSICAL THERAPIST

## 2020-03-10 PROCEDURE — 97110 THERAPEUTIC EXERCISES: CPT | Performed by: PHYSICAL THERAPIST

## 2020-03-10 NOTE — PROGRESS NOTES
Physical Therapy Daily Progress Note    VISIT#: 11    Subjective   Briana Alas reports that her shoulder is doing really well, she is able to reach and lift it without a problem. However, she is still having some difficulty reaching behind her back.  Pain Ratin    Objective     See Exercise, Manual, and Modality Logs for complete treatment.     Patient Education: exercise cueing and rationale    Assessment & Plan     Assessment  Assessment details: The patient demonstrates full active ROM into flexion. She lacks internal rotation, which is why internal rotation stretch was added today. She tolerated added exercises well, did required cueing to reduce compensation.        Progress per Plan of Care and Progress strengthening /stabilization /functional activity          Timed:         Manual Therapy:    15     mins  22724;     Therapeutic Exercise:    25     mins  66538;     Neuromuscular [place]:        mins  34697;    Therapeutic Activity:          mins  48824;     Gait Training:           mins  82840;     Ultrasound:          mins  90428;    Ionto                                   mins   61014  Self Care                            mins   19180  Canalith Repos                   mins  67907    Un-Timed:  Electrical Stimulation:         mins  09406 ( );  Dry Needling          mins self-pay  Traction          mins 02577  Low Eval          Mins  63986  Mod Eval          Mins  03812  High Eval                            Mins  67977  Re-Eval                               mins  45611    Timed Treatment:   40   mins   Total Treatment:     53   mins    Vanesa Mayer PT  Physical Therapist  [place] License # [id number]

## 2020-03-14 DIAGNOSIS — M79.7 FIBROMYALGIA: ICD-10-CM

## 2020-03-14 RX ORDER — CARISOPRODOL 350 MG/1
TABLET ORAL
Qty: 90 TABLET | Refills: 0 | Status: SHIPPED | OUTPATIENT
Start: 2020-03-14 | End: 2020-03-20

## 2020-03-17 ENCOUNTER — TREATMENT (OUTPATIENT)
Dept: PHYSICAL THERAPY | Facility: CLINIC | Age: 55
End: 2020-03-17

## 2020-03-17 DIAGNOSIS — M75.121 COMPLETE TEAR OF RIGHT ROTATOR CUFF, UNSPECIFIED WHETHER TRAUMATIC: ICD-10-CM

## 2020-03-17 DIAGNOSIS — Z47.89 ORTHOPEDIC AFTERCARE: Primary | ICD-10-CM

## 2020-03-17 PROCEDURE — 97110 THERAPEUTIC EXERCISES: CPT | Performed by: PHYSICAL THERAPIST

## 2020-03-17 PROCEDURE — 97140 MANUAL THERAPY 1/> REGIONS: CPT | Performed by: PHYSICAL THERAPIST

## 2020-03-17 NOTE — PROGRESS NOTES
Physical Therapy Daily Progress Note    VISIT#: 12    Subjective   Briana Alas reports that she still has some pain in the lateral shoulder. She feels like sometimes it can catch on her.  Pain Ratin    Objective     See Exercise, Manual, and Modality Logs for complete treatment.     Patient Education: HEP, POC    Assessment & Plan     Assessment  Assessment details: The patient presents to therapy today with a reduction in pain in the shoulder. She continues to make improvements in functional active ROM. She still demonstrates difficulty with internal rotation, patient and PT discussed importance of HEP at home for self stretching.        Progress per Plan of Care and Progress strengthening /stabilization /functional activity          Timed:         Manual Therapy:    15     mins  53872;     Therapeutic Exercise:    23     mins  75273;     Neuromuscular [place]:        mins  25598;    Therapeutic Activity:          mins  43427;     Gait Training:           mins  49381;     Ultrasound:          mins  92593;    Ionto                                   mins   01119  Self Care                            mins   59565  Canalith Repos                   mins  67681    Un-Timed:  Electrical Stimulation:         mins  01929 ( );  Dry Needling          mins self-pay  Traction          mins 25047  Low Eval          Mins  39166  Mod Eval          Mins  00223  High Eval                            Mins  03219  Re-Eval                               mins  43794    Timed Treatment:   38   mins   Total Treatment:     50   mins    Vanesa Mayer, PT  Physical Therapist  [place] License # [id number]

## 2020-03-19 ENCOUNTER — TREATMENT (OUTPATIENT)
Dept: PHYSICAL THERAPY | Facility: CLINIC | Age: 55
End: 2020-03-19

## 2020-03-19 DIAGNOSIS — M75.121 COMPLETE TEAR OF RIGHT ROTATOR CUFF, UNSPECIFIED WHETHER TRAUMATIC: ICD-10-CM

## 2020-03-19 DIAGNOSIS — Z47.89 ORTHOPEDIC AFTERCARE: Primary | ICD-10-CM

## 2020-03-19 PROCEDURE — 97110 THERAPEUTIC EXERCISES: CPT | Performed by: PHYSICAL THERAPIST

## 2020-03-19 PROCEDURE — 97530 THERAPEUTIC ACTIVITIES: CPT | Performed by: PHYSICAL THERAPIST

## 2020-03-19 PROCEDURE — 97140 MANUAL THERAPY 1/> REGIONS: CPT | Performed by: PHYSICAL THERAPIST

## 2020-03-19 NOTE — PROGRESS NOTES
Physical Therapy Daily Progress Note    VISIT#: [id number]    Subjective   Briana Alas reports that her shoulder is feeling good today, she isn't having any pain in the shoulder today.  Pain Ratin    Objective     See Exercise, Manual, and Modality Logs for complete treatment.     Patient Education: HEP, exercise cueing and rationale    Assessment & Plan     Assessment  Assessment details: The patient is able to demonstrate 170 degrees of active flexion with ease and 170 degrees of active abduction with slight discomfort around 90 degrees. The patient continues to tolerate exercises well, does note some discomfort and difficulty with internal rotation stretch which is in her HEP. Patient is returning to referring physician for follow up appointment.        Progress per Plan of Care and Progress strengthening /stabilization /functional activity          Timed:         Manual Therapy:    10     mins  76156;     Therapeutic Exercise:    15     mins  48000;     Neuromuscular [place]:        mins  71237;    Therapeutic Activity:     15     mins  35521;     Gait Training:           mins  41132;     Ultrasound:          mins  63766;    Ionto                                   mins   00981  Self Care                            mins   86545  Canalith Repos                   mins  02257    Un-Timed:  Electrical Stimulation:         mins  42524 ( );  Dry Needling          mins self-pay  Traction          mins 16299  Low Eval          Mins  85605  Mod Eval          Mins  65722  High Eval                            Mins  69086  Re-Eval                               mins  69713    Timed Treatment:   40   mins   Total Treatment:     43   mins    Vanesa Mayer PT  Physical Therapist  [place] License # [id number]

## 2020-03-20 ENCOUNTER — TELEPHONE (OUTPATIENT)
Dept: FAMILY MEDICINE CLINIC | Facility: CLINIC | Age: 55
End: 2020-03-20

## 2020-03-20 RX ORDER — BACLOFEN 20 MG/1
20 TABLET ORAL 3 TIMES DAILY
Qty: 90 TABLET | Refills: 0 | Status: SHIPPED | OUTPATIENT
Start: 2020-03-20 | End: 2020-05-26

## 2020-03-20 NOTE — TELEPHONE ENCOUNTER
[facility] sent a fax regarding the denial of Carisoprodol.   The patient is currently taking clonazepam 1 mg ( paid claim on 02/28/2020 for 30 days.   Our guideline for Carisoprodol products does not permit concurrent use (using at the same time) with opioid analgesics (such as oxycodone) and/or benzopiazepines (such as clonazepam). This was way the request was denied.

## 2020-03-23 ENCOUNTER — OFFICE VISIT (OUTPATIENT)
Dept: ORTHOPEDIC SURGERY | Facility: CLINIC | Age: 55
End: 2020-03-23

## 2020-03-23 VITALS
TEMPERATURE: 98 F | DIASTOLIC BLOOD PRESSURE: 75 MMHG | HEART RATE: 59 BPM | HEIGHT: 64 IN | BODY MASS INDEX: 32.78 KG/M2 | SYSTOLIC BLOOD PRESSURE: 115 MMHG | WEIGHT: 192 LBS

## 2020-03-23 DIAGNOSIS — M75.121 COMPLETE TEAR OF RIGHT ROTATOR CUFF, UNSPECIFIED WHETHER TRAUMATIC: Primary | ICD-10-CM

## 2020-03-23 PROCEDURE — 99213 OFFICE O/P EST LOW 20 MIN: CPT | Performed by: ORTHOPAEDIC SURGERY

## 2020-03-23 NOTE — PROGRESS NOTES
"     Patient ID: Briana Alas is a 54 y.o. female.  Right shoulder pain  12/13/19 right shoulder arthroscopy SAD, upper subscap repair, suprasprinatus repair  Pain controlled, not at goal    Review of Systems:  Right shoulder pain  Denies chest pain    Objective:    Temp 98 °F (36.7 °C)   Ht 162.6 cm (64\")   Wt 87.1 kg (192 lb)   BMI 32.96 kg/m²     Physical Examination:   She is a pleasant female in no distress. She is alert and oriented x3 and appears her stated age.  Right shoulder demonstrates healed incisions, active elevation 180 degrees abduction 130 degrees external rotation 40 degrees internal rotation L5 with intact repair.Sensory and motor exam are intact all distributions. Radial pulse is palpable and capillary refill is less than two seconds to all digits      Imaging:       Assessment:    Doing well after cuff repair    Plan:  Finish out formal therapy, then gradual activity as tolerated and see me as needed          Disclaimer: Please note that areas of this note were completed with computer voice recognition software.  Quite often unanticipated grammatical, syntax, homophones, and other interpretive errors are inadvertently transcribed by the computer software. Please excuse any errors that have escaped final proofreading.  "

## 2020-03-24 ENCOUNTER — DOCUMENTATION (OUTPATIENT)
Dept: PHYSICAL THERAPY | Facility: CLINIC | Age: 55
End: 2020-03-24

## 2020-03-24 NOTE — PROGRESS NOTES
Discharge Summary  Discharge Summary from Physical Therapy Report      Dates  PT visit: 1/2/2020 to 3/19/2020  Number of Visits: 13     Discharge Status of Patient: discharged    Goals: All Met    Discharge Plan: Continue with current home exercise program as instructed    Comments: The patient is being discharged today due to meeting all therapy goals and completion of current PT program. The patient called the clinic stating that her surgeon has discharged her from therapy. Patient was advised to continue with HEP for strengthening.    Date of Discharge 3/24/2020        Vanesa Mayer, PT  Physical Therapist

## 2020-03-26 DIAGNOSIS — G89.4 CHRONIC PAIN SYNDROME: ICD-10-CM

## 2020-03-26 RX ORDER — OXYCODONE HYDROCHLORIDE 10 MG/1
10 TABLET ORAL EVERY 6 HOURS PRN
Qty: 120 TABLET | Refills: 0 | Status: SHIPPED | OUTPATIENT
Start: 2020-03-26 | End: 2020-03-30 | Stop reason: SDUPTHER

## 2020-03-30 DIAGNOSIS — G89.4 CHRONIC PAIN SYNDROME: ICD-10-CM

## 2020-03-30 RX ORDER — OXYCODONE HYDROCHLORIDE 10 MG/1
10 TABLET ORAL EVERY 6 HOURS PRN
Qty: 120 TABLET | Refills: 0 | Status: SHIPPED | OUTPATIENT
Start: 2020-03-30 | End: 2020-04-30 | Stop reason: SDUPTHER

## 2020-03-30 NOTE — TELEPHONE ENCOUNTER
She says that [facility] in [place] is completely out of oxycodone.   I called and confirmed with them they are out completely not nori when they will get more.   She is wanting it sent to [facility] in [place].

## 2020-03-31 DIAGNOSIS — G89.4 CHRONIC PAIN SYNDROME: ICD-10-CM

## 2020-03-31 RX ORDER — CLONAZEPAM 1 MG/1
TABLET ORAL
Qty: 90 TABLET | Refills: 0 | Status: SHIPPED | OUTPATIENT
Start: 2020-03-31 | End: 2020-04-30 | Stop reason: SDUPTHER

## 2020-04-16 DIAGNOSIS — M79.7 FIBROMYALGIA: ICD-10-CM

## 2020-04-16 RX ORDER — CARISOPRODOL 350 MG/1
TABLET ORAL
Qty: 90 TABLET | Refills: 0 | Status: SHIPPED | OUTPATIENT
Start: 2020-04-16 | End: 2020-05-04 | Stop reason: SDUPTHER

## 2020-04-17 RX ORDER — ARIPIPRAZOLE 20 MG/1
TABLET ORAL
Qty: 90 TABLET | Refills: 1 | Status: SHIPPED | OUTPATIENT
Start: 2020-04-17 | End: 2020-06-14

## 2020-04-17 RX ORDER — DULOXETIN HYDROCHLORIDE 60 MG/1
CAPSULE, DELAYED RELEASE ORAL
Qty: 180 CAPSULE | Refills: 1 | Status: SHIPPED | OUTPATIENT
Start: 2020-04-17 | End: 2020-05-04 | Stop reason: SDUPTHER

## 2020-04-20 RX ORDER — FOLIC ACID 1 MG/1
TABLET ORAL
Qty: 30 TABLET | Refills: 2 | OUTPATIENT
Start: 2020-04-20

## 2020-04-30 DIAGNOSIS — G89.4 CHRONIC PAIN SYNDROME: ICD-10-CM

## 2020-04-30 RX ORDER — CLONAZEPAM 1 MG/1
1 TABLET ORAL 3 TIMES DAILY PRN
Qty: 90 TABLET | Refills: 0 | Status: SHIPPED | OUTPATIENT
Start: 2020-04-30 | End: 2020-05-04 | Stop reason: SDUPTHER

## 2020-04-30 RX ORDER — OXYCODONE HYDROCHLORIDE 10 MG/1
10 TABLET ORAL EVERY 6 HOURS PRN
Qty: 120 TABLET | Refills: 0 | Status: SHIPPED | OUTPATIENT
Start: 2020-04-30 | End: 2020-05-27 | Stop reason: SDUPTHER

## 2020-05-04 DIAGNOSIS — M79.7 FIBROMYALGIA: ICD-10-CM

## 2020-05-04 DIAGNOSIS — G89.4 CHRONIC PAIN SYNDROME: ICD-10-CM

## 2020-05-04 RX ORDER — CLONAZEPAM 1 MG/1
1 TABLET ORAL 3 TIMES DAILY PRN
Qty: 90 TABLET | Refills: 0 | Status: SHIPPED | OUTPATIENT
Start: 2020-05-04 | End: 2020-06-24 | Stop reason: SDUPTHER

## 2020-05-04 RX ORDER — ATENOLOL 50 MG/1
50 TABLET ORAL DAILY
Qty: 30 TABLET | Refills: 3 | Status: SHIPPED | OUTPATIENT
Start: 2020-05-04 | End: 2020-08-20

## 2020-05-04 RX ORDER — CARISOPRODOL 350 MG/1
350 TABLET ORAL 3 TIMES DAILY PRN
Qty: 90 TABLET | Refills: 0 | Status: SHIPPED | OUTPATIENT
Start: 2020-05-04 | End: 2020-06-11

## 2020-05-04 RX ORDER — DULOXETIN HYDROCHLORIDE 60 MG/1
60 CAPSULE, DELAYED RELEASE ORAL 2 TIMES DAILY
Qty: 180 CAPSULE | Refills: 1 | Status: SHIPPED | OUTPATIENT
Start: 2020-05-04 | End: 2021-02-25

## 2020-05-26 RX ORDER — ATORVASTATIN CALCIUM 20 MG/1
TABLET, FILM COATED ORAL
Qty: 30 TABLET | Refills: 3 | Status: SHIPPED | OUTPATIENT
Start: 2020-05-26 | End: 2020-06-14

## 2020-05-26 RX ORDER — BACLOFEN 20 MG/1
TABLET ORAL
Qty: 90 TABLET | Refills: 0 | Status: SHIPPED | OUTPATIENT
Start: 2020-05-26 | End: 2020-06-14

## 2020-05-27 DIAGNOSIS — G89.4 CHRONIC PAIN SYNDROME: ICD-10-CM

## 2020-05-27 RX ORDER — OXYCODONE HYDROCHLORIDE 10 MG/1
10 TABLET ORAL EVERY 6 HOURS PRN
Qty: 120 TABLET | Refills: 0 | Status: SHIPPED | OUTPATIENT
Start: 2020-05-27 | End: 2020-06-24 | Stop reason: SDUPTHER

## 2020-05-29 DIAGNOSIS — M79.7 FIBROMYALGIA: ICD-10-CM

## 2020-05-29 RX ORDER — PREGABALIN 150 MG/1
CAPSULE ORAL
Qty: 60 CAPSULE | Refills: 3 | Status: SHIPPED | OUTPATIENT
Start: 2020-05-29 | End: 2020-06-14

## 2020-06-11 DIAGNOSIS — M79.7 FIBROMYALGIA: ICD-10-CM

## 2020-06-11 RX ORDER — CARISOPRODOL 350 MG/1
TABLET ORAL
Qty: 90 TABLET | Refills: 0 | Status: SHIPPED | OUTPATIENT
Start: 2020-06-11 | End: 2020-06-14

## 2020-06-14 ENCOUNTER — APPOINTMENT (OUTPATIENT)
Dept: CT IMAGING | Facility: HOSPITAL | Age: 55
End: 2020-06-14

## 2020-06-14 ENCOUNTER — HOSPITAL ENCOUNTER (OUTPATIENT)
Facility: HOSPITAL | Age: 55
Setting detail: OBSERVATION
Discharge: HOME | End: 2020-06-16
Attending: EMERGENCY MEDICINE | Admitting: INTERNAL MEDICINE

## 2020-06-14 ENCOUNTER — APPOINTMENT (OUTPATIENT)
Dept: GENERAL RADIOLOGY | Facility: HOSPITAL | Age: 55
End: 2020-06-14

## 2020-06-14 DIAGNOSIS — R41.82 ALTERED MENTAL STATUS, UNSPECIFIED ALTERED MENTAL STATUS TYPE: Primary | ICD-10-CM

## 2020-06-14 PROBLEM — M21.612 BUNION, LEFT FOOT: Status: RESOLVED | Noted: 2019-05-13 | Resolved: 2020-06-14

## 2020-06-14 PROBLEM — H61.23 BILATERAL IMPACTED CERUMEN: Status: RESOLVED | Noted: 2017-11-02 | Resolved: 2020-06-14

## 2020-06-14 PROBLEM — Z79.899 OTHER LONG TERM (CURRENT) DRUG THERAPY: Status: RESOLVED | Noted: 2018-12-24 | Resolved: 2020-06-14

## 2020-06-14 PROBLEM — M75.121 COMPLETE TEAR OF RIGHT ROTATOR CUFF: Status: RESOLVED | Noted: 2019-11-25 | Resolved: 2020-06-14

## 2020-06-14 PROBLEM — N39.0 URINARY TRACT INFECTION: Status: RESOLVED | Noted: 2018-01-24 | Resolved: 2020-06-14

## 2020-06-14 LAB
ALBUMIN SERPL-MCNC: 4.4 G/DL (ref 3.5–5.2)
ALBUMIN/GLOB SERPL: 1.3 G/DL
ALP SERPL-CCNC: 72 U/L (ref 39–117)
ALT SERPL W P-5'-P-CCNC: 29 U/L (ref 1–33)
AMMONIA BLD-SCNC: <10 UMOL/L (ref 11–51)
AMPHET+METHAMPHET UR QL: NEGATIVE
ANION GAP SERPL CALCULATED.3IONS-SCNC: 18 MMOL/L (ref 5–15)
APAP SERPL-MCNC: <5 MCG/ML (ref 10–30)
AST SERPL-CCNC: 60 U/L (ref 1–32)
BACTERIA UR QL AUTO: ABNORMAL /HPF
BARBITURATES UR QL SCN: NEGATIVE
BASOPHILS # BLD AUTO: 0.1 10*3/MM3 (ref 0–0.2)
BASOPHILS NFR BLD AUTO: 1.1 % (ref 0–1.5)
BENZODIAZ UR QL SCN: POSITIVE
BILIRUB SERPL-MCNC: 0.4 MG/DL (ref 0.2–1.2)
BILIRUB UR QL STRIP: ABNORMAL
BUN BLD-MCNC: 26 MG/DL (ref 6–20)
BUN BLD-MCNC: ABNORMAL MG/DL
BUN/CREAT SERPL: ABNORMAL
CALCIUM SPEC-SCNC: 9.7 MG/DL (ref 8.6–10.5)
CANNABINOIDS SERPL QL: NEGATIVE
CHLORIDE SERPL-SCNC: 101 MMOL/L (ref 98–107)
CK SERPL-CCNC: 1379 U/L (ref 20–180)
CLARITY UR: CLEAR
CO2 SERPL-SCNC: 21 MMOL/L (ref 22–29)
COCAINE UR QL: NEGATIVE
COLOR UR: ABNORMAL
CREAT BLD-MCNC: 0.92 MG/DL (ref 0.57–1)
DEPRECATED RDW RBC AUTO: 45.1 FL (ref 37–54)
EOSINOPHIL # BLD AUTO: 0 10*3/MM3 (ref 0–0.4)
EOSINOPHIL NFR BLD AUTO: 0 % (ref 0.3–6.2)
ERYTHROCYTE [DISTWIDTH] IN BLOOD BY AUTOMATED COUNT: 14 % (ref 12.3–15.4)
ETHANOL UR QL: <0.01 %
GFR SERPL CREATININE-BSD FRML MDRD: 63 ML/MIN/1.73
GLOBULIN UR ELPH-MCNC: 3.4 GM/DL
GLUCOSE BLD-MCNC: 92 MG/DL (ref 65–99)
GLUCOSE UR STRIP-MCNC: NEGATIVE MG/DL
GRAN CASTS URNS QL MICRO: ABNORMAL /LPF
HCT VFR BLD AUTO: 38.1 % (ref 34–46.6)
HGB BLD-MCNC: 13.2 G/DL (ref 12–15.9)
HGB UR QL STRIP.AUTO: ABNORMAL
HYALINE CASTS UR QL AUTO: ABNORMAL /LPF
KETONES UR QL STRIP: ABNORMAL
LEUKOCYTE ESTERASE UR QL STRIP.AUTO: NEGATIVE
LYMPHOCYTES # BLD AUTO: 2 10*3/MM3 (ref 0.7–3.1)
LYMPHOCYTES NFR BLD AUTO: 18.9 % (ref 19.6–45.3)
MAGNESIUM SERPL-MCNC: 1.8 MG/DL (ref 1.6–2.6)
MCH RBC QN AUTO: 31.5 PG (ref 26.6–33)
MCHC RBC AUTO-ENTMCNC: 34.7 G/DL (ref 31.5–35.7)
MCV RBC AUTO: 90.9 FL (ref 79–97)
METHADONE UR QL SCN: POSITIVE
MONOCYTES # BLD AUTO: 0.7 10*3/MM3 (ref 0.1–0.9)
MONOCYTES NFR BLD AUTO: 6.5 % (ref 5–12)
NEUTROPHILS # BLD AUTO: 7.7 10*3/MM3 (ref 1.7–7)
NEUTROPHILS NFR BLD AUTO: 73.5 % (ref 42.7–76)
NITRITE UR QL STRIP: NEGATIVE
NRBC BLD AUTO-RTO: 0.3 /100 WBC (ref 0–0.2)
OPIATES UR QL: NEGATIVE
OXYCODONE UR QL SCN: POSITIVE
PH UR STRIP.AUTO: <=5 [PH] (ref 5–8)
PLATELET # BLD AUTO: 218 10*3/MM3 (ref 140–450)
PMV BLD AUTO: 8.3 FL (ref 6–12)
POTASSIUM BLD-SCNC: 3.9 MMOL/L (ref 3.5–5.2)
PROT SERPL-MCNC: 7.8 G/DL (ref 6–8.5)
PROT UR QL STRIP: ABNORMAL
RBC # BLD AUTO: 4.19 10*6/MM3 (ref 3.77–5.28)
RBC # UR: ABNORMAL /HPF
REF LAB TEST METHOD: ABNORMAL
SALICYLATES SERPL-MCNC: <0.3 MG/DL
SODIUM BLD-SCNC: 140 MMOL/L (ref 136–145)
SP GR UR STRIP: >=1.03 (ref 1–1.03)
SQUAMOUS #/AREA URNS HPF: ABNORMAL /HPF
TROPONIN T SERPL-MCNC: <0.01 NG/ML (ref 0–0.03)
TSH SERPL DL<=0.05 MIU/L-ACNC: 0.62 UIU/ML (ref 0.27–4.2)
UROBILINOGEN UR QL STRIP: ABNORMAL
WBC NRBC COR # BLD: 10.5 10*3/MM3 (ref 3.4–10.8)
WBC UR QL AUTO: ABNORMAL /HPF

## 2020-06-14 PROCEDURE — G0378 HOSPITAL OBSERVATION PER HR: HCPCS

## 2020-06-14 PROCEDURE — P9612 CATHETERIZE FOR URINE SPEC: HCPCS

## 2020-06-14 PROCEDURE — 80307 DRUG TEST PRSMV CHEM ANLYZR: CPT | Performed by: EMERGENCY MEDICINE

## 2020-06-14 PROCEDURE — 93005 ELECTROCARDIOGRAM TRACING: CPT | Performed by: EMERGENCY MEDICINE

## 2020-06-14 PROCEDURE — 71045 X-RAY EXAM CHEST 1 VIEW: CPT

## 2020-06-14 PROCEDURE — 85025 COMPLETE CBC W/AUTO DIFF WBC: CPT | Performed by: EMERGENCY MEDICINE

## 2020-06-14 PROCEDURE — 80053 COMPREHEN METABOLIC PANEL: CPT | Performed by: EMERGENCY MEDICINE

## 2020-06-14 PROCEDURE — 70450 CT HEAD/BRAIN W/O DYE: CPT

## 2020-06-14 PROCEDURE — 84484 ASSAY OF TROPONIN QUANT: CPT | Performed by: EMERGENCY MEDICINE

## 2020-06-14 PROCEDURE — 82550 ASSAY OF CK (CPK): CPT | Performed by: EMERGENCY MEDICINE

## 2020-06-14 PROCEDURE — 83735 ASSAY OF MAGNESIUM: CPT | Performed by: EMERGENCY MEDICINE

## 2020-06-14 PROCEDURE — 81001 URINALYSIS AUTO W/SCOPE: CPT | Performed by: EMERGENCY MEDICINE

## 2020-06-14 PROCEDURE — 99285 EMERGENCY DEPT VISIT HI MDM: CPT

## 2020-06-14 PROCEDURE — 82140 ASSAY OF AMMONIA: CPT | Performed by: EMERGENCY MEDICINE

## 2020-06-14 PROCEDURE — 99219 PR INITIAL OBSERVATION CARE/DAY 50 MINUTES: CPT | Performed by: NURSE PRACTITIONER

## 2020-06-14 PROCEDURE — 84443 ASSAY THYROID STIM HORMONE: CPT | Performed by: EMERGENCY MEDICINE

## 2020-06-14 RX ORDER — ARIPIPRAZOLE 10 MG/1
20 TABLET ORAL DAILY
COMMUNITY
End: 2020-09-14 | Stop reason: DRUGHIGH

## 2020-06-14 RX ORDER — BUSPIRONE HYDROCHLORIDE 15 MG/1
15 TABLET ORAL 2 TIMES DAILY
COMMUNITY
End: 2020-08-20

## 2020-06-14 RX ORDER — ATORVASTATIN CALCIUM 20 MG/1
20 TABLET, FILM COATED ORAL DAILY
Status: DISCONTINUED | OUTPATIENT
Start: 2020-06-15 | End: 2020-06-16 | Stop reason: HOSPADM

## 2020-06-14 RX ORDER — ATENOLOL 50 MG/1
50 TABLET ORAL DAILY
Status: DISCONTINUED | OUTPATIENT
Start: 2020-06-15 | End: 2020-06-16 | Stop reason: HOSPADM

## 2020-06-14 RX ORDER — ARIPIPRAZOLE 10 MG/1
20 TABLET ORAL DAILY
Status: DISCONTINUED | OUTPATIENT
Start: 2020-06-15 | End: 2020-06-16 | Stop reason: HOSPADM

## 2020-06-14 RX ORDER — SODIUM CHLORIDE 0.9 % (FLUSH) 0.9 %
10 SYRINGE (ML) INJECTION AS NEEDED
Status: DISCONTINUED | OUTPATIENT
Start: 2020-06-14 | End: 2020-06-16 | Stop reason: HOSPADM

## 2020-06-14 RX ORDER — BACLOFEN 20 MG/1
20 TABLET ORAL 3 TIMES DAILY
COMMUNITY
End: 2020-06-16 | Stop reason: HOSPADM

## 2020-06-14 RX ORDER — ATORVASTATIN CALCIUM 20 MG/1
20 TABLET, FILM COATED ORAL DAILY
COMMUNITY
End: 2020-09-11

## 2020-06-14 RX ORDER — SULFASALAZINE 500 MG/1
1000 TABLET ORAL 2 TIMES DAILY
COMMUNITY
End: 2020-08-03

## 2020-06-14 RX ORDER — ROPINIROLE 0.25 MG/1
0.5 TABLET, FILM COATED ORAL 2 TIMES DAILY
Status: DISCONTINUED | OUTPATIENT
Start: 2020-06-15 | End: 2020-06-16 | Stop reason: HOSPADM

## 2020-06-14 RX ORDER — PREGABALIN 150 MG/1
150 CAPSULE ORAL 2 TIMES DAILY
COMMUNITY
End: 2020-10-11

## 2020-06-14 RX ORDER — HYDROXYCHLOROQUINE SULFATE 200 MG/1
200 TABLET, FILM COATED ORAL
Status: DISCONTINUED | OUTPATIENT
Start: 2020-06-15 | End: 2020-06-15

## 2020-06-14 RX ORDER — SULFASALAZINE 500 MG/1
1000 TABLET ORAL 2 TIMES DAILY
Status: DISCONTINUED | OUTPATIENT
Start: 2020-06-15 | End: 2020-06-16 | Stop reason: HOSPADM

## 2020-06-14 RX ORDER — FOLIC ACID 1 MG/1
1000 TABLET ORAL DAILY
Status: DISCONTINUED | OUTPATIENT
Start: 2020-06-15 | End: 2020-06-16 | Stop reason: HOSPADM

## 2020-06-14 RX ORDER — DULOXETIN HYDROCHLORIDE 30 MG/1
60 CAPSULE, DELAYED RELEASE ORAL 2 TIMES DAILY
Status: DISCONTINUED | OUTPATIENT
Start: 2020-06-15 | End: 2020-06-16 | Stop reason: HOSPADM

## 2020-06-14 RX ORDER — BUSPIRONE HYDROCHLORIDE 15 MG/1
15 TABLET ORAL 2 TIMES DAILY
Status: DISCONTINUED | OUTPATIENT
Start: 2020-06-15 | End: 2020-06-16 | Stop reason: HOSPADM

## 2020-06-14 RX ORDER — CARISOPRODOL 350 MG/1
350 TABLET ORAL 3 TIMES DAILY PRN
COMMUNITY
End: 2020-06-16 | Stop reason: HOSPADM

## 2020-06-15 LAB
ANION GAP SERPL CALCULATED.3IONS-SCNC: 16 MMOL/L (ref 5–15)
BUN BLD-MCNC: 23 MG/DL (ref 6–20)
BUN BLD-MCNC: ABNORMAL MG/DL
BUN/CREAT SERPL: ABNORMAL
CALCIUM SPEC-SCNC: 9.3 MG/DL (ref 8.6–10.5)
CHLORIDE SERPL-SCNC: 103 MMOL/L (ref 98–107)
CO2 SERPL-SCNC: 20 MMOL/L (ref 22–29)
CREAT BLD-MCNC: 0.87 MG/DL (ref 0.57–1)
DEPRECATED RDW RBC AUTO: 44.2 FL (ref 37–54)
ERYTHROCYTE [DISTWIDTH] IN BLOOD BY AUTOMATED COUNT: 13.7 % (ref 12.3–15.4)
GFR SERPL CREATININE-BSD FRML MDRD: 68 ML/MIN/1.73
GLUCOSE BLD-MCNC: 81 MG/DL (ref 65–99)
HCT VFR BLD AUTO: 35.9 % (ref 34–46.6)
HGB BLD-MCNC: 12.5 G/DL (ref 12–15.9)
MCH RBC QN AUTO: 32.3 PG (ref 26.6–33)
MCHC RBC AUTO-ENTMCNC: 34.9 G/DL (ref 31.5–35.7)
MCV RBC AUTO: 92.7 FL (ref 79–97)
PLATELET # BLD AUTO: 200 10*3/MM3 (ref 140–450)
PMV BLD AUTO: 8.5 FL (ref 6–12)
POTASSIUM BLD-SCNC: 3.7 MMOL/L (ref 3.5–5.2)
RBC # BLD AUTO: 3.88 10*6/MM3 (ref 3.77–5.28)
SODIUM BLD-SCNC: 139 MMOL/L (ref 136–145)
WBC NRBC COR # BLD: 10.1 10*3/MM3 (ref 3.4–10.8)

## 2020-06-15 PROCEDURE — 96360 HYDRATION IV INFUSION INIT: CPT

## 2020-06-15 PROCEDURE — 80048 BASIC METABOLIC PNL TOTAL CA: CPT | Performed by: NURSE PRACTITIONER

## 2020-06-15 PROCEDURE — 96361 HYDRATE IV INFUSION ADD-ON: CPT

## 2020-06-15 PROCEDURE — 99204 OFFICE O/P NEW MOD 45 MIN: CPT | Performed by: PHYSICIAN ASSISTANT

## 2020-06-15 PROCEDURE — G0378 HOSPITAL OBSERVATION PER HR: HCPCS

## 2020-06-15 PROCEDURE — 85027 COMPLETE CBC AUTOMATED: CPT | Performed by: NURSE PRACTITIONER

## 2020-06-15 PROCEDURE — 99225 PR SBSQ OBSERVATION CARE/DAY 25 MINUTES: CPT | Performed by: INTERNAL MEDICINE

## 2020-06-15 RX ORDER — NICOTINE 21 MG/24HR
1 PATCH, TRANSDERMAL 24 HOURS TRANSDERMAL DAILY
Status: DISCONTINUED | OUTPATIENT
Start: 2020-06-15 | End: 2020-06-16 | Stop reason: HOSPADM

## 2020-06-15 RX ORDER — ONDANSETRON 2 MG/ML
4 INJECTION INTRAMUSCULAR; INTRAVENOUS EVERY 6 HOURS PRN
Status: DISCONTINUED | OUTPATIENT
Start: 2020-06-15 | End: 2020-06-16 | Stop reason: HOSPADM

## 2020-06-15 RX ORDER — POTASSIUM CHLORIDE 1.5 G/1.77G
40 POWDER, FOR SOLUTION ORAL AS NEEDED
Status: DISCONTINUED | OUTPATIENT
Start: 2020-06-15 | End: 2020-06-16 | Stop reason: HOSPADM

## 2020-06-15 RX ORDER — ACETAMINOPHEN 325 MG/1
650 TABLET ORAL EVERY 4 HOURS PRN
Status: DISCONTINUED | OUTPATIENT
Start: 2020-06-15 | End: 2020-06-16 | Stop reason: HOSPADM

## 2020-06-15 RX ORDER — ONDANSETRON 4 MG/1
4 TABLET, FILM COATED ORAL EVERY 6 HOURS PRN
Status: DISCONTINUED | OUTPATIENT
Start: 2020-06-15 | End: 2020-06-16 | Stop reason: HOSPADM

## 2020-06-15 RX ORDER — NITROGLYCERIN 0.4 MG/1
0.4 TABLET SUBLINGUAL
Status: DISCONTINUED | OUTPATIENT
Start: 2020-06-15 | End: 2020-06-16 | Stop reason: HOSPADM

## 2020-06-15 RX ORDER — HYDROXYCHLOROQUINE SULFATE 200 MG/1
300 TABLET, FILM COATED ORAL
Status: DISCONTINUED | OUTPATIENT
Start: 2020-06-15 | End: 2020-06-16 | Stop reason: HOSPADM

## 2020-06-15 RX ORDER — CHOLECALCIFEROL (VITAMIN D3) 125 MCG
5 CAPSULE ORAL NIGHTLY PRN
Status: DISCONTINUED | OUTPATIENT
Start: 2020-06-15 | End: 2020-06-16 | Stop reason: HOSPADM

## 2020-06-15 RX ORDER — SODIUM CHLORIDE 9 MG/ML
100 INJECTION, SOLUTION INTRAVENOUS CONTINUOUS
Status: DISCONTINUED | OUTPATIENT
Start: 2020-06-15 | End: 2020-06-16 | Stop reason: HOSPADM

## 2020-06-15 RX ORDER — POTASSIUM CHLORIDE 7.45 MG/ML
10 INJECTION INTRAVENOUS
Status: DISCONTINUED | OUTPATIENT
Start: 2020-06-15 | End: 2020-06-16 | Stop reason: HOSPADM

## 2020-06-15 RX ORDER — ACETAMINOPHEN 650 MG/1
650 SUPPOSITORY RECTAL EVERY 4 HOURS PRN
Status: DISCONTINUED | OUTPATIENT
Start: 2020-06-15 | End: 2020-06-16 | Stop reason: HOSPADM

## 2020-06-15 RX ORDER — ACETAMINOPHEN 160 MG/5ML
650 SOLUTION ORAL EVERY 4 HOURS PRN
Status: DISCONTINUED | OUTPATIENT
Start: 2020-06-15 | End: 2020-06-16 | Stop reason: HOSPADM

## 2020-06-15 RX ORDER — SODIUM CHLORIDE 0.9 % (FLUSH) 0.9 %
10 SYRINGE (ML) INJECTION EVERY 12 HOURS SCHEDULED
Status: DISCONTINUED | OUTPATIENT
Start: 2020-06-15 | End: 2020-06-16 | Stop reason: HOSPADM

## 2020-06-15 RX ORDER — POTASSIUM CHLORIDE 20 MEQ/1
40 TABLET, EXTENDED RELEASE ORAL AS NEEDED
Status: DISCONTINUED | OUTPATIENT
Start: 2020-06-15 | End: 2020-06-16 | Stop reason: HOSPADM

## 2020-06-15 RX ORDER — SODIUM CHLORIDE 0.9 % (FLUSH) 0.9 %
10 SYRINGE (ML) INJECTION AS NEEDED
Status: DISCONTINUED | OUTPATIENT
Start: 2020-06-15 | End: 2020-06-16 | Stop reason: HOSPADM

## 2020-06-15 RX ADMIN — Medication 10 ML: at 09:21

## 2020-06-15 RX ADMIN — SODIUM CHLORIDE 100 ML/HR: 900 INJECTION, SOLUTION INTRAVENOUS at 13:32

## 2020-06-15 RX ADMIN — SODIUM CHLORIDE 1000 ML: 900 INJECTION, SOLUTION INTRAVENOUS at 01:19

## 2020-06-15 RX ADMIN — SODIUM CHLORIDE 100 ML/HR: 900 INJECTION, SOLUTION INTRAVENOUS at 02:30

## 2020-06-15 RX ADMIN — Medication 10 ML: at 03:26

## 2020-06-15 RX ADMIN — ACETAMINOPHEN 650 MG: 325 TABLET, FILM COATED ORAL at 23:22

## 2020-06-15 NOTE — PROGRESS NOTES
"      [facility] Medicine Services Daily Progress Note      Hospitalist Team  LOS 1 days      Patient Care Team:  Kamran Garcia MD as PCP - General  Kamran Garcia MD as PCP - Family Medicine    Patient Location: 227/1      Subjective   Subjective     Chief Complaint / Subjective  Chief Complaint   Patient presents with   • Altered Mental Status         Brief Synopsis of Hospital Course/HPI   54 year old female brought to ED by family for concern for 3 days of  confusion and reported hallucination. She has a PMH of anxiety , chronic pain, fibromyalgia  And depression and is on several  psychiatric and sedating medications including benzodiazepines, and opiates. Per report from ED family member called and had a concern patient was procuring non prescribed methadone. She has no methadone per INSPECT and her drug screen came back positive. Per report from ED she was talking and oriented x2 in ED and admitted she had hallucinations but when confronted about positive methadone in urine she will no longer talk or make eye contact. She is cooperative with exam . Her eyes are open but she still will not nod or answer or talk.  This seems to be by choice. Her foot is rhythmically tapping on the bed as if nervous or angry. Patient will not cooperate to  answer any questions but moves all extremities and has no facial droop. CT head per radiology today :     \" IMPRESSION:  1. No acute intracranial process. Volume loss. Atherosclerosis. MRI is more sensitive for the detection of acute nonhemorrhagic infarct.\"      Home meds that are sedating have been held and  and psychiatry have been consulted. Labs show a CK of 1379 and she was given a IVF bolus in ED and IVF continued . Ethanol was 0.010and ammonia not elevated. She will be admitted for further evaluation and treatment .       6/15/20  Pt lying in bed staring at the ceiling. She is will answer some questions. Refusing " "to eat any food. Nursing reports patient has been refusing her medications and will only cooperate or answer questions at times.        Review of Systems   Unable to perform ROS: psychiatric disorder         Objective   Objective      Vital Signs  Temp:  [98.2 °F (36.8 °C)-98.5 °F (36.9 °C)] 98.2 °F (36.8 °C)  Heart Rate:  [58-93] 78  Resp:  [14-18] 14  BP: (125-160)/(69-79) 151/70  Oxygen Therapy  SpO2: 96 %  Pulse Oximetry Type: Intermittent  Device (Oxygen Therapy): room air  Device (Oxygen Therapy): room air  Flowsheet Rows      First Filed Value   Admission Height  162.6 cm (64\") Documented at 06/14/2020 2042   Admission Weight  85.7 kg (188 lb 15 oz) Documented at 06/14/2020 2042        Intake & Output (last 3 days)     None        Lines, Drains & Airways    Active LDAs     Name:   Placement date:   Placement time:   Site:   Days:    Peripheral IV 06/14/20 2126 Left Antecubital   06/14/20 2126    Antecubital   less than 1                  Physical Exam:    Physical Exam   Constitutional: No distress.   Obese female lying in bed    HENT:   Head: Normocephalic and atraumatic.   Mouth/Throat: Oropharynx is clear and moist.   Eyes: Pupils are equal, round, and reactive to light. No scleral icterus.   Cardiovascular: Normal rate and regular rhythm.   No murmur heard.  Pulmonary/Chest: Effort normal and breath sounds normal. No respiratory distress. She has no wheezes.   Abdominal: Soft. Bowel sounds are normal. She exhibits no distension. There is no tenderness.   Neurological: She is alert.   Oriented to self only, moves all extremities    Skin: Skin is warm and dry. No rash noted. She is not diaphoretic.   Psychiatric: She is inattentive.   Vitals reviewed.           Wounds (last 24 hours)      LDA Wound     Row Name 06/15/20 0952             [REMOVED] Wound Right shoulder    Wound - Properties Group Side: Right  -BS Location: shoulder  -BS Stage, Pressure Injury: unstageable  -BS Additional Comments: right " shoulder mastisol, steri strips, 4x4, abd, foam tape , sling with abduction pillow   -BS Resolution Date: 06/15/20  -AH Resolution Time: 0952  -AH Wound Outcome: Healed  -AH       Wound 12/13/19 0800 Left lower;posterior arm Abrasion    Wound - Properties Group Date first assessed: 12/13/19  -TM Time first assessed: 0800  -TM Side: Left  -TM Orientation: lower;posterior  -TM Location: arm  -TM Primary Wound Type: Abrasion  -TM    Dressing Appearance  dry;intact;open to air  -AH      Base  scab  -AH      Periwound  intact  -AH      Periwound Temperature  warm  -AH      Drainage Amount  none  -AH        User Key  (r) = Recorded By, (t) = Taken By, (c) = Cosigned By    Initials Name Provider Type    BS Kenij Pittman, RN Registered Nurse    Laron Farfan, RN Registered Nurse    Janny Tong RN Registered Nurse          Procedures:              Results Review:     I reviewed the patient's new clinical results.      Lab Results (last 24 hours)     Procedure Component Value Units Date/Time    BUN [[id number]]  (Abnormal) Collected:  06/15/20 0337    Specimen:  Blood Updated:  06/15/20 0740     BUN 23 mg/dL     Basic Metabolic Panel [[id number]]  (Abnormal) Collected:  06/15/20 0337    Specimen:  Blood Updated:  06/15/20 0452     Glucose 81 mg/dL      BUN --     Comment: Testing performed by alternate method        Creatinine 0.87 mg/dL      Sodium 139 mmol/L      Potassium 3.7 mmol/L      Chloride 103 mmol/L      CO2 20.0 mmol/L      Calcium 9.3 mg/dL      eGFR Non African Amer 68 mL/min/1.73      BUN/Creatinine Ratio --     Comment: Testing not performed.        Anion Gap 16.0 mmol/L     Narrative:       GFR Normal >60  Chronic Kidney Disease <60  Kidney Failure <15      CBC (No Diff) [[id number]]  (Normal) Collected:  06/15/20 0337    Specimen:  Blood Updated:  06/15/20 0424     WBC 10.10 10*3/mm3      RBC 3.88 10*6/mm3      Hemoglobin 12.5 g/dL      Hematocrit 35.9 %      MCV 92.7 fL      MCH 32.3 pg       MCHC 34.9 g/dL      RDW 13.7 %      RDW-SD 44.2 fl      MPV 8.5 fL      Platelets 200 10*3/mm3     Urinalysis, Microscopic Only - Urine, Catheter In/Out [[id number]]  (Abnormal) Collected:  06/14/20 2058    Specimen:  Urine, Catheter In/Out Updated:  06/14/20 2228     RBC, UA 6-12 /HPF      WBC, UA 3-5 /HPF      Bacteria, UA None Seen /HPF      Squamous Epithelial Cells, UA 3-6 /HPF      Hyaline Casts, UA Too Numerous to Count /LPF      Granular Casts, UA 3-6 /LPF      Methodology Manual Light Microscopy    Ammonia [[id number]]  (Abnormal) Collected:  06/14/20 2058    Specimen:  Blood Updated:  06/14/20 2206     Ammonia <10 umol/L     Troponin [[id number]]  (Normal) Collected:  06/14/20 2058    Specimen:  Blood Updated:  06/14/20 2137     Troponin T <0.010 ng/mL     Narrative:       Troponin T Reference Range:  <= 0.03 ng/mL-   Negative for AMI  >0.03 ng/mL-     Abnormal for myocardial necrosis.  Clinicians would have to utilize clinical acumen, EKG, Troponin and serial changes to determine if it is an Acute Myocardial Infarction or myocardial injury due to an underlying chronic condition.       Results may be falsely decreased if patient taking Biotin.      TSH [[id number]]  (Normal) Collected:  06/14/20 2058    Specimen:  Blood Updated:  06/14/20 2137     TSH 0.618 uIU/mL     BUN [[id number]]  (Abnormal) Collected:  06/14/20 2058    Specimen:  Blood Updated:  06/14/20 2135     BUN 26 mg/dL     Comprehensive Metabolic Panel [[id number]]  (Abnormal) Collected:  06/14/20 2058    Specimen:  Blood Updated:  06/14/20 2135     Glucose 92 mg/dL      BUN --     Comment: Testing performed by alternate method        Creatinine 0.92 mg/dL      Sodium 140 mmol/L      Potassium 3.9 mmol/L      Chloride 101 mmol/L      CO2 21.0 mmol/L      Calcium 9.7 mg/dL      Total Protein 7.8 g/dL      Albumin 4.40 g/dL      ALT (SGPT) 29 U/L      AST (SGOT) 60 U/L      Comment: Specimen hemolyzed.  Results may be affected.        Alkaline  Phosphatase 72 U/L      Total Bilirubin 0.4 mg/dL      eGFR Non African Amer 63 mL/min/1.73      Globulin 3.4 gm/dL      A/G Ratio 1.3 g/dL      BUN/Creatinine Ratio --     Comment: Testing not performed.        Anion Gap 18.0 mmol/L     Narrative:       GFR Normal >60  Chronic Kidney Disease <60  Kidney Failure <15      Urine Drug Screen - Urine, Catheter In/Out [[id number]]  (Abnormal) Collected:  06/14/20 2058    Specimen:  Urine, Catheter In/Out Updated:  06/14/20 2134     Amphet/Methamphet, Screen Negative     Barbiturates Screen, Urine Negative     Benzodiazepine Screen, Urine Positive     Cocaine Screen, Urine Negative     Opiate Screen Negative     THC, Screen, Urine Negative     Methadone Screen, Urine Positive     Oxycodone Screen, Urine Positive    Narrative:       Negative Thresholds For Drugs Screened:     Amphetamines               500 ng/ml   Barbiturates               200 ng/ml   Benzodiazepines            100 ng/ml   Cocaine                    300 ng/ml   Methadone                  300 ng/ml   Opiates                    300 ng/ml   Oxycodone                  100 ng/ml   THC                        50 ng/ml    The Normal Value for all drugs tested is negative. This report includes final unconfirmed screening results to be used for medical treatment purposes only. Unconfirmed results must not be used for non-medical purposes such as employment or legal testing. Clinical consideration should be applied to any drug of abuse test, particulary when unconfirmed results are used.  All urine drugs of abuse requests without chain of custody are for medical screening purposes only.  False positives are possible.      Salicylate Level [[id number]]  (Normal) Collected:  06/14/20 2058    Specimen:  Blood Updated:  06/14/20 2134     Salicylate <0.3 mg/dL     Acetaminophen Level [[id number]]  (Abnormal) Collected:  06/14/20 2058    Specimen:  Blood Updated:  06/14/20 2134     Acetaminophen <5.0 mcg/mL     Narrative:        Acetaminophen Therapeutic Range  5-20 ug/mL      Hours after ingestion            Toxic Value    4 Hours                           150 ug/mL    8 Hours                            70 ug/mL   12 Hours                            40 ug/mL   16 Hours                            20 ug/mL    These values apply to a single ingestion only.     Ethanol [[id number]] Collected:  06/14/20 2058    Specimen:  Blood Updated:  06/14/20 2134     Ethanol % <0.010 %     Narrative:       Plasma Ethanol Clinical Symptoms:    ETOH (%)               Clinical Symptom  .01-.05              No apparent influence  .03-.12              Euphoria, Diminished judgment and attention   .09-.25              Impaired comprehension, Muscle incoordination  .18-.30              Confusion, Staggered gait, Slurred speech  .25-.40              Markedly decreased response to stimuli, unable to stand or                        walk, vomitting, sleep or stupor  .35-.50              Comatose, Anesthesia, Subnormal body temperature        CK [[id number]]  (Abnormal) Collected:  06/14/20 2058    Specimen:  Blood Updated:  06/14/20 2134     Creatine Kinase 1,379 U/L     Magnesium [[id number]]  (Normal) Collected:  06/14/20 2058    Specimen:  Blood Updated:  06/14/20 2134     Magnesium 1.8 mg/dL     Urinalysis With Culture If Indicated - Urine, Catheter In/Out [[id number]]  (Abnormal) Collected:  06/14/20 2058    Specimen:  Urine, Catheter In/Out Updated:  06/14/20 2116     Color, UA Dark Yellow     Comment: Result checked Visual confirmation        Appearance, UA Clear     pH, UA <=5.0     Specific Gravity, UA >=1.030     Glucose, UA Negative     Ketones, UA 40 mg/dL (2+)     Bilirubin, UA Moderate (2+)     Comment: Confirmation testing is unavailable.  A serum bilirubin is recommended for further assessment.        Blood, UA Trace     Protein, UA Trace     Leuk Esterase, UA Negative     Nitrite, UA Negative     Urobilinogen, UA 0.2 E.U./dL    CBC & Differential  [[id number]] Collected:  06/14/20 2058    Specimen:  Blood Updated:  06/14/20 2115    Narrative:       The following orders were created for panel order CBC & Differential.  Procedure                               Abnormality         Status                     ---------                               -----------         ------                     CBC Auto Differential[[id number]]        Abnormal            Final result                 Please view results for these tests on the individual orders.    CBC Auto Differential [[id number]]  (Abnormal) Collected:  06/14/20 2058    Specimen:  Blood Updated:  06/14/20 2115     WBC 10.50 10*3/mm3      RBC 4.19 10*6/mm3      Hemoglobin 13.2 g/dL      Hematocrit 38.1 %      MCV 90.9 fL      MCH 31.5 pg      MCHC 34.7 g/dL      RDW 14.0 %      RDW-SD 45.1 fl      MPV 8.3 fL      Platelets 218 10*3/mm3      Neutrophil % 73.5 %      Lymphocyte % 18.9 %      Monocyte % 6.5 %      Eosinophil % 0.0 %      Basophil % 1.1 %      Neutrophils, Absolute 7.70 10*3/mm3      Lymphocytes, Absolute 2.00 10*3/mm3      Monocytes, Absolute 0.70 10*3/mm3      Eosinophils, Absolute 0.00 10*3/mm3      Basophils, Absolute 0.10 10*3/mm3      nRBC 0.3 /100 WBC         No results found for: HGBA1C            No results found for: LIPASE  Lab Results   Component Value Date    CHOL 129 10/24/2019    TRIG 96 10/24/2019    HDL 48 10/24/2019    LDL 62 10/24/2019       No results found for: INTRAOP, PREDX, FINALDX, COMDX    Microbiology Results (last 10 days)     ** No results found for the last 240 hours. **          ECG/EMG Results (most recent)     Procedure Component Value Units Date/Time    ECG 12 Lead [[id number]] Collected:  06/14/20 2101     Updated:  06/14/20 2107    Narrative:       HEART RATE= 89  bpm  RR Interval= 676  ms  NJ Interval= 159  ms  P Horizontal Axis= 20  deg  P Front Axis= 36  deg  QRSD Interval= 97  ms  QT Interval= 379  ms  QRS Axis= -26  deg  T Wave Axis= 14  deg  - NORMAL ECG -  Sinus  rhythm  Electronically Signed By:   Date and Time of Study: 2020-06-14 21:01:53                    Ct Head Without Contrast    Result Date: 6/14/2020  1. No acute intracranial process. Volume loss. Atherosclerosis. MRI is more sensitive for the detection of acute nonhemorrhagic infarct.  Electronically signed by:  Devon Meza M.D.  6/14/2020 8:39 PM    Xr Chest 1 View    Result Date: 6/15/2020  No acute cardiopulmonary abnormality.  Electronically Signed By-Galileo Mccain On:6/15/2020 7:23 AM This report was finalized on [id number] by  Galileo Mccain, .          Xrays, labs reviewed personally by physician.    Medication Review:   I have reviewed the patient's current medication list      Scheduled Meds    ARIPiprazole 20 mg Oral Daily   atenolol 50 mg Oral Daily   atorvastatin 20 mg Oral Daily   busPIRone 15 mg Oral BID   DULoxetine 60 mg Oral BID   folic acid 1,000 mcg Oral Daily   hydroxychloroquine 300 mg Oral Q24H   nicotine 1 patch Transdermal Daily   rOPINIRole 0.5 mg Oral BID   sodium chloride 10 mL Intravenous Q12H   sulfaSALAzine 1,000 mg Oral BID       Meds Infusions    sodium chloride 100 mL/hr Last Rate: 100 mL/hr (06/15/20 1332)       Meds PRN  •  acetaminophen **OR** acetaminophen **OR** acetaminophen  •  melatonin  •  nitroglycerin  •  ondansetron **OR** ondansetron  •  potassium chloride **OR** potassium chloride **OR** potassium chloride  •  [COMPLETED] Insert peripheral IV **AND** sodium chloride  •  sodium chloride    I personally reviewed patient's x-ray films and my findings are    I personally reviewed patient's EKG strips and my findings are    Assessment/Plan   Assessment/Plan     Active Hospital Problems    Diagnosis  POA   • Altered mental status [R41.82]  Yes      Resolved Hospital Problems   No resolved problems to display.       MEDICAL DECISION MAKING COMPLEXITY BY PROBLEM:     Altered Mental status/ reported hallucinations  - unclear etiology possibly psychiatric in nature     - CT head negative for acute process  - no focal deficits, moves all extremities  - psych consulted  -  consult  - holding all sedating meds at thsi time   - UA- ketones, trace blood  - ethanol negative, acetaminophen <5.0  - UDS + benzo, methadone, oxycodone   - CXR- no acute cardiopulmonary process     Rhabdomyolysis  -CK 1379 presumably non traumatic from over sedation and poor po intake  - continue IV fluids  - repeat in am      Anxiety/Depression  - on Abilify, BusPar, Cymbalta  - hold clonazepam   - Psychiatry consulted      Fibromyalgia/ chronic pain   - hold Lyrica, Oxycodone, Baclofen      RA?   - on folic acid and  methotrexate weekly not ordered   - Plaquenil and Azulfidine      HTN, HLP  - continue atenolol, statin        RLS  - on Requip     Sleep disturbance  - hold SOMA for now     DVT Prophylaxis  - SCDS    VTE Prophylaxis -   Mechanical Order History:      Ordered        06/15/20 0021  Place Sequential Compression Device  Once         06/15/20 0021  Maintain Sequential Compression Device  Continuous                 Pharmalogical Order History:     None            Code Status -   Code Status and Medical Interventions:   Ordered at: 06/14/20 2351     Code Status:    CPR     Medical Interventions (Level of Support Prior to Arrest):    Full           Discharge Planning          Destination      Coordination has not been started for this encounter.      Durable Medical Equipment      Coordination has not been started for this encounter.      Dialysis/Infusion      Coordination has not been started for this encounter.      Home Medical Care      Coordination has not been started for this encounter.      Therapy      Coordination has not been started for this encounter.      Community Resources      Coordination has not been started for this encounter.            Electronically signed by Antelmo Melissa DO, 06/15/20, 19:43.  [facility]ist Team

## 2020-06-15 NOTE — H&P
"      [facility] Medicine Services      Patient Name: Briana Alas  : 1965  MRN: [id number]  Primary Care Physician: Kamran Garcia MD  Date of admission: 2020    Patient Care Team:  Kamran Garcia MD as PCP - General  Kamran Garcia MD as PCP - Family Medicine          Subjective   History Present Illness     Chief Complaint:   Chief Complaint   Patient presents with   • Altered Mental Status              54 year old female brought to ED by family for concern for 3 days of  confusion and reported hallucination. She has a PMH of anxiety , chronic pain, fibromyalgia  And depression and is on several  psychiatric and sedating medications including benzodiazepines, and opiates. Per report from ED family member called and had a concern patient was procuring non prescribed methadone. She has no methadone per INSPECT and her drug screen came back positive. Per report from ED she was talking and oriented x2 in ED and admitted she had hallucinations but when confronted about positive methadone in urine she will no longer talk or make eye contact. She is cooperative with exam . Her eyes are open but she still will not nod or answer or talk.  This seems to be by choice. Her foot is rhythmically tapping on the bed as if nervous or angry. Patient will not cooperate to  answer any questions but moves all extremities and has no facial droop. CT head per radiology today :    \" IMPRESSION:  1. No acute intracranial process. Volume loss. Atherosclerosis. MRI is more sensitive for the detection of acute nonhemorrhagic infarct.\"     Home meds that are sedating have been held and  and psychiatry have been consulted. Labs show a CK of 1379 and she was given a IVF bolus in ED and IVF continued . Ethanol was 0.010and ammonia not elevated. She will be admitted for further evaluation and treatment .           Review of Systems   Unable to perform ROS: patient " nonverbal           Personal History     Past Medical History:   Past Medical History:   Diagnosis Date   • Anxiety    • Arthritis    • Back pain    • Depression    • Headache    • Hyperlipidemia    • Hypertension    • Injury of back    • Restless leg syndrome        Surgical History:      Past Surgical History:   Procedure Laterality Date   • ANKLE OPEN REDUCTION INTERNAL FIXATION Right    • FOOT SURGERY Bilateral     bunionectomy   • SHOULDER ARTHROSCOPY W/ ROTATOR CUFF REPAIR Right 12/13/2019    Procedure: RIGHT SHOULDER ARTHROSCOPY WITH ROTATOR CUFF REPAIR and Subacromial decompression;  Surgeon: Gino Ackerman MD;  Location: [facility] MAIN OR;  Service: Orthopedics   • TUBAL ABDOMINAL LIGATION             Family History: family history includes Depression in her mother; Hyperlipidemia in her mother; Hypertension in her father and mother; Thyroid disease in her mother. Otherwise pertinent FHx was reviewed and unremarkable.     Social History:  reports that she has been smoking cigarettes. She has been smoking about 0.50 packs per day. She has never used smokeless tobacco. She reports that she does not drink alcohol or use drugs.      Medications:  Prior to Admission medications    Medication Sig Start Date End Date Taking? Authorizing Provider   ARIPiprazole (ABILIFY) 20 MG tablet TAKE ONE TABLET BY MOUTH DAILY 4/17/20 6/14/20 Yes Kamran Garcia MD   atenolol (TENORMIN) 50 MG tablet Take 1 tablet by mouth Daily. 5/4/20   Kamran Garcia MD   clonazePAM (KlonoPIN) 1 MG tablet Take 1 tablet by mouth 3 (Three) Times a Day As Needed for Anxiety or Seizures. 5/4/20   Kamran Garcia MD   DULoxetine (CYMBALTA) 60 MG capsule Take 1 capsule by mouth 2 (Two) Times a Day. 5/4/20   Kamran Garcia MD   folic acid (FOLVITE) 1 MG tablet Take 1 tablet by mouth Daily. 9/16/19   Lorna Pitts MD   hydroxychloroquine (PLAQUENIL) 200 MG tablet TAKE ONE AND ONE-HALF TABLETS BY  MOUTH DAILY 1/6/20   Lorna Pitts MD   methotrexate 2.5 MG tablet Take 15 mg by mouth 1 (One) Time Per Week.    ProviderAamir MD   oxyCODONE (ROXICODONE) 10 MG tablet Take 1 tablet by mouth Every 6 (Six) Hours As Needed for Severe Pain . 5/27/20   Kamran Garcia MD   rOPINIRole (REQUIP) 0.5 MG tablet Take 1 tablet by mouth 2 (Two) Times a Day. Take 1 hour before bedtime. 12/4/19   Kamran Garcia MD   atenolol (TENORMIN) 50 MG tablet Take 50 mg by mouth Daily.  6/14/20  ProviderAamir MD   atorvastatin (LIPITOR) 20 MG tablet TAKE ONE (1) TABLET BY MOUTH AT BEDTIME 5/26/20 6/14/20  Kamran Garcia MD   baclofen (LIORESAL) 20 MG tablet TAKE ONE (1) TABLET BY MOUTH THREE TIMES DAILY 5/26/20 6/14/20  Kamran Garcia MD   busPIRone (BUSPAR) 15 MG tablet TAKE ONE TABLET BY MOUTH TWICE A DAY 3/2/20 6/14/20  Kamran Garcia MD   carisoprodol (SOMA) 350 MG tablet TAKE ONE (1) TABLET BY MOUTH THREE TIMES DAILY AS NEEDED FOR MUSCLE SPASMS 6/11/20 6/14/20  Kamran Garcia MD   pregabalin (LYRICA) 150 MG capsule TAKE ONE (1) CAPSULE BY MOUTH TWICE DAILY 5/29/20 6/14/20  Kamran Garcia MD   sulfaSALAzine (AZULFIDINE) 500 MG tablet TAKE TWO TABLETS BY MOUTH TWICE A DAY 12/3/19 6/14/20  Meka Coughlin, APRN       Allergies:    Allergies   Allergen Reactions   • Penicillins Rash       Objective   Objective     Vital Signs  Temp:  [98.5 °F (36.9 °C)] 98.5 °F (36.9 °C)  Heart Rate:  [84-93] 93  Resp:  [18] 18  BP: (133-157)/(69-79) 157/79  SpO2:  [92 %-99 %] 92 %  on   ;   Device (Oxygen Therapy): room air  Body mass index is 32.43 kg/m².    Physical Exam   Constitutional: She appears well-developed and well-nourished.   HENT:   Head: Normocephalic and atraumatic.   Eyes: EOM are normal.   Neck: Normal range of motion. Neck supple.   Cardiovascular: Normal rate, regular rhythm, normal heart sounds and intact distal pulses.   Pulmonary/Chest:  Effort normal and breath sounds normal.   Abdominal: Soft. Bowel sounds are normal.   Genitourinary:   Genitourinary Comments: deferred   Musculoskeletal: Normal range of motion.   Neurological: She is alert.   Non communicative appears by choice    Skin: Skin is warm and dry.   Psychiatric:   Non communicative appears by choice but cooperative with exam.   Vitals reviewed.      Results Review:  I have personally reviewed most recent lab results and agree with findings, most notably: Urine drug screen .    Results from last 7 days   Lab Units 06/14/20 2058   WBC 10*3/mm3 10.50   HEMOGLOBIN g/dL 13.2   HEMATOCRIT % 38.1   PLATELETS 10*3/mm3 218     Results from last 7 days   Lab Units 06/14/20 2058   SODIUM mmol/L 140   POTASSIUM mmol/L 3.9   CHLORIDE mmol/L 101   CO2 mmol/L 21.0*   BUN  26*   CREATININE mg/dL 0.92   GLUCOSE mg/dL 92   CALCIUM mg/dL 9.7   ALT (SGPT) U/L 29   AST (SGOT) U/L 60*   TROPONIN T ng/mL <0.010     Estimated Creatinine Clearance: 73.2 mL/min (by C-G formula based on SCr of 0.92 mg/dL).  Brief Urine Lab Results  (Last result in the past 365 days)      Color   Clarity   Blood   Leuk Est   Nitrite   Protein   CREAT   Urine HCG        06/14/20 2058 Dark Yellow  Comment:  Result checked Visual confirmation Clear Trace Negative Negative Trace               Microbiology Results (last 10 days)     ** No results found for the last 240 hours. **          ECG/EMG Results (most recent)     Procedure Component Value Units Date/Time    ECG 12 Lead [[id number]] Collected:  06/14/20 2101     Updated:  06/14/20 2107    Narrative:       HEART RATE= 89  bpm  RR Interval= 676  ms  ND Interval= 159  ms  P Horizontal Axis= 20  deg  P Front Axis= 36  deg  QRSD Interval= 97  ms  QT Interval= 379  ms  QRS Axis= -26  deg  T Wave Axis= 14  deg  - NORMAL ECG -  Sinus rhythm  Electronically Signed By:   Date and Time of Study: 2020-06-14 21:01:53                    Ct Head Without Contrast    Result Date: 6/14/2020  1.  No acute intracranial process. Volume loss. Atherosclerosis. MRI is more sensitive for the detection of acute nonhemorrhagic infarct.  Electronically signed by:  Devon Meza M.D.  6/14/2020 8:39 PM        Estimated Creatinine Clearance: 73.2 mL/min (by C-G formula based on SCr of 0.92 mg/dL).    Assessment/Plan   Assessment/Plan       Active Hospital Problems    Diagnosis  POA   • Altered mental status [R41.82]  Yes      Resolved Hospital Problems   No resolved problems to display.     Altered Mental status/ reported hallucinations- CT no acute process per radiology, no focal deficits, no facial droop. Non communicative seems to be by choice . Likely secondary to over medication of benzodiazepines, opiates and non prescribed methadone - patient will not communicate whether this was accidental or intentional .   and psychiatry consulted- hold home Soma, Baclofen Oxicodone 10, Lyrica, Clonazepam( these 3 INSPECT verified) , does not appear to be CVA/TIA    Rhabdomyolysis-CK 1379 presumably non traumatic from over sedation and poor po intake - NS IVF bolus in ED and continue IVF repeat CK in am     Anxiety - depression - on Abilify, BusPar, Cymbalta, hold clonazepam     Fibromyalgia - chronic pain - hold Lyrica, Oxycodone, Baclofen     RA? Fibromyalgia - on methotrexate weekly not ordered and Plaquenil and Azulfidine     HTN- controlled on atenolol, monitor      HLD- on statin     Dioet supp - on folic acid     RLS- on Requip    Sleep disturbance hold SOMA for now           VTE Prophylaxis - scd  Mechanical Order History:     None      Pharmalogical Order History:     None          CODE STATUS:    Code Status and Medical Interventions:   Ordered at: 06/14/20 7209     Code Status:    CPR     Medical Interventions (Level of Support Prior to Arrest):    Full       This patient has been examined wearing appropriate Personal Protective Equipment . 06/15/20      I discussed the patient's findings and  my recommendations with patient.        Electronically signed by FRANCESCO Wang, 06/14/20, 11:21 PM.  [facility] Hospitalist Team

## 2020-06-15 NOTE — PLAN OF CARE
Problem: Patient Care Overview  Goal: Plan of Care Review  Flowsheets (Taken 6/15/2020 1712)  Progress: no change  Plan of Care Reviewed With: patient  Outcome Summary: Patient has been alert to self. She is not speaking much but answering some questions. Does not complain of any pain. She continues to stare at the ceiling verbilizing she sees colors and shapes. Refused all medications and refused to eat. MD aware. Psych eval today. They recommend inpatient psych if there is no other medical reason for her current condition. Remains on IV fluids. She is very flat and distant, does not make eye contact with anyone. Will continue to monitor.     Problem: Patient Care Overview  Goal: Interprofessional Rounds/Family Conf  Flowsheets (Taken 6/15/2020 1712)  Summary: Patient lives at home with daughter and another couple.  Participants: ; nursing; pharmacy

## 2020-06-15 NOTE — NURSING NOTE
Patient refuses to wear yellow falls bracelet and red allergy bracelet. She was educated on the importance of wearing the bracelets and still refuses. Will continue to monitor patient.

## 2020-06-15 NOTE — ED PROVIDER NOTES
Subjective   Chief complaint Per family is some altered mental status confusion    History of present illness 54-year-old female who was brought in by the family not for 3-day history of acting confused not able to answer questions appropriately and not acting her normal self.  States she is hallucinating.  The patient has had no injury.  There is been no slurred speech.  The patient states that she has had some numbness and tingling in her extremities for several weeks.  She offers no other complaints.  No headache vision change speech difficulty no chest pain neck arm jaw pain or shortness of breath.  No fever chills or illness.  No trauma.  No slurred speech or paralysis.          Review of Systems   Constitutional: Negative for chills and fever.   HENT: Negative for congestion and sinus pressure.    Eyes: Negative for photophobia and visual disturbance.   Respiratory: Negative for chest tightness and shortness of breath.    Cardiovascular: Negative for chest pain and leg swelling.   Gastrointestinal: Negative for abdominal pain and vomiting.   Endocrine: Negative for cold intolerance and heat intolerance.   Genitourinary: Negative for difficulty urinating and dysuria.   Musculoskeletal: Positive for arthralgias and back pain.   Skin: Negative for color change and pallor.   Neurological: Positive for numbness. Negative for dizziness and light-headedness.   Psychiatric/Behavioral: Negative for agitation and behavioral problems.       Past Medical History:   Diagnosis Date   • Anxiety    • Arthritis    • Back pain    • Depression    • Headache    • Hyperlipidemia    • Hypertension    • Injury of back    • Restless leg syndrome        Allergies   Allergen Reactions   • Penicillins Rash       Past Surgical History:   Procedure Laterality Date   • ANKLE OPEN REDUCTION INTERNAL FIXATION Right    • FOOT SURGERY Bilateral     bunionectomy   • SHOULDER ARTHROSCOPY W/ ROTATOR CUFF REPAIR Right 12/13/2019    Procedure:  RIGHT SHOULDER ARTHROSCOPY WITH ROTATOR CUFF REPAIR and Subacromial decompression;  Surgeon: Gino Ackerman MD;  Location: [facility] MAIN OR;  Service: Orthopedics   • TUBAL ABDOMINAL LIGATION         Family History   Problem Relation Age of Onset   • Hypertension Mother    • Hyperlipidemia Mother    • Depression Mother    • Thyroid disease Mother    • Hypertension Father        Social History     Socioeconomic History   • Marital status: Legally      Spouse name: Not on file   • Number of children: Not on file   • Years of education: Not on file   • Highest education level: Not on file   Tobacco Use   • Smoking status: Current Every Day Smoker     Packs/day: 0.50     Types: Cigarettes   • Smokeless tobacco: Never Used   Substance and Sexual Activity   • Alcohol use: No     Frequency: Never   • Drug use: No   • Sexual activity: Defer       Prior to Admission medications    Medication Sig Start Date End Date Taking? Authorizing Provider   ARIPiprazole (ABILIFY) 20 MG tablet TAKE ONE TABLET BY MOUTH DAILY 4/17/20   Kamran Garcia MD   atenolol (TENORMIN) 50 MG tablet Take 50 mg by mouth Daily.    Provider, MD Aamir   atenolol (TENORMIN) 50 MG tablet Take 1 tablet by mouth Daily. 5/4/20   Kamran Garcia MD   atorvastatin (LIPITOR) 20 MG tablet TAKE ONE (1) TABLET BY MOUTH AT BEDTIME 5/26/20   Kamran Garcia MD   baclofen (LIORESAL) 20 MG tablet TAKE ONE (1) TABLET BY MOUTH THREE TIMES DAILY 5/26/20   Kamran Garcia MD   busPIRone (BUSPAR) 15 MG tablet TAKE ONE TABLET BY MOUTH TWICE A DAY 3/2/20   Kamran Garcia MD   carisoprodol (SOMA) 350 MG tablet TAKE ONE (1) TABLET BY MOUTH THREE TIMES DAILY AS NEEDED FOR MUSCLE SPASMS 6/11/20   Kamran Garcia MD   clonazePAM (KlonoPIN) 1 MG tablet Take 1 tablet by mouth 3 (Three) Times a Day As Needed for Anxiety or Seizures. 5/4/20   Kamran Garcia MD   DULoxetine (CYMBALTA) 60 MG capsule  Take 1 capsule by mouth 2 (Two) Times a Day. 5/4/20   Kamran Garcia MD   folic acid (FOLVITE) 1 MG tablet Take 1 tablet by mouth Daily. 9/16/19   Lorna Pitts MD   hydroxychloroquine (PLAQUENIL) 200 MG tablet TAKE ONE AND ONE-HALF TABLETS BY MOUTH DAILY 1/6/20   Lorna Pitts MD   methotrexate 2.5 MG tablet Take 15 mg by mouth 1 (One) Time Per Week.    Provider, MD Aamir   oxyCODONE (ROXICODONE) 10 MG tablet Take 1 tablet by mouth Every 6 (Six) Hours As Needed for Severe Pain . 5/27/20   Kamran Garica MD   pregabalin (LYRICA) 150 MG capsule TAKE ONE (1) CAPSULE BY MOUTH TWICE DAILY 5/29/20   Kamran Garcia MD   rOPINIRole (REQUIP) 0.5 MG tablet Take 1 tablet by mouth 2 (Two) Times a Day. Take 1 hour before bedtime. 12/4/19   Kamran Garcia MD   sulfaSALAzine (AZULFIDINE) 500 MG tablet TAKE TWO TABLETS BY MOUTH TWICE A DAY 12/3/19   Meka Coughlin, APRN         Objective   Physical Exam  54-year-old female awake alert no acute distress HEENT no signs of trauma extraocular muscles intact pupils equal and reactive no raccoon or anne sign mouth is clear neck is supple no adenopathy no JVD no bruits no meningeal signs lungs clear no retractions heart regular without murmur abdomen soft without tenderness no masses extremities pulses are equal throughout upper and lower extremities no edema cords or Homans sign no evidence of DVT.  She is awake alert she thinks is July 2016 she knows where she says she knows her name no facial asymmetry normal speech no drift in arms or legs normal finger-to-nose the patient is able to walk without ataxia patient has no focal deficits  Procedures           ED Course      Results for orders placed or performed during the hospital encounter of 06/14/20   Comprehensive Metabolic Panel   Result Value Ref Range    Glucose 92 65 - 99 mg/dL    BUN      Creatinine 0.92 0.57 - 1.00 mg/dL    Sodium 140 136 - 145 mmol/L     Potassium 3.9 3.5 - 5.2 mmol/L    Chloride 101 98 - 107 mmol/L    CO2 21.0 (L) 22.0 - 29.0 mmol/L    Calcium 9.7 8.6 - 10.5 mg/dL    Total Protein 7.8 6.0 - 8.5 g/dL    Albumin 4.40 3.50 - 5.20 g/dL    ALT (SGPT) 29 1 - 33 U/L    AST (SGOT) 60 (H) 1 - 32 U/L    Alkaline Phosphatase 72 39 - 117 U/L    Total Bilirubin 0.4 0.2 - 1.2 mg/dL    eGFR Non African Amer 63 >60 mL/min/1.73    Globulin 3.4 gm/dL    A/G Ratio 1.3 g/dL    BUN/Creatinine Ratio      Anion Gap 18.0 (H) 5.0 - 15.0 mmol/L   Urinalysis With Culture If Indicated - Urine, Catheter In/Out   Result Value Ref Range    Color, UA Dark Yellow (A) Yellow, Straw    Appearance, UA Clear Clear    pH, UA <=5.0 5.0 - 8.0    Specific Gravity, UA >=1.030 1.005 - 1.030    Glucose, UA Negative Negative    Ketones, UA 40 mg/dL (2+) (A) Negative    Bilirubin, UA Moderate (2+) (A) Negative    Blood, UA Trace (A) Negative    Protein, UA Trace (A) Negative    Leuk Esterase, UA Negative Negative    Nitrite, UA Negative Negative    Urobilinogen, UA 0.2 E.U./dL 0.2 - 1.0 E.U./dL   Troponin   Result Value Ref Range    Troponin T <0.010 0.000 - 0.030 ng/mL   Acetaminophen Level   Result Value Ref Range    Acetaminophen <5.0 (L) 10.0 - 30.0 mcg/mL   Ethanol   Result Value Ref Range    Ethanol % <0.010 %   Urine Drug Screen - Urine, Catheter In/Out   Result Value Ref Range    Amphet/Methamphet, Screen Negative Negative    Barbiturates Screen, Urine Negative Negative    Benzodiazepine Screen, Urine Positive (A) Negative    Cocaine Screen, Urine Negative Negative    Opiate Screen Negative Negative    THC, Screen, Urine Negative Negative    Methadone Screen, Urine Positive (A) Negative    Oxycodone Screen, Urine Positive (A) Negative   Salicylate Level   Result Value Ref Range    Salicylate <0.3 <=30.0 mg/dL   CK   Result Value Ref Range    Creatine Kinase 1,379 (H) 20 - 180 U/L   TSH   Result Value Ref Range    TSH 0.618 0.270 - 4.200 uIU/mL   Magnesium   Result Value Ref Range     Magnesium 1.8 1.6 - 2.6 mg/dL   Ammonia   Result Value Ref Range    Ammonia <10 (L) 11 - 51 umol/L   CBC Auto Differential   Result Value Ref Range    WBC 10.50 3.40 - 10.80 10*3/mm3    RBC 4.19 3.77 - 5.28 10*6/mm3    Hemoglobin 13.2 12.0 - 15.9 g/dL    Hematocrit 38.1 34.0 - 46.6 %    MCV 90.9 79.0 - 97.0 fL    MCH 31.5 26.6 - 33.0 pg    MCHC 34.7 31.5 - 35.7 g/dL    RDW 14.0 12.3 - 15.4 %    RDW-SD 45.1 37.0 - 54.0 fl    MPV 8.3 6.0 - 12.0 fL    Platelets 218 140 - 450 10*3/mm3    Neutrophil % 73.5 42.7 - 76.0 %    Lymphocyte % 18.9 (L) 19.6 - 45.3 %    Monocyte % 6.5 5.0 - 12.0 %    Eosinophil % 0.0 (L) 0.3 - 6.2 %    Basophil % 1.1 0.0 - 1.5 %    Neutrophils, Absolute 7.70 (H) 1.70 - 7.00 10*3/mm3    Lymphocytes, Absolute 2.00 0.70 - 3.10 10*3/mm3    Monocytes, Absolute 0.70 0.10 - 0.90 10*3/mm3    Eosinophils, Absolute 0.00 0.00 - 0.40 10*3/mm3    Basophils, Absolute 0.10 0.00 - 0.20 10*3/mm3    nRBC 0.3 (H) 0.0 - 0.2 /100 WBC   Urinalysis, Microscopic Only - Urine, Catheter In/Out   Result Value Ref Range    RBC, UA 6-12 (A) None Seen /HPF    WBC, UA 3-5 (A) None Seen /HPF    Bacteria, UA None Seen None Seen /HPF    Squamous Epithelial Cells, UA 3-6 (A) None Seen, 0-2 /HPF    Hyaline Casts, UA Too Numerous to Count None Seen /LPF    Granular Casts, UA 3-6 None Seen /LPF    Methodology Manual Light Microscopy    BUN   Result Value Ref Range    BUN 26 (H) 6 - 20 mg/dL     Ct Head Without Contrast    Result Date: 6/14/2020  1. No acute intracranial process. Volume loss. Atherosclerosis. MRI is more sensitive for the detection of acute nonhemorrhagic infarct.  Electronically signed by:  Devon Meza M.D.  6/14/2020 8:39 PM    Medications   sodium chloride 0.9 % flush 10 mL (has no administration in time range)          EKG my interpretation normal sinus rhythm rate of 89 normal axis no hypertrophy QTC of 432 normal EKG                                  MDM  Number of Diagnoses or Management  Options  Altered mental status, unspecified altered mental status type:   Diagnosis management comments: Medical decision making.  Patient had the above exam evaluation IV established placed on the monitor.  CT head without without acute findings chest x-ray without acute findings EKG without acute findings.  CBC and electrolytes were unremarkable ammonia level was normal magnesium normal TSH normal CK was slightly elevated 1379 aspirin level negative Tylenol negative drug screen showed benzos oxycodone and methadone.  I do not see where patient is prescribed methadone when I go back into the room at 11:05 PM to talk to the patient and ask her about her symptoms and the methadone she will not answer me she just stares ahead she is conscious there is no seizure activity she just will not answer questions at this time.  Patient made aware of the findings.  She was given a liter saline as her CK is slightly elevated at 1300.  I do not see evidence of suggest acute stroke as patient was talking perfectly fine and following commands she was little confused but there was no focal deficits the patient's had this ongoing for 3 days per family.  I do not see evidence of sepsis or infection based on clinical exam.  Hospitalist nurse practitioner paged and she will be placed in the hospital for further care      Final diagnoses:   Altered mental status, unspecified altered mental status type            Jesús Chilel MD  06/14/20 7882

## 2020-06-15 NOTE — PROGRESS NOTES
Discharge Planning Assessment   [facility]     Patient Name: Briana Alas  MRN: [id number]  Today's Date: 6/15/2020    Admit Date: 6/14/2020           Discharge Plan     Row Name 06/15/20 8582       Plan    Plan   unable to contact pt per phone in room - pending psych consult 6/15.                             Chris Lombardo RN

## 2020-06-15 NOTE — PLAN OF CARE
Problem: Patient Care Overview  Goal: Plan of Care Review  Outcome: Ongoing (interventions implemented as appropriate)  Flowsheets (Taken 6/15/2020 0415)  Progress: no change  Plan of Care Reviewed With: patient  Outcome Summary: Patient arrived to the unit tonight from the ER. She is a falls risk with the bed alarm on. Patient refuses to speek and answer questions. Will continue to monitor.

## 2020-06-16 ENCOUNTER — READMISSION MANAGEMENT (OUTPATIENT)
Dept: CALL CENTER | Facility: HOSPITAL | Age: 55
End: 2020-06-16

## 2020-06-16 VITALS
WEIGHT: 195.77 LBS | BODY MASS INDEX: 33.42 KG/M2 | OXYGEN SATURATION: 96 % | SYSTOLIC BLOOD PRESSURE: 128 MMHG | TEMPERATURE: 98.2 F | RESPIRATION RATE: 16 BRPM | HEART RATE: 52 BPM | HEIGHT: 64 IN | DIASTOLIC BLOOD PRESSURE: 68 MMHG

## 2020-06-16 PROBLEM — R41.82 ALTERED MENTAL STATUS: Status: RESOLVED | Noted: 2020-06-14 | Resolved: 2020-06-16

## 2020-06-16 LAB
ALBUMIN SERPL-MCNC: 3.9 G/DL (ref 3.5–5.2)
ALBUMIN/GLOB SERPL: 1.4 G/DL
ALP SERPL-CCNC: 65 U/L (ref 39–117)
ALT SERPL W P-5'-P-CCNC: 22 U/L (ref 1–33)
ANION GAP SERPL CALCULATED.3IONS-SCNC: 16 MMOL/L (ref 5–15)
AST SERPL-CCNC: 35 U/L (ref 1–32)
BILIRUB SERPL-MCNC: 0.4 MG/DL (ref 0.2–1.2)
BUN BLD-MCNC: 23 MG/DL (ref 6–20)
BUN BLD-MCNC: ABNORMAL MG/DL
BUN/CREAT SERPL: ABNORMAL
CALCIUM SPEC-SCNC: 9.1 MG/DL (ref 8.6–10.5)
CHLORIDE SERPL-SCNC: 107 MMOL/L (ref 98–107)
CK SERPL-CCNC: 376 U/L (ref 20–180)
CO2 SERPL-SCNC: 18 MMOL/L (ref 22–29)
CREAT BLD-MCNC: 0.77 MG/DL (ref 0.57–1)
GFR SERPL CREATININE-BSD FRML MDRD: 78 ML/MIN/1.73
GLOBULIN UR ELPH-MCNC: 2.8 GM/DL
GLUCOSE BLD-MCNC: 60 MG/DL (ref 65–99)
POTASSIUM BLD-SCNC: 3.9 MMOL/L (ref 3.5–5.2)
PROT SERPL-MCNC: 6.7 G/DL (ref 6–8.5)
SODIUM BLD-SCNC: 141 MMOL/L (ref 136–145)

## 2020-06-16 PROCEDURE — G0378 HOSPITAL OBSERVATION PER HR: HCPCS

## 2020-06-16 PROCEDURE — 99217 PR OBSERVATION CARE DISCHARGE MANAGEMENT: CPT | Performed by: INTERNAL MEDICINE

## 2020-06-16 PROCEDURE — 96361 HYDRATE IV INFUSION ADD-ON: CPT

## 2020-06-16 PROCEDURE — 80053 COMPREHEN METABOLIC PANEL: CPT | Performed by: INTERNAL MEDICINE

## 2020-06-16 PROCEDURE — 82550 ASSAY OF CK (CPK): CPT | Performed by: INTERNAL MEDICINE

## 2020-06-16 RX ADMIN — SODIUM CHLORIDE 100 ML/HR: 900 INJECTION, SOLUTION INTRAVENOUS at 11:24

## 2020-06-16 RX ADMIN — ATENOLOL 50 MG: 50 TABLET ORAL at 08:22

## 2020-06-16 RX ADMIN — Medication 10 ML: at 08:23

## 2020-06-16 RX ADMIN — HYDROXYCHLOROQUINE SULFATE 300 MG: 200 TABLET, FILM COATED ORAL at 08:23

## 2020-06-16 RX ADMIN — ACETAMINOPHEN 650 MG: 325 TABLET, FILM COATED ORAL at 05:19

## 2020-06-16 RX ADMIN — DULOXETINE HYDROCHLORIDE 60 MG: 30 CAPSULE, DELAYED RELEASE ORAL at 08:22

## 2020-06-16 RX ADMIN — FOLIC ACID 1000 MCG: 1 TABLET ORAL at 08:22

## 2020-06-16 RX ADMIN — ARIPIPRAZOLE 20 MG: 10 TABLET ORAL at 08:22

## 2020-06-16 RX ADMIN — ROPINIROLE 0.5 MG: 0.25 TABLET, FILM COATED ORAL at 08:22

## 2020-06-16 RX ADMIN — ATORVASTATIN CALCIUM 20 MG: 20 TABLET, FILM COATED ORAL at 08:22

## 2020-06-16 RX ADMIN — BUSPIRONE HYDROCHLORIDE 15 MG: 15 TABLET ORAL at 08:22

## 2020-06-16 RX ADMIN — SULFASALAZINE 1000 MG: 500 TABLET ORAL at 08:22

## 2020-06-16 NOTE — DISCHARGE SUMMARY
Date of Admission: 6/14/2020    Date of Discharge:  6/16/2020    Length of stay:  LOS: 1 day     Admission Diagnosis:   Altered mental status, unspecified altered mental status type [R41.82]  Altered mental status, unspecified altered mental status type [R41.82]      Discharge Diagnosis:     Altered Mental status/ reported hallucinations- resolved  - ? Polypharmacy vs possibly psychiatric in nature   - CT head negative for acute process  - no focal deficits, moves all extremities  - psych consulted  - UA- ketones, trace blood  - ethanol negative, acetaminophen <5.0  - UDS + benzo, methadone, oxycodone   - CXR- no acute cardiopulmonary process   - symptoms have completely resolved      Rhabdomyolysis  -CK 1379 -> 376  - presumably non traumatic from over sedation and poor po intake  - encourage po intake      Anxiety/Depression  - on Abilify, BusPar, Cymbalta, Clonazepam  - pt reports she had not been taking some of her medications  - discussed with psychiatry and they feel patient is stable for discharge and not suicidal      Fibromyalgia/ chronic pain   - resume Lyrica, Oxycodone  - dc baclofen     RA?   - on folic acid and methotrexate weekly  - Plaquenil and Azulfidine      HTN, HLP  - continue atenolol, statin        RLS  - on Requip     Sleep disturbance  - hold SOMA for now        Active Hospital Problems   No active problems to display.      Resolved Hospital Problems    Diagnosis Date Resolved POA   • Altered mental status [R41.82] 06/16/2020 Yes       Hospital Course:  Patient is a 54 y.o. female brought to the ED by family for concern for 3 days of  confusion and reported hallucination. She has a PMH of anxiety , chronic pain, fibromyalgia  And depression and is on several  psychiatric and sedating medications including benzodiazepines, and opiates. Per report from ED family member called and had a concern patient was procuring non prescribed methadone. She has no methadone per INSPECT and her drug  "screen came back positive. Per report from ED she was talking and oriented x2 in ED and admitted she had hallucinations but when confronted about positive methadone in urine she will no longer talk or make eye contact. She is cooperative with exam . Her eyes are open but she still will not nod or answer or talk.  This seems to be by choice. Her foot is rhythmically tapping on the bed as if nervous or angry. Patient will not cooperate to  answer any questions but moves all extremities and has no facial droop. CT head per radiology today :     \" IMPRESSION:  1. No acute intracranial process. Volume loss. Atherosclerosis. MRI is more sensitive for the detection of acute nonhemorrhagic infarct.\"      Home meds that are sedating have been held and  and psychiatry have been consulted. Labs show a CK of 1379 and she was given a IVF bolus in ED and IVF continued . Ethanol was 0.010and ammonia not elevated. She will be admitted for further evaluation and treatment .         6/15/20  Pt lying in bed staring at the ceiling. She is will answer some questions. Refusing to eat any food. Nursing reports patient has been refusing her medications and will only cooperate or answer questions at times.     6/16/20  Patient's symptoms seem to have completely resolved. She is tolerating diet, awake and talking, oriented x3. Discussed with psychiatry and feel could be related to not sleeping and not taking some of her medications.  Patient reportedly has plans to follow up with Dr. Garcia to try to wean off some of her medications.          Procedures Performed:         Consults:   Consults     Date and Time Order Name Status Description    6/15/2020 0211 Inpatient Psychiatrist Consult Completed     6/14/2020 2310 Hospitalist (on-call MD unless specified) Completed           Vital Signs:  Temp:  [98.1 °F (36.7 °C)-98.2 °F (36.8 °C)] 98.2 °F (36.8 °C)  Heart Rate:  [52-78] 52  Resp:  [14-16] 16  BP: (128-151)/(68-73) " 128/68        Physical Exam:  Physical Exam   Constitutional: She is oriented to person, place, and time. She appears well-developed. No distress.   obese   HENT:   Head: Normocephalic and atraumatic.   Mouth/Throat: Oropharynx is clear and moist.   Cardiovascular: Normal rate and regular rhythm.   Pulmonary/Chest: Effort normal and breath sounds normal.   Abdominal: Soft. Bowel sounds are normal. She exhibits no distension. There is no tenderness.   Neurological: She is alert and oriented to person, place, and time. No cranial nerve deficit.   Skin: Skin is warm and dry. No rash noted.   Psychiatric: She has a normal mood and affect. Her behavior is normal.   Vitals reviewed.          Pertinent Test Results:   Lab Results (last 72 hours)     Procedure Component Value Units Date/Time    BUN [[id number]]  (Abnormal) Collected:  06/16/20 0257    Specimen:  Blood Updated:  06/16/20 0725     BUN 23 mg/dL     CK [[id number]]  (Abnormal) Collected:  06/16/20 0257    Specimen:  Blood Updated:  06/16/20 0409     Creatine Kinase 376 U/L     Comprehensive Metabolic Panel [[id number]]  (Abnormal) Collected:  06/16/20 0257    Specimen:  Blood Updated:  06/16/20 0409     Glucose 60 mg/dL      BUN --     Comment: Testing performed by alternate method        Creatinine 0.77 mg/dL      Sodium 141 mmol/L      Potassium 3.9 mmol/L      Chloride 107 mmol/L      CO2 18.0 mmol/L      Calcium 9.1 mg/dL      Total Protein 6.7 g/dL      Albumin 3.90 g/dL      ALT (SGPT) 22 U/L      AST (SGOT) 35 U/L      Alkaline Phosphatase 65 U/L      Total Bilirubin 0.4 mg/dL      eGFR Non African Amer 78 mL/min/1.73      Globulin 2.8 gm/dL      A/G Ratio 1.4 g/dL      BUN/Creatinine Ratio --     Comment: Testing not performed        Anion Gap 16.0 mmol/L     Narrative:       GFR Normal >60  Chronic Kidney Disease <60  Kidney Failure <15      BUN [[id number]]  (Abnormal) Collected:  06/15/20 0337    Specimen:  Blood Updated:  06/15/20 0740     BUN 23 mg/dL      Basic Metabolic Panel [[id number]]  (Abnormal) Collected:  06/15/20 0337    Specimen:  Blood Updated:  06/15/20 0452     Glucose 81 mg/dL      BUN --     Comment: Testing performed by alternate method        Creatinine 0.87 mg/dL      Sodium 139 mmol/L      Potassium 3.7 mmol/L      Chloride 103 mmol/L      CO2 20.0 mmol/L      Calcium 9.3 mg/dL      eGFR Non African Amer 68 mL/min/1.73      BUN/Creatinine Ratio --     Comment: Testing not performed.        Anion Gap 16.0 mmol/L     Narrative:       GFR Normal >60  Chronic Kidney Disease <60  Kidney Failure <15      CBC (No Diff) [[id number]]  (Normal) Collected:  06/15/20 0337    Specimen:  Blood Updated:  06/15/20 0424     WBC 10.10 10*3/mm3      RBC 3.88 10*6/mm3      Hemoglobin 12.5 g/dL      Hematocrit 35.9 %      MCV 92.7 fL      MCH 32.3 pg      MCHC 34.9 g/dL      RDW 13.7 %      RDW-SD 44.2 fl      MPV 8.5 fL      Platelets 200 10*3/mm3     Urinalysis, Microscopic Only - Urine, Catheter In/Out [[id number]]  (Abnormal) Collected:  06/14/20 2058    Specimen:  Urine, Catheter In/Out Updated:  06/14/20 2228     RBC, UA 6-12 /HPF      WBC, UA 3-5 /HPF      Bacteria, UA None Seen /HPF      Squamous Epithelial Cells, UA 3-6 /HPF      Hyaline Casts, UA Too Numerous to Count /LPF      Granular Casts, UA 3-6 /LPF      Methodology Manual Light Microscopy    Ammonia [[id number]]  (Abnormal) Collected:  06/14/20 2058    Specimen:  Blood Updated:  06/14/20 2206     Ammonia <10 umol/L     Troponin [[id number]]  (Normal) Collected:  06/14/20 2058    Specimen:  Blood Updated:  06/14/20 2137     Troponin T <0.010 ng/mL     Narrative:       Troponin T Reference Range:  <= 0.03 ng/mL-   Negative for AMI  >0.03 ng/mL-     Abnormal for myocardial necrosis.  Clinicians would have to utilize clinical acumen, EKG, Troponin and serial changes to determine if it is an Acute Myocardial Infarction or myocardial injury due to an underlying chronic condition.       Results may be falsely  decreased if patient taking Biotin.      TSH [[id number]]  (Normal) Collected:  06/14/20 2058    Specimen:  Blood Updated:  06/14/20 2137     TSH 0.618 uIU/mL     BUN [[id number]]  (Abnormal) Collected:  06/14/20 2058    Specimen:  Blood Updated:  06/14/20 2135     BUN 26 mg/dL     Comprehensive Metabolic Panel [[id number]]  (Abnormal) Collected:  06/14/20 2058    Specimen:  Blood Updated:  06/14/20 2135     Glucose 92 mg/dL      BUN --     Comment: Testing performed by alternate method        Creatinine 0.92 mg/dL      Sodium 140 mmol/L      Potassium 3.9 mmol/L      Chloride 101 mmol/L      CO2 21.0 mmol/L      Calcium 9.7 mg/dL      Total Protein 7.8 g/dL      Albumin 4.40 g/dL      ALT (SGPT) 29 U/L      AST (SGOT) 60 U/L      Comment: Specimen hemolyzed.  Results may be affected.        Alkaline Phosphatase 72 U/L      Total Bilirubin 0.4 mg/dL      eGFR Non African Amer 63 mL/min/1.73      Globulin 3.4 gm/dL      A/G Ratio 1.3 g/dL      BUN/Creatinine Ratio --     Comment: Testing not performed.        Anion Gap 18.0 mmol/L     Narrative:       GFR Normal >60  Chronic Kidney Disease <60  Kidney Failure <15      Urine Drug Screen - Urine, Catheter In/Out [[id number]]  (Abnormal) Collected:  06/14/20 2058    Specimen:  Urine, Catheter In/Out Updated:  06/14/20 2134     Amphet/Methamphet, Screen Negative     Barbiturates Screen, Urine Negative     Benzodiazepine Screen, Urine Positive     Cocaine Screen, Urine Negative     Opiate Screen Negative     THC, Screen, Urine Negative     Methadone Screen, Urine Positive     Oxycodone Screen, Urine Positive    Narrative:       Negative Thresholds For Drugs Screened:     Amphetamines               500 ng/ml   Barbiturates               200 ng/ml   Benzodiazepines            100 ng/ml   Cocaine                    300 ng/ml   Methadone                  300 ng/ml   Opiates                    300 ng/ml   Oxycodone                  100 ng/ml   THC                        50  ng/ml    The Normal Value for all drugs tested is negative. This report includes final unconfirmed screening results to be used for medical treatment purposes only. Unconfirmed results must not be used for non-medical purposes such as employment or legal testing. Clinical consideration should be applied to any drug of abuse test, particulary when unconfirmed results are used.  All urine drugs of abuse requests without chain of custody are for medical screening purposes only.  False positives are possible.      Salicylate Level [[id number]]  (Normal) Collected:  06/14/20 2058    Specimen:  Blood Updated:  06/14/20 2134     Salicylate <0.3 mg/dL     Acetaminophen Level [[id number]]  (Abnormal) Collected:  06/14/20 2058    Specimen:  Blood Updated:  06/14/20 2134     Acetaminophen <5.0 mcg/mL     Narrative:       Acetaminophen Therapeutic Range  5-20 ug/mL      Hours after ingestion            Toxic Value    4 Hours                           150 ug/mL    8 Hours                            70 ug/mL   12 Hours                            40 ug/mL   16 Hours                            20 ug/mL    These values apply to a single ingestion only.     Ethanol [[id number]] Collected:  06/14/20 2058    Specimen:  Blood Updated:  06/14/20 2134     Ethanol % <0.010 %     Narrative:       Plasma Ethanol Clinical Symptoms:    ETOH (%)               Clinical Symptom  .01-.05              No apparent influence  .03-.12              Euphoria, Diminished judgment and attention   .09-.25              Impaired comprehension, Muscle incoordination  .18-.30              Confusion, Staggered gait, Slurred speech  .25-.40              Markedly decreased response to stimuli, unable to stand or                        walk, vomitting, sleep or stupor  .35-.50              Comatose, Anesthesia, Subnormal body temperature        CK [[id number]]  (Abnormal) Collected:  06/14/20 2058    Specimen:  Blood Updated:  06/14/20 2134     Creatine Kinase 1,379  U/L     Magnesium [[id number]]  (Normal) Collected:  06/14/20 2058    Specimen:  Blood Updated:  06/14/20 2134     Magnesium 1.8 mg/dL     Urinalysis With Culture If Indicated - Urine, Catheter In/Out [[id number]]  (Abnormal) Collected:  06/14/20 2058    Specimen:  Urine, Catheter In/Out Updated:  06/14/20 2116     Color, UA Dark Yellow     Comment: Result checked Visual confirmation        Appearance, UA Clear     pH, UA <=5.0     Specific Gravity, UA >=1.030     Glucose, UA Negative     Ketones, UA 40 mg/dL (2+)     Bilirubin, UA Moderate (2+)     Comment: Confirmation testing is unavailable.  A serum bilirubin is recommended for further assessment.        Blood, UA Trace     Protein, UA Trace     Leuk Esterase, UA Negative     Nitrite, UA Negative     Urobilinogen, UA 0.2 E.U./dL    CBC & Differential [[id number]] Collected:  06/14/20 2058    Specimen:  Blood Updated:  06/14/20 2115    Narrative:       The following orders were created for panel order CBC & Differential.  Procedure                               Abnormality         Status                     ---------                               -----------         ------                     CBC Auto Differential[[id number]]        Abnormal            Final result                 Please view results for these tests on the individual orders.    CBC Auto Differential [[id number]]  (Abnormal) Collected:  06/14/20 2058    Specimen:  Blood Updated:  06/14/20 2115     WBC 10.50 10*3/mm3      RBC 4.19 10*6/mm3      Hemoglobin 13.2 g/dL      Hematocrit 38.1 %      MCV 90.9 fL      MCH 31.5 pg      MCHC 34.7 g/dL      RDW 14.0 %      RDW-SD 45.1 fl      MPV 8.3 fL      Platelets 218 10*3/mm3      Neutrophil % 73.5 %      Lymphocyte % 18.9 %      Monocyte % 6.5 %      Eosinophil % 0.0 %      Basophil % 1.1 %      Neutrophils, Absolute 7.70 10*3/mm3      Lymphocytes, Absolute 2.00 10*3/mm3      Monocytes, Absolute 0.70 10*3/mm3      Eosinophils, Absolute 0.00 10*3/mm3       Basophils, Absolute 0.10 10*3/mm3      nRBC 0.3 /100 WBC               Imaging Results (All)     Procedure Component Value Units Date/Time    XR Chest 1 View [[id number]] Collected:  06/15/20 0722     Updated:  06/15/20 0725    Narrative:       Examination: XR CHEST 1 VW-     Date of Exam: 6/14/2020 9:07 PM     Indication: Confusion.     Comparison: 6/14/2020     Technique: Single radiographic view of the chest was obtained.     Findings:  There is no pneumothorax, pleural effusion or focal airspace  consolidation. Cardiomediastinal silhouette is unremarkable. Pulmonary  vasculature appears within normal limits. Regional bones appear grossly  intact.        Impression:       No acute cardiopulmonary abnormality.     Electronically Signed By-Galileo Mccain On:6/15/2020 7:23 AM  This report was finalized on [id number] by  Galileo Mccain, .    CT Head Without Contrast [[id number]] Collected:  06/14/20 2038     Updated:  06/14/20 2240    Narrative:       EXAMINATION: CT HEAD WITHOUT CONTRAST    DATE: 6/14/2020 10:07 PM     INDICATION: Confusion.     COMPARISON: 5/13/2015.     TECHNIQUE:  Routine axial images through the head without contrast. Coronal reformations.    Low-dose CT acquisition technique included one or more of the following options: 1. Automated exposure control, 2. Adjustment of mA and/or KV according to patient's size and/or 3. Use of iterative reconstruction.    FINDINGS:      Intracranial contents:    The ventricles and cisternal spaces are prominent from mild volume loss.  No dominant mass, midline shift, hydrocephalus, extra-axial fluid collection or acute hemorrhage.      No asymmetric hyperdensity of the proximal major cerebral arteries.    Craniocervical junction is patent.    Bones and extracranial soft tissues:    Mild mucosal thickening ethmoid air cells. Otherwise, Visualized paranasal sinuses and mastoid air cells are clear.  Skull is intact. Visualized intraorbital contents are unremarkable.  Few calcifications distal internal carotid arteries.      Impression:       1. No acute intracranial process. Volume loss. Atherosclerosis. MRI is more sensitive for the detection of acute nonhemorrhagic infarct.         Electronically signed by:  Devon Meza M.D.    6/14/2020 8:39 PM                Discharge Disposition:  Home or Self Care    Discharge Medications:     Discharge Medications      Continue These Medications      Instructions Start Date   ARIPiprazole 10 MG tablet  Commonly known as:  ABILIFY   20 mg, Oral, Daily      atenolol 50 MG tablet  Commonly known as:  TENORMIN   50 mg, Oral, Daily      atorvastatin 20 MG tablet  Commonly known as:  LIPITOR   20 mg, Oral, Daily      busPIRone 15 MG tablet  Commonly known as:  BUSPAR   15 mg, Oral, 2 times daily      clonazePAM 1 MG tablet  Commonly known as:  KlonoPIN   1 mg, Oral, 3 Times Daily PRN      DULoxetine 60 MG capsule  Commonly known as:  CYMBALTA   60 mg, Oral, 2 Times Daily      folic acid 1 MG tablet  Commonly known as:  FOLVITE   1,000 mcg, Oral, Daily      hydroxychloroquine 200 MG tablet  Commonly known as:  PLAQUENIL   TAKE ONE AND ONE-HALF TABLETS BY MOUTH DAILY      methotrexate 2.5 MG tablet   15 mg, Oral, Weekly      oxyCODONE 10 MG tablet  Commonly known as:  ROXICODONE   10 mg, Oral, Every 6 Hours PRN      pregabalin 150 MG capsule  Commonly known as:  LYRICA   150 mg, Oral, 2 Times Daily      rOPINIRole 0.5 MG tablet  Commonly known as:  REQUIP   0.5 mg, Oral, 2 Times Daily, Take 1 hour before bedtime.       sulfaSALAzine 500 MG tablet  Commonly known as:  AZULFIDINE   1,000 mg, Oral, 2 times daily         Stop These Medications    baclofen 20 MG tablet  Commonly known as:  LIORESAL     carisoprodol 350 MG tablet  Commonly known as:  SOMA            Discharge Diet:   Diet Instructions     Diet: Regular      Discharge Diet:  Regular          Activity at Discharge:   Activity Instructions     Activity as Tolerated             Follow-up Appointments:  Future Appointments   Date Time Provider Department Center   6/24/2020 10:30 AM Kamran Garcia MD [facility] None         Test Results Pending at Discharge:  None    Condition on Discharge:    Stable      Risk for Readmission (LACE): Score: 5 (6/16/2020  6:00 AM)          Antelmo Melissa DO  06/16/20  15:36    Time: Discharge 32 min with face-to-face history/exam, writing all prescriptions, and documenting discharge data including care coordination

## 2020-06-16 NOTE — CONSULTS
"  Referring Provider: Dr. Melissa  Reason for Consultation: Substance abuse/Hallucinations      Chief complaint \"I don't know\"    Subjective .     History of present illness:  The patient is a 54 y.o.white female with PMH significant for chronic pain, fibromyalgia, HTN, HLD, anxiety and depression who was admitted secondary to confusion and reported hallucinations.  Psych was consulted by Dr. Melissa secondary to substance abuse and hallucinations.  Per nursing, patient has had very limited responses, not eating and refusing medication.  Patient is staring at the ceiling, will not make eye contact with this provider even when requested.  She was fairly cooperative with answering questions, reported that she has depression and anxiety treated by Dr. Garcia and was aware of the names of several of her medications.  She reported her depression and anxiety is stable but then admitted that she has not been eating or taking medications because she is depressed.  She reports seeing \"colors\", trying not to \"grab them\", denied any auditory hallucinations.  She was strongly continue Seroquel with her right index finger on the bed during the entire exam.  She accurately stated her date of birth, knew [employer] is president but reported today's date as 5/16/14.  She denied any active suicidal ideation.  She denied any ingrid or hypomania.    Past psychiatric history: Depression, anxiety; hospitalizations unknown      Review of Systems   Constitutional: Positive for fatigue.   Respiratory: Negative.    Cardiovascular: Negative.    Gastrointestinal: Negative.    Neurological: Positive for weakness.   Psychiatric/Behavioral: Positive for confusion, decreased concentration, dysphoric mood, hallucinations and sleep disturbance. Negative for agitation, behavioral problems, self-injury and suicidal ideas. The patient is nervous/anxious. The patient is not hyperactive.         History    Past Medical History:   Diagnosis Date   • " Anxiety    • Arthritis    • Back pain    • Depression    • Headache    • Hyperlipidemia    • Hypertension    • Injury of back    • Restless leg syndrome           Family History   Problem Relation Age of Onset   • Hypertension Mother    • Hyperlipidemia Mother    • Depression Mother    • Thyroid disease Mother    • Hypertension Father         Social History     Tobacco Use   • Smoking status: Current Every Day Smoker     Packs/day: 0.50     Types: Cigarettes   • Smokeless tobacco: Never Used   Substance Use Topics   • Alcohol use: No     Frequency: Never   • Drug use: No          Medications Prior to Admission   Medication Sig Dispense Refill Last Dose   • ARIPiprazole (ABILIFY) 10 MG tablet Take 20 mg by mouth Daily.      • atenolol (TENORMIN) 50 MG tablet Take 1 tablet by mouth Daily. 30 tablet 3    • atorvastatin (LIPITOR) 20 MG tablet Take 20 mg by mouth Daily.      • baclofen (LIORESAL) 20 MG tablet Take 20 mg by mouth 3 (Three) Times a Day.      • busPIRone (BUSPAR) 15 MG tablet Take 15 mg by mouth 2 (two) times a day.      • carisoprodol (SOMA) 350 MG tablet Take 350 mg by mouth 3 (Three) Times a Day As Needed for Muscle Spasms.      • clonazePAM (KlonoPIN) 1 MG tablet Take 1 tablet by mouth 3 (Three) Times a Day As Needed for Anxiety or Seizures. 90 tablet 0    • DULoxetine (CYMBALTA) 60 MG capsule Take 1 capsule by mouth 2 (Two) Times a Day. 180 capsule 1    • oxyCODONE (ROXICODONE) 10 MG tablet Take 1 tablet by mouth Every 6 (Six) Hours As Needed for Severe Pain . 120 tablet 0    • pregabalin (LYRICA) 150 MG capsule Take 150 mg by mouth 2 (Two) Times a Day.      • rOPINIRole (REQUIP) 0.5 MG tablet Take 1 tablet by mouth 2 (Two) Times a Day. Take 1 hour before bedtime. 60 tablet 5 Taking   • sulfaSALAzine (AZULFIDINE) 500 MG tablet Take 1,000 mg by mouth 2 (two) times a day.      • folic acid (FOLVITE) 1 MG tablet Take 1 tablet by mouth Daily. 90 tablet 0 Taking   • hydroxychloroquine (PLAQUENIL) 200 MG  "tablet TAKE ONE AND ONE-HALF TABLETS BY MOUTH DAILY 45 tablet 3 Taking   • methotrexate 2.5 MG tablet Take 15 mg by mouth 1 (One) Time Per Week.   Taking       Scheduled Meds:  ARIPiprazole 20 mg Oral Daily   atenolol 50 mg Oral Daily   atorvastatin 20 mg Oral Daily   busPIRone 15 mg Oral BID   DULoxetine 60 mg Oral BID   folic acid 1,000 mcg Oral Daily   hydroxychloroquine 300 mg Oral Q24H   nicotine 1 patch Transdermal Daily   rOPINIRole 0.5 mg Oral BID   sodium chloride 10 mL Intravenous Q12H   sulfaSALAzine 1,000 mg Oral BID     Continuous Infusions:  sodium chloride 100 mL/hr Last Rate: 100 mL/hr (06/15/20 1332)     PRN Meds:.•  acetaminophen **OR** acetaminophen **OR** acetaminophen  •  melatonin  •  nitroglycerin  •  ondansetron **OR** ondansetron  •  potassium chloride **OR** potassium chloride **OR** potassium chloride  •  [COMPLETED] Insert peripheral IV **AND** sodium chloride  •  sodium chloride     Allergies:  Penicillins      Objective     Vital Signs   /70 (BP Location: Right arm, Patient Position: Lying)   Pulse 78   Temp 98.2 °F (36.8 °C) (Oral)   Resp 14   Ht 162.6 cm (64\")   Wt 85.7 kg (188 lb 15 oz)   SpO2 96%   BMI 32.43 kg/m²     Physical Exam:     General Appearance:    NAD   Head:    Normocephalic, without obvious abnormality, atraumatic   Eyes:            Lids and lashes normal, conjunctivae and sclerae normal, no   icterus, no pallor, corneas clear, PERRLA   Skin:   No bleeding, bruising or rash          Neurologic:   Cranial nerves 2 - 12 grossly intact, sensation intact, DTR       present and equal bilaterally         Mental Status Exam:   Hygiene:   fair  Cooperation:  Evasive  Eye Contact:  Poor  Psychomotor Behavior:  Slow  Affect:  Restricted  Mood: depressed  Hopelessness: Denies  Speech:  Minimal  Thought Process:  Goal directed  Thought Content:  Normal  Suicidal:  None  Homicidal:  None  Hallucinations:  Visual  Delusion:  None  Memory:  Deficits  Orientation:  " Person  Reliability:  poor  Insight:  Poor  Judgement:  Impaired  Impulse Control:  Impaired  Physical/Medical Issues:  Yes     Medications and allergies were reviewed by this provider.     Lab Results   Component Value Date    GLUCOSE 81 06/15/2020    CALCIUM 9.3 06/15/2020     06/15/2020    K 3.7 06/15/2020    CO2 20.0 (L) 06/15/2020     06/15/2020    BUN  06/15/2020      Comment:      Testing performed by alternate method    BUN 23 (H) 06/15/2020    CREATININE 0.87 06/15/2020    EGFRIFAFRI 60 05/17/2017    EGFRIFNONA 68 06/15/2020    BCR  06/15/2020      Comment:      Testing not performed.    ANIONGAP 16.0 (H) 06/15/2020       Last Urine Toxicity     LAST URINE TOXICITY RESULTS Latest Ref Rng & Units 6/14/2020    BARBITURATES SCREEN Negative Negative    BENZODIAZEPINE SCREEN, URINE Negative Positive(A)    COCAINE SCREEN, URINE Negative Negative    METHADONE SCREEN, URINE Negative Positive(A)          No results found for: PHENYTOIN, PHENOBARB, VALPROATE, CBMZ    Lab Results   Component Value Date     06/15/2020    BUN  06/15/2020      Comment:      Testing performed by alternate method    BUN 23 (H) 06/15/2020    CREATININE 0.87 06/15/2020    TSH 0.618 06/14/2020    WBC 10.10 06/15/2020       Brief Urine Lab Results  (Last result in the past 365 days)      Color   Clarity   Blood   Leuk Est   Nitrite   Protein   CREAT   Urine HCG        06/14/20 2058 Dark Yellow  Comment:  Result checked Visual confirmation Clear Trace Negative Negative Trace               ECG/EMG Results (last 72 hours)     Procedure Component Value Units Date/Time    ECG 12 Lead [[id number]] Collected:  06/14/20 2101     Updated:  06/14/20 2107    Narrative:       HEART RATE= 89  bpm  RR Interval= 676  ms  IA Interval= 159  ms  P Horizontal Axis= 20  deg  P Front Axis= 36  deg  QRSD Interval= 97  ms  QT Interval= 379  ms  QRS Axis= -26  deg  T Wave Axis= 14  deg  - NORMAL ECG -  Sinus rhythm  Electronically Signed By:   Date  and Time of Study: 2020-06-14 21:01:53          CT/MRI     EXAMINATION: CT HEAD WITHOUT CONTRAST     DATE: 6/14/2020 10:07 PM     INDICATION: Confusion.     COMPARISON: 5/13/2015.     TECHNIQUE:  Routine axial images through the head without contrast. Coronal reformations.     Low-dose CT acquisition technique included one or more of the following options: 1. Automated exposure control, 2. Adjustment of mA and/or KV according to patient's size and/or 3. Use of iterative reconstruction.     FINDINGS:       Intracranial contents:     The ventricles and cisternal spaces are prominent from mild volume loss.  No dominant mass, midline shift, hydrocephalus, extra-axial fluid collection or acute hemorrhage.       No asymmetric hyperdensity of the proximal major cerebral arteries.     Craniocervical junction is patent.     Bones and extracranial soft tissues:     Mild mucosal thickening ethmoid air cells. Otherwise, Visualized paranasal sinuses and mastoid air cells are clear.  Skull is intact. Visualized intraorbital contents are unremarkable. Few calcifications distal internal carotid arteries.     IMPRESSION:  1. No acute intracranial process. Volume loss. Atherosclerosis. MRI is more sensitive for the detection of acute nonhemorrhagic infarct.           Electronically signed by:  Devon Meza M.D.    6/14/2020 8:39 PM    Assessment/Plan       Altered mental status       LABS: Reviewed    Assessment:   Major depressive disorder, recurrent, moderate  Anxiety disorder    Treatment Plan:   Patient with confusion, depression, visual hallucinations.   She denies SI but has been refusing meds, not eating. She has not been agitated or combative.  She is already on several psych meds including Abilify, Buspar, Cymbalta, and Klonopin.  Will follow and if confusion and hallucinations continue without medical cause, she may need inpatient psych.    Treatment Plan discussed with: Nursing staff      I discussed the patients  findings and my recommendations with patient and nursing staff    I have reviewed and approved the behavioral health treatment plans and problem list. Yes  Thank you for the consult   Referring MD has access to the consult report and progress notes in EMR     Jeri Rincon PA-C  06/15/20  20:36

## 2020-06-16 NOTE — OUTREACH NOTE
Prep Survey      Responses   [facility] patient discharged from?  [facility]   Is LACE score < 7 ?  Yes   Eligibility  [facility]   Date of Admission  06/14/20   Date of Discharge  06/16/20   Discharge Disposition  Home or Self Care   Discharge diagnosis  Altered Mental status/ reported hallucinations, Rhabdomyolysis   Does the patient have one of the following disease processes/diagnoses(primary or secondary)?  Other   Does the patient have Home health ordered?  No   Is there a DME ordered?  No   Prep survey completed?  Yes          Jaelyn Browne RN

## 2020-06-16 NOTE — PLAN OF CARE
Problem: Patient Care Overview  Goal: Plan of Care Review  Outcome: Ongoing (interventions implemented as appropriate)  Flowsheets  Taken 6/15/2020 1712 by Laron Nunez RN  Plan of Care Reviewed With: patient  Taken 6/16/2020 0611 by Eileen Haddad RN  Outcome Summary: Patient is alert to self. Patient has been talking more. Patient slept very little during the night. Patient complained of pain and received prn tylenol. Patient refused medications. Vitals stable. Will continue to monitor     Problem: Fall Risk (Adult)  Goal: Identify Related Risk Factors and Signs and Symptoms  Outcome: Ongoing (interventions implemented as appropriate)  Flowsheets (Taken 6/16/2020 0611)  Related Risk Factors (Fall Risk): confusion/agitation; culprit medication(s); gait/mobility problems  Signs and Symptoms (Fall Risk): presence of risk factors  Goal: Absence of Fall  Outcome: Ongoing (interventions implemented as appropriate)  Flowsheets (Taken 6/16/2020 0611)  Absence of Fall: making progress toward outcome     Problem: Skin Injury Risk (Adult)  Goal: Identify Related Risk Factors and Signs and Symptoms  Outcome: Ongoing (interventions implemented as appropriate)  Flowsheets (Taken 6/16/2020 0611)  Related Risk Factors (Skin Injury Risk): mobility impaired; nutritional deficiencies  Goal: Skin Health and Integrity  Outcome: Ongoing (interventions implemented as appropriate)  Flowsheets (Taken 6/16/2020 0611)  Skin Health and Integrity: making progress toward outcome     Problem: Confusion, Acute (Adult)  Goal: Identify Related Risk Factors and Signs and Symptoms  Outcome: Ongoing (interventions implemented as appropriate)  Flowsheets (Taken 6/16/2020 0611)  Related Risk Factors (Acute Confusion): medication effects; mobility decreased; pain; polypharmacy  Signs and Symptoms (Acute Confusion): disorientation  Goal: Cognitive/Functional Impairments Minimized  Outcome: Ongoing (interventions implemented as  appropriate)  Flowsheets (Taken 6/16/2020 0611)  Cognitive/Functional Impairments Minimized: making progress toward outcome  Goal: Safety  Outcome: Ongoing (interventions implemented as appropriate)  Flowsheets (Taken 6/16/2020 0611)  Safety: making progress toward outcome

## 2020-06-17 ENCOUNTER — TRANSITIONAL CARE MANAGEMENT TELEPHONE ENCOUNTER (OUTPATIENT)
Dept: CALL CENTER | Facility: HOSPITAL | Age: 55
End: 2020-06-17

## 2020-06-17 NOTE — OUTREACH NOTE
Call Center TCM Note      Responses   [facility] patient discharged from?  [facility]   Does the patient have one of the following disease processes/diagnoses(primary or secondary)?  Other   TCM attempt successful?  Yes   Call start time  0937   Call end time  0938   Discharge diagnosis  Altered Mental status/ reported hallucinations, Rhabdomyolysis   Meds reviewed with patient/caregiver?  N/A   Does the patient have all medications ordered at discharge?  N/A   Prescription comments  No new meds ordered, pt states she is d/c SOMA and Baclofen   Is the patient taking all medications as directed (includes completed medication regime)?  Yes   Does the patient have a primary care provider?   Yes   Does the patient have an appointment with their PCP within 7 days of discharge?  No [8 DAYS 06/24/2020]   Comments regarding PCP  Kamran Garcia MD   What is preventing the patient from scheduling follow up appointments within 7 days of discharge?  Earlier appointment not available   Has the patient kept scheduled appointments due by today?  N/A   Has home health visited the patient within 72 hours of discharge?  N/A   Did the patient receive a copy of their discharge instructions?  Yes   Nursing interventions  Reviewed instructions with patient   What is the patient's perception of their health status since discharge?  Improving   Is the patient/caregiver able to teach back signs and symptoms related to disease process for when to call PCP?  Yes   Is the patient/caregiver able to teach back signs and symptoms related to disease process for when to call 911?  Yes   Is the patient/caregiver able to teach back the hierarchy of who to call/visit for symptoms/problems? PCP, Specialist, Home health nurse, Urgent Care, ED, 911  Yes   TCM call completed?  Yes   Wrap up additional comments  Pt states she feels fine, has family checking in on her. Appetite fine. Medications in order. Pt will see PCP on 06/24/2020.          Hanna  Anay Prado MA    6/17/2020, 09:40

## 2020-06-18 RX ORDER — ROPINIROLE 0.5 MG/1
TABLET, FILM COATED ORAL
Qty: 60 TABLET | Refills: 5 | Status: SHIPPED | OUTPATIENT
Start: 2020-06-18 | End: 2020-12-05

## 2020-06-19 RX ORDER — BACLOFEN 20 MG/1
TABLET ORAL
Qty: 90 TABLET | Refills: 0 | Status: SHIPPED | OUTPATIENT
Start: 2020-06-19 | End: 2020-06-24

## 2020-06-24 ENCOUNTER — OFFICE VISIT (OUTPATIENT)
Dept: FAMILY MEDICINE CLINIC | Facility: CLINIC | Age: 55
End: 2020-06-24

## 2020-06-24 VITALS
TEMPERATURE: 98.2 F | OXYGEN SATURATION: 94 % | WEIGHT: 187 LBS | HEART RATE: 58 BPM | SYSTOLIC BLOOD PRESSURE: 115 MMHG | BODY MASS INDEX: 32.1 KG/M2 | DIASTOLIC BLOOD PRESSURE: 73 MMHG

## 2020-06-24 DIAGNOSIS — G89.4 CHRONIC PAIN SYNDROME: ICD-10-CM

## 2020-06-24 DIAGNOSIS — I10 HYPERTENSION, ESSENTIAL: ICD-10-CM

## 2020-06-24 DIAGNOSIS — M54.2 NECK PAIN, CHRONIC: ICD-10-CM

## 2020-06-24 DIAGNOSIS — F41.9 ANXIETY DISORDER, UNSPECIFIED TYPE: ICD-10-CM

## 2020-06-24 DIAGNOSIS — R41.82 ALTERED MENTAL STATUS, UNSPECIFIED ALTERED MENTAL STATUS TYPE: Primary | ICD-10-CM

## 2020-06-24 DIAGNOSIS — G89.29 NECK PAIN, CHRONIC: ICD-10-CM

## 2020-06-24 DIAGNOSIS — M79.7 FIBROMYALGIA: ICD-10-CM

## 2020-06-24 PROCEDURE — 99214 OFFICE O/P EST MOD 30 MIN: CPT | Performed by: FAMILY MEDICINE

## 2020-06-24 RX ORDER — OXYCODONE HYDROCHLORIDE 10 MG/1
10 TABLET ORAL EVERY 6 HOURS PRN
Qty: 120 TABLET | Refills: 0 | Status: SHIPPED | OUTPATIENT
Start: 2020-06-24 | End: 2020-07-22 | Stop reason: SDUPTHER

## 2020-06-24 RX ORDER — CLONAZEPAM 1 MG/1
1 TABLET ORAL 3 TIMES DAILY PRN
Qty: 90 TABLET | Refills: 1 | Status: SHIPPED | OUTPATIENT
Start: 2020-06-24 | End: 2020-07-22 | Stop reason: SDUPTHER

## 2020-06-24 NOTE — PROGRESS NOTES
Transitional Care Follow Up Visit  Subjective     Briana Alas is a 54 y.o. female who presents for a transitional care management visit.    Within 48 business hours after discharge our office contacted her via telephone to coordinate her care and needs.      I reviewed and discussed the details of that call along with the discharge summary, hospital problems, inpatient lab results, inpatient diagnostic studies, and consultation reports with Briana.     Current outpatient and discharge medications have been reconciled for the patient.  Reviewed by: Digna Grace      Date of TCM Phone Call 6/16/2020   [facility]   Date of Admission 6/14/2020   Date of Discharge 6/16/2020   Discharge Disposition Home or Self Care     Risk for Readmission (LACE) Score: 5 (6/16/2020  6:00 AM)      She is in today for follow-up on a recent hospital stay.  She was in with mental status changes that appeared to be related to medication.  She had a very thorough work-up that did not show any sign of infection or ischemic issue.  She cleared spontaneously and was discharged.  It appears to be related to some muscle relaxers that she was taking and she is not sure where she even got them.  She also could not tell me why she was even taking them.  She has been on a lot of her other chronic medications, some of which could cause confusion, for years without incident.  She denies any issue with chest pain or difficulty breathing.  She denies issue with bowel or bladder function.  She denies any issue with sleep or mood.  She feels much, much better today.     Course During Hospital Stay:  She does not recall much of the stay but her stay was uneventful.     The following portions of the patient's history were reviewed and updated as appropriate: allergies, current medications, past family history, past medical history, past social history, past surgical history and problem list.    Review of Systems    Constitutional: Negative for activity change, fatigue and fever.   HENT: Negative for congestion, sinus pressure, sinus pain, sore throat and trouble swallowing.    Eyes: Negative for visual disturbance.   Respiratory: Negative for chest tightness, shortness of breath and wheezing.    Cardiovascular: Negative for chest pain.   Gastrointestinal: Negative for abdominal distention, abdominal pain, constipation, diarrhea, nausea and vomiting.   Genitourinary: Negative for difficulty urinating, dysuria, frequency and urgency.   Musculoskeletal: Negative for back pain and neck pain.   Neurological: Negative for dizziness, weakness, light-headedness and headaches.   Psychiatric/Behavioral: Positive for confusion. Negative for agitation, hallucinations and suicidal ideas.       Objective   Physical Exam   Constitutional: She is oriented to person, place, and time. No distress.   Neck: No thyromegaly present.   Cardiovascular: Normal rate, regular rhythm and normal heart sounds.   No murmur heard.  Pulmonary/Chest: Effort normal and breath sounds normal. She has no wheezes.   Abdominal: Soft. Bowel sounds are normal. There is no guarding.   Musculoskeletal:   She has diffuse arthritic discomfort and diffuse discomfort from fibromyalgia  Gait is normal  Exam is at baseline   Lymphadenopathy:     She has no cervical adenopathy.   Neurological: She is alert and oriented to person, place, and time. She displays normal reflexes.   She is very clear and back to baseline today   Psychiatric: She has a normal mood and affect.   Nursing note and vitals reviewed.      Assessment/Plan   Problems Addressed this Visit        Cardiovascular and Mediastinum    Hypertension, essential       Nervous and Auditory    Fibromyalgia    Neck pain, chronic       Other    Anxiety disorder    RESOLVED: Altered mental status - Primary      Other Visit Diagnoses     Chronic pain syndrome        Relevant Medications    clonazePAM (KlonoPIN) 1 MG  tablet    oxyCODONE (ROXICODONE) 10 MG tablet        Her mental status issue seems to have resolved  Her chronic pain issues are stable on current treatment  I do not need to do any labs today as there were enough done during her hospital stay last week  I will see her back in 4 months for next check on her chronic issues, sooner if she has new episodes  If she has another episode of mental status changes, we will have to make adjustments in her clonazepam or pain medication

## 2020-06-26 ENCOUNTER — TELEPHONE (OUTPATIENT)
Dept: FAMILY MEDICINE CLINIC | Facility: CLINIC | Age: 55
End: 2020-06-26

## 2020-07-09 DIAGNOSIS — M79.7 FIBROMYALGIA: ICD-10-CM

## 2020-07-09 RX ORDER — CARISOPRODOL 350 MG/1
TABLET ORAL
Qty: 90 TABLET | Refills: 0 | Status: SHIPPED | OUTPATIENT
Start: 2020-07-09 | End: 2020-08-10

## 2020-07-15 ENCOUNTER — OFFICE VISIT (OUTPATIENT)
Dept: ORTHOPEDIC SURGERY | Facility: CLINIC | Age: 55
End: 2020-07-15

## 2020-07-15 VITALS
DIASTOLIC BLOOD PRESSURE: 77 MMHG | SYSTOLIC BLOOD PRESSURE: 127 MMHG | HEART RATE: 73 BPM | BODY MASS INDEX: 31.92 KG/M2 | WEIGHT: 187 LBS | HEIGHT: 64 IN

## 2020-07-15 DIAGNOSIS — M25.511 ACUTE PAIN OF RIGHT SHOULDER: Primary | ICD-10-CM

## 2020-07-15 PROCEDURE — 99213 OFFICE O/P EST LOW 20 MIN: CPT | Performed by: ORTHOPAEDIC SURGERY

## 2020-07-15 NOTE — PROGRESS NOTES
Patient ID: Briana Alas is a 54 y.o. female.  Right shoulder pain  12/13/19 right shoulder arthroscopy SAD, upper subscap repair, suprasprinatus repair  Has been doing well up until about a month ago when she had increasing shoulder pain in the posterior impingement area traveling down her tricep.  She feels like her shoulder is popping.  Denies injury.  Pain is sharp and a 6/10    Review of Systems:  Right shoulder pain  Denies chest pain      Objective:    There were no vitals taken for this visit.    Physical Examination:   She is a pleasant female in no distress. She is alert and oriented x3 and appears her stated age.  Right shoulder demonstrates healed portals.  No sign of infection.  Passive elevation is 170 degrees abduction 130 degrees external rotation 40 degrees internal rotation is L5.  She has pain weakness on Speed, [place], supraspinatus testing.  Belly press and liftoff are 5/5 and 4/5.Sensory and motor exam are intact all distributions. Radial pulse is palpable and capillary refill is less than two seconds to all digits      Imaging:       Assessment:    Right shoulder pain and weakness after previous cuff repair    Plan:  Recommend MRI to evaluate her repair          Disclaimer: Please note that areas of this note were completed with computer voice recognition software.  Quite often unanticipated grammatical, syntax, homophones, and other interpretive errors are inadvertently transcribed by the computer software. Please excuse any errors that have escaped final proofreading.

## 2020-07-20 RX ORDER — BACLOFEN 20 MG/1
TABLET ORAL
Qty: 90 TABLET | Refills: 0 | OUTPATIENT
Start: 2020-07-20

## 2020-07-22 DIAGNOSIS — G89.4 CHRONIC PAIN SYNDROME: ICD-10-CM

## 2020-07-22 RX ORDER — OXYCODONE HYDROCHLORIDE 10 MG/1
10 TABLET ORAL EVERY 6 HOURS PRN
Qty: 120 TABLET | Refills: 0 | Status: SHIPPED | OUTPATIENT
Start: 2020-07-22 | End: 2020-08-20 | Stop reason: SDUPTHER

## 2020-07-22 RX ORDER — CLONAZEPAM 1 MG/1
1 TABLET ORAL 3 TIMES DAILY PRN
Qty: 90 TABLET | Refills: 1 | Status: SHIPPED | OUTPATIENT
Start: 2020-07-22 | End: 2020-07-23

## 2020-07-23 ENCOUNTER — APPOINTMENT (OUTPATIENT)
Dept: MRI IMAGING | Facility: HOSPITAL | Age: 55
End: 2020-07-23

## 2020-07-23 DIAGNOSIS — G89.4 CHRONIC PAIN SYNDROME: ICD-10-CM

## 2020-07-23 RX ORDER — CLONAZEPAM 1 MG/1
TABLET ORAL
Qty: 90 TABLET | Refills: 1 | Status: SHIPPED | OUTPATIENT
Start: 2020-07-23 | End: 2020-08-20 | Stop reason: SDUPTHER

## 2020-08-03 RX ORDER — SULFASALAZINE 500 MG/1
TABLET ORAL
Qty: 360 TABLET | Refills: 1 | Status: SHIPPED | OUTPATIENT
Start: 2020-08-03 | End: 2021-01-06

## 2020-08-10 DIAGNOSIS — M79.7 FIBROMYALGIA: ICD-10-CM

## 2020-08-10 RX ORDER — CARISOPRODOL 350 MG/1
TABLET ORAL
Qty: 90 TABLET | Refills: 0 | Status: SHIPPED | OUTPATIENT
Start: 2020-08-10 | End: 2020-09-10

## 2020-08-19 ENCOUNTER — HOSPITAL ENCOUNTER (OUTPATIENT)
Dept: MRI IMAGING | Facility: HOSPITAL | Age: 55
Discharge: HOME | End: 2020-08-19
Admitting: ORTHOPAEDIC SURGERY

## 2020-08-19 DIAGNOSIS — M25.511 ACUTE PAIN OF RIGHT SHOULDER: ICD-10-CM

## 2020-08-19 PROCEDURE — 73221 MRI JOINT UPR EXTREM W/O DYE: CPT

## 2020-08-20 DIAGNOSIS — G89.4 CHRONIC PAIN SYNDROME: ICD-10-CM

## 2020-08-20 RX ORDER — OXYCODONE HYDROCHLORIDE 10 MG/1
10 TABLET ORAL EVERY 6 HOURS PRN
Qty: 120 TABLET | Refills: 0 | Status: SHIPPED | OUTPATIENT
Start: 2020-08-20 | End: 2020-08-21

## 2020-08-20 RX ORDER — CLONAZEPAM 1 MG/1
1 TABLET ORAL 3 TIMES DAILY PRN
Qty: 90 TABLET | Refills: 1 | Status: SHIPPED | OUTPATIENT
Start: 2020-08-20 | End: 2020-10-11

## 2020-08-20 RX ORDER — ATENOLOL 50 MG/1
TABLET ORAL
Qty: 30 TABLET | Refills: 3 | Status: SHIPPED | OUTPATIENT
Start: 2020-08-20 | End: 2020-12-05

## 2020-08-20 RX ORDER — BUSPIRONE HYDROCHLORIDE 15 MG/1
TABLET ORAL
Qty: 60 TABLET | Refills: 3 | Status: SHIPPED | OUTPATIENT
Start: 2020-08-20 | End: 2020-12-05

## 2020-08-21 ENCOUNTER — TELEPHONE (OUTPATIENT)
Dept: FAMILY MEDICINE CLINIC | Facility: CLINIC | Age: 55
End: 2020-08-21

## 2020-08-21 DIAGNOSIS — G89.4 CHRONIC PAIN SYNDROME: Primary | ICD-10-CM

## 2020-08-21 DIAGNOSIS — M79.7 FIBROMYALGIA: Primary | ICD-10-CM

## 2020-08-21 RX ORDER — OXYCODONE AND ACETAMINOPHEN 7.5; 325 MG/1; MG/1
1 TABLET ORAL EVERY 6 HOURS PRN
Qty: 40 TABLET | Refills: 0 | Status: SHIPPED | OUTPATIENT
Start: 2020-08-21 | End: 2020-08-24

## 2020-08-21 NOTE — TELEPHONE ENCOUNTER
She says [facility] is denying cash payment for her Oxycodone. They are saying she has to have prior authorization for these. What is the next step?

## 2020-08-21 NOTE — TELEPHONE ENCOUNTER
She is willing for the referral but she will be out tomorrow of medicine. What does she need to do?

## 2020-08-21 NOTE — TELEPHONE ENCOUNTER
Pharmacies are not wanting to go around the insurance bar with narcotics, just looks like someone is trying to cheat the system  Not meaning that she is doing that, just explaining that is their reason  Best thing we can do , if she will let me , is refer her to pain management  We have a new dano in [place] who is very good

## 2020-08-21 NOTE — TELEPHONE ENCOUNTER
Dr. Martinez for [facility] and that's were her  goes so she is used to their office in [place].

## 2020-08-24 ENCOUNTER — OFFICE VISIT (OUTPATIENT)
Dept: ORTHOPEDIC SURGERY | Facility: CLINIC | Age: 55
End: 2020-08-24

## 2020-08-24 VITALS
HEART RATE: 80 BPM | BODY MASS INDEX: 33.12 KG/M2 | SYSTOLIC BLOOD PRESSURE: 115 MMHG | WEIGHT: 194 LBS | DIASTOLIC BLOOD PRESSURE: 71 MMHG | HEIGHT: 64 IN

## 2020-08-24 DIAGNOSIS — M25.511 ACUTE PAIN OF RIGHT SHOULDER: Primary | ICD-10-CM

## 2020-08-24 DIAGNOSIS — M79.671 RIGHT FOOT PAIN: ICD-10-CM

## 2020-08-24 PROCEDURE — 99213 OFFICE O/P EST LOW 20 MIN: CPT | Performed by: ORTHOPAEDIC SURGERY

## 2020-08-24 NOTE — PROGRESS NOTES
Patient ID: Briana Alas is a 54 y.o. female.  Right shoulder pain  12/13/19 right shoulder arthroscopy SAD, upper subscap repair, suprasprinatus repair  Has been doing well up until about a month ago when she had increasing shoulder pain in the posterior impingement area traveling down her tricep.  She feels like her shoulder is popping.  Denies injury.  Pain is sharp and a 6/10  Also stubbed her right foot with some bruising and tenderness over the fourth digit    Review of Systems:    Right shoulder and right foot pain  Denies chest pain    Objective:    There were no vitals taken for this visit.    Physical Examination:   She is a pleasant female in no distress. She is alert and oriented x3 and appears her stated age.  Right shoulder demonstrates healed incisions without tenderness, passive elevation 170 degrees abduction 130 degrees external rotation 40 degrees.  She has mild pain but no weakness on Speed, [place], supraspinatus testing.Sensory and motor exam are intact all distributions. Radial pulse is palpable and capillary refill is less than two seconds to all digits    Right foot demonstrates bruising, swelling, and tenderness over the fourth digit metatarsal head and phalanx.  No deformity.  Sensation intact light touch with intact capillary refill      Imaging:   X-rays of the foot are negative, MRI of the shoulder demonstrates some thinning at the previous repair site of the anterior supraspinatus but overall intact tissue    Assessment:    Right shoulder pain after cuff repair  Right foot contusion    Plan:  Recommend physical therapy for the shoulder, if the foot does not improve recommend repeat imaging.  See me in 6 weeks          Disclaimer: Please note that areas of this note were completed with computer voice recognition software.  Quite often unanticipated grammatical, syntax, homophones, and other interpretive errors are inadvertently transcribed by the computer software. Please excuse  any errors that have escaped final proofreading.

## 2020-08-28 ENCOUNTER — TELEPHONE (OUTPATIENT)
Dept: FAMILY MEDICINE CLINIC | Facility: CLINIC | Age: 55
End: 2020-08-28

## 2020-08-28 DIAGNOSIS — G89.4 CHRONIC PAIN SYNDROME: ICD-10-CM

## 2020-08-28 RX ORDER — OXYCODONE HYDROCHLORIDE 10 MG/1
10 TABLET ORAL EVERY 6 HOURS PRN
Qty: 30 TABLET | Refills: 0 | Status: SHIPPED | OUTPATIENT
Start: 2020-08-28 | End: 2020-09-04 | Stop reason: SDUPTHER

## 2020-08-28 NOTE — TELEPHONE ENCOUNTER
The reduction in milligram was to get them to fill at without prior authorization but obviously that did not work her insurance is only going to let her have a minimal amount without getting pain management involved  I can try referring to another office if she has not heard from them in the next few days

## 2020-08-28 NOTE — TELEPHONE ENCOUNTER
Still no call from Pain Management.   Pharmacy only filled 7 days of the oxycodone without a Prior Auth from insurance.     Why did you reduce from 10 mg to 7.5 mg?

## 2020-08-28 NOTE — TELEPHONE ENCOUNTER
Okay, spoke to Briana and informed her of the insurance PA for oxycodone. She is okay with doing weekly scripts until Pain Management can get her in. She is wanting to know if you can go back to the 10 mg instead of the 7.5.

## 2020-09-04 DIAGNOSIS — G89.4 CHRONIC PAIN SYNDROME: ICD-10-CM

## 2020-09-04 RX ORDER — OXYCODONE HYDROCHLORIDE 10 MG/1
10 TABLET ORAL EVERY 6 HOURS PRN
Qty: 30 TABLET | Refills: 0 | Status: SHIPPED | OUTPATIENT
Start: 2020-09-04 | End: 2020-09-10 | Stop reason: SDUPTHER

## 2020-09-09 ENCOUNTER — TELEPHONE (OUTPATIENT)
Dept: PHYSICAL THERAPY | Facility: CLINIC | Age: 55
End: 2020-09-09

## 2020-09-10 DIAGNOSIS — G89.4 CHRONIC PAIN SYNDROME: ICD-10-CM

## 2020-09-10 DIAGNOSIS — M79.7 FIBROMYALGIA: ICD-10-CM

## 2020-09-10 RX ORDER — CARISOPRODOL 350 MG/1
TABLET ORAL
Qty: 90 TABLET | Refills: 0 | Status: SHIPPED | OUTPATIENT
Start: 2020-09-10 | End: 2020-10-08

## 2020-09-10 RX ORDER — OXYCODONE HYDROCHLORIDE 10 MG/1
10 TABLET ORAL EVERY 6 HOURS PRN
Qty: 30 TABLET | Refills: 0 | Status: SHIPPED | OUTPATIENT
Start: 2020-09-10 | End: 2020-09-14 | Stop reason: SDUPTHER

## 2020-09-11 ENCOUNTER — TELEPHONE (OUTPATIENT)
Dept: FAMILY MEDICINE CLINIC | Facility: CLINIC | Age: 55
End: 2020-09-11

## 2020-09-11 RX ORDER — ARIPIPRAZOLE 20 MG/1
TABLET ORAL
Qty: 30 TABLET | Refills: 4 | Status: SHIPPED | OUTPATIENT
Start: 2020-09-11 | End: 2020-09-14

## 2020-09-11 RX ORDER — ATORVASTATIN CALCIUM 20 MG/1
TABLET, FILM COATED ORAL
Qty: 30 TABLET | Refills: 3 | Status: SHIPPED | OUTPATIENT
Start: 2020-09-11 | End: 2021-01-06

## 2020-09-11 NOTE — TELEPHONE ENCOUNTER
We sent the oxycodone to [facility] but she has not picked up. ( I called to verify ) Briana is wanting to switch pharmacy and [facility] [place]. Can we send the script to [facility] and cancel the one at [facility]?

## 2020-09-11 NOTE — TELEPHONE ENCOUNTER
Pt requests pres for oxycodone.  Asks for full months supply.   States she was getting weekly pres sent to [facility] but they advised her that they can only fill 2 weekly scripts per 45 day period.  Pt asks for her pain medication pres go to [facility] on [address]   Pt also states she is out of medication at this time.   Please call if any questions.

## 2020-09-12 NOTE — TELEPHONE ENCOUNTER
I would not do that  I have no issue changing pharmacies next refill but she should go ahead and get it there this time if she can

## 2020-09-14 ENCOUNTER — LAB (OUTPATIENT)
Dept: FAMILY MEDICINE CLINIC | Facility: CLINIC | Age: 55
End: 2020-09-14

## 2020-09-14 ENCOUNTER — OFFICE VISIT (OUTPATIENT)
Dept: FAMILY MEDICINE CLINIC | Facility: CLINIC | Age: 55
End: 2020-09-14

## 2020-09-14 VITALS
BODY MASS INDEX: 33.64 KG/M2 | WEIGHT: 196 LBS | HEART RATE: 69 BPM | SYSTOLIC BLOOD PRESSURE: 112 MMHG | OXYGEN SATURATION: 93 % | DIASTOLIC BLOOD PRESSURE: 72 MMHG | TEMPERATURE: 97.1 F

## 2020-09-14 DIAGNOSIS — H61.21 IMPACTED CERUMEN OF RIGHT EAR: ICD-10-CM

## 2020-09-14 DIAGNOSIS — G89.29 CHRONIC BILATERAL LOW BACK PAIN WITHOUT SCIATICA: Primary | ICD-10-CM

## 2020-09-14 DIAGNOSIS — M79.7 FIBROMYALGIA: ICD-10-CM

## 2020-09-14 DIAGNOSIS — M54.50 CHRONIC BILATERAL LOW BACK PAIN WITHOUT SCIATICA: Primary | ICD-10-CM

## 2020-09-14 DIAGNOSIS — G89.4 CHRONIC PAIN SYNDROME: ICD-10-CM

## 2020-09-14 DIAGNOSIS — F32.A DEPRESSION, UNSPECIFIED DEPRESSION TYPE: ICD-10-CM

## 2020-09-14 DIAGNOSIS — I10 HYPERTENSION, ESSENTIAL: ICD-10-CM

## 2020-09-14 LAB
ALBUMIN SERPL-MCNC: 4.2 G/DL (ref 3.5–5.2)
ALBUMIN/GLOB SERPL: 1.5 G/DL
ALP SERPL-CCNC: 84 U/L (ref 39–117)
ALT SERPL W P-5'-P-CCNC: 17 U/L (ref 1–33)
ANION GAP SERPL CALCULATED.3IONS-SCNC: 10.1 MMOL/L (ref 5–15)
AST SERPL-CCNC: 17 U/L (ref 1–32)
BILIRUB SERPL-MCNC: <0.2 MG/DL (ref 0–1.2)
BUN SERPL-MCNC: 12 MG/DL (ref 6–20)
BUN/CREAT SERPL: 11.7 (ref 7–25)
CALCIUM SPEC-SCNC: 9.2 MG/DL (ref 8.6–10.5)
CHLORIDE SERPL-SCNC: 102 MMOL/L (ref 98–107)
CHOLEST SERPL-MCNC: 155 MG/DL (ref 0–200)
CO2 SERPL-SCNC: 26.9 MMOL/L (ref 22–29)
CREAT SERPL-MCNC: 1.03 MG/DL (ref 0.57–1)
GFR SERPL CREATININE-BSD FRML MDRD: 55 ML/MIN/1.73
GLOBULIN UR ELPH-MCNC: 2.8 GM/DL
GLUCOSE SERPL-MCNC: 73 MG/DL (ref 65–99)
HDLC SERPL-MCNC: 47 MG/DL (ref 40–60)
LDLC SERPL CALC-MCNC: 71 MG/DL (ref 0–100)
LDLC/HDLC SERPL: 1.5 {RATIO}
POTASSIUM SERPL-SCNC: 4.2 MMOL/L (ref 3.5–5.2)
PROT SERPL-MCNC: 7 G/DL (ref 6–8.5)
SODIUM SERPL-SCNC: 139 MMOL/L (ref 136–145)
TRIGL SERPL-MCNC: 187 MG/DL (ref 0–150)
VLDLC SERPL-MCNC: 37.4 MG/DL (ref 5–40)

## 2020-09-14 PROCEDURE — 80061 LIPID PANEL: CPT | Performed by: FAMILY MEDICINE

## 2020-09-14 PROCEDURE — 80053 COMPREHEN METABOLIC PANEL: CPT | Performed by: FAMILY MEDICINE

## 2020-09-14 PROCEDURE — 99214 OFFICE O/P EST MOD 30 MIN: CPT | Performed by: FAMILY MEDICINE

## 2020-09-14 PROCEDURE — 36415 COLL VENOUS BLD VENIPUNCTURE: CPT | Performed by: FAMILY MEDICINE

## 2020-09-14 RX ORDER — ARIPIPRAZOLE 30 MG/1
30 TABLET ORAL DAILY
Qty: 30 TABLET | Refills: 3 | Status: SHIPPED | OUTPATIENT
Start: 2020-09-14 | End: 2021-01-06

## 2020-09-14 RX ORDER — OXYCODONE HYDROCHLORIDE 10 MG/1
10 TABLET ORAL EVERY 6 HOURS PRN
Qty: 120 TABLET | Refills: 0 | Status: SHIPPED | OUTPATIENT
Start: 2020-09-14 | End: 2020-10-09 | Stop reason: SDUPTHER

## 2020-09-14 NOTE — PROGRESS NOTES
Subjective   Briana Alas is a 55 y.o. female.     She is in with multiple concerns.  She has chronic pain issues and is having difficulty getting her prescriptions filled.  Her usual pharmacy is apparently having some supply issues and her pain is not being adequately controlled as a result.  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  She has significant fibromyalgia which also causes diffuse aches and pains.  Her mood has been more negative of late which is also affecting her pain.  She has not had any suicidal ideations but is not resting well and is fighting her depression again.  She denies any chest pain or difficulty breathing.  She is a smoker with a little bit of a smoker cough chronically.  She denies any new issue with bowel or bladder function.  She denies any issue with dizziness or lightheadedness.  She is not sleeping well due to the pain.  She has some discomfort in her right ear but not her left ear.  She is prone to wax issues.  She has not had any fever or illness symptoms other than her smoker's cough.         /72 (BP Location: Left arm, Patient Position: Sitting, Cuff Size: Large Adult)   Pulse 69   Temp 97.1 °F (36.2 °C) (Temporal)   Wt 88.9 kg (196 lb)   SpO2 93%   BMI 33.64 kg/m²       Chief Complaint   Patient presents with   • Depression     discuss medicine does not think medicine is working    • Medication Problem     [facility]            Current Outpatient Medications:   •  ARIPiprazole (ABILIFY) 30 MG tablet, Take 1 tablet by mouth Daily., Disp: 30 tablet, Rfl: 3  •  atenolol (TENORMIN) 50 MG tablet, TAKE ONE (1) TABLET BY MOUTH DAILY, Disp: 30 tablet, Rfl: 3  •  atorvastatin (LIPITOR) 20 MG tablet, TAKE ONE (1) TABLET BY MOUTH AT BEDTIME, Disp: 30 tablet, Rfl: 3  •  busPIRone (BUSPAR) 15 MG tablet, TAKE ONE (1) TABLET BY MOUTH TWICE DAILY, Disp: 60 tablet,  Rfl: 3  •  carisoprodol (SOMA) 350 MG tablet, TAKE ONE (1) TABLET BY MOUTH THREE TIMES DAILY AS NEEDED FOR MUSCLE SPASMS, Disp: 90 tablet, Rfl: 0  •  clonazePAM (KlonoPIN) 1 MG tablet, Take 1 tablet by mouth 3 (Three) Times a Day As Needed for Seizures., Disp: 90 tablet, Rfl: 1  •  DULoxetine (CYMBALTA) 60 MG capsule, Take 1 capsule by mouth 2 (Two) Times a Day., Disp: 180 capsule, Rfl: 1  •  folic acid (FOLVITE) 1 MG tablet, Take 1 tablet by mouth Daily., Disp: 90 tablet, Rfl: 0  •  oxyCODONE (ROXICODONE) 10 MG tablet, Take 1 tablet by mouth Every 6 (Six) Hours As Needed for Severe Pain ., Disp: 120 tablet, Rfl: 0  •  pregabalin (LYRICA) 150 MG capsule, Take 150 mg by mouth 2 (Two) Times a Day., Disp: , Rfl:   •  rOPINIRole (REQUIP) 0.5 MG tablet, TAKE ONE (1) TABLET BY MOUTH TWICE DAILY. TAKE ONE (1) HOUR BEFORE BEDTIME., Disp: 60 tablet, Rfl: 5  •  sulfaSALAzine (AZULFIDINE) 500 MG tablet, TAKE TWO (2) TABLETS BY MOUTH TWICE DAILY, Disp: 360 tablet, Rfl: 1        The following portions of the patient's history were reviewed and updated as appropriate: allergies, current medications, past family history, past medical history, past social history, past surgical history and problem list.    Review of Systems   Constitutional: Negative for activity change, fatigue and fever.   HENT: Negative for congestion, sinus pressure, sinus pain, sore throat and trouble swallowing.    Eyes: Negative for visual disturbance.   Respiratory: Positive for cough. Negative for chest tightness, shortness of breath and wheezing.    Cardiovascular: Negative for chest pain.   Gastrointestinal: Negative for abdominal distention, abdominal pain, constipation, diarrhea, nausea and vomiting.   Genitourinary: Negative for difficulty urinating, dysuria and urgency.   Musculoskeletal: Positive for arthralgias, back pain, myalgias and neck pain.   Neurological: Negative for dizziness, weakness, light-headedness and numbness.    Psychiatric/Behavioral: Positive for dysphoric mood and sleep disturbance. Negative for agitation, hallucinations and suicidal ideas.       Objective   Physical Exam  Vitals signs and nursing note reviewed.   HENT:      Right Ear: Tympanic membrane and external ear normal. There is impacted cerumen.      Left Ear: Tympanic membrane, ear canal and external ear normal. There is no impacted cerumen.      Nose: No congestion or rhinorrhea.      Mouth/Throat:      Pharynx: No oropharyngeal exudate or posterior oropharyngeal erythema.   Neck:      Musculoskeletal: Neck supple.   Cardiovascular:      Rate and Rhythm: Normal rate and regular rhythm.      Heart sounds: No murmur.   Pulmonary:      Effort: Pulmonary effort is normal.      Breath sounds: Normal breath sounds. No wheezing.   Abdominal:      General: Bowel sounds are normal.      Palpations: Abdomen is soft.      Tenderness: There is no guarding.   Musculoskeletal: Normal range of motion.         General: No swelling or deformity.   Lymphadenopathy:      Cervical: No cervical adenopathy.   Neurological:      General: No focal deficit present.      Mental Status: She is alert and oriented to person, place, and time. Mental status is at baseline.   Psychiatric:      Comments: Mood is flat not anxious  Emotions seem absent           Assessment/Plan   Problems Addressed this Visit        Cardiovascular and Mediastinum    Hypertension, essential    Relevant Orders    Comprehensive metabolic panel    Lipid panel       Nervous and Auditory    Fibromyalgia    Chronic low back pain - Primary       Other    Depression    Relevant Medications    ARIPiprazole (ABILIFY) 30 MG tablet      Other Visit Diagnoses     Chronic pain syndrome        Relevant Medications    oxyCODONE (ROXICODONE) 10 MG tablet    Impacted cerumen of right ear        Relevant Orders    Ambulatory Referral to ENT (Otolaryngology)        I will adjust the Abilify up to 30 mg help with mood  I will  rewrite her pain medication and she is going to try a different local pharmacy with a different supplier where she may have a better chance to get her medication  Inspect report was reviewed and did not show any irregularities that I could find  I will refer to ENT for the cerumen issue  I will see her back in a month to see how she is doing with these issues

## 2020-10-08 DIAGNOSIS — M79.7 FIBROMYALGIA: ICD-10-CM

## 2020-10-08 RX ORDER — CARISOPRODOL 350 MG/1
TABLET ORAL
Qty: 90 TABLET | Refills: 0 | Status: SHIPPED | OUTPATIENT
Start: 2020-10-08 | End: 2020-11-06

## 2020-10-09 DIAGNOSIS — G89.4 CHRONIC PAIN SYNDROME: ICD-10-CM

## 2020-10-09 RX ORDER — OXYCODONE HYDROCHLORIDE 10 MG/1
10 TABLET ORAL EVERY 6 HOURS PRN
Qty: 120 TABLET | Refills: 0 | Status: SHIPPED | OUTPATIENT
Start: 2020-10-09 | End: 2020-11-09 | Stop reason: SDUPTHER

## 2020-10-10 DIAGNOSIS — G89.4 CHRONIC PAIN SYNDROME: ICD-10-CM

## 2020-10-11 RX ORDER — PREGABALIN 150 MG/1
CAPSULE ORAL
Qty: 60 CAPSULE | Refills: 3 | Status: SHIPPED | OUTPATIENT
Start: 2020-10-11 | End: 2021-03-16

## 2020-10-11 RX ORDER — CLONAZEPAM 1 MG/1
TABLET ORAL
Qty: 90 TABLET | Refills: 1 | Status: SHIPPED | OUTPATIENT
Start: 2020-10-11 | End: 2020-12-05

## 2020-10-26 ENCOUNTER — OFFICE VISIT (OUTPATIENT)
Dept: FAMILY MEDICINE CLINIC | Facility: CLINIC | Age: 55
End: 2020-10-26

## 2020-10-26 VITALS
DIASTOLIC BLOOD PRESSURE: 71 MMHG | OXYGEN SATURATION: 93 % | WEIGHT: 200 LBS | TEMPERATURE: 97.1 F | SYSTOLIC BLOOD PRESSURE: 115 MMHG | BODY MASS INDEX: 34.33 KG/M2 | HEART RATE: 57 BPM

## 2020-10-26 DIAGNOSIS — F43.21 GRIEF AT LOSS OF CHILD: ICD-10-CM

## 2020-10-26 DIAGNOSIS — F32.A DEPRESSION, UNSPECIFIED DEPRESSION TYPE: Primary | ICD-10-CM

## 2020-10-26 DIAGNOSIS — Z63.4 GRIEF AT LOSS OF CHILD: ICD-10-CM

## 2020-10-26 PROCEDURE — 99213 OFFICE O/P EST LOW 20 MIN: CPT | Performed by: FAMILY MEDICINE

## 2020-10-26 RX ORDER — ZOLPIDEM TARTRATE 10 MG/1
10 TABLET ORAL NIGHTLY PRN
Qty: 30 TABLET | Refills: 1 | Status: SHIPPED | OUTPATIENT
Start: 2020-10-26 | End: 2020-11-30

## 2020-10-26 SDOH — SOCIAL STABILITY - SOCIAL INSECURITY: DISSAPEARANCE AND DEATH OF FAMILY MEMBER: Z63.4

## 2020-10-26 NOTE — PROGRESS NOTES
Subjective   Briana Alas is a 55 y.o. female.     She is in for follow-up on chronic pain issues but her daughter passed away recently and unexpectedly and that has her emotionally distraught today.  Her daughter had a lot of chronic health issues complicated by cerebral palsy.  The death was unexpected and shocked her tremendously.  She denies any chest pain or difficulty breathing.  She denies any issue with bowel or bladder function.  She denies any dizziness or lightheadedness.  She is not sleeping well and is not eating well as one would expect during this period of grief.         /71 (BP Location: Left arm, Patient Position: Sitting, Cuff Size: Large Adult)   Pulse 57   Temp 97.1 °F (36.2 °C) (Temporal)   Wt 90.7 kg (200 lb)   SpO2 93%   BMI 34.33 kg/m²       Chief Complaint   Patient presents with   • Pain     1 month f/u           Current Outpatient Medications:   •  ARIPiprazole (ABILIFY) 30 MG tablet, Take 1 tablet by mouth Daily., Disp: 30 tablet, Rfl: 3  •  atenolol (TENORMIN) 50 MG tablet, TAKE ONE (1) TABLET BY MOUTH DAILY, Disp: 30 tablet, Rfl: 3  •  atorvastatin (LIPITOR) 20 MG tablet, TAKE ONE (1) TABLET BY MOUTH AT BEDTIME, Disp: 30 tablet, Rfl: 3  •  busPIRone (BUSPAR) 15 MG tablet, TAKE ONE (1) TABLET BY MOUTH TWICE DAILY, Disp: 60 tablet, Rfl: 3  •  carisoprodol (SOMA) 350 MG tablet, TAKE ONE (1) TABLET BY MOUTH THREE TIMES DAILY AS NEEDED FOR MUSCLE SPASMS, Disp: 90 tablet, Rfl: 0  •  clonazePAM (KlonoPIN) 1 MG tablet, TAKE ONE (1) TABLET BY MOUTH THREE TIMES DAILY AS NEEDED FOR ANXIETY, Disp: 90 tablet, Rfl: 1  •  DULoxetine (CYMBALTA) 60 MG capsule, Take 1 capsule by mouth 2 (Two) Times a Day., Disp: 180 capsule, Rfl: 1  •  folic acid (FOLVITE) 1 MG tablet, Take 1 tablet by mouth Daily., Disp: 90 tablet, Rfl: 0  •  oxyCODONE (ROXICODONE) 10 MG tablet, Take 1 tablet by mouth Every 6 (Six) Hours As Needed for Severe Pain ., Disp: 120 tablet, Rfl: 0  •  pregabalin (LYRICA) 150 MG  capsule, TAKE ONE (1) CAPSULE BY MOUTH TWICE DAILY, Disp: 60 capsule, Rfl: 3  •  rOPINIRole (REQUIP) 0.5 MG tablet, TAKE ONE (1) TABLET BY MOUTH TWICE DAILY. TAKE ONE (1) HOUR BEFORE BEDTIME., Disp: 60 tablet, Rfl: 5  •  sulfaSALAzine (AZULFIDINE) 500 MG tablet, TAKE TWO (2) TABLETS BY MOUTH TWICE DAILY, Disp: 360 tablet, Rfl: 1  •  zolpidem (AMBIEN) 10 MG tablet, Take 1 tablet by mouth At Night As Needed for Sleep., Disp: 30 tablet, Rfl: 1        The following portions of the patient's history were reviewed and updated as appropriate: allergies, current medications, past family history, past medical history, past social history, past surgical history and problem list.    Review of Systems   Constitutional: Negative for activity change, fatigue and fever.   HENT: Negative for congestion, sinus pressure, sinus pain, sore throat and trouble swallowing.    Eyes: Negative for visual disturbance.   Respiratory: Negative for cough, chest tightness, shortness of breath and wheezing.    Cardiovascular: Negative for chest pain.   Gastrointestinal: Negative for abdominal distention, abdominal pain, constipation, diarrhea, nausea and vomiting.   Genitourinary: Negative for difficulty urinating, dysuria and urgency.   Musculoskeletal: Positive for arthralgias, back pain, myalgias and neck pain.   Neurological: Negative for dizziness, weakness, light-headedness and numbness.   Psychiatric/Behavioral: Positive for dysphoric mood and sleep disturbance. Negative for agitation, hallucinations and suicidal ideas.       Objective   Physical Exam  Vitals signs and nursing note reviewed.   Neck:      Musculoskeletal: Neck supple.   Cardiovascular:      Rate and Rhythm: Normal rate and regular rhythm.      Heart sounds: Normal heart sounds.   Pulmonary:      Effort: Pulmonary effort is normal.      Breath sounds: Normal breath sounds. No wheezing or rales.   Abdominal:      General: Bowel sounds are normal.      Palpations: Abdomen is  soft.      Tenderness: There is no abdominal tenderness. There is no guarding.   Musculoskeletal:         General: No swelling or deformity.   Lymphadenopathy:      Cervical: No cervical adenopathy.   Neurological:      General: No focal deficit present.      Mental Status: She is alert and oriented to person, place, and time. Mental status is at baseline.   Psychiatric:      Comments: Very distraught today very emotional  Not suicidal  Difficult to truly assess her pain levels today           Assessment/Plan   Problems Addressed this Visit        Other    Depression - Primary    Relevant Medications    zolpidem (AMBIEN) 10 MG tablet      Other Visit Diagnoses     Grief at loss of child        Relevant Medications    zolpidem (AMBIEN) 10 MG tablet      Diagnoses       Codes Comments    Depression, unspecified depression type    -  Primary ICD-10-CM: F32.9  ICD-9-CM: 311     Grief at loss of child     ICD-10-CM: F43.21, Z63.4  ICD-9-CM: 309.0           I did offer her a counseling referral and she took it  I will give her a chance to get counseling and I will give her some Ambien to help with sleep  I will see her back in the office in a few weeks and see if we are making progress but it will take time for her to work through this grief process

## 2020-11-06 DIAGNOSIS — M79.7 FIBROMYALGIA: ICD-10-CM

## 2020-11-06 RX ORDER — CARISOPRODOL 350 MG/1
TABLET ORAL
Qty: 90 TABLET | Refills: 0 | Status: SHIPPED | OUTPATIENT
Start: 2020-11-06 | End: 2020-12-08

## 2020-11-09 DIAGNOSIS — G89.4 CHRONIC PAIN SYNDROME: ICD-10-CM

## 2020-11-09 RX ORDER — OXYCODONE HYDROCHLORIDE 10 MG/1
10 TABLET ORAL EVERY 6 HOURS PRN
Qty: 120 TABLET | Refills: 0 | Status: SHIPPED | OUTPATIENT
Start: 2020-11-09 | End: 2020-12-07 | Stop reason: SDUPTHER

## 2020-11-16 ENCOUNTER — TELEPHONE (OUTPATIENT)
Dept: FAMILY MEDICINE CLINIC | Facility: CLINIC | Age: 55
End: 2020-11-16

## 2020-11-19 NOTE — TELEPHONE ENCOUNTER
Prakash Auth for Pregabalin 150 mg capsules were APPROVED on CoverMyMeds.    Coverage start date: 11/12/2020  Coverage end date: 11/11/2021    This request has been approved up to 3 capsules per day.

## 2020-11-30 ENCOUNTER — OFFICE VISIT (OUTPATIENT)
Dept: FAMILY MEDICINE CLINIC | Facility: CLINIC | Age: 55
End: 2020-11-30

## 2020-11-30 VITALS
HEART RATE: 63 BPM | WEIGHT: 197 LBS | DIASTOLIC BLOOD PRESSURE: 69 MMHG | SYSTOLIC BLOOD PRESSURE: 103 MMHG | TEMPERATURE: 96.6 F | BODY MASS INDEX: 33.81 KG/M2 | OXYGEN SATURATION: 96 %

## 2020-11-30 DIAGNOSIS — Z63.4 GRIEF AT LOSS OF CHILD: ICD-10-CM

## 2020-11-30 DIAGNOSIS — F43.21 GRIEF AT LOSS OF CHILD: ICD-10-CM

## 2020-11-30 DIAGNOSIS — F32.A DEPRESSION, UNSPECIFIED DEPRESSION TYPE: Primary | ICD-10-CM

## 2020-11-30 PROCEDURE — 99213 OFFICE O/P EST LOW 20 MIN: CPT | Performed by: FAMILY MEDICINE

## 2020-11-30 RX ORDER — TEMAZEPAM 30 MG/1
30 CAPSULE ORAL NIGHTLY PRN
Qty: 30 CAPSULE | Refills: 1 | Status: SHIPPED | OUTPATIENT
Start: 2020-11-30 | End: 2021-01-27

## 2020-11-30 SDOH — SOCIAL STABILITY - SOCIAL INSECURITY: DISSAPEARANCE AND DEATH OF FAMILY MEMBER: Z63.4

## 2020-11-30 NOTE — PROGRESS NOTES
Subjective   Briana Alas is a 55 y.o. female.     She is in today for follow-up on depression issues.  She has not been to any counseling yet.  She denies any suicidal thoughts or negative thoughts.  She is still functional with her ADLs.  Her daughter was a major source of focus and importance in her life.  Her daughter relied on her for a lot of her daily needs.  Now that her daughter is gone that is what has been lost the most.  She denies any chest pain or difficulty breathing.  She is not sleeping well but that is not new.  The Ambien has not been effective.  She denies any issue with bowel or bladder function.    DepressionPatient is not experiencing: shortness of breath and suicidal ideas.           /69 (BP Location: Left arm, Patient Position: Sitting, Cuff Size: Large Adult)   Pulse 63   Temp 96.6 °F (35.9 °C) (Temporal)   Wt 89.4 kg (197 lb)   SpO2 96%   BMI 33.81 kg/m²       Chief Complaint   Patient presents with   • Depression     5 wk f/u           Current Outpatient Medications:   •  ARIPiprazole (ABILIFY) 30 MG tablet, Take 1 tablet by mouth Daily., Disp: 30 tablet, Rfl: 3  •  atenolol (TENORMIN) 50 MG tablet, TAKE ONE (1) TABLET BY MOUTH DAILY, Disp: 30 tablet, Rfl: 3  •  atorvastatin (LIPITOR) 20 MG tablet, TAKE ONE (1) TABLET BY MOUTH AT BEDTIME, Disp: 30 tablet, Rfl: 3  •  busPIRone (BUSPAR) 15 MG tablet, TAKE ONE (1) TABLET BY MOUTH TWICE DAILY, Disp: 60 tablet, Rfl: 3  •  carisoprodol (SOMA) 350 MG tablet, TAKE ONE (1) TABLET BY MOUTH THREE TIMES DAILY AS NEEDED FOR MUSCLE SPASMS, Disp: 90 tablet, Rfl: 0  •  clonazePAM (KlonoPIN) 1 MG tablet, TAKE ONE (1) TABLET BY MOUTH THREE TIMES DAILY AS NEEDED FOR ANXIETY, Disp: 90 tablet, Rfl: 1  •  DULoxetine (CYMBALTA) 60 MG capsule, Take 1 capsule by mouth 2 (Two) Times a Day., Disp: 180 capsule, Rfl: 1  •  folic acid (FOLVITE) 1 MG tablet, Take 1 tablet by mouth Daily., Disp: 90 tablet, Rfl: 0  •  oxyCODONE (ROXICODONE) 10 MG tablet,  Take 1 tablet by mouth Every 6 (Six) Hours As Needed for Severe Pain ., Disp: 120 tablet, Rfl: 0  •  pregabalin (LYRICA) 150 MG capsule, TAKE ONE (1) CAPSULE BY MOUTH TWICE DAILY, Disp: 60 capsule, Rfl: 3  •  rOPINIRole (REQUIP) 0.5 MG tablet, TAKE ONE (1) TABLET BY MOUTH TWICE DAILY. TAKE ONE (1) HOUR BEFORE BEDTIME., Disp: 60 tablet, Rfl: 5  •  sulfaSALAzine (AZULFIDINE) 500 MG tablet, TAKE TWO (2) TABLETS BY MOUTH TWICE DAILY, Disp: 360 tablet, Rfl: 1  •  zolpidem (AMBIEN) 10 MG tablet, Take 1 tablet by mouth At Night As Needed for Sleep., Disp: 30 tablet, Rfl: 1        The following portions of the patient's history were reviewed and updated as appropriate: allergies, current medications, past family history, past medical history, past social history, past surgical history and problem list.    Review of Systems   Constitutional: Negative for activity change, fatigue and fever.   HENT: Negative for congestion, sinus pressure, sinus pain, sore throat and trouble swallowing.    Eyes: Negative for visual disturbance.   Respiratory: Negative for cough, chest tightness, shortness of breath and wheezing.    Cardiovascular: Negative for chest pain.   Gastrointestinal: Negative for abdominal distention, abdominal pain, constipation, diarrhea, nausea and vomiting.   Genitourinary: Negative for difficulty urinating, dysuria and urgency.   Musculoskeletal: Positive for arthralgias, back pain, myalgias and neck pain.   Neurological: Negative for dizziness, weakness, light-headedness and numbness.   Psychiatric/Behavioral: Positive for dysphoric mood and sleep disturbance. Negative for agitation, hallucinations and suicidal ideas.       Objective   Physical Exam  Vitals signs and nursing note reviewed.   Eyes:      Conjunctiva/sclera: Conjunctivae normal.      Pupils: Pupils are equal, round, and reactive to light.   Neck:      Musculoskeletal: Neck supple.   Cardiovascular:      Rate and Rhythm: Normal rate and regular  rhythm.      Heart sounds: Normal heart sounds. No murmur.   Pulmonary:      Effort: Pulmonary effort is normal.      Breath sounds: Normal breath sounds. No wheezing or rales.   Abdominal:      General: Bowel sounds are normal.      Palpations: Abdomen is soft.      Tenderness: There is no abdominal tenderness. There is no guarding.   Lymphadenopathy:      Cervical: No cervical adenopathy.   Neurological:      General: No focal deficit present.      Mental Status: She is alert and oriented to person, place, and time.   Psychiatric:      Comments: Mood improved but still negative           Assessment/Plan   Problems Addressed this Visit        Other    Depression - Primary    Relevant Orders    Ambulatory Referral to Psychiatry      Other Visit Diagnoses     Grief at loss of child        Relevant Orders    Ambulatory Referral to Psychiatry      Diagnoses       Codes Comments    Depression, unspecified depression type    -  Primary ICD-10-CM: F32.9  ICD-9-CM: 311     Grief at loss of child     ICD-10-CM: F43.21, Z63.4  ICD-9-CM: 309.0         I will try to get her in with our psychiatry office to see the practitioner there  I will change her sleep medication from Ambien to temazepam  I will see her back in a few months  I did ask her to pursue volunteer activities either with a local animal shelter or with a local senior center

## 2020-12-05 DIAGNOSIS — G89.4 CHRONIC PAIN SYNDROME: ICD-10-CM

## 2020-12-05 RX ORDER — ROPINIROLE 0.5 MG/1
TABLET, FILM COATED ORAL
Qty: 60 TABLET | Refills: 5 | Status: SHIPPED | OUTPATIENT
Start: 2020-12-05 | End: 2021-05-18

## 2020-12-05 RX ORDER — CLONAZEPAM 1 MG/1
TABLET ORAL
Qty: 90 TABLET | Refills: 1 | Status: SHIPPED | OUTPATIENT
Start: 2020-12-05 | End: 2021-02-01 | Stop reason: SDUPTHER

## 2020-12-05 RX ORDER — BUSPIRONE HYDROCHLORIDE 15 MG/1
TABLET ORAL
Qty: 60 TABLET | Refills: 3 | Status: SHIPPED | OUTPATIENT
Start: 2020-12-05 | End: 2021-03-24

## 2020-12-05 RX ORDER — ATENOLOL 50 MG/1
TABLET ORAL
Qty: 30 TABLET | Refills: 3 | Status: SHIPPED | OUTPATIENT
Start: 2020-12-05 | End: 2021-03-24

## 2020-12-07 DIAGNOSIS — G89.4 CHRONIC PAIN SYNDROME: ICD-10-CM

## 2020-12-07 RX ORDER — OXYCODONE HYDROCHLORIDE 10 MG/1
10 TABLET ORAL EVERY 6 HOURS PRN
Qty: 120 TABLET | Refills: 0 | Status: SHIPPED | OUTPATIENT
Start: 2020-12-07 | End: 2021-01-04 | Stop reason: SDUPTHER

## 2020-12-08 DIAGNOSIS — M79.7 FIBROMYALGIA: ICD-10-CM

## 2020-12-08 RX ORDER — CARISOPRODOL 350 MG/1
TABLET ORAL
Qty: 90 TABLET | Refills: 0 | Status: SHIPPED | OUTPATIENT
Start: 2020-12-08 | End: 2021-01-05

## 2020-12-17 DIAGNOSIS — Z63.4 GRIEF AT LOSS OF CHILD: ICD-10-CM

## 2020-12-17 DIAGNOSIS — F43.21 GRIEF AT LOSS OF CHILD: ICD-10-CM

## 2020-12-17 RX ORDER — ZOLPIDEM TARTRATE 10 MG/1
TABLET ORAL
Qty: 30 TABLET | Refills: 1 | OUTPATIENT
Start: 2020-12-17

## 2020-12-17 SDOH — SOCIAL STABILITY - SOCIAL INSECURITY: DISSAPEARANCE AND DEATH OF FAMILY MEMBER: Z63.4

## 2021-01-04 DIAGNOSIS — G89.4 CHRONIC PAIN SYNDROME: ICD-10-CM

## 2021-01-04 RX ORDER — OXYCODONE HYDROCHLORIDE 10 MG/1
10 TABLET ORAL EVERY 6 HOURS PRN
Qty: 120 TABLET | Refills: 0 | Status: SHIPPED | OUTPATIENT
Start: 2021-01-04 | End: 2021-02-01 | Stop reason: SDUPTHER

## 2021-01-05 DIAGNOSIS — M79.7 FIBROMYALGIA: ICD-10-CM

## 2021-01-05 RX ORDER — CARISOPRODOL 350 MG/1
TABLET ORAL
Qty: 90 TABLET | Refills: 0 | Status: SHIPPED | OUTPATIENT
Start: 2021-01-05 | End: 2021-02-02

## 2021-01-06 RX ORDER — SULFASALAZINE 500 MG/1
TABLET ORAL
Qty: 360 TABLET | Refills: 1 | Status: SHIPPED | OUTPATIENT
Start: 2021-01-06 | End: 2021-05-19 | Stop reason: SDUPTHER

## 2021-01-06 RX ORDER — ATORVASTATIN CALCIUM 20 MG/1
TABLET, FILM COATED ORAL
Qty: 30 TABLET | Refills: 3 | Status: SHIPPED | OUTPATIENT
Start: 2021-01-06 | End: 2021-04-21

## 2021-01-06 RX ORDER — ARIPIPRAZOLE 30 MG/1
TABLET ORAL
Qty: 30 TABLET | Refills: 3 | Status: SHIPPED | OUTPATIENT
Start: 2021-01-06 | End: 2021-04-21

## 2021-01-27 DIAGNOSIS — F32.A DEPRESSION, UNSPECIFIED DEPRESSION TYPE: ICD-10-CM

## 2021-01-27 RX ORDER — TEMAZEPAM 30 MG/1
30 CAPSULE ORAL NIGHTLY PRN
Qty: 30 CAPSULE | Refills: 1 | Status: SHIPPED | OUTPATIENT
Start: 2021-01-27 | End: 2021-03-01 | Stop reason: SDUPTHER

## 2021-02-01 DIAGNOSIS — G89.4 CHRONIC PAIN SYNDROME: ICD-10-CM

## 2021-02-01 RX ORDER — OXYCODONE HYDROCHLORIDE 10 MG/1
10 TABLET ORAL EVERY 6 HOURS PRN
Qty: 120 TABLET | Refills: 0 | Status: SHIPPED | OUTPATIENT
Start: 2021-02-01 | End: 2021-02-02 | Stop reason: SDUPTHER

## 2021-02-01 RX ORDER — CLONAZEPAM 1 MG/1
1 TABLET ORAL 3 TIMES DAILY PRN
Qty: 90 TABLET | Refills: 1 | Status: SHIPPED | OUTPATIENT
Start: 2021-02-01 | End: 2021-04-22 | Stop reason: SDUPTHER

## 2021-02-01 NOTE — TELEPHONE ENCOUNTER
Pt needs oxycodone to [facility]. I have it added below.   Also needs clonazepam to [facility]  INSPECT on your desk  Lv:11/30/20

## 2021-02-02 DIAGNOSIS — G89.4 CHRONIC PAIN SYNDROME: ICD-10-CM

## 2021-02-02 DIAGNOSIS — M79.7 FIBROMYALGIA: ICD-10-CM

## 2021-02-02 RX ORDER — CARISOPRODOL 350 MG/1
TABLET ORAL
Qty: 90 TABLET | Refills: 0 | Status: SHIPPED | OUTPATIENT
Start: 2021-02-02 | End: 2021-03-01 | Stop reason: SDUPTHER

## 2021-02-02 RX ORDER — OXYCODONE HYDROCHLORIDE 10 MG/1
10 TABLET ORAL EVERY 6 HOURS PRN
Qty: 120 TABLET | Refills: 0 | Status: SHIPPED | OUTPATIENT
Start: 2021-02-02 | End: 2021-03-01 | Stop reason: SDUPTHER

## 2021-02-02 NOTE — TELEPHONE ENCOUNTER
[facility] advised [facility] and was wondering if it could be sent to a different pharmacy.     [facility] - [place], [place] - [address] AT [facility] [address] - [phone]  - [phone] FX

## 2021-02-25 RX ORDER — DULOXETIN HYDROCHLORIDE 60 MG/1
CAPSULE, DELAYED RELEASE ORAL
Qty: 180 CAPSULE | Refills: 1 | Status: SHIPPED | OUTPATIENT
Start: 2021-02-25 | End: 2021-05-18

## 2021-03-01 ENCOUNTER — OFFICE VISIT (OUTPATIENT)
Dept: FAMILY MEDICINE CLINIC | Facility: CLINIC | Age: 56
End: 2021-03-01

## 2021-03-01 VITALS
WEIGHT: 195 LBS | SYSTOLIC BLOOD PRESSURE: 109 MMHG | DIASTOLIC BLOOD PRESSURE: 69 MMHG | TEMPERATURE: 98 F | BODY MASS INDEX: 33.47 KG/M2 | OXYGEN SATURATION: 95 % | HEART RATE: 64 BPM

## 2021-03-01 DIAGNOSIS — G89.4 CHRONIC PAIN SYNDROME: ICD-10-CM

## 2021-03-01 DIAGNOSIS — F32.A DEPRESSION, UNSPECIFIED DEPRESSION TYPE: Primary | ICD-10-CM

## 2021-03-01 DIAGNOSIS — H92.01 ACUTE EAR PAIN, RIGHT: ICD-10-CM

## 2021-03-01 DIAGNOSIS — F41.9 ANXIETY DISORDER, UNSPECIFIED TYPE: ICD-10-CM

## 2021-03-01 DIAGNOSIS — I10 HYPERTENSION, ESSENTIAL: ICD-10-CM

## 2021-03-01 DIAGNOSIS — M79.7 FIBROMYALGIA: ICD-10-CM

## 2021-03-01 PROCEDURE — 99214 OFFICE O/P EST MOD 30 MIN: CPT | Performed by: FAMILY MEDICINE

## 2021-03-01 RX ORDER — OXYCODONE HYDROCHLORIDE 10 MG/1
10 TABLET ORAL EVERY 6 HOURS PRN
Qty: 120 TABLET | Refills: 0 | Status: SHIPPED | OUTPATIENT
Start: 2021-03-01 | End: 2021-03-30 | Stop reason: SDUPTHER

## 2021-03-01 RX ORDER — CARISOPRODOL 350 MG/1
350 TABLET ORAL 3 TIMES DAILY PRN
Qty: 90 TABLET | Refills: 0 | Status: SHIPPED | OUTPATIENT
Start: 2021-03-01 | End: 2021-03-30

## 2021-03-01 RX ORDER — TEMAZEPAM 30 MG/1
30 CAPSULE ORAL NIGHTLY PRN
Qty: 30 CAPSULE | Refills: 1 | Status: SHIPPED | OUTPATIENT
Start: 2021-03-01 | End: 2021-05-07

## 2021-03-01 NOTE — PROGRESS NOTES
Subjective   Briana Alas is a 55 y.o. female.     She is in today for follow-up on issues with depression and anxiety.  She also has high blood pressure.  She is still grieving the loss of her daughter and is concerned because she has a son who is using heroin and she knows that she is going to walk-in on him potentially any day and find him dead.  She is helping to care for his 3 children ranging in age from 11-17.  She is without a car as her ex- has that.  She does have family that will help her get from place to place including to her appointment here today.  She is not sleeping well.  She denies suicidal ideations but she still does not feel she has risen out of her grief.  She denies any issue with bowel or bladder function.  She has some pain in her right ear which she thinks is probably a significant wax impaction.  She has not had any fever or signs of illness.         /69 (BP Location: Left arm, Patient Position: Sitting, Cuff Size: Large Adult)   Pulse 64   Temp 98 °F (36.7 °C) (Temporal)   Wt 88.5 kg (195 lb)   SpO2 95%   BMI 33.47 kg/m²       Chief Complaint   Patient presents with   • Depression     3 month f/u            Current Outpatient Medications:   •  ARIPiprazole (ABILIFY) 30 MG tablet, TAKE ONE (1) TABLET BY MOUTH EVERY DAY, Disp: 30 tablet, Rfl: 3  •  atenolol (TENORMIN) 50 MG tablet, TAKE ONE (1) TABLET BY MOUTH EVERY DAY, Disp: 30 tablet, Rfl: 3  •  atorvastatin (LIPITOR) 20 MG tablet, TAKE ONE (1) TABLET BY MOUTH AT BEDTIME, Disp: 30 tablet, Rfl: 3  •  busPIRone (BUSPAR) 15 MG tablet, TAKE ONE (1) TABLET BY MOUTH TWICE DAILY, Disp: 60 tablet, Rfl: 3  •  carisoprodol (SOMA) 350 MG tablet, TAKE ONE (1) TABLET BY MOUTH THREE TIMES DAILY AS NEEDED FOR MUSCLE SPASMS, Disp: 90 tablet, Rfl: 0  •  clonazePAM (KlonoPIN) 1 MG tablet, Take 1 tablet by mouth 3 (Three) Times a Day As Needed for Anxiety., Disp: 90 tablet, Rfl: 1  •  DULoxetine (CYMBALTA) 60 MG capsule, TAKE ONE (1)  CAPSULE BY MOUTH TWICE DAILY, Disp: 180 capsule, Rfl: 1  •  folic acid (FOLVITE) 1 MG tablet, Take 1 tablet by mouth Daily., Disp: 90 tablet, Rfl: 0  •  oxyCODONE (ROXICODONE) 10 MG tablet, Take 1 tablet by mouth Every 6 (Six) Hours As Needed for Severe Pain ., Disp: 120 tablet, Rfl: 0  •  pregabalin (LYRICA) 150 MG capsule, TAKE ONE (1) CAPSULE BY MOUTH TWICE DAILY, Disp: 60 capsule, Rfl: 3  •  rOPINIRole (REQUIP) 0.5 MG tablet, TAKE ONE (1) TABLET BY MOUTH TWICE DAILY. TAKE ONE (1) HOUR BEFORE BEDTIME., Disp: 60 tablet, Rfl: 5  •  sulfaSALAzine (AZULFIDINE) 500 MG tablet, TAKE TWO (2) TABLETS BY MOUTH TWICE DAILY, Disp: 360 tablet, Rfl: 1  •  temazepam (RESTORIL) 30 MG capsule, TAKE 1 CAPSULE BY MOUTH AT NIGHT AS NEEDED FOR SLEEP, Disp: 30 capsule, Rfl: 1        The following portions of the patient's history were reviewed and updated as appropriate: allergies, current medications, past family history, past medical history, past social history, past surgical history and problem list.    Review of Systems   Constitutional: Negative for activity change, fatigue and fever.   HENT: Negative for congestion, sinus pressure, sinus pain, sore throat and trouble swallowing.    Eyes: Negative for visual disturbance.   Respiratory: Negative for cough, chest tightness, shortness of breath and wheezing.    Cardiovascular: Negative for chest pain.   Gastrointestinal: Negative for abdominal distention, abdominal pain, constipation, diarrhea, nausea and vomiting.   Genitourinary: Negative for difficulty urinating, dysuria and urgency.   Musculoskeletal: Positive for arthralgias, back pain, myalgias and neck pain.   Neurological: Negative for dizziness, weakness, light-headedness and numbness.   Psychiatric/Behavioral: Positive for dysphoric mood. Negative for agitation, hallucinations, sleep disturbance and suicidal ideas. The patient is nervous/anxious.        Objective   Physical Exam  Vitals signs and nursing note reviewed.    HENT:      Right Ear: There is impacted cerumen.      Left Ear: There is impacted cerumen.   Eyes:      Conjunctiva/sclera: Conjunctivae normal.      Pupils: Pupils are equal, round, and reactive to light.   Neck:      Musculoskeletal: Neck supple.   Cardiovascular:      Rate and Rhythm: Normal rate and regular rhythm.      Heart sounds: Normal heart sounds. No murmur.   Pulmonary:      Effort: Pulmonary effort is normal.      Breath sounds: No wheezing or rales.   Abdominal:      General: Bowel sounds are normal.      Palpations: Abdomen is soft.      Tenderness: There is no abdominal tenderness. There is no guarding.   Musculoskeletal:      Right lower leg: No edema.      Left lower leg: No edema.   Lymphadenopathy:      Cervical: No cervical adenopathy.   Skin:     Findings: No rash.   Neurological:      General: No focal deficit present.      Mental Status: She is alert and oriented to person, place, and time.   Psychiatric:      Comments: Still sad, grieving, but not suicidal  Sleeping better, calmer           Assessment/Plan   Problems Addressed this Visit        Cardiac and Vasculature    Hypertension, essential       Mental Health    Anxiety disorder    Relevant Medications    temazepam (RESTORIL) 30 MG capsule    Other Relevant Orders    Ambulatory Referral to Psychology    Depression - Primary    Relevant Medications    temazepam (RESTORIL) 30 MG capsule    Other Relevant Orders    Ambulatory Referral to Psychology       Musculoskeletal and Injuries    Fibromyalgia    Relevant Medications    carisoprodol (SOMA) 350 MG tablet      Other Visit Diagnoses     Chronic pain syndrome        Relevant Medications    oxyCODONE (ROXICODONE) 10 MG tablet    Acute ear pain, right        Relevant Orders    Ambulatory Referral to ENT (Otolaryngology)      Diagnoses       Codes Comments    Depression, unspecified depression type    -  Primary ICD-10-CM: F32.9  ICD-9-CM: 311     Anxiety disorder, unspecified type      ICD-10-CM: F41.9  ICD-9-CM: 300.00     Hypertension, essential     ICD-10-CM: I10  ICD-9-CM: 401.9     Chronic pain syndrome     ICD-10-CM: G89.4  ICD-9-CM: 338.4     Fibromyalgia     ICD-10-CM: M79.7  ICD-9-CM: 729.1     Acute ear pain, right     ICD-10-CM: H92.01  ICD-9-CM: 388.70           I am going to refer for counseling as medical treatment is not adequate at this time for her  I am going to refer to ENT for the ear issue  I am not going to change any of her medication at the moment  I will see her back in a few months to make sure she got the counseling referral  She is to call me at any point she feels more despondent

## 2021-03-04 ENCOUNTER — TELEPHONE (OUTPATIENT)
Dept: FAMILY MEDICINE CLINIC | Facility: CLINIC | Age: 56
End: 2021-03-04

## 2021-03-04 NOTE — TELEPHONE ENCOUNTER
Caller: [facility], [place] - [address], [place]. [facility] - [phone]  - [phone]     Relationship: Pharmacy    Best call back number: [phone]    What medications are you currently taking:   Current Outpatient Medications on File Prior to Visit   Medication Sig Dispense Refill   • ARIPiprazole (ABILIFY) 30 MG tablet TAKE ONE (1) TABLET BY MOUTH EVERY DAY 30 tablet 3   • atenolol (TENORMIN) 50 MG tablet TAKE ONE (1) TABLET BY MOUTH EVERY DAY 30 tablet 3   • atorvastatin (LIPITOR) 20 MG tablet TAKE ONE (1) TABLET BY MOUTH AT BEDTIME 30 tablet 3   • busPIRone (BUSPAR) 15 MG tablet TAKE ONE (1) TABLET BY MOUTH TWICE DAILY 60 tablet 3   • carisoprodol (SOMA) 350 MG tablet Take 1 tablet by mouth 3 (Three) Times a Day As Needed for Muscle Spasms. 90 tablet 0   • clonazePAM (KlonoPIN) 1 MG tablet Take 1 tablet by mouth 3 (Three) Times a Day As Needed for Anxiety. 90 tablet 1   • DULoxetine (CYMBALTA) 60 MG capsule TAKE ONE (1) CAPSULE BY MOUTH TWICE DAILY 180 capsule 1   • folic acid (FOLVITE) 1 MG tablet Take 1 tablet by mouth Daily. 90 tablet 0   • oxyCODONE (ROXICODONE) 10 MG tablet Take 1 tablet by mouth Every 6 (Six) Hours As Needed for Severe Pain . 120 tablet 0   • pregabalin (LYRICA) 150 MG capsule TAKE ONE (1) CAPSULE BY MOUTH TWICE DAILY 60 capsule 3   • rOPINIRole (REQUIP) 0.5 MG tablet TAKE ONE (1) TABLET BY MOUTH TWICE DAILY. TAKE ONE (1) HOUR BEFORE BEDTIME. 60 tablet 5   • sulfaSALAzine (AZULFIDINE) 500 MG tablet TAKE TWO (2) TABLETS BY MOUTH TWICE DAILY 360 tablet 1   • temazepam (RESTORIL) 30 MG capsule Take 1 capsule by mouth At Night As Needed for Sleep. 30 capsule 1     No current facility-administered medications on file prior to visit.         When did you start taking these medications: N/A    Which medication are you concerned about: [facility] IS CONCERNED THAT PATIENT IS RECEIVING SIMILAR MEDICATIONS FROM THREE SEPARATE PHARMACIES AND THERE WAS A DUPLICATION ALERT THAT  CAME ACROSS FROM THE INSURANCE COMPANY TODAY.  PATIENT IS 85 TIMES MORE LIKELY TO HAVE AN OVERDOSE DUE TO THE MEDICATIONS THAT SHE IS GETTING.    Who prescribed you this medication: DR WATT    What are your concerns: PHARMACY IS CONCERNED THAT SHE IS TAKING MULTIPLE AGENTS FOR WHAT COULD BE USED FOR THE SAME AILMENT.    How long have you been taking these medications: UNSURE    How long have you had these concerns: [facility] DISCOVERED THAT PATIENT IS RECEIVING THE DIFFERENT MEDICATIONS FROM THREE SEPARATE PHARMACIES AND HE DID A SCAN ON THE MEDICATIONS REQUESTED AND IT CAME UP THAT THE PATIENT COULD BE AT RISK OF AN ACCIDENTAL OVERDOSE.

## 2021-03-16 DIAGNOSIS — G89.4 CHRONIC PAIN SYNDROME: ICD-10-CM

## 2021-03-16 RX ORDER — PREGABALIN 150 MG/1
CAPSULE ORAL
Qty: 60 CAPSULE | Refills: 3 | Status: SHIPPED | OUTPATIENT
Start: 2021-03-16 | End: 2021-07-09

## 2021-03-24 RX ORDER — BUSPIRONE HYDROCHLORIDE 15 MG/1
TABLET ORAL
Qty: 60 TABLET | Refills: 3 | Status: SHIPPED | OUTPATIENT
Start: 2021-03-24 | End: 2021-05-18

## 2021-03-24 RX ORDER — ATENOLOL 50 MG/1
TABLET ORAL
Qty: 30 TABLET | Refills: 3 | Status: SHIPPED | OUTPATIENT
Start: 2021-03-24 | End: 2021-07-12

## 2021-03-30 DIAGNOSIS — M79.7 FIBROMYALGIA: ICD-10-CM

## 2021-03-30 DIAGNOSIS — G89.4 CHRONIC PAIN SYNDROME: ICD-10-CM

## 2021-03-30 RX ORDER — CARISOPRODOL 350 MG/1
TABLET ORAL
Qty: 90 TABLET | Refills: 0 | Status: SHIPPED | OUTPATIENT
Start: 2021-03-30 | End: 2021-04-28 | Stop reason: SDUPTHER

## 2021-03-30 RX ORDER — OXYCODONE HYDROCHLORIDE 10 MG/1
10 TABLET ORAL EVERY 6 HOURS PRN
Qty: 120 TABLET | Refills: 0 | Status: SHIPPED | OUTPATIENT
Start: 2021-03-30 | End: 2021-04-28 | Stop reason: SDUPTHER

## 2021-03-30 NOTE — TELEPHONE ENCOUNTER
Caller: Briana Alas    Relationship: Self    Best call back number: [phone]     Medication needed:   Requested Prescriptions     Pending Prescriptions Disp Refills   • oxyCODONE (ROXICODONE) 10 MG tablet 120 tablet 0     Sig: Take 1 tablet by mouth Every 6 (Six) Hours As Needed for Severe Pain .       When do you need the refill by: ASAP    Does the patient have less than a 3 day supply:  [x] Yes  [] No    What is the patient's preferred pharmacy: [facility] [place] [facility] [address] [facility] [place] [address] - [phone] [facility] [phone] FX

## 2021-04-21 RX ORDER — ATORVASTATIN CALCIUM 20 MG/1
TABLET, FILM COATED ORAL
Qty: 30 TABLET | Refills: 3 | Status: SHIPPED | OUTPATIENT
Start: 2021-04-21 | End: 2021-05-18

## 2021-04-21 RX ORDER — ARIPIPRAZOLE 30 MG/1
TABLET ORAL
Qty: 30 TABLET | Refills: 3 | Status: SHIPPED | OUTPATIENT
Start: 2021-04-21 | End: 2021-06-17

## 2021-04-22 ENCOUNTER — OFFICE VISIT (OUTPATIENT)
Dept: PSYCHIATRY | Facility: CLINIC | Age: 56
End: 2021-04-22

## 2021-04-22 DIAGNOSIS — Z63.4 BEREAVEMENT: ICD-10-CM

## 2021-04-22 DIAGNOSIS — F51.01 PRIMARY INSOMNIA: Chronic | ICD-10-CM

## 2021-04-22 DIAGNOSIS — G89.4 CHRONIC PAIN SYNDROME: ICD-10-CM

## 2021-04-22 DIAGNOSIS — F41.1 GENERALIZED ANXIETY DISORDER: Chronic | ICD-10-CM

## 2021-04-22 DIAGNOSIS — F33.1 MAJOR DEPRESSIVE DISORDER, RECURRENT EPISODE, MODERATE (HCC): Primary | Chronic | ICD-10-CM

## 2021-04-22 PROCEDURE — 90792 PSYCH DIAG EVAL W/MED SRVCS: CPT | Performed by: PSYCHIATRY & NEUROLOGY

## 2021-04-22 RX ORDER — BREXPIPRAZOLE 1 MG/1
1 TABLET ORAL EVERY MORNING
Qty: 14 TABLET | Refills: 0 | COMMUNITY
Start: 2021-04-22 | End: 2021-05-11 | Stop reason: DRUGHIGH

## 2021-04-22 RX ORDER — CLONAZEPAM 1 MG/1
1 TABLET ORAL 3 TIMES DAILY PRN
Qty: 90 TABLET | Refills: 1 | Status: SHIPPED | OUTPATIENT
Start: 2021-04-22 | End: 2021-06-18 | Stop reason: SDUPTHER

## 2021-04-22 SDOH — SOCIAL STABILITY - SOCIAL INSECURITY: DISSAPEARANCE AND DEATH OF FAMILY MEMBER: Z63.4

## 2021-04-22 NOTE — PROGRESS NOTES
"Subjective   Briana Alas is a 55 y.o. female who presents today for initial evaluation     Chief Complaint:  \"I lost my daughter 6 months ago this month, and I can not process and accept that\"     History of Present Illness: the pt has a long hx of depression, no specific triggers or stressors, was on zoloft, celexa , few unsuccessful trials   She was relatively stable on meds until her daughter passed away 6 months ago , now she can not cope with that , her daughter was special needs, pt's life was revolving around her , now she feels she has no purpose   Depression is rated as 8/10, every day, all day long , denied AVH/SI/HI   The pt still sees her in her dreams , but not every night .  Sleep - improved on temazepam   Triggers - daughter's death  Alleviating factors - none   Anxiety increased, associated with somatic sxs, tension, constant worries about everything, unable to relax   Panic attacks at least once per week   Mood was always on the low side, no ups         The following portions of the patient's history were reviewed and updated as appropriate: allergies, current medications, past family history, past medical history, past social history, past surgical history and problem list.    PAST PSYCHIATRIC HISTORY  Axis I  Affective/Bipolar Disorder, Anxiety/Panic Disorder  No inpt. No SI/SA   Axis II  Defer     PAST OUTPATIENT TREATMENT  Diagnosis treated:  Affective Disorder, Anxiety/Panic Disorder  Treatment Type:  Medication Management  Psychotherapy - shante Osei at [facility]   Prior Psychiatric Medications:  lexapro - not effective  celexa -not effective  zoloft - not effective now    Support Groups:  None   Sequelae Of Mental Disorder:  emotional distress          Interval History  Deteriorated    Side Effects  Denied       Past Medical History:  Past Medical History:   Diagnosis Date   • Anxiety    • Arthritis    • Back pain    • Depression    • Headache    • Hyperlipidemia    • Hypertension    • " Injury of back    • Restless leg syndrome        Social History:  Social History     Socioeconomic History   • Marital status: Legally      Spouse name: Not on file   • Number of children: Not on file   • Years of education: Not on file   • Highest education level: Not on file   Tobacco Use   • Smoking status: Current Every Day Smoker     Packs/day: 0.50     Types: Cigarettes   • Smokeless tobacco: Never Used   Substance and Sexual Activity   • Alcohol use: No   • Drug use: No   • Sexual activity: Defer       Family History:  Family History   Problem Relation Age of Onset   • Hypertension Mother    • Hyperlipidemia Mother    • Depression Mother    • Thyroid disease Mother    • Hypertension Father        Past Surgical History:  Past Surgical History:   Procedure Laterality Date   • ANKLE OPEN REDUCTION INTERNAL FIXATION Right    • FOOT SURGERY Bilateral     bunionectomy   • SHOULDER ARTHROSCOPY W/ ROTATOR CUFF REPAIR Right 12/13/2019    Procedure: RIGHT SHOULDER ARTHROSCOPY WITH ROTATOR CUFF REPAIR and Subacromial decompression;  Surgeon: Gino Ackerman MD;  Location: [facility] OR;  Service: Orthopedics   • TUBAL ABDOMINAL LIGATION         Problem List:  Patient Active Problem List   Diagnosis   • Fibromyalgia   • Abnormal gait   • Ankle pain, right   • Knee pain   • Leg pain, left   • Annular tear of lumbar disc   • Degeneration of lumbar intervertebral disc   • Generalized anxiety disorder   • Body mass index 32.0-32.9, adult   • Cervical myelopathy (CMS/HCC)   • Other dorsalgia   • Chronic low back pain   • Depression   • Hx traumatic fracture   • Hyperlipidemia   • Hypertension, essential   • Hypovitaminosis D   • Inflammatory arthritis   • Joint pain   • Leg weakness, bilateral   • Lumbosacral radiculopathy   • Malaise and fatigue   • Neck pain, chronic   • Osteoarthritis of knee   • Pain in right shoulder   • Pain due to internal orthopedic prosthetic devices, implants and grafts, subsequent  encounter   • Rotator cuff tendonitis   • Scoliosis   • Osteoarthritis of lumbosacral spine without myelopathy   • Spondylosis of lumbosacral region   • Tobacco abuse counseling   • Upper respiratory tract infection   • Major depressive disorder, recurrent episode, moderate (CMS/HCC)   • Primary insomnia   • Bereavement       Allergy:   Allergies   Allergen Reactions   • Penicillins Rash        Discontinued Medications:  Medications Discontinued During This Encounter   Medication Reason   • clonazePAM (KlonoPIN) 1 MG tablet Reorder       Current Medications:   Current Outpatient Medications   Medication Sig Dispense Refill   • ARIPiprazole (ABILIFY) 30 MG tablet TAKE ONE (1) TABLET BY MOUTH EVERY DAY 30 tablet 3   • atenolol (TENORMIN) 50 MG tablet TAKE ONE (1) TABLET BY MOUTH EVERY DAY 30 tablet 3   • atorvastatin (LIPITOR) 20 MG tablet TAKE ONE (1) TABLET BY MOUTH EVERY NIGHT AT BEDTIME 30 tablet 3   • Brexpiprazole (Rexulti) 1 MG tablet Take 1 mg by mouth Every Morning. 14 tablet 0   • busPIRone (BUSPAR) 15 MG tablet TAKE ONE (1) TABLET BY MOUTH TWICE DAILY 60 tablet 3   • carisoprodol (SOMA) 350 MG tablet TAKE ONE TABLET BY MOUTH THREE TIMES A DAY AS NEEDED FOR MUSCLE SPASM 90 tablet 0   • clonazePAM (KlonoPIN) 1 MG tablet Take 1 tablet by mouth 3 (Three) Times a Day As Needed for Anxiety. 90 tablet 1   • DULoxetine (CYMBALTA) 60 MG capsule TAKE ONE (1) CAPSULE BY MOUTH TWICE DAILY 180 capsule 1   • folic acid (FOLVITE) 1 MG tablet Take 1 tablet by mouth Daily. 90 tablet 0   • oxyCODONE (ROXICODONE) 10 MG tablet Take 1 tablet by mouth Every 6 (Six) Hours As Needed for Severe Pain . 120 tablet 0   • pregabalin (LYRICA) 150 MG capsule TAKE ONE (1) CAPSULE BY MOUTH TWICE DAILY 60 capsule 3   • rOPINIRole (REQUIP) 0.5 MG tablet TAKE ONE (1) TABLET BY MOUTH TWICE DAILY. TAKE ONE (1) HOUR BEFORE BEDTIME. 60 tablet 5   • sulfaSALAzine (AZULFIDINE) 500 MG tablet TAKE TWO (2) TABLETS BY MOUTH TWICE DAILY 360 tablet 1   •  temazepam (RESTORIL) 30 MG capsule Take 1 capsule by mouth At Night As Needed for Sleep. 30 capsule 1     No current facility-administered medications for this visit.         Psychological ROS: positive for - anxiety, depression and sleep disturbances  negative for - hallucinations, hostility, irritability, memory difficulties, mood swings, obsessive thoughts or suicidal ideation      Physical Exam:   There were no vitals taken for this visit.    Mental Status Exam:   Hygiene:   good  Cooperation:  Cooperative  Eye Contact:  Good  Psychomotor Behavior:  Appropriate  Affect:  congruent with mood   Mood: depressed  Hopelessness: Denies  Speech:  Normal  Thought Process:  Goal directed and Linear  Thought Content:  Mood congruent  Suicidal:  None  Homicidal:  None  Hallucinations:  None  Delusion:  None  Memory:  Intact  Orientation:  Person, Place, Time and Situation  Reliability:  good  Insight:  Good  Judgement:  Good  Impulse Control:  Good  Physical/Medical Issues:  Yes         PHQ-9 Depression Screening  Little interest or pleasure in doing things? 3   Feeling down, depressed, or hopeless? 3   Trouble falling or staying asleep, or sleeping too much? 2   Feeling tired or having little energy? 2   Poor appetite or overeating? 1   Feeling bad about yourself - or that you are a failure or have let yourself or your family down? 2   Trouble concentrating on things, such as reading the newspaper or watching television? 0   Moving or speaking so slowly that other people could have noticed? Or the opposite - being so fidgety or restless that you have been moving around a lot more than usual? 0   Thoughts that you would be better off dead, or of hurting yourself in some way? 0   PHQ-9 Total Score 13   If you checked off any problems, how difficult have these problems made it for you to do your work, take care of things at home, or get along with other people? Very difficult           Current every day smoker 3-10 mintues  spent counseling Not agreeable to stopping    I advised Briana of the risks of tobacco use.     Lab Results:   No visits with results within 3 Month(s) from this visit.   Latest known visit with results is:   Office Visit on 09/14/2020   Component Date Value Ref Range Status   • Glucose 09/14/2020 73  65 - 99 mg/dL Final   • BUN 09/14/2020 12  6 - 20 mg/dL Final   • Creatinine 09/14/2020 1.03* 0.57 - 1.00 mg/dL Final   • Sodium 09/14/2020 139  136 - 145 mmol/L Final   • Potassium 09/14/2020 4.2  3.5 - 5.2 mmol/L Final   • Chloride 09/14/2020 102  98 - 107 mmol/L Final   • CO2 09/14/2020 26.9  22.0 - 29.0 mmol/L Final   • Calcium 09/14/2020 9.2  8.6 - 10.5 mg/dL Final   • Total Protein 09/14/2020 7.0  6.0 - 8.5 g/dL Final   • Albumin 09/14/2020 4.20  3.50 - 5.20 g/dL Final   • ALT (SGPT) 09/14/2020 17  1 - 33 U/L Final   • AST (SGOT) 09/14/2020 17  1 - 32 U/L Final   • Alkaline Phosphatase 09/14/2020 84  39 - 117 U/L Final   • Total Bilirubin 09/14/2020 <0.2  0.0 - 1.2 mg/dL Final   • eGFR Non African Amer 09/14/2020 55* >60 mL/min/1.73 Final   • Globulin 09/14/2020 2.8  gm/dL Final   • A/G Ratio 09/14/2020 1.5  g/dL Final   • BUN/Creatinine Ratio 09/14/2020 11.7  7.0 - 25.0 Final   • Anion Gap 09/14/2020 10.1  5.0 - 15.0 mmol/L Final   • Total Cholesterol 09/14/2020 155  0 - 200 mg/dL Final   • Triglycerides 09/14/2020 187* 0 - 150 mg/dL Final   • HDL Cholesterol 09/14/2020 47  40 - 60 mg/dL Final   • LDL Cholesterol  09/14/2020 71  0 - 100 mg/dL Final   • VLDL Cholesterol 09/14/2020 37.4  5 - 40 mg/dL Final   • LDL/HDL Ratio 09/14/2020 1.50   Final       Assessment/Plan   Problems Addressed this Visit        Mental Health    Generalized anxiety disorder (Chronic)    Relevant Medications    clonazePAM (KlonoPIN) 1 MG tablet    Brexpiprazole (Rexulti) 1 MG tablet    Major depressive disorder, recurrent episode, moderate (CMS/HCC) - Primary (Chronic)    Relevant Medications    Brexpiprazole (Rexulti) 1 MG tablet     Bereavement       Sleep    Primary insomnia (Chronic)      Other Visit Diagnoses     Chronic pain syndrome          Diagnoses       Codes Comments    Major depressive disorder, recurrent episode, moderate (CMS/HCC)    -  Primary ICD-10-CM: F33.1  ICD-9-CM: 296.32     Generalized anxiety disorder     ICD-10-CM: F41.1  ICD-9-CM: 300.02     Primary insomnia     ICD-10-CM: F51.01  ICD-9-CM: 307.42     Bereavement     ICD-10-CM: Z63.4  ICD-9-CM: V62.82     Chronic pain syndrome     ICD-10-CM: G89.4  ICD-9-CM: 338.4           Visit Diagnoses:    ICD-10-CM ICD-9-CM   1. Major depressive disorder, recurrent episode, moderate (CMS/HCC)  F33.1 296.32   2. Generalized anxiety disorder  F41.1 300.02   3. Primary insomnia  F51.01 307.42   4. Bereavement  Z63.4 V62.82   5. Chronic pain syndrome  G89.4 338.4       TREATMENT PLAN/GOALS: Continue supportive psychotherapy efforts and medications as indicated. Treatment and medication options discussed during today's visit. Patient ackowledged and verbally consented to continue with current treatment plan and was educated on the importance of compliance with treatment and follow-up appointments.    MEDICATION ISSUES:  INSPECT reviewed as expected - on pain meds, on clonazepam and temazepam from PCP   1. Major depressive d/o moderate recurrent - cont cymbalta 120 mg BP is stable, at present time cont cymbalta for now, difficult to get off cymbalta due to discontinuation sxs , cross taper from Abilify to rexulti - 0.5-1-2 mg   Cont therapy  2. Generalized anxiety - cont cymbalta, cont clonazepam 1 mg TID, the pt is on opioids, will closely monitor   3 Insomnia - cont temazepam  4 bereavement - cont grief therapy     PHQ scored 13 and indicated moderate depression     Discussed medication options and treatment plan of prescribed medication as well as the risks, benefits, and side effects including potential falls, possible impaired driving and metabolic adversities among others. Patient  is agreeable to call the office with any worsening of symptoms or onset of side effects. Patient is agreeable to call 911 or go to the nearest ER should he/she begin having SI/HI. No medication side effects or related complaints today.     MEDS ORDERED DURING VISIT:  New Medications Ordered This Visit   Medications   • clonazePAM (KlonoPIN) 1 MG tablet     Sig: Take 1 tablet by mouth 3 (Three) Times a Day As Needed for Anxiety.     Dispense:  90 tablet     Refill:  1   • Brexpiprazole (Rexulti) 1 MG tablet     Sig: Take 1 mg by mouth Every Morning.     Dispense:  14 tablet     Refill:  0       Return in about 2 months (around 6/22/2021).         This document has been electronically signed by Jade Ryder MD  April 22, 2021 09:49 EDT    EMR Dragon transcription disclaimer:  Some of this encounter note is an electronic transcription translation of spoken language to printed text. The electronic translation of spoken language may permit erroneous, or at times, nonsensical words or phrases to be inadvertently transcribed; Although I have reviewed the note for such errors some may still exist.

## 2021-04-22 NOTE — PATIENT INSTRUCTIONS
"[email or web address]">   Generalized Anxiety Disorder, Adult  Generalized anxiety disorder (STEWART) is a mental health condition. Unlike normal worries, anxiety related to STEWART is not triggered by a specific event. These worries do not fade or get better with time. STEWART interferes with relationships, work, and school.  STEWART symptoms can vary from mild to severe. People with severe STEWART can have intense waves of anxiety with physical symptoms that are similar to panic attacks.  What are the causes?  The exact cause of STEWART is not known, but the following are believed to have an impact:  · Differences in natural brain chemicals.  · Genes passed down from parents to children.  · Differences in the way threats are perceived.  · Development during childhood.  · Personality.  What increases the risk?  The following factors may make you more likely to develop this condition:  · Being female.  · Having a family history of anxiety disorders.  · Being very shy.  · Experiencing very stressful life events, such as the death of a loved one.  · Having a very stressful family environment.  What are the signs or symptoms?  People with STEWART often worry excessively about many things in their lives, such as their health and family. Symptoms may also include:  · Mental and emotional symptoms:  ? Worrying excessively about natural disasters.  ? Fear of being late.  ? Difficulty concentrating.  ? Fears that others are judging your performance.  · Physical symptoms:  ? Fatigue.  ? Headaches, muscle tension, muscle twitches, trembling, or feeling shaky.  ? Feeling like your heart is pounding or beating very fast.  ? Feeling out of breath or like you cannot take a deep breath.  ? Having trouble falling asleep or staying asleep, or experiencing restlessness.  ? Sweating.  ? Nausea, diarrhea, or irritable bowel syndrome (IBS).  · Behavioral symptoms:  ? Experiencing erratic moods or irritability.  ? Avoidance of new situations.  ? Avoidance of " people.  ? Extreme difficulty making decisions.  How is this diagnosed?  This condition is diagnosed based on your symptoms and medical history. You will also have a physical exam. Your health care provider may perform tests to rule out other possible causes of your symptoms.  To be diagnosed with STEWART, a person must have anxiety that:  · Is out of his or her control.  · Affects several different aspects of his or her life, such as work and relationships.  · Causes distress that makes him or her unable to take part in normal activities.  · Includes at least three symptoms of STEWART, such as restlessness, fatigue, trouble concentrating, irritability, muscle tension, or sleep problems.  Before your health care provider can confirm a diagnosis of STEWART, these symptoms must be present more days than they are not, and they must last for 6 months or longer.  How is this treated?  This condition may be treated with:  · Medicine. Antidepressant medicine is usually prescribed for long-term daily control. Anti-anxiety medicines may be added in severe cases, especially when panic attacks occur.  · Talk therapy (psychotherapy). Certain types of talk therapy can be helpful in treating STEWART by providing support, education, and guidance. Options include:  ? Cognitive behavioral therapy (CBT). People learn coping skills and self-calming techniques to ease their physical symptoms. They learn to identify unrealistic thoughts and behaviors and to replace them with more appropriate thoughts and behaviors.  ? Acceptance and commitment therapy (ACT). This treatment teaches people how to be mindful as a way to cope with unwanted thoughts and feelings.  ? Biofeedback. This process trains you to manage your body's response (physiological response) through breathing techniques and relaxation methods. You will work with a therapist while machines are used to monitor your physical symptoms.  · Stress management techniques. These include yoga,  meditation, and exercise.  A mental health specialist can help determine which treatment is best for you. Some people see improvement with one type of therapy. However, other people require a combination of therapies.  Follow these instructions at home:  Lifestyle  · Maintain a consistent routine and schedule.  · Anticipate stressful situations. Create a plan, and allow extra time to work with your plan.  · Practice stress management or self-calming techniques that you have learned from your therapist or your health care provider.  General instructions  · Take over-the-counter and prescription medicines only as told by your health care provider.  · Understand that you are likely to have setbacks. Accept this and be kind to yourself as you persist to take better care of yourself.  · Recognize and accept your accomplishments, even if you  them as small.  · Keep all follow-up visits as told by your health care provider. This is important.  Contact a health care provider if:  · Your symptoms do not get better.  · Your symptoms get worse.  · You have signs of depression, such as:  ? A persistently sad or irritable mood.  ? Loss of enjoyment in activities that used to bring you deniz.  ? Change in weight or eating.  ? Changes in sleeping habits.  ? Avoiding friends or family members.  ? Loss of energy for normal tasks.  ? Feelings of guilt or worthlessness.  Get help right away if:  · You have serious thoughts about hurting yourself or others.  If you ever feel like you may hurt yourself or others, or have thoughts about taking your own life, get help right away. Go to your nearest emergency department or:  · Call your local emergency services (161 in the [place]  · Call a suicide crisis helpline, such as the National Suicide Prevention Lifeline at 1-105.258.9501. This is open 24 hours a day in the [place]  · Text the Crisis Text Line at 458196 (in the [place]  Summary  · Generalized anxiety disorder (STEWART) is a mental  health condition that involves worry that is not triggered by a specific event.  · People with STEWART often worry excessively about many things in their lives, such as their health and family.  · STEWART may cause symptoms such as restlessness, trouble concentrating, sleep problems, frequent sweating, nausea, diarrhea, headaches, and trembling or muscle twitching.  · A mental health specialist can help determine which treatment is best for you. Some people see improvement with one type of therapy. However, other people require a combination of therapies.  This information is not intended to replace advice given to you by your health care provider. Make sure you discuss any questions you have with your health care provider.  Document Revised: 10/07/2020 Document Reviewed: 10/07/2020  Elsevier Patient Education © 2021 [employer].

## 2021-04-28 ENCOUNTER — TELEPHONE (OUTPATIENT)
Dept: FAMILY MEDICINE CLINIC | Facility: CLINIC | Age: 56
End: 2021-04-28

## 2021-04-28 DIAGNOSIS — M79.7 FIBROMYALGIA: ICD-10-CM

## 2021-04-28 DIAGNOSIS — G89.4 CHRONIC PAIN SYNDROME: ICD-10-CM

## 2021-04-28 RX ORDER — OXYCODONE HYDROCHLORIDE 10 MG/1
10 TABLET ORAL EVERY 6 HOURS PRN
Qty: 120 TABLET | Refills: 0 | Status: SHIPPED | OUTPATIENT
Start: 2021-04-28 | End: 2021-05-25 | Stop reason: SDUPTHER

## 2021-04-28 RX ORDER — CARISOPRODOL 350 MG/1
350 TABLET ORAL 3 TIMES DAILY PRN
Qty: 90 TABLET | Refills: 0 | Status: SHIPPED | OUTPATIENT
Start: 2021-04-28 | End: 2021-05-25 | Stop reason: SDUPTHER

## 2021-04-28 NOTE — TELEPHONE ENCOUNTER
Caller: Briana Alas    Relationship: Self    Best call back number: [phone]      Medication needed:   Requested Prescriptions     Pending Prescriptions Disp Refills   • oxyCODONE (ROXICODONE) 10 MG tablet 120 tablet 0     Sig: Take 1 tablet by mouth Every 6 (Six) Hours As Needed for Severe Pain .   • carisoprodol (SOMA) 350 MG tablet 90 tablet 0       When do you need the refill by: ASAP    What additional details did the patient provide when requesting the medication: PATIENT HAS ABOUT 12 PILLS LEFT    Does the patient have less than a 3 day supply:  [] Yes  [x] No    What is the patient's preferred pharmacy: [facility] - [place], [place] - [address] AT [place] [address] - [phone] [facility] [phone] FX

## 2021-05-07 DIAGNOSIS — F32.A DEPRESSION, UNSPECIFIED DEPRESSION TYPE: ICD-10-CM

## 2021-05-07 RX ORDER — TEMAZEPAM 30 MG/1
CAPSULE ORAL
Qty: 30 CAPSULE | Refills: 2 | Status: SHIPPED | OUTPATIENT
Start: 2021-05-07 | End: 2021-05-10

## 2021-05-10 DIAGNOSIS — F33.1 MAJOR DEPRESSIVE DISORDER, RECURRENT EPISODE, MODERATE (HCC): Chronic | ICD-10-CM

## 2021-05-10 DIAGNOSIS — F32.A DEPRESSION, UNSPECIFIED DEPRESSION TYPE: ICD-10-CM

## 2021-05-10 RX ORDER — BREXPIPRAZOLE 1 MG/1
1 TABLET ORAL EVERY MORNING
Qty: 14 TABLET | Refills: 0 | Status: CANCELLED | COMMUNITY
Start: 2021-05-10

## 2021-05-10 RX ORDER — TEMAZEPAM 30 MG/1
CAPSULE ORAL
Qty: 30 CAPSULE | Refills: 0 | Status: SHIPPED | OUTPATIENT
Start: 2021-05-10 | End: 2021-09-15

## 2021-05-10 NOTE — TELEPHONE ENCOUNTER
PATIENT IS RUNNING OUT OF SAMPLES OF REXULTI AND NEEDS REFILL SENT TO PHARM. SHE SAID SHE IS NOW TAKING THE 2 MG NOT 1MG IN THE MORNING.     PHARM IS [facility] IN [place]

## 2021-05-11 RX ORDER — BREXPIPRAZOLE 2 MG/1
2 TABLET ORAL EVERY MORNING
Qty: 30 TABLET | Refills: 1 | Status: SHIPPED | OUTPATIENT
Start: 2021-05-11 | End: 2021-06-18 | Stop reason: SDUPTHER

## 2021-05-18 RX ORDER — BACLOFEN 20 MG/1
TABLET ORAL
Qty: 90 TABLET | Refills: 0 | Status: SHIPPED | OUTPATIENT
Start: 2021-05-18 | End: 2021-06-12

## 2021-05-18 RX ORDER — DULOXETIN HYDROCHLORIDE 60 MG/1
CAPSULE, DELAYED RELEASE ORAL
Qty: 60 CAPSULE | Refills: 4 | Status: SHIPPED | OUTPATIENT
Start: 2021-05-18 | End: 2021-06-18 | Stop reason: SDUPTHER

## 2021-05-18 RX ORDER — ROPINIROLE 0.5 MG/1
TABLET, FILM COATED ORAL
Qty: 60 TABLET | Refills: 5 | Status: SHIPPED | OUTPATIENT
Start: 2021-05-18 | End: 2021-11-25

## 2021-05-18 RX ORDER — BUSPIRONE HYDROCHLORIDE 15 MG/1
TABLET ORAL
Qty: 60 TABLET | Refills: 3 | Status: SHIPPED | OUTPATIENT
Start: 2021-05-18 | End: 2021-06-18 | Stop reason: SDUPTHER

## 2021-05-18 RX ORDER — ATORVASTATIN CALCIUM 20 MG/1
TABLET, FILM COATED ORAL
Qty: 30 TABLET | Refills: 3 | Status: SHIPPED | OUTPATIENT
Start: 2021-05-18 | End: 2021-12-25

## 2021-05-19 RX ORDER — SULFASALAZINE 500 MG/1
1000 TABLET ORAL 2 TIMES DAILY
Qty: 360 TABLET | Refills: 1 | Status: SHIPPED | OUTPATIENT
Start: 2021-05-19 | End: 2022-04-14

## 2021-05-25 ENCOUNTER — TELEPHONE (OUTPATIENT)
Dept: FAMILY MEDICINE CLINIC | Facility: CLINIC | Age: 56
End: 2021-05-25

## 2021-05-25 DIAGNOSIS — M79.7 FIBROMYALGIA: ICD-10-CM

## 2021-05-25 DIAGNOSIS — G89.4 CHRONIC PAIN SYNDROME: ICD-10-CM

## 2021-05-25 RX ORDER — OXYCODONE HYDROCHLORIDE 10 MG/1
10 TABLET ORAL EVERY 6 HOURS PRN
Qty: 120 TABLET | Refills: 0 | Status: SHIPPED | OUTPATIENT
Start: 2021-05-25 | End: 2021-06-22 | Stop reason: SDUPTHER

## 2021-05-25 RX ORDER — CARISOPRODOL 350 MG/1
350 TABLET ORAL 3 TIMES DAILY PRN
Qty: 90 TABLET | Refills: 0 | Status: SHIPPED | OUTPATIENT
Start: 2021-05-25 | End: 2021-06-22 | Stop reason: SDUPTHER

## 2021-05-25 NOTE — TELEPHONE ENCOUNTER
Caller: Briana Alas    Relationship: Self    Best call back number: [phone]    Medication needed:   Requested Prescriptions     Pending Prescriptions Disp Refills   • carisoprodol (SOMA) 350 MG tablet 90 tablet 0     Sig: Take 1 tablet by mouth 3 (Three) Times a Day As Needed for Muscle Spasms.   • oxyCODONE (ROXICODONE) 10 MG tablet 120 tablet 0     Sig: Take 1 tablet by mouth Every 6 (Six) Hours As Needed for Severe Pain .       When do you need the refill by: ASAP    What additional details did the patient provide when requesting the medication: PATIENT PICKED UP PRESCRIPTIONS THE OTHER DAY AND THE PHARMACY HAD REFILLED THE BACLOFEN, WHICH SHE CAN'T TAKE.  PLEASE REMOVE THIS FROM HER MEDICATION LIST.   ALSO HER PSYCHIATRIST CHANGED HER FROM ARIPiprazole (ABILIFY) 30 MG tablet TO THE Brexpiprazole (Rexulti) 2 MG tablet.  PLEASE UPDATE HER MEDICATIONS ON HER CHART.    Does the patient have less than a 3 day supply:  [x] Yes  [] No    What is the patient's preferred pharmacy: [facility] - [place], [place] - [address] AT [place] [address] - [phone] [facility] [phone] FX

## 2021-06-02 ENCOUNTER — OFFICE VISIT (OUTPATIENT)
Dept: FAMILY MEDICINE CLINIC | Facility: CLINIC | Age: 56
End: 2021-06-02

## 2021-06-02 ENCOUNTER — LAB (OUTPATIENT)
Dept: FAMILY MEDICINE CLINIC | Facility: CLINIC | Age: 56
End: 2021-06-02

## 2021-06-02 VITALS
TEMPERATURE: 96.9 F | SYSTOLIC BLOOD PRESSURE: 95 MMHG | WEIGHT: 188 LBS | HEART RATE: 62 BPM | OXYGEN SATURATION: 95 % | DIASTOLIC BLOOD PRESSURE: 62 MMHG | BODY MASS INDEX: 32.27 KG/M2

## 2021-06-02 DIAGNOSIS — M79.7 FIBROMYALGIA: ICD-10-CM

## 2021-06-02 DIAGNOSIS — F32.A DEPRESSION, UNSPECIFIED DEPRESSION TYPE: ICD-10-CM

## 2021-06-02 DIAGNOSIS — I10 HYPERTENSION, ESSENTIAL: Primary | ICD-10-CM

## 2021-06-02 LAB
ALBUMIN SERPL-MCNC: 4.3 G/DL (ref 3.5–5.2)
ALBUMIN/GLOB SERPL: 1.3 G/DL
ALP SERPL-CCNC: 89 U/L (ref 39–117)
ALT SERPL W P-5'-P-CCNC: 7 U/L (ref 1–33)
ANION GAP SERPL CALCULATED.3IONS-SCNC: 10 MMOL/L (ref 5–15)
AST SERPL-CCNC: 18 U/L (ref 1–32)
BILIRUB SERPL-MCNC: 0.2 MG/DL (ref 0–1.2)
BUN SERPL-MCNC: 15 MG/DL (ref 6–20)
BUN/CREAT SERPL: 16.1 (ref 7–25)
CALCIUM SPEC-SCNC: 10 MG/DL (ref 8.6–10.5)
CHLORIDE SERPL-SCNC: 103 MMOL/L (ref 98–107)
CHOLEST SERPL-MCNC: 181 MG/DL (ref 0–200)
CO2 SERPL-SCNC: 26 MMOL/L (ref 22–29)
CREAT SERPL-MCNC: 0.93 MG/DL (ref 0.57–1)
GFR SERPL CREATININE-BSD FRML MDRD: 62 ML/MIN/1.73
GLOBULIN UR ELPH-MCNC: 3.3 GM/DL
GLUCOSE SERPL-MCNC: 101 MG/DL (ref 65–99)
HDLC SERPL-MCNC: 42 MG/DL (ref 40–60)
LDLC SERPL CALC-MCNC: 115 MG/DL (ref 0–100)
LDLC/HDLC SERPL: 2.67 {RATIO}
POTASSIUM SERPL-SCNC: 4.6 MMOL/L (ref 3.5–5.2)
PROT SERPL-MCNC: 7.6 G/DL (ref 6–8.5)
SODIUM SERPL-SCNC: 139 MMOL/L (ref 136–145)
TRIGL SERPL-MCNC: 134 MG/DL (ref 0–150)
VLDLC SERPL-MCNC: 24 MG/DL (ref 5–40)

## 2021-06-02 PROCEDURE — 99213 OFFICE O/P EST LOW 20 MIN: CPT | Performed by: FAMILY MEDICINE

## 2021-06-02 PROCEDURE — 80061 LIPID PANEL: CPT | Performed by: FAMILY MEDICINE

## 2021-06-02 PROCEDURE — 80053 COMPREHEN METABOLIC PANEL: CPT | Performed by: FAMILY MEDICINE

## 2021-06-02 PROCEDURE — 36415 COLL VENOUS BLD VENIPUNCTURE: CPT | Performed by: FAMILY MEDICINE

## 2021-06-02 NOTE — PROGRESS NOTES
Subjective   Briana Alas is a 55 y.o. female.     Briana Alas is in for follow up on her high blood pressure. She is still depressed, dealing with grief from the loss of her daughter last year. She also has fibromyalgia. There is no history of chest pain or dyspnea. There is no history of issue with bowel or bladder dysfunction. There is no history of dizziness or lightheadedness. There is no history of issue with sleep or mood. There is no history of issue with present medication.            BP 95/62 (BP Location: Left arm, Patient Position: Sitting, Cuff Size: Large Adult)   Pulse 62   Temp 96.9 °F (36.1 °C) (Temporal)   Wt 85.3 kg (188 lb)   SpO2 95%   BMI 32.27 kg/m²       Chief Complaint   Patient presents with   • Depression     3 month f/u            Current Outpatient Medications:   •  Brexpiprazole (Rexulti) 2 MG tablet, Take 1 tablet by mouth Every Morning., Disp: 30 tablet, Rfl: 1  •  DULoxetine (CYMBALTA) 60 MG capsule, TAKE ONE (1) CAPSULE BY MOUTH TWICE DAILY, Disp: 60 capsule, Rfl: 4  •  ARIPiprazole (ABILIFY) 30 MG tablet, TAKE ONE (1) TABLET BY MOUTH EVERY DAY, Disp: 30 tablet, Rfl: 3  •  atenolol (TENORMIN) 50 MG tablet, TAKE ONE (1) TABLET BY MOUTH EVERY DAY, Disp: 30 tablet, Rfl: 3  •  atorvastatin (LIPITOR) 20 MG tablet, TAKE ONE (1) TABLET BY MOUTH AT BEDTIME, Disp: 30 tablet, Rfl: 3  •  baclofen (LIORESAL) 20 MG tablet, TAKE ONE (1) TABLET BY MOUTH THREE TIMES DAILY, Disp: 90 tablet, Rfl: 0  •  busPIRone (BUSPAR) 15 MG tablet, TAKE ONE (1) TABLET BY MOUTH TWICE DAILY, Disp: 60 tablet, Rfl: 3  •  carisoprodol (SOMA) 350 MG tablet, Take 1 tablet by mouth 3 (Three) Times a Day As Needed for Muscle Spasms., Disp: 90 tablet, Rfl: 0  •  clonazePAM (KlonoPIN) 1 MG tablet, Take 1 tablet by mouth 3 (Three) Times a Day As Needed for Anxiety., Disp: 90 tablet, Rfl: 1  •  folic acid (FOLVITE) 1 MG tablet, Take 1 tablet by mouth Daily., Disp: 90 tablet, Rfl: 0  •  oxyCODONE (ROXICODONE) 10 MG  tablet, Take 1 tablet by mouth Every 6 (Six) Hours As Needed for Severe Pain ., Disp: 120 tablet, Rfl: 0  •  pregabalin (LYRICA) 150 MG capsule, TAKE ONE (1) CAPSULE BY MOUTH TWICE DAILY, Disp: 60 capsule, Rfl: 3  •  rOPINIRole (REQUIP) 0.5 MG tablet, TAKE ONE (1) TABLET BY MOUTH TWICE DAILY (TAKE ONE (1) TABLET ONE HOUR BEFORE BEDTIME), Disp: 60 tablet, Rfl: 5  •  sulfaSALAzine (AZULFIDINE) 500 MG tablet, Take 2 tablets by mouth 2 (Two) Times a Day., Disp: 360 tablet, Rfl: 1  •  temazepam (RESTORIL) 30 MG capsule, TAKE ONE CAPSULE BY MOUTH AT NIGHT AS NEEDED FOR SLEEP, Disp: 30 capsule, Rfl: 0        The following portions of the patient's history were reviewed and updated as appropriate: allergies, current medications, past family history, past medical history, past social history, past surgical history, and problem list.    Review of Systems   Constitutional: Negative for activity change, fatigue and fever.   HENT: Negative for congestion, sinus pressure, sinus pain, sore throat and trouble swallowing.    Eyes: Negative for visual disturbance.   Respiratory: Negative for cough, chest tightness, shortness of breath and wheezing.    Cardiovascular: Negative for chest pain.   Gastrointestinal: Negative for abdominal distention, abdominal pain, constipation, diarrhea, nausea and vomiting.   Genitourinary: Negative for difficulty urinating, dysuria and urgency.   Musculoskeletal: Positive for arthralgias, back pain, myalgias and neck pain.   Neurological: Negative for dizziness, weakness, light-headedness and numbness.   Psychiatric/Behavioral: Positive for dysphoric mood. Negative for agitation, hallucinations, sleep disturbance and suicidal ideas. The patient is nervous/anxious.        Objective   Physical Exam  Vitals and nursing note reviewed.   Cardiovascular:      Rate and Rhythm: Normal rate and regular rhythm.      Heart sounds: Normal heart sounds. No murmur heard.     Pulmonary:      Effort: Pulmonary effort  is normal.      Breath sounds: No wheezing or rales.   Abdominal:      General: Bowel sounds are normal.      Palpations: Abdomen is soft.      Tenderness: There is no abdominal tenderness. There is no guarding.   Musculoskeletal:      Cervical back: Neck supple.      Right lower leg: No edema.      Left lower leg: No edema.   Lymphadenopathy:      Cervical: No cervical adenopathy.   Skin:     Findings: No rash.   Neurological:      General: No focal deficit present.      Mental Status: She is alert and oriented to person, place, and time.   Psychiatric:         Mood and Affect: Mood normal.           Assessment/Plan   Problems Addressed this Visit        Cardiac and Vasculature    Hypertension, essential - Primary       Mental Health    Depression       Musculoskeletal and Injuries    Fibromyalgia      Diagnoses       Codes Comments    Hypertension, essential    -  Primary ICD-10-CM: I10  ICD-9-CM: 401.9     Fibromyalgia     ICD-10-CM: M79.7  ICD-9-CM: 729.1     Depression, unspecified depression type     ICD-10-CM: F32.9  ICD-9-CM: 311           Everything appears stable at present  I will update some labs to see if adjustments are needed  I have asked her to continue her counseling and her psychiatric sessions  She has completed Covid series  I will see her back later in the year for follow-up

## 2021-06-11 DIAGNOSIS — F41.1 GENERALIZED ANXIETY DISORDER: Chronic | ICD-10-CM

## 2021-06-11 RX ORDER — CLONAZEPAM 1 MG/1
TABLET ORAL
Qty: 90 TABLET | Refills: 1 | OUTPATIENT
Start: 2021-06-11

## 2021-06-12 RX ORDER — BACLOFEN 20 MG/1
TABLET ORAL
Qty: 90 TABLET | Refills: 0 | Status: SHIPPED | OUTPATIENT
Start: 2021-06-12 | End: 2021-07-12

## 2021-06-17 ENCOUNTER — TELEPHONE (OUTPATIENT)
Dept: FAMILY MEDICINE CLINIC | Facility: CLINIC | Age: 56
End: 2021-06-17

## 2021-06-18 ENCOUNTER — OFFICE VISIT (OUTPATIENT)
Dept: PSYCHIATRY | Facility: CLINIC | Age: 56
End: 2021-06-18

## 2021-06-18 DIAGNOSIS — Z63.4 BEREAVEMENT: ICD-10-CM

## 2021-06-18 DIAGNOSIS — F33.1 MAJOR DEPRESSIVE DISORDER, RECURRENT EPISODE, MODERATE (HCC): Primary | Chronic | ICD-10-CM

## 2021-06-18 DIAGNOSIS — F41.1 GENERALIZED ANXIETY DISORDER: Chronic | ICD-10-CM

## 2021-06-18 DIAGNOSIS — F51.01 PRIMARY INSOMNIA: Chronic | ICD-10-CM

## 2021-06-18 PROCEDURE — 99214 OFFICE O/P EST MOD 30 MIN: CPT | Performed by: PSYCHIATRY & NEUROLOGY

## 2021-06-18 RX ORDER — BUSPIRONE HYDROCHLORIDE 15 MG/1
15 TABLET ORAL 2 TIMES DAILY
Qty: 60 TABLET | Refills: 3 | Status: SHIPPED | OUTPATIENT
Start: 2021-06-18 | End: 2021-11-04 | Stop reason: SDUPTHER

## 2021-06-18 RX ORDER — DULOXETIN HYDROCHLORIDE 60 MG/1
60 CAPSULE, DELAYED RELEASE ORAL 2 TIMES DAILY
Qty: 60 CAPSULE | Refills: 4 | Status: SHIPPED | OUTPATIENT
Start: 2021-06-18 | End: 2021-11-04 | Stop reason: SDUPTHER

## 2021-06-18 RX ORDER — BREXPIPRAZOLE 2 MG/1
2 TABLET ORAL EVERY MORNING
Qty: 30 TABLET | Refills: 1 | Status: SHIPPED | OUTPATIENT
Start: 2021-06-18 | End: 2021-09-23

## 2021-06-18 RX ORDER — CLONAZEPAM 1 MG/1
1 TABLET ORAL 3 TIMES DAILY PRN
Qty: 90 TABLET | Refills: 3 | Status: SHIPPED | OUTPATIENT
Start: 2021-06-18 | End: 2021-09-23

## 2021-06-18 SDOH — SOCIAL STABILITY - SOCIAL INSECURITY: DISSAPEARANCE AND DEATH OF FAMILY MEMBER: Z63.4

## 2021-06-18 NOTE — PATIENT INSTRUCTIONS
"[email or web address]">   Generalized Anxiety Disorder, Adult  Generalized anxiety disorder (STEWART) is a mental health condition. Unlike normal worries, anxiety related to STEWART is not triggered by a specific event. These worries do not fade or get better with time. STEWART interferes with relationships, work, and school.  STEWART symptoms can vary from mild to severe. People with severe STEWART can have intense waves of anxiety with physical symptoms that are similar to panic attacks.  What are the causes?  The exact cause of STEWART is not known, but the following are believed to have an impact:  · Differences in natural brain chemicals.  · Genes passed down from parents to children.  · Differences in the way threats are perceived.  · Development during childhood.  · Personality.  What increases the risk?  The following factors may make you more likely to develop this condition:  · Being female.  · Having a family history of anxiety disorders.  · Being very shy.  · Experiencing very stressful life events, such as the death of a loved one.  · Having a very stressful family environment.  What are the signs or symptoms?  People with STEWART often worry excessively about many things in their lives, such as their health and family. Symptoms may also include:  · Mental and emotional symptoms:  ? Worrying excessively about natural disasters.  ? Fear of being late.  ? Difficulty concentrating.  ? Fears that others are judging your performance.  · Physical symptoms:  ? Fatigue.  ? Headaches, muscle tension, muscle twitches, trembling, or feeling shaky.  ? Feeling like your heart is pounding or beating very fast.  ? Feeling out of breath or like you cannot take a deep breath.  ? Having trouble falling asleep or staying asleep, or experiencing restlessness.  ? Sweating.  ? Nausea, diarrhea, or irritable bowel syndrome (IBS).  · Behavioral symptoms:  ? Experiencing erratic moods or irritability.  ? Avoidance of new situations.  ? Avoidance of " people.  ? Extreme difficulty making decisions.  How is this diagnosed?  This condition is diagnosed based on your symptoms and medical history. You will also have a physical exam. Your health care provider may perform tests to rule out other possible causes of your symptoms.  To be diagnosed with STEWART, a person must have anxiety that:  · Is out of his or her control.  · Affects several different aspects of his or her life, such as work and relationships.  · Causes distress that makes him or her unable to take part in normal activities.  · Includes at least three symptoms of STEWART, such as restlessness, fatigue, trouble concentrating, irritability, muscle tension, or sleep problems.  Before your health care provider can confirm a diagnosis of STEWART, these symptoms must be present more days than they are not, and they must last for 6 months or longer.  How is this treated?  This condition may be treated with:  · Medicine. Antidepressant medicine is usually prescribed for long-term daily control. Anti-anxiety medicines may be added in severe cases, especially when panic attacks occur.  · Talk therapy (psychotherapy). Certain types of talk therapy can be helpful in treating STEWART by providing support, education, and guidance. Options include:  ? Cognitive behavioral therapy (CBT). People learn coping skills and self-calming techniques to ease their physical symptoms. They learn to identify unrealistic thoughts and behaviors and to replace them with more appropriate thoughts and behaviors.  ? Acceptance and commitment therapy (ACT). This treatment teaches people how to be mindful as a way to cope with unwanted thoughts and feelings.  ? Biofeedback. This process trains you to manage your body's response (physiological response) through breathing techniques and relaxation methods. You will work with a therapist while machines are used to monitor your physical symptoms.  · Stress management techniques. These include yoga,  meditation, and exercise.  A mental health specialist can help determine which treatment is best for you. Some people see improvement with one type of therapy. However, other people require a combination of therapies.  Follow these instructions at home:  Lifestyle  · Maintain a consistent routine and schedule.  · Anticipate stressful situations. Create a plan, and allow extra time to work with your plan.  · Practice stress management or self-calming techniques that you have learned from your therapist or your health care provider.  General instructions  · Take over-the-counter and prescription medicines only as told by your health care provider.  · Understand that you are likely to have setbacks. Accept this and be kind to yourself as you persist to take better care of yourself.  · Recognize and accept your accomplishments, even if you  them as small.  · Keep all follow-up visits as told by your health care provider. This is important.  Contact a health care provider if:  · Your symptoms do not get better.  · Your symptoms get worse.  · You have signs of depression, such as:  ? A persistently sad or irritable mood.  ? Loss of enjoyment in activities that used to bring you deniz.  ? Change in weight or eating.  ? Changes in sleeping habits.  ? Avoiding friends or family members.  ? Loss of energy for normal tasks.  ? Feelings of guilt or worthlessness.  Get help right away if:  · You have serious thoughts about hurting yourself or others.  If you ever feel like you may hurt yourself or others, or have thoughts about taking your own life, get help right away. Go to your nearest emergency department or:  · Call your local emergency services (411 in the [place]  · Call a suicide crisis helpline, such as the National Suicide Prevention Lifeline at 1-478.702.9361. This is open 24 hours a day in the [place]  · Text the Crisis Text Line at 296603 (in the [place]  Summary  · Generalized anxiety disorder (STEWART) is a mental  health condition that involves worry that is not triggered by a specific event.  · People with STEWART often worry excessively about many things in their lives, such as their health and family.  · STEWART may cause symptoms such as restlessness, trouble concentrating, sleep problems, frequent sweating, nausea, diarrhea, headaches, and trembling or muscle twitching.  · A mental health specialist can help determine which treatment is best for you. Some people see improvement with one type of therapy. However, other people require a combination of therapies.  This information is not intended to replace advice given to you by your health care provider. Make sure you discuss any questions you have with your health care provider.  Document Revised: 10/07/2020 Document Reviewed: 10/07/2020  Elsevier Patient Education © 2021 [employer] Inc.

## 2021-06-18 NOTE — PROGRESS NOTES
Subjective   Briana Alas is a 55 y.o. female who presents today for follow up    Chief Complaint:  Depression     History of Present Illness: the pt has a long hx of depression, no specific triggers or stressors, was on zoloft, celexa , few unsuccessful trials   She was relatively stable on meds until her daughter passed away 8 months ago , now she can not cope with that , her daughter was special needs, pt's life was revolving around her , now she feels she has no purpose     Today the pt reported feeling better, more motivated on rexulti,     Depression is rated as 6-7/10, started having more good days  , denied AVH/SI/HI     Sleep - improved on temazepam   Triggers - daughter's death  Alleviating factors - none     Anxiety increased, associated with somatic sxs, tension, constant worries about everything, unable to relax   Panic attacks  More often now around mother's/father's day    Mood was always on the low side, no ups         The following portions of the patient's history were reviewed and updated as appropriate: allergies, current medications, past family history, past medical history, past social history, past surgical history and problem list.    PAST PSYCHIATRIC HISTORY  Axis I  Affective/Bipolar Disorder, Anxiety/Panic Disorder  No inpt. No SI/SA   Axis II  Defer     PAST OUTPATIENT TREATMENT  Diagnosis treated:  Affective Disorder, Anxiety/Panic Disorder  Treatment Type:  Medication Management  Psychotherapy - [facility]   Prior Psychiatric Medications:  lexapro - not effective  celexa -not effective  zoloft - not effective now    Support Groups:  None   Sequelae Of Mental Disorder:  emotional distress          Interval History  Deteriorated    Side Effects  Denied       Past Medical History:  Past Medical History:   Diagnosis Date   • Anxiety    • Arthritis    • Back pain    • Depression    • Headache    • Hyperlipidemia    • Hypertension    • Injury of back    • Restless leg  syndrome        Social History:  Social History     Socioeconomic History   • Marital status: Legally      Spouse name: Not on file   • Number of children: Not on file   • Years of education: Not on file   • Highest education level: Not on file   Tobacco Use   • Smoking status: Current Every Day Smoker     Packs/day: 0.50     Types: Cigarettes   • Smokeless tobacco: Never Used   Substance and Sexual Activity   • Alcohol use: No   • Drug use: No   • Sexual activity: Defer       Family History:  Family History   Problem Relation Age of Onset   • Hypertension Mother    • Hyperlipidemia Mother    • Depression Mother    • Thyroid disease Mother    • Hypertension Father        Past Surgical History:  Past Surgical History:   Procedure Laterality Date   • ANKLE OPEN REDUCTION INTERNAL FIXATION Right    • FOOT SURGERY Bilateral     bunionectomy   • SHOULDER ARTHROSCOPY W/ ROTATOR CUFF REPAIR Right 12/13/2019    Procedure: RIGHT SHOULDER ARTHROSCOPY WITH ROTATOR CUFF REPAIR and Subacromial decompression;  Surgeon: Gino Ackerman MD;  Location: [facility] OR;  Service: Orthopedics   • TUBAL ABDOMINAL LIGATION         Problem List:  Patient Active Problem List   Diagnosis   • Fibromyalgia   • Abnormal gait   • Ankle pain, right   • Knee pain   • Leg pain, left   • Annular tear of lumbar disc   • Degeneration of lumbar intervertebral disc   • Generalized anxiety disorder   • Body mass index 32.0-32.9, adult   • Cervical myelopathy (CMS/HCC)   • Other dorsalgia   • Chronic low back pain   • Hx traumatic fracture   • Hyperlipidemia   • Hypertension, essential   • Hypovitaminosis D   • Inflammatory arthritis   • Joint pain   • Leg weakness, bilateral   • Lumbosacral radiculopathy   • Malaise and fatigue   • Neck pain, chronic   • Osteoarthritis of knee   • Pain in right shoulder   • Pain due to internal orthopedic prosthetic devices, implants and grafts, subsequent encounter   • Rotator cuff tendonitis   •  Scoliosis   • Osteoarthritis of lumbosacral spine without myelopathy   • Spondylosis of lumbosacral region   • Tobacco abuse counseling   • Upper respiratory tract infection   • Major depressive disorder, recurrent episode, moderate (CMS/HCC)   • Primary insomnia   • Bereavement       Allergy:   Allergies   Allergen Reactions   • Penicillins Rash        Discontinued Medications:  Medications Discontinued During This Encounter   Medication Reason   • clonazePAM (KlonoPIN) 1 MG tablet Reorder   • Brexpiprazole (Rexulti) 2 MG tablet Reorder   • DULoxetine (CYMBALTA) 60 MG capsule Reorder   • busPIRone (BUSPAR) 15 MG tablet Reorder       Current Medications:   Current Outpatient Medications   Medication Sig Dispense Refill   • atenolol (TENORMIN) 50 MG tablet TAKE ONE (1) TABLET BY MOUTH EVERY DAY 30 tablet 3   • atorvastatin (LIPITOR) 20 MG tablet TAKE ONE (1) TABLET BY MOUTH AT BEDTIME 30 tablet 3   • baclofen (LIORESAL) 20 MG tablet TAKE ONE (1) TABLET BY MOUTH THREE TIMES DAILY 90 tablet 0   • Brexpiprazole (Rexulti) 2 MG tablet Take 1 tablet by mouth Every Morning. 30 tablet 1   • busPIRone (BUSPAR) 15 MG tablet Take 1 tablet by mouth 2 (Two) Times a Day. 60 tablet 3   • carisoprodol (SOMA) 350 MG tablet Take 1 tablet by mouth 3 (Three) Times a Day As Needed for Muscle Spasms. 90 tablet 0   • clonazePAM (KlonoPIN) 1 MG tablet Take 1 tablet by mouth 3 (Three) Times a Day As Needed for Anxiety. 90 tablet 3   • DULoxetine (CYMBALTA) 60 MG capsule Take 1 capsule by mouth 2 (Two) Times a Day. 60 capsule 4   • folic acid (FOLVITE) 1 MG tablet Take 1 tablet by mouth Daily. 90 tablet 0   • oxyCODONE (ROXICODONE) 10 MG tablet Take 1 tablet by mouth Every 6 (Six) Hours As Needed for Severe Pain . 120 tablet 0   • pregabalin (LYRICA) 150 MG capsule TAKE ONE (1) CAPSULE BY MOUTH TWICE DAILY 60 capsule 3   • rOPINIRole (REQUIP) 0.5 MG tablet TAKE ONE (1) TABLET BY MOUTH TWICE DAILY (TAKE ONE (1) TABLET ONE HOUR BEFORE BEDTIME)  60 tablet 5   • sulfaSALAzine (AZULFIDINE) 500 MG tablet Take 2 tablets by mouth 2 (Two) Times a Day. 360 tablet 1   • temazepam (RESTORIL) 30 MG capsule TAKE ONE CAPSULE BY MOUTH AT NIGHT AS NEEDED FOR SLEEP 30 capsule 0     No current facility-administered medications for this visit.         Psychological ROS: positive for - anxiety, depression   negative for - hallucinations, hostility, irritability, memory difficulties, mood swings, obsessive thoughts or suicidal ideation      Physical Exam:   There were no vitals taken for this visit.    Mental Status Exam:   Hygiene:   good  Cooperation:  Cooperative  Eye Contact:  Good  Psychomotor Behavior:  Appropriate  Affect:  congruent with mood   Mood: depressed and fluctates  Hopelessness: Denies  Speech:  Normal  Thought Process:  Goal directed and Linear  Thought Content:  Mood congruent  Suicidal:  None  Homicidal:  None  Hallucinations:  None  Delusion:  None  Memory:  Intact  Orientation:  Person, Place, Time and Situation  Reliability:  good  Insight:  Good  Judgement:  Good  Impulse Control:  Good  Physical/Medical Issues:  Yes       MSE from 4/22/2021 reviewed and accepted with changes     PHQ-9 Depression Screening  Little interest or pleasure in doing things? 2   Feeling down, depressed, or hopeless? 1   Trouble falling or staying asleep, or sleeping too much? 1   Feeling tired or having little energy? 1   Poor appetite or overeating? 0   Feeling bad about yourself - or that you are a failure or have let yourself or your family down? 1   Trouble concentrating on things, such as reading the newspaper or watching television? 0   Moving or speaking so slowly that other people could have noticed? Or the opposite - being so fidgety or restless that you have been moving around a lot more than usual? 0   Thoughts that you would be better off dead, or of hurting yourself in some way? 0   PHQ-9 Total Score 6   If you checked off any problems, how difficult have these  problems made it for you to do your work, take care of things at home, or get along with other people? Very difficult           Current every day smoker 3-10 mintues spent counseling Not agreeable to stopping    I advised Briana of the risks of tobacco use.     Lab Results:   Office Visit on 06/02/2021   Component Date Value Ref Range Status   • Glucose 06/02/2021 101* 65 - 99 mg/dL Final   • BUN 06/02/2021 15  6 - 20 mg/dL Final   • Creatinine 06/02/2021 0.93  0.57 - 1.00 mg/dL Final   • Sodium 06/02/2021 139  136 - 145 mmol/L Final   • Potassium 06/02/2021 4.6  3.5 - 5.2 mmol/L Final   • Chloride 06/02/2021 103  98 - 107 mmol/L Final   • CO2 06/02/2021 26.0  22.0 - 29.0 mmol/L Final   • Calcium 06/02/2021 10.0  8.6 - 10.5 mg/dL Final   • Total Protein 06/02/2021 7.6  6.0 - 8.5 g/dL Final   • Albumin 06/02/2021 4.30  3.50 - 5.20 g/dL Final   • ALT (SGPT) 06/02/2021 7  1 - 33 U/L Final   • AST (SGOT) 06/02/2021 18  1 - 32 U/L Final   • Alkaline Phosphatase 06/02/2021 89  39 - 117 U/L Final   • Total Bilirubin 06/02/2021 0.2  0.0 - 1.2 mg/dL Final   • eGFR Non  Amer 06/02/2021 62  >60 mL/min/1.73 Final   • Globulin 06/02/2021 3.3  gm/dL Final   • A/G Ratio 06/02/2021 1.3  g/dL Final   • BUN/Creatinine Ratio 06/02/2021 16.1  7.0 - 25.0 Final   • Anion Gap 06/02/2021 10.0  5.0 - 15.0 mmol/L Final   • Total Cholesterol 06/02/2021 181  0 - 200 mg/dL Final   • Triglycerides 06/02/2021 134  0 - 150 mg/dL Final   • HDL Cholesterol 06/02/2021 42  40 - 60 mg/dL Final   • LDL Cholesterol  06/02/2021 115* 0 - 100 mg/dL Final   • VLDL Cholesterol 06/02/2021 24  5 - 40 mg/dL Final   • LDL/HDL Ratio 06/02/2021 2.67   Final       Assessment/Plan   Problems Addressed this Visit        Mental Health    Generalized anxiety disorder (Chronic)    Relevant Medications    Brexpiprazole (Rexulti) 2 MG tablet    clonazePAM (KlonoPIN) 1 MG tablet    DULoxetine (CYMBALTA) 60 MG capsule    busPIRone (BUSPAR) 15 MG tablet    Major  depressive disorder, recurrent episode, moderate (CMS/HCC) - Primary (Chronic)    Relevant Medications    Brexpiprazole (Rexulti) 2 MG tablet    DULoxetine (CYMBALTA) 60 MG capsule    busPIRone (BUSPAR) 15 MG tablet    Bereavement       Sleep    Primary insomnia (Chronic)      Diagnoses       Codes Comments    Major depressive disorder, recurrent episode, moderate (CMS/HCC)    -  Primary ICD-10-CM: F33.1  ICD-9-CM: 296.32     Generalized anxiety disorder     ICD-10-CM: F41.1  ICD-9-CM: 300.02     Bereavement     ICD-10-CM: Z63.4  ICD-9-CM: V62.82     Primary insomnia     ICD-10-CM: F51.01  ICD-9-CM: 307.42           Visit Diagnoses:    ICD-10-CM ICD-9-CM   1. Major depressive disorder, recurrent episode, moderate (CMS/HCC)  F33.1 296.32   2. Generalized anxiety disorder  F41.1 300.02   3. Bereavement  Z63.4 V62.82   4. Primary insomnia  F51.01 307.42       TREATMENT PLAN/GOALS: Continue supportive psychotherapy efforts and medications as indicated. Treatment and medication options discussed during today's visit. Patient ackowledged and verbally consented to continue with current treatment plan and was educated on the importance of compliance with treatment and follow-up appointments.    MEDICATION ISSUES:    INSPECT reviewed as expected - on pain meds, on clonazepam  5/18/2021 (last refill)   and temazepam from PCP     1. Major depressive d/o moderate recurrent - cont cymbalta 120 mg BP is stable, rexulti - 2 mg   Cont therapy  2. Generalized anxiety - cont cymbalta, cont clonazepam 1 mg TID, the pt is on opioids, will closely monitor   3 Insomnia - cont temazepam  4 bereavement - cont grief therapy     PHQ scored 13 and indicated moderate depression     Discussed medication options and treatment plan of prescribed medication as well as the risks, benefits, and side effects including potential falls, possible impaired driving and metabolic adversities among others. Patient is agreeable to call the office with any  worsening of symptoms or onset of side effects. Patient is agreeable to call 911 or go to the nearest ER should he/she begin having SI/HI. No medication side effects or related complaints today.     MEDS ORDERED DURING VISIT:  New Medications Ordered This Visit   Medications   • Brexpiprazole (Rexulti) 2 MG tablet     Sig: Take 1 tablet by mouth Every Morning.     Dispense:  30 tablet     Refill:  1   • clonazePAM (KlonoPIN) 1 MG tablet     Sig: Take 1 tablet by mouth 3 (Three) Times a Day As Needed for Anxiety.     Dispense:  90 tablet     Refill:  3   • DULoxetine (CYMBALTA) 60 MG capsule     Sig: Take 1 capsule by mouth 2 (Two) Times a Day.     Dispense:  60 capsule     Refill:  4   • busPIRone (BUSPAR) 15 MG tablet     Sig: Take 1 tablet by mouth 2 (Two) Times a Day.     Dispense:  60 tablet     Refill:  3       Return in about 4 months (around 10/18/2021).         This document has been electronically signed by Jade Ryder MD  June 18, 2021 09:54 EDT    EMR Dragon transcription disclaimer:  Some of this encounter note is an electronic transcription translation of spoken language to printed text. The electronic translation of spoken language may permit erroneous, or at times, nonsensical words or phrases to be inadvertently transcribed; Although I have reviewed the note for such errors some may still exist.

## 2021-06-22 DIAGNOSIS — G89.4 CHRONIC PAIN SYNDROME: ICD-10-CM

## 2021-06-22 DIAGNOSIS — M79.7 FIBROMYALGIA: ICD-10-CM

## 2021-06-22 RX ORDER — CARISOPRODOL 350 MG/1
350 TABLET ORAL 3 TIMES DAILY PRN
Qty: 90 TABLET | Refills: 0 | Status: SHIPPED | OUTPATIENT
Start: 2021-06-22 | End: 2021-07-19 | Stop reason: SDUPTHER

## 2021-06-22 RX ORDER — OXYCODONE HYDROCHLORIDE 10 MG/1
10 TABLET ORAL EVERY 6 HOURS PRN
Qty: 120 TABLET | Refills: 0 | Status: SHIPPED | OUTPATIENT
Start: 2021-06-22 | End: 2021-07-19 | Stop reason: SDUPTHER

## 2021-06-22 NOTE — TELEPHONE ENCOUNTER
Caller: Briana Alas    Relationship: Self    Best call back number: [phone]    Medication needed:   Requested Prescriptions     Pending Prescriptions Disp Refills   • oxyCODONE (ROXICODONE) 10 MG tablet 120 tablet 0     Sig: Take 1 tablet by mouth Every 6 (Six) Hours As Needed for Severe Pain .   • carisoprodol (SOMA) 350 MG tablet 90 tablet 0     Sig: Take 1 tablet by mouth 3 (Three) Times a Day As Needed for Muscle Spasms.       When do you need the refill by: 06/24/2021    What additional details did the patient provide when requesting the medication: PATIENT WILL BE OUT BY THURSDAY    Does the patient have less than a 3 day supply:  [] Yes  [x] No    What is the patient's preferred pharmacy: [facility] [place] - [address] AT [facility] [address] - [phone] [facility] [phone] FX

## 2021-07-09 DIAGNOSIS — G89.4 CHRONIC PAIN SYNDROME: ICD-10-CM

## 2021-07-09 RX ORDER — PREGABALIN 150 MG/1
CAPSULE ORAL
Qty: 60 CAPSULE | Refills: 3 | Status: SHIPPED | OUTPATIENT
Start: 2021-07-09 | End: 2021-11-20

## 2021-07-12 RX ORDER — ATENOLOL 50 MG/1
TABLET ORAL
Qty: 30 TABLET | Refills: 3 | Status: SHIPPED | OUTPATIENT
Start: 2021-07-12 | End: 2021-11-25

## 2021-07-12 RX ORDER — BACLOFEN 20 MG/1
TABLET ORAL
Qty: 90 TABLET | Refills: 0 | Status: SHIPPED | OUTPATIENT
Start: 2021-07-12 | End: 2021-08-11

## 2021-07-16 ENCOUNTER — HOSPITAL ENCOUNTER (OUTPATIENT)
Dept: GENERAL RADIOLOGY | Facility: HOSPITAL | Age: 56
Discharge: HOME | End: 2021-07-16

## 2021-07-16 ENCOUNTER — TRANSCRIBE ORDERS (OUTPATIENT)
Dept: ADMINISTRATIVE | Facility: HOSPITAL | Age: 56
End: 2021-07-16

## 2021-07-16 DIAGNOSIS — Z02.71 DISABILITY EXAMINATION: ICD-10-CM

## 2021-07-16 DIAGNOSIS — Z02.71 DISABILITY EXAMINATION: Primary | ICD-10-CM

## 2021-07-16 PROCEDURE — 72100 X-RAY EXAM L-S SPINE 2/3 VWS: CPT

## 2021-07-19 ENCOUNTER — TELEPHONE (OUTPATIENT)
Dept: FAMILY MEDICINE CLINIC | Facility: CLINIC | Age: 56
End: 2021-07-19

## 2021-07-19 DIAGNOSIS — M79.7 FIBROMYALGIA: ICD-10-CM

## 2021-07-19 DIAGNOSIS — G89.4 CHRONIC PAIN SYNDROME: ICD-10-CM

## 2021-07-19 RX ORDER — OXYCODONE HYDROCHLORIDE 10 MG/1
10 TABLET ORAL EVERY 6 HOURS PRN
Qty: 120 TABLET | Refills: 0 | Status: SHIPPED | OUTPATIENT
Start: 2021-07-19 | End: 2021-07-21 | Stop reason: SDUPTHER

## 2021-07-19 RX ORDER — CARISOPRODOL 350 MG/1
350 TABLET ORAL 3 TIMES DAILY PRN
Qty: 90 TABLET | Refills: 0 | Status: SHIPPED | OUTPATIENT
Start: 2021-07-19 | End: 2021-08-18 | Stop reason: SDUPTHER

## 2021-07-21 DIAGNOSIS — G89.4 CHRONIC PAIN SYNDROME: ICD-10-CM

## 2021-07-21 RX ORDER — OXYCODONE HYDROCHLORIDE 10 MG/1
10 TABLET ORAL EVERY 6 HOURS PRN
Qty: 120 TABLET | Refills: 0 | Status: SHIPPED | OUTPATIENT
Start: 2021-07-21 | End: 2021-07-22 | Stop reason: SDUPTHER

## 2021-07-21 NOTE — TELEPHONE ENCOUNTER
Caller: Briana Alas    Relationship: Self    Best call back number: [phone] (H)    Medication needed:   Requested Prescriptions     Pending Prescriptions Disp Refills   • oxyCODONE (ROXICODONE) 10 MG tablet 120 tablet 0     Sig: Take 1 tablet by mouth Every 6 (Six) Hours As Needed for Severe Pain .       When do you need the refill by: ASAP    What additional details did the patient provide when requesting the medication: [facility] DID NOT HAVE MEDICATION IN STOCK, PATIENT IS ASKING TO HAVE PRESCRIPTION SENT TO THIS PHARMACY, PLEASE SEND PRESCRIPTION ASAP AND ADVISE PATIENT WHEN SENT    Does the patient have less than a 3 day supply:  [x] Yes  [] No    What is the patient's preferred pharmacy: [facility] #[id number] - [place] - [address] - [phone]  - [phone] FX

## 2021-07-22 ENCOUNTER — TELEPHONE (OUTPATIENT)
Dept: FAMILY MEDICINE CLINIC | Facility: CLINIC | Age: 56
End: 2021-07-22

## 2021-07-22 DIAGNOSIS — G89.4 CHRONIC PAIN SYNDROME: ICD-10-CM

## 2021-07-22 RX ORDER — OXYCODONE HYDROCHLORIDE 10 MG/1
10 TABLET ORAL EVERY 6 HOURS PRN
Qty: 120 TABLET | Refills: 0 | Status: SHIPPED | OUTPATIENT
Start: 2021-07-22 | End: 2021-08-18 | Stop reason: SDUPTHER

## 2021-07-22 NOTE — TELEPHONE ENCOUNTER
[facility] STATES THAT THE PATIENT'S PRESCRIPTION FOR HER oxyCODONE (ROXICODONE) 10 MG tablet WAS ACCIDENTALLY DELETED FROM THE SYSTEM. [facility] IS NOW REQUESTING A NEW PRESCRIPTION REQUEST TO BE SENT TO THEM. PLEASE ADVISE.

## 2021-08-11 RX ORDER — BACLOFEN 20 MG/1
TABLET ORAL
Qty: 90 TABLET | Refills: 0 | Status: SHIPPED | OUTPATIENT
Start: 2021-08-11 | End: 2021-08-18

## 2021-08-17 NOTE — PROGRESS NOTES
Subjective   Briana Alas is a 55 y.o. female.       HPI   Pt. is here today with concern of painful bumps in her vaginal area.    Symptoms started  3-4 days ago.  She has seen some drainage.  She does shave in this area.  She denies any vaginal discharge or bleeding.    No fevers.      Requests refill on oxycodone and Soma; due refill tomorrow. Uses both for chronic lower back pain. Feels symptoms are stable.     The following portions of the patient's history were reviewed and updated as appropriate: allergies, current medications, past family history, past medical history, past social history, past surgical history and problem list.    Review of Systems   Constitutional: Negative for activity change, appetite change, chills, diaphoresis, fatigue, fever, unexpected weight gain and unexpected weight loss.   Respiratory: Negative for cough, chest tightness, shortness of breath and wheezing.    Cardiovascular: Negative for chest pain, palpitations and leg swelling.   Gastrointestinal: Negative for diarrhea, nausea and vomiting.   Genitourinary: Negative for dysuria, flank pain, frequency, pelvic pain, urgency, vaginal bleeding, vaginal discharge and vaginal pain.   Musculoskeletal: Positive for back pain.   Skin: Positive for skin lesions.   Neurological: Negative for dizziness, weakness and headache.   Psychiatric/Behavioral: Negative for depressed mood. The patient is not nervous/anxious.        Objective   Physical Exam  Vitals reviewed.   Constitutional:       General: She is not in acute distress.     Appearance: Normal appearance.   Cardiovascular:      Rate and Rhythm: Normal rate and regular rhythm.      Pulses: Normal pulses.      Heart sounds: Normal heart sounds. No murmur heard.     Pulmonary:      Effort: Pulmonary effort is normal. No respiratory distress.      Breath sounds: Normal breath sounds. No wheezing.   Chest:      Chest wall: No tenderness.   Musculoskeletal:      Right lower leg: No edema.       Left lower leg: No edema.   Skin:     General: Skin is warm and dry.      Comments: Suprapubic region: two erythematous lesions with mild induration; on with scant yellowish drainage.  Mild tenderness.     Neurological:      General: No focal deficit present.      Mental Status: She is alert and oriented to person, place, and time.   Psychiatric:         Mood and Affect: Mood normal.           Assessment/Plan   Diagnoses and all orders for this visit:    1. Folliculitis (Primary)  Comments:  Given Bactrim.   Warm compresses several times daily.   Reviewed warnign S/S and when to call or go to ER.       2. Chronic low back pain with sciatica, sciatica laterality unspecified, unspecified back pain laterality  Comments:  INSPECT reviewed.   Stable.   Cont. current medication.   Refills sent.   Orders:  -     oxyCODONE (ROXICODONE) 10 MG tablet; Take 1 tablet by mouth Every 6 (Six) Hours As Needed for Severe Pain .  Dispense: 120 tablet; Refill: 0  -     carisoprodol (SOMA) 350 MG tablet; Take 1 tablet by mouth 3 (Three) Times a Day As Needed for Muscle Spasms.  Dispense: 90 tablet; Refill: 0    Other orders  -     sulfamethoxazole-trimethoprim (Bactrim DS) 800-160 MG per tablet; Take 1 tablet by mouth 2 (Two) Times a Day for 10 days.  Dispense: 20 tablet; Refill: 0

## 2021-08-18 ENCOUNTER — OFFICE VISIT (OUTPATIENT)
Dept: FAMILY MEDICINE CLINIC | Facility: CLINIC | Age: 56
End: 2021-08-18

## 2021-08-18 VITALS
WEIGHT: 185 LBS | OXYGEN SATURATION: 92 % | HEIGHT: 64 IN | RESPIRATION RATE: 16 BRPM | BODY MASS INDEX: 31.58 KG/M2 | TEMPERATURE: 98.6 F | SYSTOLIC BLOOD PRESSURE: 114 MMHG | HEART RATE: 71 BPM | DIASTOLIC BLOOD PRESSURE: 61 MMHG

## 2021-08-18 DIAGNOSIS — G89.29 CHRONIC LOW BACK PAIN WITH SCIATICA, SCIATICA LATERALITY UNSPECIFIED, UNSPECIFIED BACK PAIN LATERALITY: ICD-10-CM

## 2021-08-18 DIAGNOSIS — M54.40 CHRONIC LOW BACK PAIN WITH SCIATICA, SCIATICA LATERALITY UNSPECIFIED, UNSPECIFIED BACK PAIN LATERALITY: ICD-10-CM

## 2021-08-18 DIAGNOSIS — L73.9 FOLLICULITIS: Primary | ICD-10-CM

## 2021-08-18 PROCEDURE — 99214 OFFICE O/P EST MOD 30 MIN: CPT | Performed by: NURSE PRACTITIONER

## 2021-08-18 RX ORDER — SULFAMETHOXAZOLE AND TRIMETHOPRIM 800; 160 MG/1; MG/1
1 TABLET ORAL 2 TIMES DAILY
Qty: 20 TABLET | Refills: 0 | Status: SHIPPED | OUTPATIENT
Start: 2021-08-18 | End: 2021-08-28

## 2021-08-18 RX ORDER — CARISOPRODOL 350 MG/1
350 TABLET ORAL 3 TIMES DAILY PRN
Qty: 90 TABLET | Refills: 0 | Status: SHIPPED | OUTPATIENT
Start: 2021-08-18 | End: 2021-09-15 | Stop reason: SDUPTHER

## 2021-08-18 RX ORDER — OXYCODONE HYDROCHLORIDE 10 MG/1
10 TABLET ORAL EVERY 6 HOURS PRN
Qty: 120 TABLET | Refills: 0 | Status: SHIPPED | OUTPATIENT
Start: 2021-08-18 | End: 2021-09-15 | Stop reason: SDUPTHER

## 2021-09-15 DIAGNOSIS — F32.A DEPRESSION, UNSPECIFIED DEPRESSION TYPE: ICD-10-CM

## 2021-09-15 DIAGNOSIS — G89.29 CHRONIC LOW BACK PAIN WITH SCIATICA, SCIATICA LATERALITY UNSPECIFIED, UNSPECIFIED BACK PAIN LATERALITY: ICD-10-CM

## 2021-09-15 DIAGNOSIS — M54.40 CHRONIC LOW BACK PAIN WITH SCIATICA, SCIATICA LATERALITY UNSPECIFIED, UNSPECIFIED BACK PAIN LATERALITY: ICD-10-CM

## 2021-09-15 RX ORDER — OXYCODONE HYDROCHLORIDE 10 MG/1
10 TABLET ORAL EVERY 6 HOURS PRN
Qty: 120 TABLET | Refills: 0 | Status: SHIPPED | OUTPATIENT
Start: 2021-09-15 | End: 2021-10-13 | Stop reason: SDUPTHER

## 2021-09-15 RX ORDER — CARISOPRODOL 350 MG/1
350 TABLET ORAL 3 TIMES DAILY PRN
Qty: 90 TABLET | Refills: 0 | Status: SHIPPED | OUTPATIENT
Start: 2021-09-15 | End: 2021-10-13 | Stop reason: SDUPTHER

## 2021-09-15 RX ORDER — TEMAZEPAM 30 MG/1
CAPSULE ORAL
Qty: 30 CAPSULE | Refills: 2 | Status: SHIPPED | OUTPATIENT
Start: 2021-09-15 | End: 2021-12-08

## 2021-09-15 NOTE — TELEPHONE ENCOUNTER
Caller: Briana Alas    Relationship: Self    Best call back number: [phone]     Medication needed:   Requested Prescriptions     Pending Prescriptions Disp Refills   • oxyCODONE (ROXICODONE) 10 MG tablet 120 tablet 0     Sig: Take 1 tablet by mouth Every 6 (Six) Hours As Needed for Severe Pain .   • carisoprodol (SOMA) 350 MG tablet 90 tablet 0     Sig: Take 1 tablet by mouth 3 (Three) Times a Day As Needed for Muscle Spasms.       When do you need the refill by: ASAP    What additional details did the patient provide when requesting the medication: THE PATIENT HAS ONE DAY LEFT     Does the patient have less than a 3 day supply:  [x] Yes  [] No    What is the patient's preferred pharmacy: [facility] [address] [place] [address], [place] - [address] AT [place] [address] - [phone] [facility] [phone] FX

## 2021-09-22 DIAGNOSIS — F41.1 GENERALIZED ANXIETY DISORDER: Chronic | ICD-10-CM

## 2021-09-22 DIAGNOSIS — F33.1 MAJOR DEPRESSIVE DISORDER, RECURRENT EPISODE, MODERATE (HCC): Chronic | ICD-10-CM

## 2021-09-23 RX ORDER — BREXPIPRAZOLE 2 MG/1
TABLET ORAL
Qty: 30 TABLET | Refills: 1 | Status: SHIPPED | OUTPATIENT
Start: 2021-09-23 | End: 2021-11-04 | Stop reason: SDUPTHER

## 2021-09-23 RX ORDER — CLONAZEPAM 1 MG/1
TABLET ORAL
Qty: 90 TABLET | Refills: 0 | Status: SHIPPED | OUTPATIENT
Start: 2021-09-23 | End: 2021-11-03

## 2021-10-04 ENCOUNTER — OFFICE VISIT (OUTPATIENT)
Dept: FAMILY MEDICINE CLINIC | Facility: CLINIC | Age: 56
End: 2021-10-04

## 2021-10-04 VITALS
BODY MASS INDEX: 30.76 KG/M2 | HEART RATE: 55 BPM | TEMPERATURE: 98 F | DIASTOLIC BLOOD PRESSURE: 68 MMHG | WEIGHT: 179.2 LBS | SYSTOLIC BLOOD PRESSURE: 112 MMHG | OXYGEN SATURATION: 97 %

## 2021-10-04 DIAGNOSIS — G89.29 CHRONIC LOW BACK PAIN WITH SCIATICA, SCIATICA LATERALITY UNSPECIFIED, UNSPECIFIED BACK PAIN LATERALITY: ICD-10-CM

## 2021-10-04 DIAGNOSIS — M79.7 FIBROMYALGIA: ICD-10-CM

## 2021-10-04 DIAGNOSIS — M54.40 CHRONIC LOW BACK PAIN WITH SCIATICA, SCIATICA LATERALITY UNSPECIFIED, UNSPECIFIED BACK PAIN LATERALITY: ICD-10-CM

## 2021-10-04 DIAGNOSIS — I10 HYPERTENSION, ESSENTIAL: Primary | ICD-10-CM

## 2021-10-04 DIAGNOSIS — L98.9 SKIN LESION OF SCALP: ICD-10-CM

## 2021-10-04 DIAGNOSIS — F32.A DEPRESSION, UNSPECIFIED DEPRESSION TYPE: ICD-10-CM

## 2021-10-04 PROCEDURE — 99213 OFFICE O/P EST LOW 20 MIN: CPT | Performed by: FAMILY MEDICINE

## 2021-10-04 NOTE — PROGRESS NOTES
Subjective   Briana Alas is a 56 y.o. female.     Briana Alas is in for follow up on her chronic issues with high blood pressure, depression and chronic back pain.  We are near the anniversary of her daughter's death, and that aggravates the grief a little bit.  However, current medication seems to be keeping her emotionally stable.  Her chronic pain has good days and bad days but is controlled reasonably well on the current pain medication.  She has an area on her scalp that she would like addressed as it continues to get bigger and more irritated.  There is no history of chest pain or dyspnea. There is no history of issue with bowel or bladder dysfunction. There is no history of dizziness or lightheadedness. There is no history of issue with sleep or mood. There is no history of issue with present medication.            /68 (BP Location: Left arm, Patient Position: Sitting, Cuff Size: Large Adult)   Pulse 55   Temp 98 °F (36.7 °C) (Temporal)   Wt 81.3 kg (179 lb 3.2 oz)   SpO2 97%   BMI 30.76 kg/m²       Chief Complaint   Patient presents with   • Hypertension     4 month f/u    • Mass     top of her head and its getting bigger           Current Outpatient Medications:   •  atenolol (TENORMIN) 50 MG tablet, TAKE ONE (1) TABLET BY MOUTH EVERY DAY, Disp: 30 tablet, Rfl: 3  •  atorvastatin (LIPITOR) 20 MG tablet, TAKE ONE (1) TABLET BY MOUTH AT BEDTIME, Disp: 30 tablet, Rfl: 3  •  busPIRone (BUSPAR) 15 MG tablet, Take 1 tablet by mouth 2 (Two) Times a Day., Disp: 60 tablet, Rfl: 3  •  carisoprodol (SOMA) 350 MG tablet, Take 1 tablet by mouth 3 (Three) Times a Day As Needed for Muscle Spasms., Disp: 90 tablet, Rfl: 0  •  clonazePAM (KlonoPIN) 1 MG tablet, TAKE ONE (1) TABLET BY MOUTH THREE TIMES DAILY AS NEEDED FOR ANXIETY, Disp: 90 tablet, Rfl: 0  •  DULoxetine (CYMBALTA) 60 MG capsule, Take 1 capsule by mouth 2 (Two) Times a Day., Disp: 60 capsule, Rfl: 4  •  oxyCODONE (ROXICODONE) 10 MG tablet,  Take 1 tablet by mouth Every 6 (Six) Hours As Needed for Severe Pain ., Disp: 120 tablet, Rfl: 0  •  pregabalin (LYRICA) 150 MG capsule, TAKE ONE (1) CAPSULE BY MOUTH TWICE DAILY, Disp: 60 capsule, Rfl: 3  •  Rexulti 2 MG tablet, TAKE ONE (1) TABLET BY MOUTH EVERY MORNING, Disp: 30 tablet, Rfl: 1  •  rOPINIRole (REQUIP) 0.5 MG tablet, TAKE ONE (1) TABLET BY MOUTH TWICE DAILY (TAKE ONE (1) TABLET ONE HOUR BEFORE BEDTIME), Disp: 60 tablet, Rfl: 5  •  temazepam (RESTORIL) 30 MG capsule, TAKE ONE CAPSULE BY MOUTH EVERY NIGHT AT BEDTIME FOR SLEEP, Disp: 30 capsule, Rfl: 2  •  folic acid (FOLVITE) 1 MG tablet, Take 1 tablet by mouth Daily., Disp: 90 tablet, Rfl: 0  •  sulfaSALAzine (AZULFIDINE) 500 MG tablet, Take 2 tablets by mouth 2 (Two) Times a Day., Disp: 360 tablet, Rfl: 1        The following portions of the patient's history were reviewed and updated as appropriate: allergies, current medications, past family history, past medical history, past social history, past surgical history, and problem list.    Review of Systems   Constitutional: Negative for activity change, fatigue and fever.   HENT: Negative for congestion, sinus pressure, sinus pain, sore throat and trouble swallowing.    Eyes: Negative for visual disturbance.   Respiratory: Negative for cough, chest tightness, shortness of breath and wheezing.    Cardiovascular: Negative for chest pain.   Gastrointestinal: Negative for abdominal distention, abdominal pain, constipation, diarrhea, nausea and vomiting.   Genitourinary: Negative for difficulty urinating, dysuria and urgency.   Musculoskeletal: Positive for arthralgias, back pain, myalgias and neck pain.   Neurological: Negative for dizziness, weakness, light-headedness and numbness.   Psychiatric/Behavioral: Positive for dysphoric mood. Negative for agitation, hallucinations, sleep disturbance and suicidal ideas. The patient is nervous/anxious.        Objective   Physical Exam  Vitals and nursing note  reviewed.   Cardiovascular:      Rate and Rhythm: Normal rate and regular rhythm.      Heart sounds: Normal heart sounds. No murmur heard.     Pulmonary:      Effort: Pulmonary effort is normal.      Breath sounds: No wheezing or rales.   Abdominal:      General: Bowel sounds are normal.      Palpations: Abdomen is soft.      Tenderness: There is no abdominal tenderness. There is no guarding.   Musculoskeletal:      Cervical back: Neck supple.      Right lower leg: No edema.      Left lower leg: No edema.      Comments: Able to get out of chair independently and ambulate around without difficulty   Lymphadenopathy:      Cervical: No cervical adenopathy.   Skin:     Findings: Lesion present. No rash.   Neurological:      General: No focal deficit present.      Mental Status: She is alert and oriented to person, place, and time.   Psychiatric:         Mood and Affect: Mood normal.           Assessment/Plan   Problems Addressed this Visit        Cardiac and Vasculature    Hypertension, essential - Primary       Musculoskeletal and Injuries    Fibromyalgia    Chronic low back pain      Other Visit Diagnoses     Depression, unspecified depression type        Skin lesion of scalp        Relevant Orders    Ambulatory Referral to Dermatology (Completed)      Diagnoses       Codes Comments    Hypertension, essential    -  Primary ICD-10-CM: I10  ICD-9-CM: 401.9     Fibromyalgia     ICD-10-CM: M79.7  ICD-9-CM: 729.1     Chronic low back pain with sciatica, sciatica laterality unspecified, unspecified back pain laterality     ICD-10-CM: M54.40, G89.29  ICD-9-CM: 724.2, 724.3, 338.29     Depression, unspecified depression type     ICD-10-CM: F32.A  ICD-9-CM: 311     Skin lesion of scalp     ICD-10-CM: L98.9  ICD-9-CM: 709.9           I will refer to dermatology for the scalp lesion  I will maintain current treatment plan overall  I asked her to let me know if the emotional issues began to get worse as we approach the  anniversary of her daughter's death  Otherwise, I will see her back in about 4 months and she will need labs at her next visit

## 2021-10-13 DIAGNOSIS — G89.29 CHRONIC LOW BACK PAIN WITH SCIATICA, SCIATICA LATERALITY UNSPECIFIED, UNSPECIFIED BACK PAIN LATERALITY: ICD-10-CM

## 2021-10-13 DIAGNOSIS — M54.40 CHRONIC LOW BACK PAIN WITH SCIATICA, SCIATICA LATERALITY UNSPECIFIED, UNSPECIFIED BACK PAIN LATERALITY: ICD-10-CM

## 2021-10-13 RX ORDER — CARISOPRODOL 350 MG/1
350 TABLET ORAL 3 TIMES DAILY PRN
Qty: 90 TABLET | Refills: 0 | Status: SHIPPED | OUTPATIENT
Start: 2021-10-13 | End: 2021-11-10 | Stop reason: SDUPTHER

## 2021-10-13 RX ORDER — OXYCODONE HYDROCHLORIDE 10 MG/1
10 TABLET ORAL EVERY 6 HOURS PRN
Qty: 120 TABLET | Refills: 0 | Status: SHIPPED | OUTPATIENT
Start: 2021-10-13 | End: 2021-11-10 | Stop reason: SDUPTHER

## 2021-10-13 NOTE — TELEPHONE ENCOUNTER
Incoming Refill Request      Medication requested (name and dose): carisoprodol (SOMA) 350 MG tablet    oxyCODONE (ROXICODONE) 10 MG tablet    Pharmacy where request should be sent: [facility] [address] [place] [address], [place] - [address] AT [place] [address] - [phone] [facility] [phone] FX     Additional details provided by patient: PATIENT HAS 1 DAY SUPPLY LEFT    Best call back number: [phone] (H)    Does the patient have less than a 3 day supply:  [x] Yes  [] No    Digna Romero Rep  10/13/21, 12:10 EDT

## 2021-10-25 RX ORDER — BACLOFEN 20 MG/1
TABLET ORAL
Qty: 90 TABLET | Refills: 0 | Status: SHIPPED | OUTPATIENT
Start: 2021-10-25 | End: 2022-04-14

## 2021-10-31 DIAGNOSIS — F41.1 GENERALIZED ANXIETY DISORDER: Chronic | ICD-10-CM

## 2021-11-03 RX ORDER — CLONAZEPAM 1 MG/1
TABLET ORAL
Qty: 90 TABLET | Refills: 1 | Status: SHIPPED | OUTPATIENT
Start: 2021-11-03 | End: 2021-11-04 | Stop reason: SDUPTHER

## 2021-11-04 ENCOUNTER — OFFICE VISIT (OUTPATIENT)
Dept: PSYCHIATRY | Facility: CLINIC | Age: 56
End: 2021-11-04

## 2021-11-04 DIAGNOSIS — F33.1 MAJOR DEPRESSIVE DISORDER, RECURRENT EPISODE, MODERATE (HCC): Primary | Chronic | ICD-10-CM

## 2021-11-04 DIAGNOSIS — Z63.4 BEREAVEMENT: ICD-10-CM

## 2021-11-04 DIAGNOSIS — F41.1 GENERALIZED ANXIETY DISORDER: Chronic | ICD-10-CM

## 2021-11-04 PROCEDURE — 99214 OFFICE O/P EST MOD 30 MIN: CPT | Performed by: PSYCHIATRY & NEUROLOGY

## 2021-11-04 RX ORDER — BREXPIPRAZOLE 2 MG/1
2 TABLET ORAL EVERY MORNING
Qty: 30 TABLET | Refills: 3 | Status: SHIPPED | OUTPATIENT
Start: 2021-11-04 | End: 2022-03-30

## 2021-11-04 RX ORDER — CLONAZEPAM 1 MG/1
1 TABLET ORAL 3 TIMES DAILY PRN
Qty: 90 TABLET | Refills: 3 | Status: SHIPPED | OUTPATIENT
Start: 2021-11-04 | End: 2022-03-04 | Stop reason: SDUPTHER

## 2021-11-04 RX ORDER — BUSPIRONE HYDROCHLORIDE 15 MG/1
15 TABLET ORAL 2 TIMES DAILY
Qty: 60 TABLET | Refills: 3 | Status: SHIPPED | OUTPATIENT
Start: 2021-11-04 | End: 2022-03-04 | Stop reason: SDUPTHER

## 2021-11-04 RX ORDER — DULOXETIN HYDROCHLORIDE 60 MG/1
60 CAPSULE, DELAYED RELEASE ORAL 2 TIMES DAILY
Qty: 60 CAPSULE | Refills: 4 | Status: SHIPPED | OUTPATIENT
Start: 2021-11-04 | End: 2022-03-04 | Stop reason: SDUPTHER

## 2021-11-04 SDOH — SOCIAL STABILITY - SOCIAL INSECURITY: DISSAPEARANCE AND DEATH OF FAMILY MEMBER: Z63.4

## 2021-11-04 NOTE — PROGRESS NOTES
Subjective   Briana Alas is a 56 y.o. female who presents today for follow up    Chief Complaint:  Depression, anxiety      History of Present Illness: the pt has a long hx of depression, no specific triggers or stressors, was on zoloft, celexa , few unsuccessful trials   She was relatively stable on meds until her daughter passed away 8 months ago , now she can not cope with that , her daughter was special needs, pt's life was revolving around her , now she feels she has no purpose     Today the pt reported feeling depressed and anxious, she had anniversary of her death on oct 25, also holidays are coming up, but the pt is trying to stay busy, thinking about volunteer work     Depression is rated as7/10, denied AVH/SI/HI     Sleep - improved on temazepam   Triggers - daughter's death  Alleviating factors - none     Anxiety increased, associated with somatic sxs, tension, constant worries about everything, unable to relax   Panic attacks  More often now around mother's/father's day    Mood was always on the low side, no ups         The following portions of the patient's history were reviewed and updated as appropriate: allergies, current medications, past family history, past medical history, past social history, past surgical history and problem list.    PAST PSYCHIATRIC HISTORY  Axis I  Affective/Bipolar Disorder, Anxiety/Panic Disorder  No inpt. No SI/SA   Axis II  Defer     PAST OUTPATIENT TREATMENT  Diagnosis treated:  Affective Disorder, Anxiety/Panic Disorder  Treatment Type:  Medication Management  Psychotherapy - shante Osei at [facility]   Prior Psychiatric Medications:  lexapro - not effective  celexa -not effective  zoloft - not effective now    Support Groups:  None   Sequelae Of Mental Disorder:  emotional distress          Interval History  Deteriorated    Side Effects  Denied       Past Medical History:  Past Medical History:   Diagnosis Date   • Anxiety    • Arthritis    • Back pain    •  Depression    • Headache    • Hyperlipidemia    • Hypertension    • Injury of back    • Restless leg syndrome        Social History:  Social History     Socioeconomic History   • Marital status: Legally    Tobacco Use   • Smoking status: Current Every Day Smoker     Packs/day: 0.50     Types: Cigarettes   • Smokeless tobacco: Never Used   Substance and Sexual Activity   • Alcohol use: No   • Drug use: No   • Sexual activity: Defer       Family History:  Family History   Problem Relation Age of Onset   • Hypertension Mother    • Hyperlipidemia Mother    • Depression Mother    • Thyroid disease Mother    • Hypertension Father        Past Surgical History:  Past Surgical History:   Procedure Laterality Date   • ANKLE OPEN REDUCTION INTERNAL FIXATION Right    • FOOT SURGERY Bilateral     bunionectomy   • SHOULDER ARTHROSCOPY W/ ROTATOR CUFF REPAIR Right 12/13/2019    Procedure: RIGHT SHOULDER ARTHROSCOPY WITH ROTATOR CUFF REPAIR and Subacromial decompression;  Surgeon: Gino Ackerman MD;  Location: [facility] OR;  Service: Orthopedics   • TUBAL ABDOMINAL LIGATION         Problem List:  Patient Active Problem List   Diagnosis   • Fibromyalgia   • Abnormal gait   • Ankle pain, right   • Knee pain   • Leg pain, left   • Annular tear of lumbar disc   • Degeneration of lumbar intervertebral disc   • Generalized anxiety disorder   • Body mass index 32.0-32.9, adult   • Cervical myelopathy (HCC)   • Other dorsalgia   • Chronic low back pain   • Hx traumatic fracture   • Hyperlipidemia   • Hypertension, essential   • Hypovitaminosis D   • Inflammatory arthritis   • Joint pain   • Leg weakness, bilateral   • Lumbosacral radiculopathy   • Malaise and fatigue   • Neck pain, chronic   • Osteoarthritis of knee   • Pain in right shoulder   • Pain due to internal orthopedic prosthetic devices, implants and grafts, subsequent encounter   • Rotator cuff tendonitis   • Scoliosis   • Osteoarthritis of lumbosacral spine  without myelopathy   • Spondylosis of lumbosacral region   • Tobacco abuse counseling   • Upper respiratory tract infection   • Major depressive disorder, recurrent episode, moderate (HCC)   • Primary insomnia   • Bereavement       Allergy:   Allergies   Allergen Reactions   • Penicillins Rash        Discontinued Medications:  Medications Discontinued During This Encounter   Medication Reason   • DULoxetine (CYMBALTA) 60 MG capsule Reorder   • busPIRone (BUSPAR) 15 MG tablet Reorder   • Rexulti 2 MG tablet Reorder   • clonazePAM (KlonoPIN) 1 MG tablet Reorder       Current Medications:   Current Outpatient Medications   Medication Sig Dispense Refill   • atenolol (TENORMIN) 50 MG tablet TAKE ONE (1) TABLET BY MOUTH EVERY DAY 30 tablet 3   • atorvastatin (LIPITOR) 20 MG tablet TAKE ONE (1) TABLET BY MOUTH AT BEDTIME 30 tablet 3   • baclofen (LIORESAL) 20 MG tablet TAKE ONE (1) TABLET BY MOUTH THREE TIMES DAILY 90 tablet 0   • Brexpiprazole (Rexulti) 2 MG tablet Take 1 tablet by mouth Every Morning. 30 tablet 3   • busPIRone (BUSPAR) 15 MG tablet Take 1 tablet by mouth 2 (Two) Times a Day. 60 tablet 3   • carisoprodol (SOMA) 350 MG tablet Take 1 tablet by mouth 3 (Three) Times a Day As Needed for Muscle Spasms. 90 tablet 0   • clonazePAM (KlonoPIN) 1 MG tablet Take 1 tablet by mouth 3 (Three) Times a Day As Needed for Anxiety. 90 tablet 3   • DULoxetine (CYMBALTA) 60 MG capsule Take 1 capsule by mouth 2 (Two) Times a Day. 60 capsule 4   • folic acid (FOLVITE) 1 MG tablet Take 1 tablet by mouth Daily. 90 tablet 0   • oxyCODONE (ROXICODONE) 10 MG tablet Take 1 tablet by mouth Every 6 (Six) Hours As Needed for Severe Pain . 120 tablet 0   • pregabalin (LYRICA) 150 MG capsule TAKE ONE (1) CAPSULE BY MOUTH TWICE DAILY 60 capsule 3   • rOPINIRole (REQUIP) 0.5 MG tablet TAKE ONE (1) TABLET BY MOUTH TWICE DAILY (TAKE ONE (1) TABLET ONE HOUR BEFORE BEDTIME) 60 tablet 5   • sulfaSALAzine (AZULFIDINE) 500 MG tablet Take 2  tablets by mouth 2 (Two) Times a Day. 360 tablet 1   • temazepam (RESTORIL) 30 MG capsule TAKE ONE CAPSULE BY MOUTH EVERY NIGHT AT BEDTIME FOR SLEEP 30 capsule 2     No current facility-administered medications for this visit.         Psychological ROS: positive for - anxiety, depression   negative for - hallucinations, hostility, irritability, memory difficulties, mood swings, obsessive thoughts or suicidal ideation      Physical Exam:   There were no vitals taken for this visit.    Mental Status Exam:   Hygiene:   good  Cooperation:  Cooperative  Eye Contact:  Good  Psychomotor Behavior:  Appropriate  Affect:  congruent with mood   Mood: depressed and fluctates  Hopelessness: Denies  Speech:  Normal  Thought Process:  Goal directed and Linear  Thought Content:  Mood congruent  Suicidal:  None  Homicidal:  None  Hallucinations:  None  Delusion:  None  Memory:  Intact  Orientation:  Person, Place, Time and Situation  Reliability:  good  Insight:  Good  Judgement:  Good  Impulse Control:  Good  Physical/Medical Issues:  Yes       MSE from 6/18/2021 reviewed and no changes necessary     PHQ-9 Depression Screening  Little interest or pleasure in doing things? 3   Feeling down, depressed, or hopeless? 3   Trouble falling or staying asleep, or sleeping too much? 1   Feeling tired or having little energy? 2   Poor appetite or overeating? 0   Feeling bad about yourself - or that you are a failure or have let yourself or your family down? 2   Trouble concentrating on things, such as reading the newspaper or watching television? 0   Moving or speaking so slowly that other people could have noticed? Or the opposite - being so fidgety or restless that you have been moving around a lot more than usual? 0   Thoughts that you would be better off dead, or of hurting yourself in some way? 0   PHQ-9 Total Score 11   If you checked off any problems, how difficult have these problems made it for you to do your work, take care of  things at home, or get along with other people? Very difficult           Current every day smoker 3-10 mintues spent counseling Not agreeable to stopping    I advised Briana of the risks of tobacco use.     Lab Results:   No visits with results within 3 Month(s) from this visit.   Latest known visit with results is:   Office Visit on 06/02/2021   Component Date Value Ref Range Status   • Glucose 06/02/2021 101* 65 - 99 mg/dL Final   • BUN 06/02/2021 15  6 - 20 mg/dL Final   • Creatinine 06/02/2021 0.93  0.57 - 1.00 mg/dL Final   • Sodium 06/02/2021 139  136 - 145 mmol/L Final   • Potassium 06/02/2021 4.6  3.5 - 5.2 mmol/L Final   • Chloride 06/02/2021 103  98 - 107 mmol/L Final   • CO2 06/02/2021 26.0  22.0 - 29.0 mmol/L Final   • Calcium 06/02/2021 10.0  8.6 - 10.5 mg/dL Final   • Total Protein 06/02/2021 7.6  6.0 - 8.5 g/dL Final   • Albumin 06/02/2021 4.30  3.50 - 5.20 g/dL Final   • ALT (SGPT) 06/02/2021 7  1 - 33 U/L Final   • AST (SGOT) 06/02/2021 18  1 - 32 U/L Final   • Alkaline Phosphatase 06/02/2021 89  39 - 117 U/L Final   • Total Bilirubin 06/02/2021 0.2  0.0 - 1.2 mg/dL Final   • eGFR Non  Amer 06/02/2021 62  >60 mL/min/1.73 Final   • Globulin 06/02/2021 3.3  gm/dL Final   • A/G Ratio 06/02/2021 1.3  g/dL Final   • BUN/Creatinine Ratio 06/02/2021 16.1  7.0 - 25.0 Final   • Anion Gap 06/02/2021 10.0  5.0 - 15.0 mmol/L Final   • Total Cholesterol 06/02/2021 181  0 - 200 mg/dL Final   • Triglycerides 06/02/2021 134  0 - 150 mg/dL Final   • HDL Cholesterol 06/02/2021 42  40 - 60 mg/dL Final   • LDL Cholesterol  06/02/2021 115* 0 - 100 mg/dL Final   • VLDL Cholesterol 06/02/2021 24  5 - 40 mg/dL Final   • LDL/HDL Ratio 06/02/2021 2.67   Final       Assessment/Plan   Problems Addressed this Visit        Mental Health    Generalized anxiety disorder (Chronic)    Relevant Medications    DULoxetine (CYMBALTA) 60 MG capsule    busPIRone (BUSPAR) 15 MG tablet    Brexpiprazole (Rexulti) 2 MG tablet     clonazePAM (KlonoPIN) 1 MG tablet    Major depressive disorder, recurrent episode, moderate (HCC) - Primary (Chronic)    Relevant Medications    DULoxetine (CYMBALTA) 60 MG capsule    busPIRone (BUSPAR) 15 MG tablet    Brexpiprazole (Rexulti) 2 MG tablet    Bereavement      Diagnoses       Codes Comments    Major depressive disorder, recurrent episode, moderate (HCC)    -  Primary ICD-10-CM: F33.1  ICD-9-CM: 296.32     Generalized anxiety disorder     ICD-10-CM: F41.1  ICD-9-CM: 300.02     Bereavement     ICD-10-CM: Z63.4  ICD-9-CM: V62.82           Visit Diagnoses:    ICD-10-CM ICD-9-CM   1. Major depressive disorder, recurrent episode, moderate (HCC)  F33.1 296.32   2. Generalized anxiety disorder  F41.1 300.02   3. Bereavement  Z63.4 V62.82       TREATMENT PLAN/GOALS: Continue supportive psychotherapy efforts and medications as indicated. Treatment and medication options discussed during today's visit. Patient ackowledged and verbally consented to continue with current treatment plan and was educated on the importance of compliance with treatment and follow-up appointments.    MEDICATION ISSUES:    INSPECT reviewed as expected - on pain meds, on clonazepam  10/8/2021 (last refill)   and temazepam from PCP     1. Major depressive d/o moderate recurrent - cont cymbalta 120 mg BP is stable, cont rexulti - 2 mg, no changes necessary    Cont therapy  2. Generalized anxiety - cont cymbalta, cont clonazepam 1 mg TID, the pt is on opioids, will closely monitor   3 Insomnia - cont temazepam  4 bereavement - cont grief therapy     PHQ scored 11 and indicated moderate depression     Discussed medication options and treatment plan of prescribed medication as well as the risks, benefits, and side effects including potential falls, possible impaired driving and metabolic adversities among others. Patient is agreeable to call the office with any worsening of symptoms or onset of side effects. Patient is agreeable to call 911 or  go to the nearest ER should he/she begin having SI/HI. No medication side effects or related complaints today.     MEDS ORDERED DURING VISIT:  New Medications Ordered This Visit   Medications   • DULoxetine (CYMBALTA) 60 MG capsule     Sig: Take 1 capsule by mouth 2 (Two) Times a Day.     Dispense:  60 capsule     Refill:  4   • busPIRone (BUSPAR) 15 MG tablet     Sig: Take 1 tablet by mouth 2 (Two) Times a Day.     Dispense:  60 tablet     Refill:  3   • Brexpiprazole (Rexulti) 2 MG tablet     Sig: Take 1 tablet by mouth Every Morning.     Dispense:  30 tablet     Refill:  3   • clonazePAM (KlonoPIN) 1 MG tablet     Sig: Take 1 tablet by mouth 3 (Three) Times a Day As Needed for Anxiety.     Dispense:  90 tablet     Refill:  3       Return in about 4 months (around 3/4/2022).         This document has been electronically signed by Jade Ryder MD  November 4, 2021 09:07 EDT    EMR Dragon transcription disclaimer:  Some of this encounter note is an electronic transcription translation of spoken language to printed text. The electronic translation of spoken language may permit erroneous, or at times, nonsensical words or phrases to be inadvertently transcribed; Although I have reviewed the note for such errors some may still exist.

## 2021-11-04 NOTE — PATIENT INSTRUCTIONS
"[email or web address]   Generalized Anxiety Disorder, Adult  Generalized anxiety disorder (STEWART) is a mental health condition. Unlike normal worries, anxiety related to STEWART is not triggered by a specific event. These worries do not fade or get better with time. STEWART interferes with relationships, work, and school.  STEWART symptoms can vary from mild to severe. People with severe STEWART can have intense waves of anxiety with physical symptoms that are similar to panic attacks.  What are the causes?  The exact cause of STEWART is not known, but the following are believed to have an impact:  · Differences in natural brain chemicals.  · Genes passed down from parents to children.  · Differences in the way threats are perceived.  · Development during childhood.  · Personality.  What increases the risk?  The following factors may make you more likely to develop this condition:  · Being female.  · Having a family history of anxiety disorders.  · Being very shy.  · Experiencing very stressful life events, such as the death of a loved one.  · Having a very stressful family environment.  What are the signs or symptoms?  People with STEWART often worry excessively about many things in their lives, such as their health and family. Symptoms may also include:  · Mental and emotional symptoms:  ? Worrying excessively about natural disasters.  ? Fear of being late.  ? Difficulty concentrating.  ? Fears that others are judging your performance.  · Physical symptoms:  ? Fatigue.  ? Headaches, muscle tension, muscle twitches, trembling, or feeling shaky.  ? Feeling like your heart is pounding or beating very fast.  ? Feeling out of breath or like you cannot take a deep breath.  ? Having trouble falling asleep or staying asleep, or experiencing restlessness.  ? Sweating.  ? Nausea, diarrhea, or irritable bowel syndrome (IBS).  · Behavioral symptoms:  ? Experiencing erratic moods or irritability.  ? Avoidance of new situations.  ? Avoidance of " people.  ? Extreme difficulty making decisions.  How is this diagnosed?  This condition is diagnosed based on your symptoms and medical history. You will also have a physical exam. Your health care provider may perform tests to rule out other possible causes of your symptoms.  To be diagnosed with STEWART, a person must have anxiety that:  · Is out of his or her control.  · Affects several different aspects of his or her life, such as work and relationships.  · Causes distress that makes him or her unable to take part in normal activities.  · Includes at least three symptoms of STEWART, such as restlessness, fatigue, trouble concentrating, irritability, muscle tension, or sleep problems.  Before your health care provider can confirm a diagnosis of STEWART, these symptoms must be present more days than they are not, and they must last for 6 months or longer.  How is this treated?  This condition may be treated with:  · Medicine. Antidepressant medicine is usually prescribed for long-term daily control. Anti-anxiety medicines may be added in severe cases, especially when panic attacks occur.  · Talk therapy (psychotherapy). Certain types of talk therapy can be helpful in treating STEWART by providing support, education, and guidance. Options include:  ? Cognitive behavioral therapy (CBT). People learn coping skills and self-calming techniques to ease their physical symptoms. They learn to identify unrealistic thoughts and behaviors and to replace them with more appropriate thoughts and behaviors.  ? Acceptance and commitment therapy (ACT). This treatment teaches people how to be mindful as a way to cope with unwanted thoughts and feelings.  ? Biofeedback. This process trains you to manage your body's response (physiological response) through breathing techniques and relaxation methods. You will work with a therapist while machines are used to monitor your physical symptoms.  · Stress management techniques. These include yoga,  meditation, and exercise.  A mental health specialist can help determine which treatment is best for you. Some people see improvement with one type of therapy. However, other people require a combination of therapies.  Follow these instructions at home:  Lifestyle  · Maintain a consistent routine and schedule.  · Anticipate stressful situations. Create a plan, and allow extra time to work with your plan.  · Practice stress management or self-calming techniques that you have learned from your therapist or your health care provider.  General instructions  · Take over-the-counter and prescription medicines only as told by your health care provider.  · Understand that you are likely to have setbacks. Accept this and be kind to yourself as you persist to take better care of yourself.  · Recognize and accept your accomplishments, even if you  them as small.  · Keep all follow-up visits as told by your health care provider. This is important.  Contact a health care provider if:  · Your symptoms do not get better.  · Your symptoms get worse.  · You have signs of depression, such as:  ? A persistently sad or irritable mood.  ? Loss of enjoyment in activities that used to bring you deniz.  ? Change in weight or eating.  ? Changes in sleeping habits.  ? Avoiding friends or family members.  ? Loss of energy for normal tasks.  ? Feelings of guilt or worthlessness.  Get help right away if:  · You have serious thoughts about hurting yourself or others.  If you ever feel like you may hurt yourself or others, or have thoughts about taking your own life, get help right away. Go to your nearest emergency department or:  · Call your local emergency services (561 in the [place]  · Call a suicide crisis helpline, such as the National Suicide Prevention Lifeline at 1-960.615.1881. This is open 24 hours a day in the [place]  · Text the Crisis Text Line at 644001 (in the [place]  Summary  · Generalized anxiety disorder (STEWART) is a mental  health condition that involves worry that is not triggered by a specific event.  · People with STEWART often worry excessively about many things in their lives, such as their health and family.  · STEWART may cause symptoms such as restlessness, trouble concentrating, sleep problems, frequent sweating, nausea, diarrhea, headaches, and trembling or muscle twitching.  · A mental health specialist can help determine which treatment is best for you. Some people see improvement with one type of therapy. However, other people require a combination of therapies.  This information is not intended to replace advice given to you by your health care provider. Make sure you discuss any questions you have with your health care provider.  Document Revised: 10/07/2020 Document Reviewed: 10/07/2020  Elsevier Patient Education © 2021 [employer] Inc[employer]

## 2021-11-10 DIAGNOSIS — G89.29 CHRONIC LOW BACK PAIN WITH SCIATICA, SCIATICA LATERALITY UNSPECIFIED, UNSPECIFIED BACK PAIN LATERALITY: ICD-10-CM

## 2021-11-10 DIAGNOSIS — M54.40 CHRONIC LOW BACK PAIN WITH SCIATICA, SCIATICA LATERALITY UNSPECIFIED, UNSPECIFIED BACK PAIN LATERALITY: ICD-10-CM

## 2021-11-10 RX ORDER — CARISOPRODOL 350 MG/1
350 TABLET ORAL 3 TIMES DAILY PRN
Qty: 90 TABLET | Refills: 0 | Status: SHIPPED | OUTPATIENT
Start: 2021-11-10 | End: 2021-12-08 | Stop reason: SDUPTHER

## 2021-11-10 RX ORDER — OXYCODONE HYDROCHLORIDE 10 MG/1
10 TABLET ORAL EVERY 6 HOURS PRN
Qty: 120 TABLET | Refills: 0 | Status: SHIPPED | OUTPATIENT
Start: 2021-11-10 | End: 2021-12-08 | Stop reason: SDUPTHER

## 2021-11-10 NOTE — TELEPHONE ENCOUNTER
Caller: Briana Alas    Relationship: Self    Best call back number: [phone]    Requested Prescriptions:   Requested Prescriptions     Pending Prescriptions Disp Refills   • oxyCODONE (ROXICODONE) 10 MG tablet 120 tablet 0     Sig: Take 1 tablet by mouth Every 6 (Six) Hours As Needed for Severe Pain .   • carisoprodol (SOMA) 350 MG tablet 90 tablet 0     Sig: Take 1 tablet by mouth 3 (Three) Times a Day As Needed for Muscle Spasms.        Pharmacy where request should be sent:  [facility] - [place], [place] - [address] AT [facility] [address] - [phone]  - [phone] FX  [phone]    Additional details provided by patient: PATIENT HAS 2 DAYS LEFT    Does the patient have less than a 3 day supply:  [x] Yes  [] No    Digna Carmona Rep   11/10/21 10:34 EST

## 2021-11-20 DIAGNOSIS — G89.4 CHRONIC PAIN SYNDROME: ICD-10-CM

## 2021-11-20 RX ORDER — PREGABALIN 150 MG/1
CAPSULE ORAL
Qty: 60 CAPSULE | Refills: 3 | Status: SHIPPED | OUTPATIENT
Start: 2021-11-20 | End: 2022-03-23

## 2021-11-25 RX ORDER — ROPINIROLE 0.5 MG/1
TABLET, FILM COATED ORAL
Qty: 60 TABLET | Refills: 5 | Status: SHIPPED | OUTPATIENT
Start: 2021-11-25 | End: 2022-06-15

## 2021-11-25 RX ORDER — ATENOLOL 50 MG/1
TABLET ORAL
Qty: 30 TABLET | Refills: 5 | Status: SHIPPED | OUTPATIENT
Start: 2021-11-25 | End: 2022-06-15

## 2021-12-08 DIAGNOSIS — F32.A DEPRESSION, UNSPECIFIED DEPRESSION TYPE: ICD-10-CM

## 2021-12-08 DIAGNOSIS — G89.29 CHRONIC LOW BACK PAIN WITH SCIATICA, SCIATICA LATERALITY UNSPECIFIED, UNSPECIFIED BACK PAIN LATERALITY: ICD-10-CM

## 2021-12-08 DIAGNOSIS — M54.40 CHRONIC LOW BACK PAIN WITH SCIATICA, SCIATICA LATERALITY UNSPECIFIED, UNSPECIFIED BACK PAIN LATERALITY: ICD-10-CM

## 2021-12-08 RX ORDER — TEMAZEPAM 30 MG/1
CAPSULE ORAL
Qty: 30 CAPSULE | Refills: 3 | Status: SHIPPED | OUTPATIENT
Start: 2021-12-08 | End: 2022-04-14

## 2021-12-08 RX ORDER — CARISOPRODOL 350 MG/1
350 TABLET ORAL 3 TIMES DAILY PRN
Qty: 90 TABLET | Refills: 0 | Status: SHIPPED | OUTPATIENT
Start: 2021-12-08 | End: 2022-01-05 | Stop reason: SDUPTHER

## 2021-12-08 RX ORDER — OXYCODONE HYDROCHLORIDE 10 MG/1
10 TABLET ORAL EVERY 6 HOURS PRN
Qty: 120 TABLET | Refills: 0 | Status: SHIPPED | OUTPATIENT
Start: 2021-12-08 | End: 2022-01-05 | Stop reason: SDUPTHER

## 2021-12-08 NOTE — TELEPHONE ENCOUNTER
Caller: Briana Alas    Relationship: Self    Best call back number: [phone]    Requested Prescriptions:   Requested Prescriptions     Pending Prescriptions Disp Refills   • oxyCODONE (ROXICODONE) 10 MG tablet 120 tablet 0     Sig: Take 1 tablet by mouth Every 6 (Six) Hours As Needed for Severe Pain .   • carisoprodol (SOMA) 350 MG tablet 90 tablet 0     Sig: Take 1 tablet by mouth 3 (Three) Times a Day As Needed for Muscle Spasms.        Pharmacy where request should be sent: [facility] - [place], [place] - [address] AT [place] [address] - [phone] [facility] [phone] FX     Additional details provided by patient: PATIENT HAS 1 DAY LEFT OF MEDICATION.     Does the patient have less than a 3 day supply:  [x] Yes  [] No    Digna Bowen Rep   12/08/21 15:27 EST

## 2021-12-20 ENCOUNTER — TELEPHONE (OUTPATIENT)
Dept: FAMILY MEDICINE CLINIC | Facility: CLINIC | Age: 56
End: 2021-12-20

## 2021-12-20 NOTE — TELEPHONE ENCOUNTER
Prior Auth for Pregabalin was APPROVED.    Effective from 12/03/2021 to 12/02/2022.    Faxed to pharmacy on 12/16/2021

## 2021-12-25 RX ORDER — ATORVASTATIN CALCIUM 20 MG/1
TABLET, FILM COATED ORAL
Qty: 30 TABLET | Refills: 3 | Status: SHIPPED | OUTPATIENT
Start: 2021-12-25 | End: 2022-05-23

## 2022-01-05 ENCOUNTER — TELEPHONE (OUTPATIENT)
Dept: FAMILY MEDICINE CLINIC | Facility: CLINIC | Age: 57
End: 2022-01-05

## 2022-01-05 DIAGNOSIS — M54.40 CHRONIC LOW BACK PAIN WITH SCIATICA, SCIATICA LATERALITY UNSPECIFIED, UNSPECIFIED BACK PAIN LATERALITY: ICD-10-CM

## 2022-01-05 DIAGNOSIS — G89.29 CHRONIC LOW BACK PAIN WITH SCIATICA, SCIATICA LATERALITY UNSPECIFIED, UNSPECIFIED BACK PAIN LATERALITY: ICD-10-CM

## 2022-01-05 RX ORDER — CARISOPRODOL 350 MG/1
350 TABLET ORAL 3 TIMES DAILY PRN
Qty: 90 TABLET | Refills: 0 | Status: SHIPPED | OUTPATIENT
Start: 2022-01-05 | End: 2022-02-02 | Stop reason: SDUPTHER

## 2022-01-05 RX ORDER — OXYCODONE HYDROCHLORIDE 10 MG/1
10 TABLET ORAL EVERY 6 HOURS PRN
Qty: 120 TABLET | Refills: 0 | Status: SHIPPED | OUTPATIENT
Start: 2022-01-05 | End: 2022-02-28 | Stop reason: SDUPTHER

## 2022-01-05 NOTE — TELEPHONE ENCOUNTER
Caller: Briana Alas    Relationship: Self    Best call back number:[phone]    Requested Prescriptions:   Requested Prescriptions     Pending Prescriptions Disp Refills   • oxyCODONE (ROXICODONE) 10 MG tablet 120 tablet 0     Sig: Take 1 tablet by mouth Every 6 (Six) Hours As Needed for Severe Pain .   • carisoprodol (SOMA) 350 MG tablet 90 tablet 0     Sig: Take 1 tablet by mouth 3 (Three) Times a Day As Needed for Muscle Spasms.        Pharmacy where request should be sent: [facility] - [place], [place] - [address] AT [place] [address] - [phone] [facility] [phone] FX     Additional details provided by patient: PATIENT HAS ONE LEFT     Does the patient have less than a 3 day supply:  [x] Yes  [] No    Digna Mckeon Rep   01/05/22 13:24 EST

## 2022-01-31 ENCOUNTER — TELEPHONE (OUTPATIENT)
Dept: FAMILY MEDICINE CLINIC | Facility: CLINIC | Age: 57
End: 2022-01-31

## 2022-02-02 ENCOUNTER — TELEPHONE (OUTPATIENT)
Dept: FAMILY MEDICINE CLINIC | Facility: CLINIC | Age: 57
End: 2022-02-02

## 2022-02-02 DIAGNOSIS — M54.40 CHRONIC LOW BACK PAIN WITH SCIATICA, SCIATICA LATERALITY UNSPECIFIED, UNSPECIFIED BACK PAIN LATERALITY: ICD-10-CM

## 2022-02-02 DIAGNOSIS — G89.29 CHRONIC LOW BACK PAIN WITH SCIATICA, SCIATICA LATERALITY UNSPECIFIED, UNSPECIFIED BACK PAIN LATERALITY: ICD-10-CM

## 2022-02-02 RX ORDER — CARISOPRODOL 350 MG/1
350 TABLET ORAL 3 TIMES DAILY PRN
Qty: 90 TABLET | Refills: 0 | Status: SHIPPED | OUTPATIENT
Start: 2022-02-02 | End: 2022-02-28 | Stop reason: SDUPTHER

## 2022-02-02 NOTE — TELEPHONE ENCOUNTER
THE PATIENT'S PHARMACY STATES THAT THEY DO NOT HAVE A REQUEST TO REFILL HER carisoprodol (SOMA) 350 MG tablet. SHE WILL NEED THIS SENT TO THE [facility] [place] [address] - [place], [place] - [address] AT [facility] [address] - [phone]  - [phone] FX. THE PATIENT WILL BE OUT OF THIS MEDICATION BY TOMORROW. PLEASE ADVISE.

## 2022-02-28 ENCOUNTER — TELEPHONE (OUTPATIENT)
Dept: FAMILY MEDICINE CLINIC | Facility: CLINIC | Age: 57
End: 2022-02-28

## 2022-02-28 DIAGNOSIS — G89.29 CHRONIC LOW BACK PAIN WITH SCIATICA, SCIATICA LATERALITY UNSPECIFIED, UNSPECIFIED BACK PAIN LATERALITY: ICD-10-CM

## 2022-02-28 DIAGNOSIS — M54.40 CHRONIC LOW BACK PAIN WITH SCIATICA, SCIATICA LATERALITY UNSPECIFIED, UNSPECIFIED BACK PAIN LATERALITY: ICD-10-CM

## 2022-02-28 RX ORDER — CARISOPRODOL 350 MG/1
350 TABLET ORAL 3 TIMES DAILY PRN
Qty: 90 TABLET | Refills: 0 | Status: SHIPPED | OUTPATIENT
Start: 2022-02-28 | End: 2022-03-29 | Stop reason: SDUPTHER

## 2022-02-28 RX ORDER — OXYCODONE HYDROCHLORIDE 10 MG/1
10 TABLET ORAL EVERY 6 HOURS PRN
Qty: 120 TABLET | Refills: 0 | Status: SHIPPED | OUTPATIENT
Start: 2022-02-28 | End: 2022-03-16

## 2022-02-28 NOTE — TELEPHONE ENCOUNTER
Caller: Briana Alas    Relationship: Self    Best call back number: [phone]    Requested Prescriptions:   Requested Prescriptions     Pending Prescriptions Disp Refills   • oxyCODONE (ROXICODONE) 10 MG tablet 120 tablet 0     Sig: Take 1 tablet by mouth Every 6 (Six) Hours As Needed for Severe Pain .   • carisoprodol (SOMA) 350 MG tablet 90 tablet 0     Sig: Take 1 tablet by mouth 3 (Three) Times a Day As Needed for Muscle Spasms.        Pharmacy where request should be sent: [facility] - [place], [place] - [address] AT [place] [address] - [phone] [facility] [phone] FX     Additional details provided by patient: PATIENT IS OUT. SHE SAID SHE TOOK A COUPLE EXTRA DUE TO BREAKING HER HAND AND THE INCREASED PAIN.  Does the patient have less than a 3 day supply:  [x] Yes  [] No    Digna Mckeon Rep   02/28/22 08:40 EST

## 2022-03-04 ENCOUNTER — OFFICE VISIT (OUTPATIENT)
Dept: PSYCHIATRY | Facility: CLINIC | Age: 57
End: 2022-03-04

## 2022-03-04 DIAGNOSIS — F41.1 GENERALIZED ANXIETY DISORDER: Chronic | ICD-10-CM

## 2022-03-04 DIAGNOSIS — F33.1 MAJOR DEPRESSIVE DISORDER, RECURRENT EPISODE, MODERATE: Primary | Chronic | ICD-10-CM

## 2022-03-04 DIAGNOSIS — Z63.4 BEREAVEMENT: ICD-10-CM

## 2022-03-04 PROCEDURE — 99214 OFFICE O/P EST MOD 30 MIN: CPT | Performed by: PSYCHIATRY & NEUROLOGY

## 2022-03-04 RX ORDER — DULOXETIN HYDROCHLORIDE 60 MG/1
60 CAPSULE, DELAYED RELEASE ORAL 2 TIMES DAILY
Qty: 60 CAPSULE | Refills: 4 | Status: SHIPPED | OUTPATIENT
Start: 2022-03-04 | End: 2022-07-06 | Stop reason: SDUPTHER

## 2022-03-04 RX ORDER — BUSPIRONE HYDROCHLORIDE 15 MG/1
15 TABLET ORAL 2 TIMES DAILY
Qty: 60 TABLET | Refills: 3 | Status: SHIPPED | OUTPATIENT
Start: 2022-03-04 | End: 2022-07-06 | Stop reason: SDUPTHER

## 2022-03-04 RX ORDER — CLONAZEPAM 1 MG/1
1 TABLET ORAL 3 TIMES DAILY PRN
Qty: 90 TABLET | Refills: 3 | Status: SHIPPED | OUTPATIENT
Start: 2022-03-04 | End: 2022-07-06 | Stop reason: SDUPTHER

## 2022-03-04 SDOH — SOCIAL STABILITY - SOCIAL INSECURITY: DISSAPEARANCE AND DEATH OF FAMILY MEMBER: Z63.4

## 2022-03-04 NOTE — PROGRESS NOTES
"Subjective   Briana Alas is a 56 y.o. female who presents today for follow up    Chief Complaint:  Depression, anxiety      History of Present Illness: the pt has a long hx of depression, no specific triggers or stressors, was on zoloft, celexa , few unsuccessful trials   She was relatively stable on meds until her daughter passed away  Last year , still difficult to cope  , her daughter was special needs, pt's life was revolving around her , now she feels she has no purpose     Today the pt reported still feeling depressed and anxious,  \"I have my days\" when no motivations, no desire to do anything   First holiday without daughter     Depression is rated as 7/10, denied AVH/SI/HI     Sleep - fair  on temazepam   Triggers - daughter's death  Alleviating factors - none     Anxiety increased, associated with somatic sxs, tension, constant worries about everything, unable to relax   Panic attacks  More often now around mother's/father's day    Mood was always on the low side, no ups         The following portions of the patient's history were reviewed and updated as appropriate: allergies, current medications, past family history, past medical history, past social history, past surgical history and problem list.    PAST PSYCHIATRIC HISTORY  Axis I  Affective/Bipolar Disorder, Anxiety/Panic Disorder  No inpt. No SI/SA   Axis II  Defer     PAST OUTPATIENT TREATMENT  Diagnosis treated:  Affective Disorder, Anxiety/Panic Disorder  Treatment Type:  Medication Management  Psychotherapy - shante salomon [facility]   Prior Psychiatric Medications:  lexapro - not effective  celexa -not effective  zoloft - not effective now    Support Groups:  None   Sequelae Of Mental Disorder:  emotional distress          Interval History  No changes    Side Effects  Denied       Past Medical History:  Past Medical History:   Diagnosis Date   • Anxiety    • Arthritis    • Back pain    • Depression    • Headache    • Hyperlipidemia    • " Hypertension    • Injury of back    • Restless leg syndrome        Social History:  Social History     Socioeconomic History   • Marital status: Legally    Tobacco Use   • Smoking status: Current Every Day Smoker     Packs/day: 0.50     Types: Cigarettes   • Smokeless tobacco: Never Used   Vaping Use   • Vaping Use: Never used   Substance and Sexual Activity   • Alcohol use: No   • Drug use: No   • Sexual activity: Defer       Family History:  Family History   Problem Relation Age of Onset   • Hypertension Mother    • Hyperlipidemia Mother    • Depression Mother    • Thyroid disease Mother    • Hypertension Father        Past Surgical History:  Past Surgical History:   Procedure Laterality Date   • ANKLE OPEN REDUCTION INTERNAL FIXATION Right    • FOOT SURGERY Bilateral     bunionectomy   • SHOULDER ARTHROSCOPY W/ ROTATOR CUFF REPAIR Right 12/13/2019    Procedure: RIGHT SHOULDER ARTHROSCOPY WITH ROTATOR CUFF REPAIR and Subacromial decompression;  Surgeon: Gino Ackerman MD;  Location: [facility];  Service: Orthopedics   • TUBAL ABDOMINAL LIGATION         Problem List:  Patient Active Problem List   Diagnosis   • Fibromyalgia   • Abnormal gait   • Ankle pain, right   • Knee pain   • Leg pain, left   • Annular tear of lumbar disc   • Degeneration of lumbar intervertebral disc   • Generalized anxiety disorder   • Body mass index 32.0-32.9, adult   • Cervical myelopathy (HCC)   • Other dorsalgia   • Chronic low back pain   • Hx traumatic fracture   • Hyperlipidemia   • Hypertension, essential   • Hypovitaminosis D   • Inflammatory arthritis   • Joint pain   • Leg weakness, bilateral   • Lumbosacral radiculopathy   • Malaise and fatigue   • Neck pain, chronic   • Osteoarthritis of knee   • Pain in right shoulder   • Pain due to internal orthopedic prosthetic devices, implants and grafts, subsequent encounter   • Rotator cuff tendonitis   • Scoliosis   • Osteoarthritis of lumbosacral spine without  myelopathy   • Spondylosis of lumbosacral region   • Tobacco abuse counseling   • Upper respiratory tract infection   • Major depressive disorder, recurrent episode, moderate (HCC)   • Primary insomnia   • Bereavement       Allergy:   Allergies   Allergen Reactions   • Penicillins Rash        Discontinued Medications:  Medications Discontinued During This Encounter   Medication Reason   • DULoxetine (CYMBALTA) 60 MG capsule Reorder   • busPIRone (BUSPAR) 15 MG tablet Reorder   • clonazePAM (KlonoPIN) 1 MG tablet Reorder       Current Medications:   Current Outpatient Medications   Medication Sig Dispense Refill   • atenolol (TENORMIN) 50 MG tablet TAKE ONE (1) TABLET BY MOUTH EVERY DAY 30 tablet 5   • atorvastatin (LIPITOR) 20 MG tablet TAKE ONE (1) TABLET BY MOUTH AT BEDTIME 30 tablet 3   • baclofen (LIORESAL) 20 MG tablet TAKE ONE (1) TABLET BY MOUTH THREE TIMES DAILY 90 tablet 0   • Brexpiprazole (Rexulti) 2 MG tablet Take 1 tablet by mouth Every Morning. 30 tablet 3   • busPIRone (BUSPAR) 15 MG tablet Take 1 tablet by mouth 2 (Two) Times a Day. 60 tablet 3   • carisoprodol (SOMA) 350 MG tablet Take 1 tablet by mouth 3 (Three) Times a Day As Needed for Muscle Spasms. 90 tablet 0   • clonazePAM (KlonoPIN) 1 MG tablet Take 1 tablet by mouth 3 (Three) Times a Day As Needed for Anxiety. 90 tablet 3   • DULoxetine (CYMBALTA) 60 MG capsule Take 1 capsule by mouth 2 (Two) Times a Day. 60 capsule 4   • folic acid (FOLVITE) 1 MG tablet Take 1 tablet by mouth Daily. 90 tablet 0   • oxyCODONE (ROXICODONE) 10 MG tablet Take 1 tablet by mouth Every 6 (Six) Hours As Needed for Severe Pain . 120 tablet 0   • pregabalin (LYRICA) 150 MG capsule TAKE ONE (1) CAPSULE BY MOUTH TWICE DAILY 60 capsule 3   • rOPINIRole (REQUIP) 0.5 MG tablet TAKE ONE (1) TABLET BY MOUTH TWICE DAILY (TAKE ONE (1) TABLET ONE HOUR BEFORE BEDTIME) 60 tablet 5   • sulfaSALAzine (AZULFIDINE) 500 MG tablet Take 2 tablets by mouth 2 (Two) Times a Day. 360  tablet 1   • temazepam (RESTORIL) 30 MG capsule TAKE ONE CAPSULE BY MOUTH EVERY NIGHT AT BEDTIME AS NEEDED FOR SLEEP 30 capsule 3     No current facility-administered medications for this visit.         Psychological ROS: positive for - anxiety, depression   negative for - hallucinations, hostility, irritability, memory difficulties, mood swings, obsessive thoughts or suicidal ideation      Physical Exam:   There were no vitals taken for this visit.    Mental Status Exam:   Hygiene:   good  Cooperation:  Cooperative  Eye Contact:  Good  Psychomotor Behavior:  Appropriate  Affect:  congruent with mood   Mood: depressed and fluctates  Hopelessness: Denies  Speech:  Normal  Thought Process:  Goal directed and Linear  Thought Content:  Mood congruent  Suicidal:  None  Homicidal:  None  Hallucinations:  None  Delusion:  None  Memory:  Intact  Orientation:  Person, Place, Time and Situation  Reliability:  good  Insight:  Good  Judgement:  Good  Impulse Control:  Good  Physical/Medical Issues:  Yes       MSE from 11/4/2021 reviewed and no changes necessary     PHQ-9 Depression Screening  Little interest or pleasure in doing things? 3   Feeling down, depressed, or hopeless? 2   Trouble falling or staying asleep, or sleeping too much? 2   Feeling tired or having little energy? 2   Poor appetite or overeating? 0   Feeling bad about yourself - or that you are a failure or have let yourself or your family down? 2   Trouble concentrating on things, such as reading the newspaper or watching television? 1   Moving or speaking so slowly that other people could have noticed? Or the opposite - being so fidgety or restless that you have been moving around a lot more than usual? 0   Thoughts that you would be better off dead, or of hurting yourself in some way? 0   PHQ-9 Total Score 12   If you checked off any problems, how difficult have these problems made it for you to do your work, take care of things at home, or get along with  other people? Very difficult           Current every day smoker 3-10 mintues spent counseling Not agreeable to stopping    I advised Briana of the risks of tobacco use.     Lab Results:   No visits with results within 3 Month(s) from this visit.   Latest known visit with results is:   Office Visit on 06/02/2021   Component Date Value Ref Range Status   • Glucose 06/02/2021 101* 65 - 99 mg/dL Final   • BUN 06/02/2021 15  6 - 20 mg/dL Final   • Creatinine 06/02/2021 0.93  0.57 - 1.00 mg/dL Final   • Sodium 06/02/2021 139  136 - 145 mmol/L Final   • Potassium 06/02/2021 4.6  3.5 - 5.2 mmol/L Final   • Chloride 06/02/2021 103  98 - 107 mmol/L Final   • CO2 06/02/2021 26.0  22.0 - 29.0 mmol/L Final   • Calcium 06/02/2021 10.0  8.6 - 10.5 mg/dL Final   • Total Protein 06/02/2021 7.6  6.0 - 8.5 g/dL Final   • Albumin 06/02/2021 4.30  3.50 - 5.20 g/dL Final   • ALT (SGPT) 06/02/2021 7  1 - 33 U/L Final   • AST (SGOT) 06/02/2021 18  1 - 32 U/L Final   • Alkaline Phosphatase 06/02/2021 89  39 - 117 U/L Final   • Total Bilirubin 06/02/2021 0.2  0.0 - 1.2 mg/dL Final   • eGFR Non  Amer 06/02/2021 62  >60 mL/min/1.73 Final   • Globulin 06/02/2021 3.3  gm/dL Final   • A/G Ratio 06/02/2021 1.3  g/dL Final   • BUN/Creatinine Ratio 06/02/2021 16.1  7.0 - 25.0 Final   • Anion Gap 06/02/2021 10.0  5.0 - 15.0 mmol/L Final   • Total Cholesterol 06/02/2021 181  0 - 200 mg/dL Final   • Triglycerides 06/02/2021 134  0 - 150 mg/dL Final   • HDL Cholesterol 06/02/2021 42  40 - 60 mg/dL Final   • LDL Cholesterol  06/02/2021 115* 0 - 100 mg/dL Final   • VLDL Cholesterol 06/02/2021 24  5 - 40 mg/dL Final   • LDL/HDL Ratio 06/02/2021 2.67   Final       Assessment/Plan   Problems Addressed this Visit        Mental Health    Generalized anxiety disorder (Chronic)    Relevant Medications    DULoxetine (CYMBALTA) 60 MG capsule    busPIRone (BUSPAR) 15 MG tablet    clonazePAM (KlonoPIN) 1 MG tablet    Major depressive disorder, recurrent  episode, moderate (HCC) - Primary (Chronic)    Relevant Medications    DULoxetine (CYMBALTA) 60 MG capsule    busPIRone (BUSPAR) 15 MG tablet    Bereavement      Diagnoses       Codes Comments    Major depressive disorder, recurrent episode, moderate (HCC)    -  Primary ICD-10-CM: F33.1  ICD-9-CM: 296.32     Generalized anxiety disorder     ICD-10-CM: F41.1  ICD-9-CM: 300.02     Bereavement     ICD-10-CM: Z63.4  ICD-9-CM: V62.82           Visit Diagnoses:    ICD-10-CM ICD-9-CM   1. Major depressive disorder, recurrent episode, moderate (HCC)  F33.1 296.32   2. Generalized anxiety disorder  F41.1 300.02   3. Bereavement  Z63.4 V62.82       TREATMENT PLAN/GOALS: Continue supportive psychotherapy efforts and medications as indicated. Treatment and medication options discussed during today's visit. Patient ackowledged and verbally consented to continue with current treatment plan and was educated on the importance of compliance with treatment and follow-up appointments.    MEDICATION ISSUES:    INSPECT reviewed as expected - on pain meds, on clonazepam  2/28/2022 (last refill)   and temazepam from PCP     1. Major depressive d/o moderate recurrent - cont cymbalta 120 mg BP is stable, cont rexulti, increse to 3 mg (samples), the pt will call in 2 -3 weeks    Cont therapy  2. Generalized anxiety - cont cymbalta, cont clonazepam 1 mg TID, the pt is on opioids, will closely monitor   3 Insomnia - cont temazepam  4 bereavement - cont grief therapy     PHQ scored 12 and indicated moderate depression   STEWART 7 scored 7    Discussed medication options and treatment plan of prescribed medication as well as the risks, benefits, and side effects including potential falls, possible impaired driving and metabolic adversities among others. Patient is agreeable to call the office with any worsening of symptoms or onset of side effects. Patient is agreeable to call 911 or go to the nearest ER should he/she begin having SI/HI. No  medication side effects or related complaints today.     MEDS ORDERED DURING VISIT:  New Medications Ordered This Visit   Medications   • DULoxetine (CYMBALTA) 60 MG capsule     Sig: Take 1 capsule by mouth 2 (Two) Times a Day.     Dispense:  60 capsule     Refill:  4   • busPIRone (BUSPAR) 15 MG tablet     Sig: Take 1 tablet by mouth 2 (Two) Times a Day.     Dispense:  60 tablet     Refill:  3   • clonazePAM (KlonoPIN) 1 MG tablet     Sig: Take 1 tablet by mouth 3 (Three) Times a Day As Needed for Anxiety.     Dispense:  90 tablet     Refill:  3     Please dispense on or after March 28, 2022       Return in about 4 months (around 7/4/2022).         This document has been electronically signed by Jade Ryder MD  March 4, 2022 10:12 SAGAR Barahona transcription disclaimer:  Some of this encounter note is an electronic transcription translation of spoken language to printed text. The electronic translation of spoken language may permit erroneous, or at times, nonsensical words or phrases to be inadvertently transcribed; Although I have reviewed the note for such errors some may still exist.

## 2022-03-04 NOTE — PATIENT INSTRUCTIONS
"[email or web address]   Generalized Anxiety Disorder, Adult  Generalized anxiety disorder (STEWART) is a mental health condition. Unlike normal worries, anxiety related to STEWART is not triggered by a specific event. These worries do not fade or get better with time. STEWART interferes with relationships, work, and school.  STEWART symptoms can vary from mild to severe. People with severe STEWART can have intense waves of anxiety with physical symptoms that are similar to panic attacks.  What are the causes?  The exact cause of STEWART is not known, but the following are believed to have an impact:  · Differences in natural brain chemicals.  · Genes passed down from parents to children.  · Differences in the way threats are perceived.  · Development during childhood.  · Personality.  What increases the risk?  The following factors may make you more likely to develop this condition:  · Being female.  · Having a family history of anxiety disorders.  · Being very shy.  · Experiencing very stressful life events, such as the death of a loved one.  · Having a very stressful family environment.  What are the signs or symptoms?  People with STEWART often worry excessively about many things in their lives, such as their health and family. Symptoms may also include:  · Mental and emotional symptoms:  ? Worrying excessively about natural disasters.  ? Fear of being late.  ? Difficulty concentrating.  ? Fears that others are judging your performance.  · Physical symptoms:  ? Fatigue.  ? Headaches, muscle tension, muscle twitches, trembling, or feeling shaky.  ? Feeling like your heart is pounding or beating very fast.  ? Feeling out of breath or like you cannot take a deep breath.  ? Having trouble falling asleep or staying asleep, or experiencing restlessness.  ? Sweating.  ? Nausea, diarrhea, or irritable bowel syndrome (IBS).  · Behavioral symptoms:  ? Experiencing erratic moods or irritability.  ? Avoidance of new situations.  ? Avoidance of " people.  ? Extreme difficulty making decisions.  How is this diagnosed?  This condition is diagnosed based on your symptoms and medical history. You will also have a physical exam. Your health care provider may perform tests to rule out other possible causes of your symptoms.  To be diagnosed with STEWART, a person must have anxiety that:  · Is out of his or her control.  · Affects several different aspects of his or her life, such as work and relationships.  · Causes distress that makes him or her unable to take part in normal activities.  · Includes at least three symptoms of STEWART, such as restlessness, fatigue, trouble concentrating, irritability, muscle tension, or sleep problems.  Before your health care provider can confirm a diagnosis of STEWART, these symptoms must be present more days than they are not, and they must last for 6 months or longer.  How is this treated?  This condition may be treated with:  · Medicine. Antidepressant medicine is usually prescribed for long-term daily control. Anti-anxiety medicines may be added in severe cases, especially when panic attacks occur.  · Talk therapy (psychotherapy). Certain types of talk therapy can be helpful in treating STEWART by providing support, education, and guidance. Options include:  ? Cognitive behavioral therapy (CBT). People learn coping skills and self-calming techniques to ease their physical symptoms. They learn to identify unrealistic thoughts and behaviors and to replace them with more appropriate thoughts and behaviors.  ? Acceptance and commitment therapy (ACT). This treatment teaches people how to be mindful as a way to cope with unwanted thoughts and feelings.  ? Biofeedback. This process trains you to manage your body's response (physiological response) through breathing techniques and relaxation methods. You will work with a therapist while machines are used to monitor your physical symptoms.  · Stress management techniques. These include yoga,  meditation, and exercise.  A mental health specialist can help determine which treatment is best for you. Some people see improvement with one type of therapy. However, other people require a combination of therapies.  Follow these instructions at home:  Lifestyle  · Maintain a consistent routine and schedule.  · Anticipate stressful situations. Create a plan, and allow extra time to work with your plan.  · Practice stress management or self-calming techniques that you have learned from your therapist or your health care provider.  General instructions  · Take over-the-counter and prescription medicines only as told by your health care provider.  · Understand that you are likely to have setbacks. Accept this and be kind to yourself as you persist to take better care of yourself.  · Recognize and accept your accomplishments, even if you  them as small.  · Keep all follow-up visits as told by your health care provider. This is important.  Contact a health care provider if:  · Your symptoms do not get better.  · Your symptoms get worse.  · You have signs of depression, such as:  ? A persistently sad or irritable mood.  ? Loss of enjoyment in activities that used to bring you dneiz.  ? Change in weight or eating.  ? Changes in sleeping habits.  ? Avoiding friends or family members.  ? Loss of energy for normal tasks.  ? Feelings of guilt or worthlessness.  Get help right away if:  · You have serious thoughts about hurting yourself or others.  If you ever feel like you may hurt yourself or others, or have thoughts about taking your own life, get help right away. Go to your nearest emergency department or:  · Call your local emergency services (631 in the [place]  · Call a suicide crisis helpline, such as the National Suicide Prevention Lifeline at 1-658.856.8836. This is open 24 hours a day in the [place]  · Text the Crisis Text Line at 510637 (in the [place]  Summary  · Generalized anxiety disorder (STEWART) is a mental  health condition that involves worry that is not triggered by a specific event.  · People with STEWART often worry excessively about many things in their lives, such as their health and family.  · STEWART may cause symptoms such as restlessness, trouble concentrating, sleep problems, frequent sweating, nausea, diarrhea, headaches, and trembling or muscle twitching.  · A mental health specialist can help determine which treatment is best for you. Some people see improvement with one type of therapy. However, other people require a combination of therapies.  This information is not intended to replace advice given to you by your health care provider. Make sure you discuss any questions you have with your health care provider.  Document Revised: 10/07/2020 Document Reviewed: 10/07/2020  Elsevier Patient Education © 2021 [employer] Inc[employer]

## 2022-03-10 ENCOUNTER — TELEPHONE (OUTPATIENT)
Dept: FAMILY MEDICINE CLINIC | Facility: CLINIC | Age: 57
End: 2022-03-10

## 2022-03-10 NOTE — TELEPHONE ENCOUNTER
Prior Auth for Carisoprodol 350MG tablets was DENIED.      Patient can use [employer] at "[employer] for qty 90 for $ 12.56.      Faxed coupon to "[employer].

## 2022-03-14 ENCOUNTER — OFFICE VISIT (OUTPATIENT)
Dept: FAMILY MEDICINE CLINIC | Facility: CLINIC | Age: 57
End: 2022-03-14

## 2022-03-14 ENCOUNTER — LAB (OUTPATIENT)
Dept: FAMILY MEDICINE CLINIC | Facility: CLINIC | Age: 57
End: 2022-03-14

## 2022-03-14 VITALS
DIASTOLIC BLOOD PRESSURE: 73 MMHG | HEART RATE: 50 BPM | SYSTOLIC BLOOD PRESSURE: 114 MMHG | OXYGEN SATURATION: 98 % | WEIGHT: 178 LBS | BODY MASS INDEX: 30.55 KG/M2

## 2022-03-14 DIAGNOSIS — Z12.31 OTHER SCREENING MAMMOGRAM: ICD-10-CM

## 2022-03-14 DIAGNOSIS — G89.29 CHRONIC BILATERAL LOW BACK PAIN WITHOUT SCIATICA: ICD-10-CM

## 2022-03-14 DIAGNOSIS — F33.1 MAJOR DEPRESSIVE DISORDER, RECURRENT EPISODE, MODERATE: Chronic | ICD-10-CM

## 2022-03-14 DIAGNOSIS — I10 HYPERTENSION, ESSENTIAL: Primary | ICD-10-CM

## 2022-03-14 DIAGNOSIS — E55.9 HYPOVITAMINOSIS D: ICD-10-CM

## 2022-03-14 DIAGNOSIS — E78.5 HYPERLIPIDEMIA, UNSPECIFIED HYPERLIPIDEMIA TYPE: ICD-10-CM

## 2022-03-14 DIAGNOSIS — Z29.9 PREVENTIVE MEASURE: ICD-10-CM

## 2022-03-14 DIAGNOSIS — M54.50 CHRONIC BILATERAL LOW BACK PAIN WITHOUT SCIATICA: ICD-10-CM

## 2022-03-14 LAB
25(OH)D3 SERPL-MCNC: 8.9 NG/ML (ref 30–100)
ALBUMIN SERPL-MCNC: 4.1 G/DL (ref 3.5–5.2)
ALBUMIN/GLOB SERPL: 1.3 G/DL
ALP SERPL-CCNC: 83 U/L (ref 39–117)
ALT SERPL W P-5'-P-CCNC: 9 U/L (ref 1–33)
ANION GAP SERPL CALCULATED.3IONS-SCNC: 8 MMOL/L (ref 5–15)
AST SERPL-CCNC: 15 U/L (ref 1–32)
BILIRUB SERPL-MCNC: 0.2 MG/DL (ref 0–1.2)
BUN SERPL-MCNC: 15 MG/DL (ref 6–20)
BUN/CREAT SERPL: 16.3 (ref 7–25)
CALCIUM SPEC-SCNC: 9.4 MG/DL (ref 8.6–10.5)
CHLORIDE SERPL-SCNC: 103 MMOL/L (ref 98–107)
CHOLEST SERPL-MCNC: 188 MG/DL (ref 0–200)
CO2 SERPL-SCNC: 28 MMOL/L (ref 22–29)
CREAT SERPL-MCNC: 0.92 MG/DL (ref 0.57–1)
EGFRCR SERPLBLD CKD-EPI 2021: 72.8 ML/MIN/1.73
GLOBULIN UR ELPH-MCNC: 3.1 GM/DL
GLUCOSE SERPL-MCNC: 80 MG/DL (ref 65–99)
HDLC SERPL-MCNC: 38 MG/DL (ref 40–60)
LDLC SERPL CALC-MCNC: 116 MG/DL (ref 0–100)
LDLC/HDLC SERPL: 2.95 {RATIO}
POTASSIUM SERPL-SCNC: 5 MMOL/L (ref 3.5–5.2)
PROT SERPL-MCNC: 7.2 G/DL (ref 6–8.5)
SODIUM SERPL-SCNC: 139 MMOL/L (ref 136–145)
TRIGL SERPL-MCNC: 190 MG/DL (ref 0–150)
VLDLC SERPL-MCNC: 34 MG/DL (ref 5–40)

## 2022-03-14 PROCEDURE — 80061 LIPID PANEL: CPT | Performed by: FAMILY MEDICINE

## 2022-03-14 PROCEDURE — 82306 VITAMIN D 25 HYDROXY: CPT | Performed by: FAMILY MEDICINE

## 2022-03-14 PROCEDURE — 36415 COLL VENOUS BLD VENIPUNCTURE: CPT | Performed by: FAMILY MEDICINE

## 2022-03-14 PROCEDURE — 99214 OFFICE O/P EST MOD 30 MIN: CPT | Performed by: FAMILY MEDICINE

## 2022-03-14 PROCEDURE — 80053 COMPREHEN METABOLIC PANEL: CPT | Performed by: FAMILY MEDICINE

## 2022-03-14 RX ORDER — IBUPROFEN 800 MG/1
800 TABLET ORAL EVERY 8 HOURS PRN
Qty: 90 TABLET | Refills: 3 | Status: SHIPPED | OUTPATIENT
Start: 2022-03-14

## 2022-03-14 NOTE — PROGRESS NOTES
Subjective   Briana Alas is a 56 y.o. female.     Briana Alas is in for follow up on her chronic issues. She has high blood pressure and high cholesterol. She has low vitamin D, chronic depression and chronic back pain.  She did fall recently and suffered an injury to her right hand and right wrist.  She is being seen by a local orthopedic office for that and is in a protective splint at the moment.  My understanding is there was a fracture to a metacarpal and to her wrist as well.  There is no history of chest pain or dyspnea. There is no history of issue with bowel or bladder dysfunction. There is no history of dizziness or lightheadedness. There is no history of issue with sleep or mood. There is no history of issue with present medication.            /73 (BP Location: Left arm, Patient Position: Sitting, Cuff Size: Large Adult)   Pulse 50   Wt 80.7 kg (178 lb)   SpO2 98%   BMI 30.55 kg/m²       Chief Complaint   Patient presents with   • Hypertension           Current Outpatient Medications:   •  atenolol (TENORMIN) 50 MG tablet, TAKE ONE (1) TABLET BY MOUTH EVERY DAY, Disp: 30 tablet, Rfl: 5  •  atorvastatin (LIPITOR) 20 MG tablet, TAKE ONE (1) TABLET BY MOUTH AT BEDTIME, Disp: 30 tablet, Rfl: 3  •  baclofen (LIORESAL) 20 MG tablet, TAKE ONE (1) TABLET BY MOUTH THREE TIMES DAILY, Disp: 90 tablet, Rfl: 0  •  Brexpiprazole (Rexulti) 2 MG tablet, Take 1 tablet by mouth Every Morning., Disp: 30 tablet, Rfl: 3  •  busPIRone (BUSPAR) 15 MG tablet, Take 1 tablet by mouth 2 (Two) Times a Day., Disp: 60 tablet, Rfl: 3  •  carisoprodol (SOMA) 350 MG tablet, Take 1 tablet by mouth 3 (Three) Times a Day As Needed for Muscle Spasms., Disp: 90 tablet, Rfl: 0  •  clonazePAM (KlonoPIN) 1 MG tablet, Take 1 tablet by mouth 3 (Three) Times a Day As Needed for Anxiety., Disp: 90 tablet, Rfl: 3  •  DULoxetine (CYMBALTA) 60 MG capsule, Take 1 capsule by mouth 2 (Two) Times a Day., Disp: 60 capsule, Rfl: 4  •  folic  acid (FOLVITE) 1 MG tablet, Take 1 tablet by mouth Daily., Disp: 90 tablet, Rfl: 0  •  oxyCODONE (ROXICODONE) 10 MG tablet, Take 1 tablet by mouth Every 6 (Six) Hours As Needed for Severe Pain ., Disp: 120 tablet, Rfl: 0  •  pregabalin (LYRICA) 150 MG capsule, TAKE ONE (1) CAPSULE BY MOUTH TWICE DAILY, Disp: 60 capsule, Rfl: 3  •  rOPINIRole (REQUIP) 0.5 MG tablet, TAKE ONE (1) TABLET BY MOUTH TWICE DAILY (TAKE ONE (1) TABLET ONE HOUR BEFORE BEDTIME), Disp: 60 tablet, Rfl: 5  •  sulfaSALAzine (AZULFIDINE) 500 MG tablet, Take 2 tablets by mouth 2 (Two) Times a Day., Disp: 360 tablet, Rfl: 1  •  temazepam (RESTORIL) 30 MG capsule, TAKE ONE CAPSULE BY MOUTH EVERY NIGHT AT BEDTIME AS NEEDED FOR SLEEP, Disp: 30 capsule, Rfl: 3  •  ibuprofen (ADVIL,MOTRIN) 800 MG tablet, Take 1 tablet by mouth Every 8 (Eight) Hours As Needed for Moderate Pain ., Disp: 90 tablet, Rfl: 3        The following portions of the patient's history were reviewed and updated as appropriate: allergies, current medications, past family history, past medical history, past social history, past surgical history, and problem list.    Review of Systems   Constitutional: Negative for activity change, fatigue and fever.   HENT: Negative for congestion, sinus pressure, sinus pain, sore throat and trouble swallowing.    Eyes: Negative for visual disturbance.   Respiratory: Negative for cough, chest tightness, shortness of breath and wheezing.    Cardiovascular: Negative for chest pain.   Gastrointestinal: Negative for abdominal distention, abdominal pain, constipation, diarrhea, nausea and vomiting.   Genitourinary: Negative for difficulty urinating, dysuria and urgency.   Musculoskeletal: Positive for arthralgias, back pain, myalgias and neck pain.   Neurological: Negative for dizziness, weakness, light-headedness and numbness.   Psychiatric/Behavioral: Positive for dysphoric mood. Negative for agitation, hallucinations, sleep disturbance and suicidal ideas.  The patient is nervous/anxious.        Objective   Physical Exam  Vitals and nursing note reviewed.   Cardiovascular:      Rate and Rhythm: Normal rate and regular rhythm.      Heart sounds: Normal heart sounds. No murmur heard.  Pulmonary:      Effort: Pulmonary effort is normal.      Breath sounds: No wheezing or rales.   Abdominal:      General: Bowel sounds are normal.      Palpations: Abdomen is soft.      Tenderness: There is no abdominal tenderness. There is no guarding.   Musculoskeletal:      Cervical back: Neck supple.      Right lower leg: No edema.      Left lower leg: No edema.      Comments: Right wrist and hand in protective splint after recent fractures   Lymphadenopathy:      Cervical: No cervical adenopathy.   Skin:     Findings: No rash.   Neurological:      General: No focal deficit present.      Mental Status: She is alert and oriented to person, place, and time.   Psychiatric:         Mood and Affect: Mood normal.           Assessment/Plan   Problems Addressed this Visit        Cardiac and Vasculature    Hyperlipidemia    Relevant Orders    Comprehensive metabolic panel    Lipid panel    Hypertension, essential - Primary    Relevant Orders    Comprehensive metabolic panel    Lipid panel       Endocrine and Metabolic    Hypovitaminosis D    Relevant Orders    Vitamin D 25 hydroxy       Mental Health    Major depressive disorder, recurrent episode, moderate (HCC) (Chronic)       Musculoskeletal and Injuries    Chronic low back pain      Other Visit Diagnoses     Preventive measure        Relevant Orders    Cologuard - Stool, Per Rectum    Other screening mammogram        Relevant Orders    Mammo Screening Digital Tomosynthesis Bilateral With CAD      Diagnoses       Codes Comments    Hypertension, essential    -  Primary ICD-10-CM: I10  ICD-9-CM: 401.9     Hyperlipidemia, unspecified hyperlipidemia type     ICD-10-CM: E78.5  ICD-9-CM: 272.4     Major depressive disorder, recurrent episode,  moderate (HCC)     ICD-10-CM: F33.1  ICD-9-CM: 296.32     Chronic bilateral low back pain without sciatica     ICD-10-CM: M54.50, G89.29  ICD-9-CM: 724.2, 338.29     Hypovitaminosis D     ICD-10-CM: E55.9  ICD-9-CM: 268.9     Preventive measure     ICD-10-CM: Z29.9  ICD-9-CM: V07.9     Other screening mammogram     ICD-10-CM: Z12.31  ICD-9-CM: V76.12         I will update some labs and adjust her treatment plan as indicated  The fact that her low back pain is still a problem despite taking as much pain medication as she is on concerns me, but it also is a byproduct of her significant bipolar depression issues.  I will try her on some ibuprofen to supplement the oxycodone  She may need imaging or referral back to pain management if this is not effective  I will update mammogram and order a Cologuard and see her back in 4 months at the latest

## 2022-03-16 ENCOUNTER — TELEPHONE (OUTPATIENT)
Dept: FAMILY MEDICINE CLINIC | Facility: CLINIC | Age: 57
End: 2022-03-16

## 2022-03-16 DIAGNOSIS — G89.29 CHRONIC BILATERAL LOW BACK PAIN WITHOUT SCIATICA: Primary | ICD-10-CM

## 2022-03-16 DIAGNOSIS — M54.50 CHRONIC BILATERAL LOW BACK PAIN WITHOUT SCIATICA: Primary | ICD-10-CM

## 2022-03-16 RX ORDER — OXYCODONE HYDROCHLORIDE 15 MG/1
15 TABLET ORAL EVERY 8 HOURS PRN
Qty: 30 TABLET | Refills: 0 | Status: SHIPPED | OUTPATIENT
Start: 2022-03-16 | End: 2022-03-29 | Stop reason: SDUPTHER

## 2022-03-16 NOTE — TELEPHONE ENCOUNTER
PATIENT IS CALLING IN STATING THAT SHE WAS PUT ON A INFLAMMATORY ON 03/14/22 AND IT IS NOT WORKING FOR HER.  SHE WAS TOLD TO CALL IN AND INFORM DR TODD IT WAS NOT WORKING.  SHE WANTS TO KNOW IF HER CURRENT MEDICATION CAN BE CHANGED.    PATIENT CALL BACK  [phone]

## 2022-03-17 NOTE — TELEPHONE ENCOUNTER
I sent in a 10 day supply of 15 mg oxycodone  I don't want to use that for long , if she needs it longer, I will refer back to pain mgmt

## 2022-03-20 RX ORDER — ERGOCALCIFEROL 1.25 MG/1
50000 CAPSULE ORAL WEEKLY
Qty: 4 CAPSULE | Refills: 5 | Status: SHIPPED | OUTPATIENT
Start: 2022-03-20 | End: 2023-02-22

## 2022-03-23 DIAGNOSIS — G89.4 CHRONIC PAIN SYNDROME: ICD-10-CM

## 2022-03-23 RX ORDER — PREGABALIN 150 MG/1
CAPSULE ORAL
Qty: 60 CAPSULE | Refills: 3 | Status: SHIPPED | OUTPATIENT
Start: 2022-03-23 | End: 2022-07-13

## 2022-03-29 DIAGNOSIS — F33.1 MAJOR DEPRESSIVE DISORDER, RECURRENT EPISODE, MODERATE: Chronic | ICD-10-CM

## 2022-03-29 DIAGNOSIS — M54.50 CHRONIC BILATERAL LOW BACK PAIN WITHOUT SCIATICA: ICD-10-CM

## 2022-03-29 DIAGNOSIS — G89.29 CHRONIC BILATERAL LOW BACK PAIN WITHOUT SCIATICA: ICD-10-CM

## 2022-03-29 DIAGNOSIS — M54.40 CHRONIC LOW BACK PAIN WITH SCIATICA, SCIATICA LATERALITY UNSPECIFIED, UNSPECIFIED BACK PAIN LATERALITY: ICD-10-CM

## 2022-03-29 DIAGNOSIS — G89.29 CHRONIC LOW BACK PAIN WITH SCIATICA, SCIATICA LATERALITY UNSPECIFIED, UNSPECIFIED BACK PAIN LATERALITY: ICD-10-CM

## 2022-03-29 RX ORDER — CARISOPRODOL 350 MG/1
350 TABLET ORAL 3 TIMES DAILY PRN
Qty: 90 TABLET | Refills: 0 | Status: SHIPPED | OUTPATIENT
Start: 2022-03-29 | End: 2022-05-04

## 2022-03-29 RX ORDER — OXYCODONE HYDROCHLORIDE 15 MG/1
15 TABLET ORAL EVERY 8 HOURS PRN
Qty: 30 TABLET | Refills: 0 | Status: SHIPPED | OUTPATIENT
Start: 2022-03-29 | End: 2022-04-05 | Stop reason: SDUPTHER

## 2022-03-29 NOTE — TELEPHONE ENCOUNTER
Caller: BishopBriana dean    Relationship: Self    Best call back number: [phone]    Requested Prescriptions:   Requested Prescriptions     Pending Prescriptions Disp Refills   • oxyCODONE (ROXICODONE) 15 MG immediate release tablet 30 tablet 0     Sig: Take 1 tablet by mouth Every 8 (Eight) Hours As Needed for Moderate Pain .   • carisoprodol (SOMA) 350 MG tablet 90 tablet 0     Sig: Take 1 tablet by mouth 3 (Three) Times a Day As Needed for Muscle Spasms.        Pharmacy where request should be sent: [facility] - [place], [place] - [address] AT [place] [address] - [phone] [facility] [phone] FX     Additional details provided by patient: PATIENT STATES SHE WOULD LIKE TO GO BACK TO THE 10 MG DOSAGE OF HER OXYCODONE PRESCRIPTION BECAUSE SHE DOESN'T WANT TO GO TO PAIN MANAGEMENT AND PREFERS DR. TODD HANDLE HER PRESCRIPTION. SHE HAS 2 DAYS LEFT    Does the patient have less than a 3 day supply:  [x] Yes  [] No    Digna Carmona Rep   03/29/22 10:40 EDT

## 2022-03-30 ENCOUNTER — TELEPHONE (OUTPATIENT)
Dept: FAMILY MEDICINE CLINIC | Facility: CLINIC | Age: 57
End: 2022-03-30

## 2022-03-30 DIAGNOSIS — G89.29 CHRONIC BILATERAL LOW BACK PAIN WITHOUT SCIATICA: ICD-10-CM

## 2022-03-30 DIAGNOSIS — M54.50 CHRONIC BILATERAL LOW BACK PAIN WITHOUT SCIATICA: ICD-10-CM

## 2022-03-30 RX ORDER — BREXPIPRAZOLE 3 MG/1
3 TABLET ORAL EVERY MORNING
Qty: 30 TABLET | Refills: 1 | Status: SHIPPED | OUTPATIENT
Start: 2022-03-30 | End: 2022-05-24

## 2022-03-30 NOTE — TELEPHONE ENCOUNTER
Prior Auth for Carisoprodol 350MG tablets was DENIED.  Your doctor told us that you have an acute (quick onset) musculoskeletal (affecting bones, joints, ligaments, tendons, and/or muscles) condition that was diagnosed less than 6 months ago and that you do not have a history of meprobamate use in the past 90 days. Your provider indicated that you would be using the requested medication with an opioid analgesic (a type of pain medication) and a benzodiazepine (medication commonly used for anxiety). A paid claim for clonazepam on 03/28/2022 is noted in your prescription claims history. Your plan does not concurrent use (using at the same time) of the requested medication with opioids and benzodiazepines. We also do not have prescription claims history showing that you have tried at least one of the following preferred muscle relaxants in the past 30 days: baclofen, chlorzoxazone, cyclobenzaprine immediate release (IR), methocarbamol, orphenadrine citrate, or tizanidine. This is why your request is denied. Please work with your doctor to use a different medication or get us more information if it will allow us to approve this request. Our guideline for CARISOPRODOL PRODUCTS (SOMA, SOMA COMPOUND) requires that the patient has an acute musculoskeletal condition that was diagnosed in the last 6 months. In addition, the following criteria must also be met: 1. No history of meprobamate use in the past 90 days. 2. Trial and failure of at least one of the following preferred muscle relaxants in the past 30 days: a. Baclofen.b. Chlorzoxazone.c. cyclobenzaprine I      Patient can use [employer] $ 12.56.

## 2022-03-30 NOTE — TELEPHONE ENCOUNTER
Insurance is just not going to pay for anything sedating like pain medications or strong muscle relaxers  She is going to have to use good Rx if she wants to be on these kind of medications  Let me know if I need to resend the prescription

## 2022-03-30 NOTE — TELEPHONE ENCOUNTER
Left a voicemail on [facility] voicemail - to run the Soma through [employer] since insurance denied the PA.

## 2022-04-05 RX ORDER — OXYCODONE HYDROCHLORIDE 15 MG/1
15 TABLET ORAL EVERY 8 HOURS PRN
Qty: 30 TABLET | Refills: 0 | Status: SHIPPED | OUTPATIENT
Start: 2022-04-05 | End: 2022-04-07 | Stop reason: SDUPTHER

## 2022-04-05 NOTE — TELEPHONE ENCOUNTER
Caller: Briana Alas    Relationship: Self    Best call back number: [phone]    Requested Prescriptions:   Requested Prescriptions     Pending Prescriptions Disp Refills   • oxyCODONE (ROXICODONE) 15 MG immediate release tablet 30 tablet 0     Sig: Take 1 tablet by mouth Every 8 (Eight) Hours As Needed for Moderate Pain .        Pharmacy where request should be sent: [facility] [address] [place] [address], [place] - [address] AT [place] [address] - [phone] [facility] [phone] FX     Additional details provided by patient: PATIENT IS NEEDING A REFILL    Does the patient have less than a 3 day supply:  [x] Yes  [] No    Digna Aguero Rep   04/05/22 09:48 EDT

## 2022-04-07 ENCOUNTER — TELEPHONE (OUTPATIENT)
Dept: FAMILY MEDICINE CLINIC | Facility: CLINIC | Age: 57
End: 2022-04-07

## 2022-04-07 DIAGNOSIS — G89.29 CHRONIC BILATERAL LOW BACK PAIN WITHOUT SCIATICA: ICD-10-CM

## 2022-04-07 DIAGNOSIS — M54.50 CHRONIC BILATERAL LOW BACK PAIN WITHOUT SCIATICA: ICD-10-CM

## 2022-04-07 RX ORDER — OXYCODONE HYDROCHLORIDE 15 MG/1
15 TABLET ORAL EVERY 8 HOURS PRN
Qty: 90 TABLET | Refills: 0 | Status: SHIPPED | OUTPATIENT
Start: 2022-04-07 | End: 2022-04-14 | Stop reason: SDUPTHER

## 2022-04-07 NOTE — TELEPHONE ENCOUNTER
Spoke to Estephania and we only sent a 10 day supply and she needs a 30 day supply of her pain medicine.   Qty was only 30 tablets 1 tablet every 8hrs

## 2022-04-07 NOTE — TELEPHONE ENCOUNTER
Hub staff attempted to follow warm transfer process and was unsuccessful     Caller: Briana Alas    Relationship to patient: Self    Best call back number: [phone]    Patient is needing: TO SPEAK WITH DR CHRIS HEATON OR HIS MA TO DISCUSS HER PAIN MEDICATION REFILL.     PLEASE CALL TO ADVISE.

## 2022-04-14 ENCOUNTER — OFFICE VISIT (OUTPATIENT)
Dept: FAMILY MEDICINE CLINIC | Facility: CLINIC | Age: 57
End: 2022-04-14

## 2022-04-14 VITALS
HEIGHT: 64 IN | HEART RATE: 64 BPM | DIASTOLIC BLOOD PRESSURE: 61 MMHG | BODY MASS INDEX: 29.71 KG/M2 | RESPIRATION RATE: 16 BRPM | SYSTOLIC BLOOD PRESSURE: 100 MMHG | OXYGEN SATURATION: 94 % | WEIGHT: 174 LBS | TEMPERATURE: 97.8 F

## 2022-04-14 DIAGNOSIS — M54.50 CHRONIC BILATERAL LOW BACK PAIN WITHOUT SCIATICA: ICD-10-CM

## 2022-04-14 DIAGNOSIS — M54.2 NECK PAIN, CHRONIC: Primary | ICD-10-CM

## 2022-04-14 DIAGNOSIS — F32.A DEPRESSION, UNSPECIFIED DEPRESSION TYPE: ICD-10-CM

## 2022-04-14 DIAGNOSIS — G89.29 NECK PAIN, CHRONIC: Primary | ICD-10-CM

## 2022-04-14 DIAGNOSIS — G89.29 CHRONIC BILATERAL LOW BACK PAIN WITHOUT SCIATICA: ICD-10-CM

## 2022-04-14 PROCEDURE — 99213 OFFICE O/P EST LOW 20 MIN: CPT | Performed by: FAMILY MEDICINE

## 2022-04-14 RX ORDER — OXYCODONE HYDROCHLORIDE 15 MG/1
15 TABLET ORAL EVERY 8 HOURS PRN
Qty: 90 TABLET | Refills: 0 | Status: SHIPPED | OUTPATIENT
Start: 2022-04-14 | End: 2022-05-13 | Stop reason: SDUPTHER

## 2022-04-14 RX ORDER — TEMAZEPAM 30 MG/1
CAPSULE ORAL
Qty: 30 CAPSULE | Refills: 2 | Status: SHIPPED | OUTPATIENT
Start: 2022-04-14 | End: 2022-07-07 | Stop reason: SDUPTHER

## 2022-04-14 NOTE — PROGRESS NOTES
"Subjective   Briana Alas is a 56 y.o. female.     Briana Alas is in for follow up on her chronic pain issues. She has pain in the neck and lower back of a chronic nature , related to degenerative changes. She has been using her oxycodone There is no history of chest pain or dyspnea. There is no history of issue with bowel or bladder dysfunction. There is no history of dizziness or lightheadedness. There is no history of issue with sleep or mood. There is no history of issue with present medication.            /61 (BP Location: Left arm, Patient Position: Sitting, Cuff Size: Adult)   Pulse 64   Temp 97.8 °F (36.6 °C) (Temporal)   Resp 16   Ht 162.6 cm (64\")   Wt 78.9 kg (174 lb)   SpO2 94%   BMI 29.87 kg/m²       Chief Complaint   Patient presents with   • Medication Follow Up     Pain Meds           Current Outpatient Medications:   •  oxyCODONE (ROXICODONE) 15 MG immediate release tablet, Take 1 tablet by mouth Every 8 (Eight) Hours As Needed for Moderate Pain ., Disp: 90 tablet, Rfl: 0  •  atenolol (TENORMIN) 50 MG tablet, TAKE ONE (1) TABLET BY MOUTH EVERY DAY, Disp: 30 tablet, Rfl: 5  •  atorvastatin (LIPITOR) 20 MG tablet, TAKE ONE (1) TABLET BY MOUTH AT BEDTIME, Disp: 30 tablet, Rfl: 3  •  Brexpiprazole (Rexulti) 3 MG tablet, Take 1 tablet by mouth Every Morning., Disp: 30 tablet, Rfl: 1  •  busPIRone (BUSPAR) 15 MG tablet, Take 1 tablet by mouth 2 (Two) Times a Day., Disp: 60 tablet, Rfl: 3  •  carisoprodol (SOMA) 350 MG tablet, Take 1 tablet by mouth 3 (Three) Times a Day As Needed for Muscle Spasms., Disp: 90 tablet, Rfl: 0  •  clonazePAM (KlonoPIN) 1 MG tablet, Take 1 tablet by mouth 3 (Three) Times a Day As Needed for Anxiety., Disp: 90 tablet, Rfl: 3  •  DULoxetine (CYMBALTA) 60 MG capsule, Take 1 capsule by mouth 2 (Two) Times a Day., Disp: 60 capsule, Rfl: 4  •  ibuprofen (ADVIL,MOTRIN) 800 MG tablet, Take 1 tablet by mouth Every 8 (Eight) Hours As Needed for Moderate Pain ., Disp: " 90 tablet, Rfl: 3  •  pregabalin (LYRICA) 150 MG capsule, TAKE ONE (1) CAPSULE BY MOUTH TWICE DAILY, Disp: 60 capsule, Rfl: 3  •  rOPINIRole (REQUIP) 0.5 MG tablet, TAKE ONE (1) TABLET BY MOUTH TWICE DAILY (TAKE ONE (1) TABLET ONE HOUR BEFORE BEDTIME), Disp: 60 tablet, Rfl: 5  •  temazepam (RESTORIL) 30 MG capsule, TAKE ONE CAPSULE BY MOUTH EVERY NIGHT AT BEDTIME AS NEEDED FOR SLEEP, Disp: 30 capsule, Rfl: 3  •  vitamin D (ERGOCALCIFEROL) 1.25 MG (64758 UT) capsule capsule, Take 1 capsule by mouth 1 (One) Time Per Week., Disp: 4 capsule, Rfl: 5        The following portions of the patient's history were reviewed and updated as appropriate: allergies, current medications, past family history, past medical history, past social history, past surgical history, and problem list.    Review of Systems   Constitutional: Negative for activity change, fatigue and fever.   HENT: Negative for congestion, sinus pressure, sinus pain, sore throat and trouble swallowing.    Eyes: Negative for visual disturbance.   Respiratory: Negative for cough, chest tightness, shortness of breath and wheezing.    Cardiovascular: Negative for chest pain.   Gastrointestinal: Negative for abdominal distention, abdominal pain, constipation, diarrhea, nausea and vomiting.   Genitourinary: Negative for difficulty urinating, dysuria and urgency.   Musculoskeletal: Positive for arthralgias, back pain, myalgias and neck pain.   Neurological: Negative for dizziness, weakness, light-headedness and numbness.   Psychiatric/Behavioral: Positive for dysphoric mood. Negative for agitation, hallucinations, sleep disturbance and suicidal ideas. The patient is nervous/anxious.        Objective   Physical Exam  Vitals and nursing note reviewed.   Cardiovascular:      Rate and Rhythm: Normal rate and regular rhythm.      Heart sounds: Normal heart sounds. No murmur heard.  Pulmonary:      Effort: Pulmonary effort is normal.      Breath sounds: No wheezing or rales.    Abdominal:      General: Bowel sounds are normal.      Palpations: Abdomen is soft.      Tenderness: There is no abdominal tenderness. There is no guarding.   Musculoskeletal:      Cervical back: Neck supple.      Right lower leg: No edema.      Left lower leg: No edema.      Comments: Stiffness in the lumbar and cervical spine but able to get out of chair unaided, able to get up and down from exam table unaided, gait is steady   Lymphadenopathy:      Cervical: No cervical adenopathy.   Skin:     Findings: No rash.   Neurological:      Mental Status: She is alert and oriented to person, place, and time. Mental status is at baseline.           Assessment/Plan   Problems Addressed this Visit        Musculoskeletal and Injuries    Chronic low back pain    Relevant Medications    oxyCODONE (ROXICODONE) 15 MG immediate release tablet    Neck pain, chronic - Primary      Diagnoses       Codes Comments    Neck pain, chronic    -  Primary ICD-10-CM: M54.2, G89.29  ICD-9-CM: 723.1, 338.29     Chronic bilateral low back pain without sciatica     ICD-10-CM: M54.50, G89.29  ICD-9-CM: 724.2, 338.29           I will renew her pain medication for a 30 day supply  I advised her to try to remain active  I advised she get a Covid vaccination, as she is due for a booster  I will see again in a few months for follow up

## 2022-05-03 DIAGNOSIS — G89.29 CHRONIC LOW BACK PAIN WITH SCIATICA, SCIATICA LATERALITY UNSPECIFIED, UNSPECIFIED BACK PAIN LATERALITY: ICD-10-CM

## 2022-05-03 DIAGNOSIS — M54.40 CHRONIC LOW BACK PAIN WITH SCIATICA, SCIATICA LATERALITY UNSPECIFIED, UNSPECIFIED BACK PAIN LATERALITY: ICD-10-CM

## 2022-05-04 RX ORDER — CARISOPRODOL 350 MG/1
350 TABLET ORAL 3 TIMES DAILY PRN
Qty: 90 TABLET | Refills: 0 | Status: SHIPPED | OUTPATIENT
Start: 2022-05-04 | End: 2022-06-09 | Stop reason: SDUPTHER

## 2022-05-13 DIAGNOSIS — M54.50 CHRONIC BILATERAL LOW BACK PAIN WITHOUT SCIATICA: ICD-10-CM

## 2022-05-13 DIAGNOSIS — G89.29 CHRONIC BILATERAL LOW BACK PAIN WITHOUT SCIATICA: ICD-10-CM

## 2022-05-13 NOTE — TELEPHONE ENCOUNTER
Caller: Briana Alas    Relationship: Self    Best call back number: [phone]    Requested Prescriptions:   Requested Prescriptions     Pending Prescriptions Disp Refills   • oxyCODONE (ROXICODONE) 15 MG immediate release tablet 90 tablet 0     Sig: Take 1 tablet by mouth Every 8 (Eight) Hours As Needed for Moderate Pain .        Pharmacy where request should be sent: [facility] [address] [place] [address], [place] - [address] AT [place] [address] - [phone] [facility] [phone] FX     Additional details provided by patient: PATIENT WILL BE COMPLETELY OUT OF THIS MEDICATION ON Monday, 05/16/2022.    Does the patient have less than a 3 day supply:  [x] Yes  [] No    Digna Taylor Rep   05/13/22 10:35 EDT

## 2022-05-14 RX ORDER — OXYCODONE HYDROCHLORIDE 15 MG/1
15 TABLET ORAL EVERY 8 HOURS PRN
Qty: 90 TABLET | Refills: 0 | Status: SHIPPED | OUTPATIENT
Start: 2022-05-14 | End: 2022-06-13 | Stop reason: SDUPTHER

## 2022-05-23 DIAGNOSIS — F33.1 MAJOR DEPRESSIVE DISORDER, RECURRENT EPISODE, MODERATE: Chronic | ICD-10-CM

## 2022-05-23 RX ORDER — ATORVASTATIN CALCIUM 20 MG/1
TABLET, FILM COATED ORAL
Qty: 30 TABLET | Refills: 3 | Status: SHIPPED | OUTPATIENT
Start: 2022-05-23 | End: 2022-09-11

## 2022-05-24 RX ORDER — BREXPIPRAZOLE 3 MG/1
TABLET ORAL
Qty: 30 TABLET | Refills: 1 | Status: SHIPPED | OUTPATIENT
Start: 2022-05-24 | End: 2022-07-06 | Stop reason: SDUPTHER

## 2022-06-09 DIAGNOSIS — M54.40 CHRONIC LOW BACK PAIN WITH SCIATICA, SCIATICA LATERALITY UNSPECIFIED, UNSPECIFIED BACK PAIN LATERALITY: ICD-10-CM

## 2022-06-09 DIAGNOSIS — G89.29 CHRONIC LOW BACK PAIN WITH SCIATICA, SCIATICA LATERALITY UNSPECIFIED, UNSPECIFIED BACK PAIN LATERALITY: ICD-10-CM

## 2022-06-09 RX ORDER — CARISOPRODOL 350 MG/1
350 TABLET ORAL 3 TIMES DAILY PRN
Qty: 90 TABLET | Refills: 0 | Status: SHIPPED | OUTPATIENT
Start: 2022-06-09 | End: 2022-07-07 | Stop reason: SDUPTHER

## 2022-06-09 NOTE — TELEPHONE ENCOUNTER
Caller: Briana Alas    Relationship: Self    Best call back number: [phone]      Requested Prescriptions:   Requested Prescriptions     Pending Prescriptions Disp Refills   • carisoprodol (SOMA) 350 MG tablet 90 tablet 0     Sig: Take 1 tablet by mouth 3 (Three) Times a Day As Needed for Muscle Spasms.        Pharmacy where request should be sent: [facility] [address] [place] [address], [place] - [address] AT [place] [address] - [phone] [facility] [phone] FX     Additional details provided by patient: IS TOTALLY OUT OF THIS MEDICATION.    Does the patient have less than a 3 day supply:  [x] Yes  [] No    Tali Jaime, PCT   06/09/22 14:29 EDT

## 2022-06-13 DIAGNOSIS — G89.29 CHRONIC BILATERAL LOW BACK PAIN WITHOUT SCIATICA: ICD-10-CM

## 2022-06-13 DIAGNOSIS — M54.50 CHRONIC BILATERAL LOW BACK PAIN WITHOUT SCIATICA: ICD-10-CM

## 2022-06-13 RX ORDER — OXYCODONE HYDROCHLORIDE 15 MG/1
15 TABLET ORAL EVERY 8 HOURS PRN
Qty: 90 TABLET | Refills: 0 | Status: SHIPPED | OUTPATIENT
Start: 2022-06-13 | End: 2022-07-07 | Stop reason: SDUPTHER

## 2022-06-13 NOTE — TELEPHONE ENCOUNTER
Caller: Briana Alas    Relationship: Self    Best call back [phone]    Requested Prescriptions:   Requested Prescriptions     Pending Prescriptions Disp Refills   • oxyCODONE (ROXICODONE) 15 MG immediate release tablet 90 tablet 0     Sig: Take 1 tablet by mouth Every 8 (Eight) Hours As Needed for Moderate Pain .        Pharmacy where request should be sent: [facility] [address] [place] [address], [place] - [address] AT [place] [address] - [phone] [facility] [phone] FX     Additional details provided by patient:OUT    Does the patient have less than a 3 day supply:  [x] Yes  [] No    Jesús Barry   06/13/22 09:48 EDT

## 2022-06-15 RX ORDER — ROPINIROLE 0.5 MG/1
TABLET, FILM COATED ORAL
Qty: 60 TABLET | Refills: 5 | Status: SHIPPED | OUTPATIENT
Start: 2022-06-15 | End: 2022-12-02

## 2022-06-15 RX ORDER — ATENOLOL 50 MG/1
TABLET ORAL
Qty: 30 TABLET | Refills: 5 | Status: SHIPPED | OUTPATIENT
Start: 2022-06-15 | End: 2022-12-02

## 2022-06-16 NOTE — PROGRESS NOTES
"     Patient ID: Briana Alas is a 54 y.o. female.    Chief Complaint:    Chief Complaint   Patient presents with   • Consult     right shoulder pain       HPI:  Briana is a 54-year-old right-hand-dominant female here with about 6 months of right shoulder pain.  There is no specific injury.  She has pain deep in the shoulder with referral to the deltoid.  It is worse with overhead activity or lifting at the side.  Pain is sharp and a 7/10 and bothers her to sleep, comb her hair, or clean her house.  She is taken oral medication and done exercises at the direction of her primary care physician without relief.  Past Medical History:   Diagnosis Date   • Anxiety    • Back pain    • Depression    • Hyperlipidemia    • Hypertension    • Restless leg syndrome        Past Surgical History:   Procedure Laterality Date   • ANKLE OPEN REDUCTION INTERNAL FIXATION Right    • FOOT SURGERY Bilateral     bunionectomy   • TUBAL ABDOMINAL LIGATION         Family History   Problem Relation Age of Onset   • Hypertension Mother    • Hyperlipidemia Mother    • Depression Mother    • Thyroid disease Mother    • Hypertension Father           Social History     Occupational History   • Not on file   Tobacco Use   • Smoking status: Current Every Day Smoker     Packs/day: 0.50     Types: Cigarettes   • Smokeless tobacco: Never Used   Substance and Sexual Activity   • Alcohol use: No     Frequency: Never   • Drug use: No   • Sexual activity: Not on file      Review of Systems   Cardiovascular: Negative for chest pain.   Musculoskeletal: Positive for arthralgias.       Objective:    /70   Pulse 73   Ht 162.6 cm (64\")   Wt 82.6 kg (182 lb)   BMI 31.24 kg/m²     Physical Examination:  She is a pleasant female in no distress. She is alert and oriented x3 and appears her stated age.  Right shoulder demonstrates no scars and no atrophy with pain in the impingement area.  Passive elevation 150 degrees abduction 120 degrees external " rotation 30 degrees internal rotation is L5.  She has pain weakness on Speed, Dalhart, and supraspinatus testing.  Belly press and liftoff are 4/5.Sensory and motor exam are intact all distributions. Radial pulse is palpable and capillary refill is less than two seconds to all digits    Imaging:  X-rays demonstrate well-maintained joint spaces    Assessment:    Right shoulder pain possible cuff tear  Plan:  I recommend MRI to evaluate her rotator cuff          Disclaimer: Please note that areas of this note were completed with computer voice recognition software.  Quite often unanticipated grammatical, syntax, homophones, and other interpretive errors are inadvertently transcribed by the computer software. Please excuse any errors that have escaped final proofreading.   Instructions: This plan will send the code FBSE to the PM system.  DO NOT or CHANGE the price. Price (Do Not Change): 0.00 Detail Level: Simple

## 2022-07-06 ENCOUNTER — OFFICE VISIT (OUTPATIENT)
Dept: PSYCHIATRY | Facility: CLINIC | Age: 57
End: 2022-07-06

## 2022-07-06 DIAGNOSIS — F33.1 MAJOR DEPRESSIVE DISORDER, RECURRENT EPISODE, MODERATE: Primary | Chronic | ICD-10-CM

## 2022-07-06 DIAGNOSIS — F41.1 GENERALIZED ANXIETY DISORDER: Chronic | ICD-10-CM

## 2022-07-06 PROCEDURE — 99214 OFFICE O/P EST MOD 30 MIN: CPT | Performed by: PSYCHIATRY & NEUROLOGY

## 2022-07-06 RX ORDER — CLONAZEPAM 1 MG/1
1 TABLET ORAL 3 TIMES DAILY PRN
Qty: 90 TABLET | Refills: 3 | Status: SHIPPED | OUTPATIENT
Start: 2022-07-06 | End: 2022-11-09

## 2022-07-06 RX ORDER — BREXPIPRAZOLE 3 MG/1
3 TABLET ORAL EVERY MORNING
Qty: 30 TABLET | Refills: 5 | Status: SHIPPED | OUTPATIENT
Start: 2022-07-06 | End: 2023-01-30

## 2022-07-06 RX ORDER — BUSPIRONE HYDROCHLORIDE 15 MG/1
15 TABLET ORAL 2 TIMES DAILY
Qty: 60 TABLET | Refills: 3 | Status: SHIPPED | OUTPATIENT
Start: 2022-07-06 | End: 2023-02-28 | Stop reason: SDUPTHER

## 2022-07-06 RX ORDER — DULOXETIN HYDROCHLORIDE 60 MG/1
60 CAPSULE, DELAYED RELEASE ORAL 2 TIMES DAILY
Qty: 60 CAPSULE | Refills: 4 | Status: SHIPPED | OUTPATIENT
Start: 2022-07-06 | End: 2022-10-12

## 2022-07-06 NOTE — PROGRESS NOTES
Subjective   Briana Alas is a 56 y.o. female who presents today for follow up    Chief Complaint:   To f/u on Depression, anxiety      History of Present Illness: the pt has a long hx of depression, no specific triggers or stressors, was on zoloft, celexa , few unsuccessful trials   She was relatively stable on meds until her daughter passed away  Last year , still difficult to cope  , her daughter was special needs, pt's life was revolving around her , now she feels she has no purpose     Today the pt reported still feeling less depressed and anxious,  Still has days when she feels sad,  has no motivations, no desire to do anything      Depression is rated as 3-4/10, denied AVH/SI/HI     Sleep - fair  on temazepam negative news      Alleviating factors - none     Anxiety is manageable on clonazepam  Anxiety is  associated with somatic sxs, tension, constant worries about everything, unable to relax           The following portions of the patient's history were reviewed and updated as appropriate: allergies, current medications, past family history, past medical history, past social history, past surgical history and problem list.    PAST PSYCHIATRIC HISTORY  Axis I  Affective/Bipolar Disorder, Anxiety/Panic Disorder  No inpt. No SI/SA   Axis II  Defer     PAST OUTPATIENT TREATMENT  Diagnosis treated:  Affective Disorder, Anxiety/Panic Disorder  Treatment Type:  Medication Management  [facility]se[facility]   Prior Psychiatric Medications:  lexapro - not effective  celexa -not effective  zoloft - not effective now    Support Groups:  None   Sequelae Of Mental Disorder:  emotional distress          Interval History  Some improvement     Side Effects  Denied       Past Medical History:  Past Medical History:   Diagnosis Date   • Anxiety    • Arthritis    • Back pain    • Depression    • Headache    • Hyperlipidemia    • Hypertension    • Injury of back    • Restless leg syndrome        Social  History:  Social History     Socioeconomic History   • Marital status: Legally    Tobacco Use   • Smoking status: Current Every Day Smoker     Packs/day: 0.50     Types: Cigarettes   • Smokeless tobacco: Never Used   Vaping Use   • Vaping Use: Never used   Substance and Sexual Activity   • Alcohol use: No   • Drug use: No   • Sexual activity: Defer       Family History:  Family History   Problem Relation Age of Onset   • Hypertension Mother    • Hyperlipidemia Mother    • Depression Mother    • Thyroid disease Mother    • Hypertension Father        Past Surgical History:  Past Surgical History:   Procedure Laterality Date   • ANKLE OPEN REDUCTION INTERNAL FIXATION Right    • FOOT SURGERY Bilateral     bunionectomy   • SHOULDER ARTHROSCOPY W/ ROTATOR CUFF REPAIR Right 12/13/2019    Procedure: RIGHT SHOULDER ARTHROSCOPY WITH ROTATOR CUFF REPAIR and Subacromial decompression;  Surgeon: Gino Ackerman MD;  Location: [facility] OR;  Service: Orthopedics   • TUBAL ABDOMINAL LIGATION         Problem List:  Patient Active Problem List   Diagnosis   • Fibromyalgia   • Abnormal gait   • Ankle pain, right   • Knee pain   • Leg pain, left   • Annular tear of lumbar disc   • Degeneration of lumbar intervertebral disc   • Generalized anxiety disorder   • Body mass index 32.0-32.9, adult   • Cervical myelopathy (HCC)   • Other dorsalgia   • Chronic low back pain   • Hx traumatic fracture   • Hyperlipidemia   • Hypertension, essential   • Hypovitaminosis D   • Inflammatory arthritis   • Joint pain   • Leg weakness, bilateral   • Lumbosacral radiculopathy   • Malaise and fatigue   • Neck pain, chronic   • Osteoarthritis of knee   • Pain in right shoulder   • Pain due to internal orthopedic prosthetic devices, implants and grafts, subsequent encounter   • Rotator cuff tendonitis   • Scoliosis   • Osteoarthritis of lumbosacral spine without myelopathy   • Spondylosis of lumbosacral region   • Tobacco abuse  counseling   • Upper respiratory tract infection   • Major depressive disorder, recurrent episode, moderate (HCC)   • Primary insomnia   • Bereavement       Allergy:   Allergies   Allergen Reactions   • Penicillins Rash        Discontinued Medications:  Medications Discontinued During This Encounter   Medication Reason   • DULoxetine (CYMBALTA) 60 MG capsule Reorder   • busPIRone (BUSPAR) 15 MG tablet Reorder   • clonazePAM (KlonoPIN) 1 MG tablet Reorder   • Rexulti 3 MG tablet Reorder       Current Medications:   Current Outpatient Medications   Medication Sig Dispense Refill   • Brexpiprazole (Rexulti) 3 MG tablet Take 1 tablet by mouth Every Morning. 30 tablet 5   • busPIRone (BUSPAR) 15 MG tablet Take 1 tablet by mouth 2 (Two) Times a Day. 60 tablet 3   • clonazePAM (KlonoPIN) 1 MG tablet Take 1 tablet by mouth 3 (Three) Times a Day As Needed for Anxiety. 90 tablet 3   • DULoxetine (CYMBALTA) 60 MG capsule Take 1 capsule by mouth 2 (Two) Times a Day. 60 capsule 4   • atenolol (TENORMIN) 50 MG tablet TAKE ONE (1) TABLET BY MOUTH EVERY DAY 30 tablet 5   • atorvastatin (LIPITOR) 20 MG tablet TAKE ONE (1) TABLET BY MOUTH AT BEDTIME 30 tablet 3   • carisoprodol (SOMA) 350 MG tablet Take 1 tablet by mouth 3 (Three) Times a Day As Needed for Muscle Spasms. 90 tablet 0   • ibuprofen (ADVIL,MOTRIN) 800 MG tablet Take 1 tablet by mouth Every 8 (Eight) Hours As Needed for Moderate Pain . 90 tablet 3   • oxyCODONE (ROXICODONE) 15 MG immediate release tablet Take 1 tablet by mouth Every 8 (Eight) Hours As Needed for Moderate Pain . 90 tablet 0   • pregabalin (LYRICA) 150 MG capsule TAKE ONE (1) CAPSULE BY MOUTH TWICE DAILY 60 capsule 3   • rOPINIRole (REQUIP) 0.5 MG tablet TAKE ONE (1) TABLET BY MOUTH TWICE DAILY (TAKE ONE (1) TABLET ONE HOUR BEFORE BEDTIME) 60 tablet 5   • temazepam (RESTORIL) 30 MG capsule TAKE ONE CAPSULE BY MOUTH EVERY NIGHT AT BEDTIME AS NEEDED FOR SLEEP 30 capsule 2   • vitamin D (ERGOCALCIFEROL) 1.25  MG (29887 UT) capsule capsule Take 1 capsule by mouth 1 (One) Time Per Week. 4 capsule 5     No current facility-administered medications for this visit.         Psychological ROS: positive for - anxiety, depression   negative for - hallucinations, hostility, irritability, memory difficulties, mood swings, obsessive thoughts or suicidal ideation      Physical Exam:   There were no vitals taken for this visit.    Mental Status Exam:   Hygiene:   good  Cooperation:  Cooperative  Eye Contact:  Good  Psychomotor Behavior:  Appropriate  Affect:  Appropriate and congruent with mood   Mood: depressed and fluctates  Hopelessness: Denies  Speech:  Normal  Thought Process:  Goal directed and Linear  Thought Content:  Mood congruent  Suicidal:  None  Homicidal:  None  Hallucinations:  None  Delusion:  None  Memory:  Intact  Orientation:  Person, Place, Time and Situation  Reliability:  good  Insight:  Good  Judgement:  Good  Impulse Control:  Good  Physical/Medical Issues:  Yes       MSE from 3/4/2022 reviewed and accepted with changes   PHQ-9 Depression Screening  Little interest or pleasure in doing things? 2-->more than half the days   Feeling down, depressed, or hopeless? 2-->more than half the days   Trouble falling or staying asleep, or sleeping too much? 1-->several days   Feeling tired or having little energy? 2-->more than half the days   Poor appetite or overeating? 0-->not at all   Feeling bad about yourself - or that you are a failure or have let yourself or your family down? 1-->several days   Trouble concentrating on things, such as reading the newspaper or watching television? 1-->several days   Moving or speaking so slowly that other people could have noticed? Or the opposite - being so fidgety or restless that you have been moving around a lot more than usual? 0-->not at all   Thoughts that you would be better off dead, or of hurting yourself in some way? 0-->not at all   PHQ-9 Total Score 9   If you checked  off any problems, how difficult have these problems made it for you to do your work, take care of things at home, or get along with other people? very difficult           Current every day smoker 3-10 mintues spent counseling Not agreeable to stopping    I advised Braina of the risks of tobacco use.     Lab Results:   No visits with results within 3 Month(s) from this visit.   Latest known visit with results is:   Office Visit on 03/14/2022   Component Date Value Ref Range Status   • Glucose 03/14/2022 80  65 - 99 mg/dL Final   • BUN 03/14/2022 15  6 - 20 mg/dL Final   • Creatinine 03/14/2022 0.92  0.57 - 1.00 mg/dL Final   • Sodium 03/14/2022 139  136 - 145 mmol/L Final   • Potassium 03/14/2022 5.0  3.5 - 5.2 mmol/L Final   • Chloride 03/14/2022 103  98 - 107 mmol/L Final   • CO2 03/14/2022 28.0  22.0 - 29.0 mmol/L Final   • Calcium 03/14/2022 9.4  8.6 - 10.5 mg/dL Final   • Total Protein 03/14/2022 7.2  6.0 - 8.5 g/dL Final   • Albumin 03/14/2022 4.10  3.50 - 5.20 g/dL Final   • ALT (SGPT) 03/14/2022 9  1 - 33 U/L Final   • AST (SGOT) 03/14/2022 15  1 - 32 U/L Final   • Alkaline Phosphatase 03/14/2022 83  39 - 117 U/L Final   • Total Bilirubin 03/14/2022 0.2  0.0 - 1.2 mg/dL Final   • Globulin 03/14/2022 3.1  gm/dL Final   • A/G Ratio 03/14/2022 1.3  g/dL Final   • BUN/Creatinine Ratio 03/14/2022 16.3  7.0 - 25.0 Final   • Anion Gap 03/14/2022 8.0  5.0 - 15.0 mmol/L Final   • eGFR 03/14/2022 72.8  >60.0 mL/min/1.73 Final    National Kidney Foundation and American Society of Nephrology (ASN) Task Force recommended calculation based on the Chronic Kidney Disease Epidemiology Collaboration (CKD-EPI) equation refit without adjustment for race.   • Total Cholesterol 03/14/2022 188  0 - 200 mg/dL Final   • Triglycerides 03/14/2022 190 (A) 0 - 150 mg/dL Final   • HDL Cholesterol 03/14/2022 38 (A) 40 - 60 mg/dL Final   • LDL Cholesterol  03/14/2022 116 (A) 0 - 100 mg/dL Final   • VLDL Cholesterol 03/14/2022 34  5 - 40  mg/dL Final   • LDL/HDL Ratio 03/14/2022 2.95   Final   • 25 Hydroxy, Vitamin D 03/14/2022 8.9 (A) 30.0 - 100.0 ng/ml Final       Assessment & Plan   Problems Addressed this Visit        Mental Health    Generalized anxiety disorder (Chronic)    Relevant Medications    Brexpiprazole (Rexulti) 3 MG tablet    DULoxetine (CYMBALTA) 60 MG capsule    busPIRone (BUSPAR) 15 MG tablet    clonazePAM (KlonoPIN) 1 MG tablet    Major depressive disorder, recurrent episode, moderate (HCC) - Primary (Chronic)    Relevant Medications    Brexpiprazole (Rexulti) 3 MG tablet    DULoxetine (CYMBALTA) 60 MG capsule    busPIRone (BUSPAR) 15 MG tablet      Diagnoses       Codes Comments    Major depressive disorder, recurrent episode, moderate (HCC)    -  Primary ICD-10-CM: F33.1  ICD-9-CM: 296.32     Generalized anxiety disorder     ICD-10-CM: F41.1  ICD-9-CM: 300.02           Visit Diagnoses:    ICD-10-CM ICD-9-CM   1. Major depressive disorder, recurrent episode, moderate (HCC)  F33.1 296.32   2. Generalized anxiety disorder  F41.1 300.02       TREATMENT PLAN/GOALS: Continue supportive psychotherapy efforts and medications as indicated. Treatment and medication options discussed during today's visit. Patient ackowledged and verbally consented to continue with current treatment plan and was educated on the importance of compliance with treatment and follow-up appointments.    MEDICATION ISSUES:    INSPECT reviewed as expected - on pain meds, on clonazepam  6/20/2022 (last refill)   and temazepam from PCP     1. Major depressive d/o moderate recurrent - cont cymbalta 120 mg BP is stable, cont rexulti 3 mg      Cont therapy  2. Generalized anxiety - cont cymbalta, cont clonazepam 1 mg TID, the pt is on opioids, will closely monitor, will do random UDS    3 Insomnia - cont temazepam  4 bereavement - cont grief therapy     Labs TG 3/14/2022 , CMP WNL , vit D 8 (low) on supplements now   PHQ scored 9 and indicated mild depression   STEWART 7  scored 6    Discussed medication options and treatment plan of prescribed medication as well as the risks, benefits, and side effects including potential falls, possible impaired driving and metabolic adversities among others. Patient is agreeable to call the office with any worsening of symptoms or onset of side effects. Patient is agreeable to call 911 or go to the nearest ER should he/she begin having SI/HI. No medication side effects or related complaints today.     MEDS ORDERED DURING VISIT:  New Medications Ordered This Visit   Medications   • Brexpiprazole (Rexulti) 3 MG tablet     Sig: Take 1 tablet by mouth Every Morning.     Dispense:  30 tablet     Refill:  5   • DULoxetine (CYMBALTA) 60 MG capsule     Sig: Take 1 capsule by mouth 2 (Two) Times a Day.     Dispense:  60 capsule     Refill:  4   • busPIRone (BUSPAR) 15 MG tablet     Sig: Take 1 tablet by mouth 2 (Two) Times a Day.     Dispense:  60 tablet     Refill:  3   • clonazePAM (KlonoPIN) 1 MG tablet     Sig: Take 1 tablet by mouth 3 (Three) Times a Day As Needed for Anxiety.     Dispense:  90 tablet     Refill:  3     Please dispense on or after July 17, 2022       Return in about 4 months (around 11/6/2022).         This document has been electronically signed by Jade Ryder MD  July 6, 2022 11:19 EDT    EMR Dragon transcription disclaimer:  Some of this encounter note is an electronic transcription translation of spoken language to printed text. The electronic translation of spoken language may permit erroneous, or at times, nonsensical words or phrases to be inadvertently transcribed; Although I have reviewed the note for such errors some may still exist.

## 2022-07-07 DIAGNOSIS — M54.40 CHRONIC LOW BACK PAIN WITH SCIATICA, SCIATICA LATERALITY UNSPECIFIED, UNSPECIFIED BACK PAIN LATERALITY: ICD-10-CM

## 2022-07-07 DIAGNOSIS — M54.50 CHRONIC BILATERAL LOW BACK PAIN WITHOUT SCIATICA: ICD-10-CM

## 2022-07-07 DIAGNOSIS — F32.A DEPRESSION, UNSPECIFIED DEPRESSION TYPE: ICD-10-CM

## 2022-07-07 DIAGNOSIS — G89.29 CHRONIC BILATERAL LOW BACK PAIN WITHOUT SCIATICA: ICD-10-CM

## 2022-07-07 DIAGNOSIS — G89.29 CHRONIC LOW BACK PAIN WITH SCIATICA, SCIATICA LATERALITY UNSPECIFIED, UNSPECIFIED BACK PAIN LATERALITY: ICD-10-CM

## 2022-07-07 RX ORDER — TEMAZEPAM 30 MG/1
30 CAPSULE ORAL NIGHTLY PRN
Qty: 30 CAPSULE | Refills: 2 | Status: SHIPPED | OUTPATIENT
Start: 2022-07-07 | End: 2022-10-03 | Stop reason: SDUPTHER

## 2022-07-07 RX ORDER — OXYCODONE HYDROCHLORIDE 15 MG/1
15 TABLET ORAL EVERY 8 HOURS PRN
Qty: 90 TABLET | Refills: 0 | Status: SHIPPED | OUTPATIENT
Start: 2022-07-07 | End: 2022-08-08 | Stop reason: SDUPTHER

## 2022-07-07 RX ORDER — CARISOPRODOL 350 MG/1
350 TABLET ORAL 3 TIMES DAILY PRN
Qty: 90 TABLET | Refills: 0 | Status: SHIPPED | OUTPATIENT
Start: 2022-07-07 | End: 2022-08-08 | Stop reason: SDUPTHER

## 2022-07-07 NOTE — TELEPHONE ENCOUNTER
Caller: BishopBriana dean    Relationship: Self    Best call back number: [phone]     Requested Prescriptions:   Requested Prescriptions     Pending Prescriptions Disp Refills   • oxyCODONE (ROXICODONE) 15 MG immediate release tablet 90 tablet 0     Sig: Take 1 tablet by mouth Every 8 (Eight) Hours As Needed for Moderate Pain .   • carisoprodol (SOMA) 350 MG tablet 90 tablet 0     Sig: Take 1 tablet by mouth 3 (Three) Times a Day As Needed for Muscle Spasms.   • temazepam (RESTORIL) 30 MG capsule 30 capsule 2     Sig: Take 1 capsule by mouth At Night As Needed for Sleep.        Pharmacy where request should be sent: [facility] - [place], [place] - [address] AT [place] [address] - [phone] [facility] [phone] FX     Additional details provided by patient: PATIENT HAS 2 DAYS LEFT ON ALL MEDICATION.    PLEASE CONTACT PATIENT IF NEEDED.    Does the patient have less than a 3 day supply:  [x] Yes  [] No    Digna CABRALES Rep   07/07/22 09:57 EDT

## 2022-07-07 NOTE — TELEPHONE ENCOUNTER
Lv:4/14/22  Apt: 7/25/22  Temazepam written 4/14/22 sold: 6/13/22  Clonazepam 1mg written 3/4/22 sold: 6/20/22 by dr. vergara  Oxycodone 4/7/22 sold 6/13/22  Soma last written 6/9/22 sold: 6/10/22

## 2022-07-11 ENCOUNTER — TELEPHONE (OUTPATIENT)
Dept: FAMILY MEDICINE CLINIC | Facility: CLINIC | Age: 57
End: 2022-07-11

## 2022-07-12 NOTE — TELEPHONE ENCOUNTER
I would not full with the prior authorization, they are not approving it because of her age  It will not matter what the diagnosis is  She can use [facility] if she wants to stay on it, just let me know where to send the prescription if she is going to stay that way  
Prior authorization needed for Temazepam 30 mg capsules. Do you want me to complete PA or good [facility]? Also, can you verify diagnosis? Attached dx says depression but sig says sleep.   
Spoke with patient. She uses [facility] and gets at [facility].   
tc  
done

## 2022-07-13 DIAGNOSIS — G89.4 CHRONIC PAIN SYNDROME: ICD-10-CM

## 2022-07-13 RX ORDER — PREGABALIN 150 MG/1
CAPSULE ORAL
Qty: 60 CAPSULE | Refills: 3 | Status: SHIPPED | OUTPATIENT
Start: 2022-07-13 | End: 2022-12-29

## 2022-07-26 ENCOUNTER — CLINICAL SUPPORT (OUTPATIENT)
Dept: PSYCHIATRY | Facility: CLINIC | Age: 57
End: 2022-07-26

## 2022-07-26 DIAGNOSIS — F41.1 GENERALIZED ANXIETY DISORDER: Primary | ICD-10-CM

## 2022-07-29 ENCOUNTER — TELEPHONE (OUTPATIENT)
Dept: PSYCHIATRY | Facility: CLINIC | Age: 57
End: 2022-07-29

## 2022-08-08 ENCOUNTER — TELEPHONE (OUTPATIENT)
Dept: PSYCHIATRY | Facility: CLINIC | Age: 57
End: 2022-08-08

## 2022-08-08 DIAGNOSIS — G89.29 CHRONIC BILATERAL LOW BACK PAIN WITHOUT SCIATICA: ICD-10-CM

## 2022-08-08 DIAGNOSIS — M54.40 CHRONIC LOW BACK PAIN WITH SCIATICA, SCIATICA LATERALITY UNSPECIFIED, UNSPECIFIED BACK PAIN LATERALITY: ICD-10-CM

## 2022-08-08 DIAGNOSIS — G89.29 CHRONIC LOW BACK PAIN WITH SCIATICA, SCIATICA LATERALITY UNSPECIFIED, UNSPECIFIED BACK PAIN LATERALITY: ICD-10-CM

## 2022-08-08 DIAGNOSIS — M54.50 CHRONIC BILATERAL LOW BACK PAIN WITHOUT SCIATICA: ICD-10-CM

## 2022-08-08 RX ORDER — OXYCODONE HYDROCHLORIDE 15 MG/1
15 TABLET ORAL EVERY 8 HOURS PRN
Qty: 90 TABLET | Refills: 0 | Status: SHIPPED | OUTPATIENT
Start: 2022-08-08 | End: 2022-09-02 | Stop reason: SDUPTHER

## 2022-08-08 RX ORDER — CARISOPRODOL 350 MG/1
350 TABLET ORAL 3 TIMES DAILY PRN
Qty: 90 TABLET | Refills: 0 | Status: SHIPPED | OUTPATIENT
Start: 2022-08-08 | End: 2022-09-02 | Stop reason: SDUPTHER

## 2022-08-08 NOTE — TELEPHONE ENCOUNTER
Caller: Briana Alas    Relationship: Self    Best call back number: [phone]       Requested Prescriptions:   Requested Prescriptions     Pending Prescriptions Disp Refills   • oxyCODONE (ROXICODONE) 15 MG immediate release tablet 90 tablet 0     Sig: Take 1 tablet by mouth Every 8 (Eight) Hours As Needed for Moderate Pain .   • carisoprodol (SOMA) 350 MG tablet 90 tablet 0     Sig: Take 1 tablet by mouth 3 (Three) Times a Day As Needed for Muscle Spasms.        Pharmacy where request should be sent: [facility] - [place], [place] - [address] AT [place] [address] - [phone] [facility] [phone] FX     Additional details provided by patient: PATIENT IS OUT OF THESE MEDICATIONS     Does the patient have less than a 3 day supply:  [x] Yes  [] No    Digna Estrada Rep   08/08/22 08:28 EDT

## 2022-08-08 NOTE — TELEPHONE ENCOUNTER
Called pt to inquire why fentanyl showed up on her drug screen.  She says she has no idea, because she only takes prescription everything.  She said Dr. Garcia prescribes oxycodone to her.

## 2022-09-02 DIAGNOSIS — M54.50 CHRONIC BILATERAL LOW BACK PAIN WITHOUT SCIATICA: ICD-10-CM

## 2022-09-02 DIAGNOSIS — G89.29 CHRONIC BILATERAL LOW BACK PAIN WITHOUT SCIATICA: ICD-10-CM

## 2022-09-02 DIAGNOSIS — G89.29 CHRONIC LOW BACK PAIN WITH SCIATICA, SCIATICA LATERALITY UNSPECIFIED, UNSPECIFIED BACK PAIN LATERALITY: ICD-10-CM

## 2022-09-02 DIAGNOSIS — M54.40 CHRONIC LOW BACK PAIN WITH SCIATICA, SCIATICA LATERALITY UNSPECIFIED, UNSPECIFIED BACK PAIN LATERALITY: ICD-10-CM

## 2022-09-02 RX ORDER — OXYCODONE HYDROCHLORIDE 15 MG/1
15 TABLET ORAL EVERY 8 HOURS PRN
Qty: 90 TABLET | Refills: 0 | Status: SHIPPED | OUTPATIENT
Start: 2022-09-02 | End: 2022-10-03 | Stop reason: SDUPTHER

## 2022-09-02 RX ORDER — CARISOPRODOL 350 MG/1
350 TABLET ORAL 3 TIMES DAILY PRN
Qty: 90 TABLET | Refills: 0 | Status: SHIPPED | OUTPATIENT
Start: 2022-09-02 | End: 2022-10-03 | Stop reason: SDUPTHER

## 2022-09-02 NOTE — TELEPHONE ENCOUNTER
Caller: Briana Alas    Relationship: Self    Best call back number: [phone]    Requested Prescriptions:   Requested Prescriptions     Pending Prescriptions Disp Refills   • oxyCODONE (ROXICODONE) 15 MG immediate release tablet 90 tablet 0     Sig: Take 1 tablet by mouth Every 8 (Eight) Hours As Needed for Moderate Pain.   • carisoprodol (SOMA) 350 MG tablet 90 tablet 0     Sig: Take 1 tablet by mouth 3 (Three) Times a Day As Needed for Muscle Spasms.        Pharmacy where request should be sent: [facility] - [place], [place] - [address] AT [place] [address] - [phone] [facility] [phone] FX     Additional details provided by patient: 2 DAY SUPPLY    Does the patient have less than a 3 day supply:  [x] Yes  [] No    Digna Carmona Rep   09/02/22 09:44 EDT

## 2022-09-11 RX ORDER — ATORVASTATIN CALCIUM 20 MG/1
TABLET, FILM COATED ORAL
Qty: 30 TABLET | Refills: 3 | Status: SHIPPED | OUTPATIENT
Start: 2022-09-11 | End: 2022-12-31

## 2022-10-03 DIAGNOSIS — G89.29 CHRONIC BILATERAL LOW BACK PAIN WITHOUT SCIATICA: ICD-10-CM

## 2022-10-03 DIAGNOSIS — M54.40 CHRONIC LOW BACK PAIN WITH SCIATICA, SCIATICA LATERALITY UNSPECIFIED, UNSPECIFIED BACK PAIN LATERALITY: ICD-10-CM

## 2022-10-03 DIAGNOSIS — F32.A DEPRESSION, UNSPECIFIED DEPRESSION TYPE: ICD-10-CM

## 2022-10-03 DIAGNOSIS — M54.50 CHRONIC BILATERAL LOW BACK PAIN WITHOUT SCIATICA: ICD-10-CM

## 2022-10-03 DIAGNOSIS — G89.29 CHRONIC LOW BACK PAIN WITH SCIATICA, SCIATICA LATERALITY UNSPECIFIED, UNSPECIFIED BACK PAIN LATERALITY: ICD-10-CM

## 2022-10-03 RX ORDER — TEMAZEPAM 30 MG/1
30 CAPSULE ORAL NIGHTLY PRN
Qty: 30 CAPSULE | Refills: 2 | Status: SHIPPED | OUTPATIENT
Start: 2022-10-03 | End: 2022-11-30 | Stop reason: SDUPTHER

## 2022-10-03 RX ORDER — CARISOPRODOL 350 MG/1
350 TABLET ORAL 3 TIMES DAILY PRN
Qty: 90 TABLET | Refills: 0 | Status: SHIPPED | OUTPATIENT
Start: 2022-10-03 | End: 2022-10-31 | Stop reason: SDUPTHER

## 2022-10-03 RX ORDER — OXYCODONE HYDROCHLORIDE 15 MG/1
15 TABLET ORAL EVERY 8 HOURS PRN
Qty: 90 TABLET | Refills: 0 | Status: SHIPPED | OUTPATIENT
Start: 2022-10-03 | End: 2022-10-31 | Stop reason: SDUPTHER

## 2022-10-03 NOTE — TELEPHONE ENCOUNTER
Caller: BishopBriana dean    Relationship: Self    Best call back number: [phone]       Requested Prescriptions:   Requested Prescriptions     Pending Prescriptions Disp Refills   • oxyCODONE (ROXICODONE) 15 MG immediate release tablet 90 tablet 0     Sig: Take 1 tablet by mouth Every 8 (Eight) Hours As Needed for Moderate Pain.   • temazepam (RESTORIL) 30 MG capsule 30 capsule 2     Sig: Take 1 capsule by mouth At Night As Needed for Sleep.   • carisoprodol (SOMA) 350 MG tablet 90 tablet 0     Sig: Take 1 tablet by mouth 3 (Three) Times a Day As Needed for Muscle Spasms.        Pharmacy where request should be sent: [id number] - [place], [place] - [address] AT [place] [address] - [phone] [facility] [phone] FX      Additional details provided by patient: 1 DAY OF MEDICATION REMAINING    Does the patient have less than a 3 day supply:  [x] Yes  [] No    Digna Mahajan Rep   10/03/22 15:27 EDT

## 2022-10-11 DIAGNOSIS — F33.1 MAJOR DEPRESSIVE DISORDER, RECURRENT EPISODE, MODERATE: Chronic | ICD-10-CM

## 2022-10-11 DIAGNOSIS — F41.1 GENERALIZED ANXIETY DISORDER: Chronic | ICD-10-CM

## 2022-10-12 RX ORDER — DULOXETIN HYDROCHLORIDE 60 MG/1
CAPSULE, DELAYED RELEASE ORAL
Qty: 60 CAPSULE | Refills: 1 | Status: SHIPPED | OUTPATIENT
Start: 2022-10-12 | End: 2023-02-28 | Stop reason: SDUPTHER

## 2022-10-19 ENCOUNTER — OFFICE VISIT (OUTPATIENT)
Dept: FAMILY MEDICINE CLINIC | Facility: CLINIC | Age: 57
End: 2022-10-19

## 2022-10-19 VITALS
OXYGEN SATURATION: 98 % | DIASTOLIC BLOOD PRESSURE: 69 MMHG | TEMPERATURE: 98 F | SYSTOLIC BLOOD PRESSURE: 112 MMHG | BODY MASS INDEX: 29.87 KG/M2 | WEIGHT: 174 LBS | HEART RATE: 52 BPM

## 2022-10-19 DIAGNOSIS — F17.200 TOBACCO DEPENDENCY: ICD-10-CM

## 2022-10-19 DIAGNOSIS — F41.1 GENERALIZED ANXIETY DISORDER: Chronic | ICD-10-CM

## 2022-10-19 DIAGNOSIS — I10 HYPERTENSION, ESSENTIAL: Primary | ICD-10-CM

## 2022-10-19 DIAGNOSIS — G89.29 CHRONIC BILATERAL LOW BACK PAIN WITHOUT SCIATICA: ICD-10-CM

## 2022-10-19 DIAGNOSIS — M54.50 CHRONIC BILATERAL LOW BACK PAIN WITHOUT SCIATICA: ICD-10-CM

## 2022-10-19 DIAGNOSIS — L73.9 FOLLICULITIS: ICD-10-CM

## 2022-10-19 PROCEDURE — 99213 OFFICE O/P EST LOW 20 MIN: CPT | Performed by: FAMILY MEDICINE

## 2022-10-19 RX ORDER — CLINDAMYCIN HYDROCHLORIDE 300 MG/1
300 CAPSULE ORAL 4 TIMES DAILY
Qty: 40 CAPSULE | Refills: 0 | Status: SHIPPED | OUTPATIENT
Start: 2022-10-19 | End: 2022-10-29

## 2022-10-19 RX ORDER — CLINDAMYCIN HYDROCHLORIDE 300 MG/1
300 CAPSULE ORAL 4 TIMES DAILY
Qty: 40 CAPSULE | Refills: 0 | Status: SHIPPED | OUTPATIENT
Start: 2022-10-19 | End: 2022-10-19

## 2022-10-19 NOTE — PROGRESS NOTES
Subjective   Briana Alas is a 57 y.o. female.     History of Present Illness  Briana Alas is in for follow up on her high blood pressure and her chronic issues with anxiety and low back pain.  She continues to smoke about a pack per day.  She is primarily bothered today by some return of ingrown hairs and folliculitis in the pubic region.  She has not had any fever.  She has not seen any discharge or bleeding.  Her low back pain is stable on current medication and it allows her to maintain her ADLs effectively and safely.  There is no history of chest pain or dyspnea. There is no history of issue with bowel or bladder dysfunction. There is no history of dizziness or lightheadedness. There is no history of issue with sleep or mood. There is no history of issue with present medication.     Cyst  Associated symptoms include arthralgias, myalgias, neck pain and a rash. Pertinent negatives include no abdominal pain, chest pain, congestion, coughing, fatigue, fever, nausea, numbness, sore throat, vomiting or weakness.          /69 (BP Location: Left arm, Patient Position: Sitting, Cuff Size: Large Adult)   Pulse 52   Temp 98 °F (36.7 °C) (Temporal)   Wt 78.9 kg (174 lb)   SpO2 98%   BMI 29.87 kg/m²       Chief Complaint   Patient presents with   • medicine check   • Cyst     Cyst where her pubic hair starts and one is crease of pelvic area - painful and sore to touch come and go            Current Outpatient Medications:   •  atenolol (TENORMIN) 50 MG tablet, TAKE ONE (1) TABLET BY MOUTH EVERY DAY, Disp: 30 tablet, Rfl: 5  •  atorvastatin (LIPITOR) 20 MG tablet, TAKE ONE (1) TABLET BY MOUTH AT BEDTIME, Disp: 30 tablet, Rfl: 3  •  Brexpiprazole (Rexulti) 3 MG tablet, Take 1 tablet by mouth Every Morning., Disp: 30 tablet, Rfl: 5  •  busPIRone (BUSPAR) 15 MG tablet, Take 1 tablet by mouth 2 (Two) Times a Day., Disp: 60 tablet, Rfl: 3  •  carisoprodol (SOMA) 350 MG tablet, Take 1 tablet by mouth 3 (Three)  Times a Day As Needed for Muscle Spasms., Disp: 90 tablet, Rfl: 0  •  clindamycin (CLEOCIN) 300 MG capsule, Take 1 capsule by mouth 4 (Four) Times a Day for 10 days., Disp: 40 capsule, Rfl: 0  •  clonazePAM (KlonoPIN) 1 MG tablet, Take 1 tablet by mouth 3 (Three) Times a Day As Needed for Anxiety., Disp: 90 tablet, Rfl: 3  •  DULoxetine (CYMBALTA) 60 MG capsule, TAKE ONE (1) CAPSULE BY MOUTH TWICE DAILY, Disp: 60 capsule, Rfl: 1  •  ibuprofen (ADVIL,MOTRIN) 800 MG tablet, Take 1 tablet by mouth Every 8 (Eight) Hours As Needed for Moderate Pain ., Disp: 90 tablet, Rfl: 3  •  oxyCODONE (ROXICODONE) 15 MG immediate release tablet, Take 1 tablet by mouth Every 8 (Eight) Hours As Needed for Moderate Pain., Disp: 90 tablet, Rfl: 0  •  pregabalin (LYRICA) 150 MG capsule, TAKE ONE (1) CAPSULE BY MOUTH TWICE DAILY, Disp: 60 capsule, Rfl: 3  •  rOPINIRole (REQUIP) 0.5 MG tablet, TAKE ONE (1) TABLET BY MOUTH TWICE DAILY (TAKE ONE (1) TABLET ONE HOUR BEFORE BEDTIME), Disp: 60 tablet, Rfl: 5  •  temazepam (RESTORIL) 30 MG capsule, Take 1 capsule by mouth At Night As Needed for Sleep., Disp: 30 capsule, Rfl: 2  •  vitamin D (ERGOCALCIFEROL) 1.25 MG (96338 UT) capsule capsule, Take 1 capsule by mouth 1 (One) Time Per Week., Disp: 4 capsule, Rfl: 5        The following portions of the patient's history were reviewed and updated as appropriate: allergies, current medications, past family history, past medical history, past social history, past surgical history, and problem list.    Review of Systems   Constitutional: Negative for activity change, fatigue and fever.   HENT: Negative for congestion, sinus pressure, sinus pain, sore throat and trouble swallowing.    Eyes: Negative for visual disturbance.   Respiratory: Negative for cough, chest tightness, shortness of breath and wheezing.    Cardiovascular: Negative for chest pain.   Gastrointestinal: Negative for abdominal distention, abdominal pain, constipation, diarrhea, nausea and  vomiting.   Genitourinary: Negative for difficulty urinating, dysuria and urgency.   Musculoskeletal: Positive for arthralgias, back pain, myalgias and neck pain.   Skin: Positive for rash.   Neurological: Negative for dizziness, weakness, light-headedness and numbness.   Psychiatric/Behavioral: Positive for dysphoric mood. Negative for agitation, hallucinations, sleep disturbance and suicidal ideas. The patient is nervous/anxious.        Objective   Physical Exam  Vitals and nursing note reviewed.   HENT:      Right Ear: Tympanic membrane and ear canal normal.      Left Ear: Tympanic membrane and ear canal normal.   Cardiovascular:      Rate and Rhythm: Normal rate and regular rhythm.      Heart sounds: Normal heart sounds.   Pulmonary:      Effort: Pulmonary effort is normal.      Breath sounds: No wheezing or rales.   Abdominal:      General: Bowel sounds are normal.      Palpations: Abdomen is soft.      Tenderness: There is no abdominal tenderness. There is no guarding.   Musculoskeletal:      Cervical back: Neck supple.      Right lower leg: No edema.      Left lower leg: No edema.   Lymphadenopathy:      Cervical: No cervical adenopathy.   Skin:     Findings: Rash (Evidence of folliculitis in the inguinal region) present.   Neurological:      General: No focal deficit present.      Mental Status: She is alert and oriented to person, place, and time.   Psychiatric:         Mood and Affect: Mood normal.     Briana Annp  reports that she has been smoking cigarettes. She has been smoking an average of .5 packs per day. She has never used smokeless tobacco.. I have educated her on the risk of diseases from using tobacco products such as cancer, COPD and heart disease.     I advised her to quit and she is not willing to quit.    I spent 5 minutes counseling the patient.             Assessment & Plan   Problems Addressed this Visit        Cardiac and Vasculature    Hypertension, essential - Primary       Mental  Health    Generalized anxiety disorder (Chronic)       Musculoskeletal and Injuries    Chronic low back pain   Other Visit Diagnoses     Folliculitis          Diagnoses       Codes Comments    Hypertension, essential    -  Primary ICD-10-CM: I10  ICD-9-CM: 401.9     Generalized anxiety disorder     ICD-10-CM: F41.1  ICD-9-CM: 300.02     Chronic bilateral low back pain without sciatica     ICD-10-CM: M54.50, G89.29  ICD-9-CM: 724.2, 338.29     Folliculitis     ICD-10-CM: L73.9  ICD-9-CM: 704.8           I will put her on some clindamycin for the folliculitis  We did discuss smoking cessation but she is just not interested  I will maintain the current treatment plan for her high blood pressure and her chronic pain and anxiety issues  I have asked her to see me again in a few months  I did encourage her to get updated vaccinations for COVID and flu  I will have her call for any new concerns, and if the folliculitis is not improving I will refer to a surgeon

## 2022-10-31 DIAGNOSIS — G89.29 CHRONIC LOW BACK PAIN WITH SCIATICA, SCIATICA LATERALITY UNSPECIFIED, UNSPECIFIED BACK PAIN LATERALITY: ICD-10-CM

## 2022-10-31 DIAGNOSIS — M54.40 CHRONIC LOW BACK PAIN WITH SCIATICA, SCIATICA LATERALITY UNSPECIFIED, UNSPECIFIED BACK PAIN LATERALITY: ICD-10-CM

## 2022-10-31 DIAGNOSIS — G89.29 CHRONIC BILATERAL LOW BACK PAIN WITHOUT SCIATICA: ICD-10-CM

## 2022-10-31 DIAGNOSIS — M54.50 CHRONIC BILATERAL LOW BACK PAIN WITHOUT SCIATICA: ICD-10-CM

## 2022-10-31 RX ORDER — OXYCODONE HYDROCHLORIDE 15 MG/1
15 TABLET ORAL EVERY 8 HOURS PRN
Qty: 90 TABLET | Refills: 0 | Status: SHIPPED | OUTPATIENT
Start: 2022-10-31 | End: 2022-11-30 | Stop reason: SDUPTHER

## 2022-10-31 RX ORDER — CARISOPRODOL 350 MG/1
350 TABLET ORAL 3 TIMES DAILY PRN
Qty: 90 TABLET | Refills: 0 | Status: SHIPPED | OUTPATIENT
Start: 2022-10-31 | End: 2022-11-30 | Stop reason: SDUPTHER

## 2022-10-31 NOTE — TELEPHONE ENCOUNTER
Caller: Briana Alas    Relationship: Self    Best call back number: [phone]    Requested Prescriptions:   Requested Prescriptions     Pending Prescriptions Disp Refills   • oxyCODONE (ROXICODONE) 15 MG immediate release tablet 90 tablet 0     Sig: Take 1 tablet by mouth Every 8 (Eight) Hours As Needed for Moderate Pain.   • carisoprodol (SOMA) 350 MG tablet 90 tablet 0     Sig: Take 1 tablet by mouth 3 (Three) Times a Day As Needed for Muscle Spasms.        Pharmacy where request should be sent: [facility] [id number] - [place], [place] - [address] AT [place] [address] - [phone] [facility] [phone] FX     Does the patient have less than a 3 day supply:  [x] Yes  [] No    Digna Serrano Rep   10/31/22 10:20 EDT

## 2022-11-03 ENCOUNTER — TELEPHONE (OUTPATIENT)
Dept: FAMILY MEDICINE CLINIC | Facility: CLINIC | Age: 57
End: 2022-11-03

## 2022-11-03 NOTE — TELEPHONE ENCOUNTER
Carisoprodol 350 mg tablet prior authorization has been denied. Denied for the following reasons:  Your doctor told us that you have chronic pain and low back pain. The information   provided, states you have a history of meprobamate use in the past 90 days and that   you will be taking the requested medication with an opioid (a type of pain medication).   You are currently on clonazepam- with a paid claim on 10/14/2022. Based on the   [id number] (06/21)   information provided, we do not know if this medication is being discontinued or not.   Your plan does not concurrent use (using at the same time) of the requested medication  with opioids or benzodiazepines (like clonazepam). We also do not have information   that you have an acute musculoskeletal (affecting bones, joints, ligaments, tendons,   and/or muscles) condition that was diagnosed less than 6 months ago. Finally, we do   not have prescription claims history showing that you have tried at least one of the   following preferred muscle relaxants in the past 30 days: baclofen, chlorzoxazone,   cyclobenzaprine immediate release (IR), methocarbamol, orphenadrine citrate, or   Tizanidine.     All of these criteria must be met before they will approve. On the PA request it did ask if the patient had meprobamate use in the past 90 days and I marked no. I did not see in the past 30 days that she had used the requested alternatives. Also, they will not pay for Carisoprodol while on Clonazepam. Affordable with goodrx. Would you like to change or have her use goodrx?

## 2022-11-06 DIAGNOSIS — F41.1 GENERALIZED ANXIETY DISORDER: Chronic | ICD-10-CM

## 2022-11-07 NOTE — TELEPHONE ENCOUNTER
Rx Refill Note  Requested Prescriptions     Pending Prescriptions Disp Refills   • clonazePAM (KlonoPIN) 1 MG tablet [Pharmacy Med Name: clonazepam 1 mg tablet] 90 tablet 3     Sig: TAKE ONE (1) TABLET BY MOUTH THREE TIMES DAILY AS NEEDED FOR ANXIETY      Last office visit with prescribing clinician: 7/6/2022      Next office visit with prescribing clinician: 11/10/2022   Office Visit with Jade Ryder MD (07/06/2022)       SCANNED - LABS (07/26/2022)      Symone Varela MA  11/07/22, 09:25 EST     Last fill 10-14; inspect printed

## 2022-11-09 RX ORDER — CLONAZEPAM 1 MG/1
TABLET ORAL
Qty: 90 TABLET | Refills: 3 | Status: SHIPPED | OUTPATIENT
Start: 2022-11-09 | End: 2023-02-10 | Stop reason: SDUPTHER

## 2022-11-28 DIAGNOSIS — G89.29 CHRONIC LOW BACK PAIN WITH SCIATICA, SCIATICA LATERALITY UNSPECIFIED, UNSPECIFIED BACK PAIN LATERALITY: ICD-10-CM

## 2022-11-28 DIAGNOSIS — G89.29 CHRONIC BILATERAL LOW BACK PAIN WITHOUT SCIATICA: ICD-10-CM

## 2022-11-28 DIAGNOSIS — M54.40 CHRONIC LOW BACK PAIN WITH SCIATICA, SCIATICA LATERALITY UNSPECIFIED, UNSPECIFIED BACK PAIN LATERALITY: ICD-10-CM

## 2022-11-28 DIAGNOSIS — F32.A DEPRESSION, UNSPECIFIED DEPRESSION TYPE: ICD-10-CM

## 2022-11-28 DIAGNOSIS — M54.50 CHRONIC BILATERAL LOW BACK PAIN WITHOUT SCIATICA: ICD-10-CM

## 2022-11-28 RX ORDER — OXYCODONE HYDROCHLORIDE 15 MG/1
15 TABLET ORAL EVERY 8 HOURS PRN
Qty: 90 TABLET | Refills: 0 | OUTPATIENT
Start: 2022-11-28

## 2022-11-28 RX ORDER — TEMAZEPAM 30 MG/1
30 CAPSULE ORAL NIGHTLY PRN
Qty: 30 CAPSULE | Refills: 2 | OUTPATIENT
Start: 2022-11-28

## 2022-11-28 RX ORDER — CARISOPRODOL 350 MG/1
350 TABLET ORAL 3 TIMES DAILY PRN
Qty: 90 TABLET | Refills: 0 | OUTPATIENT
Start: 2022-11-28

## 2022-11-28 NOTE — TELEPHONE ENCOUNTER
Caller: Briana Alas    Relationship: Self    Best call back number: [phone]    Requested Prescriptions:   Requested Prescriptions     Pending Prescriptions Disp Refills   • oxyCODONE (ROXICODONE) 15 MG immediate release tablet 90 tablet 0     Sig: Take 1 tablet by mouth Every 8 (Eight) Hours As Needed for Moderate Pain.   • carisoprodol (SOMA) 350 MG tablet 90 tablet 0     Sig: Take 1 tablet by mouth 3 (Three) Times a Day As Needed for Muscle Spasms.   • temazepam (RESTORIL) 30 MG capsule 30 capsule 2     Sig: Take 1 capsule by mouth At Night As Needed for Sleep.        Pharmacy where request should be sent: [facility] [id number] - [place], [place] - [address] AT [place] [address] - [phone] [facility] [phone] FX      Additional details provided by patient: THE PATIENT IS RUNNING LOW ON THE ABOVE MEDICATIONS.    Does the patient have less than a 3 day supply:  [x] Yes  [] No    Digna Reeder Rep   11/28/22 10:30 EST

## 2022-11-30 DIAGNOSIS — G89.29 CHRONIC BILATERAL LOW BACK PAIN WITHOUT SCIATICA: ICD-10-CM

## 2022-11-30 DIAGNOSIS — M54.40 CHRONIC LOW BACK PAIN WITH SCIATICA, SCIATICA LATERALITY UNSPECIFIED, UNSPECIFIED BACK PAIN LATERALITY: ICD-10-CM

## 2022-11-30 DIAGNOSIS — M54.50 CHRONIC BILATERAL LOW BACK PAIN WITHOUT SCIATICA: ICD-10-CM

## 2022-11-30 DIAGNOSIS — F32.A DEPRESSION, UNSPECIFIED DEPRESSION TYPE: ICD-10-CM

## 2022-11-30 DIAGNOSIS — G89.29 CHRONIC LOW BACK PAIN WITH SCIATICA, SCIATICA LATERALITY UNSPECIFIED, UNSPECIFIED BACK PAIN LATERALITY: ICD-10-CM

## 2022-11-30 RX ORDER — CARISOPRODOL 350 MG/1
350 TABLET ORAL 3 TIMES DAILY PRN
Qty: 90 TABLET | Refills: 0 | Status: SHIPPED | OUTPATIENT
Start: 2022-11-30 | End: 2022-12-27 | Stop reason: SDUPTHER

## 2022-11-30 RX ORDER — TEMAZEPAM 30 MG/1
30 CAPSULE ORAL NIGHTLY PRN
Qty: 30 CAPSULE | Refills: 2 | Status: SHIPPED | OUTPATIENT
Start: 2022-11-30 | End: 2022-12-27 | Stop reason: SDUPTHER

## 2022-11-30 RX ORDER — OXYCODONE HYDROCHLORIDE 15 MG/1
15 TABLET ORAL EVERY 8 HOURS PRN
Qty: 90 TABLET | Refills: 0 | Status: SHIPPED | OUTPATIENT
Start: 2022-11-30 | End: 2022-12-27 | Stop reason: SDUPTHER

## 2022-11-30 NOTE — TELEPHONE ENCOUNTER
Caller: Briana Alas    Relationship to patient: Self    Best call back number: [phone]    Patient is needing: PATIENT IS CALLING TO CHECK ON THE STATUS OF THIS MEDICATION REFILL. SHE IS OUT OF MEDICATION. PLEASE SEND IN TODAY IF POSSIBLE.

## 2022-12-02 RX ORDER — ROPINIROLE 0.5 MG/1
TABLET, FILM COATED ORAL
Qty: 60 TABLET | Refills: 5 | Status: SHIPPED | OUTPATIENT
Start: 2022-12-02

## 2022-12-02 RX ORDER — ATENOLOL 50 MG/1
TABLET ORAL
Qty: 30 TABLET | Refills: 5 | Status: SHIPPED | OUTPATIENT
Start: 2022-12-02

## 2022-12-06 ENCOUNTER — TELEPHONE (OUTPATIENT)
Dept: FAMILY MEDICINE CLINIC | Facility: CLINIC | Age: 57
End: 2022-12-06

## 2022-12-06 NOTE — TELEPHONE ENCOUNTER
Prior authorization for Pregabalin 150mg capsules has been approved. Message from plan:  The request has been approved. The authorization is effective from 12/06/2022 to 12/05/2023, as long as the member is enrolled in their current health plan. The request was approved as submitted. This request has been approved with a quantity limit of 3 capsules per day.     Approval notice faxed to the pharmacy.

## 2022-12-27 DIAGNOSIS — M54.50 CHRONIC BILATERAL LOW BACK PAIN WITHOUT SCIATICA: ICD-10-CM

## 2022-12-27 DIAGNOSIS — G89.29 CHRONIC BILATERAL LOW BACK PAIN WITHOUT SCIATICA: ICD-10-CM

## 2022-12-27 DIAGNOSIS — G89.29 CHRONIC LOW BACK PAIN WITH SCIATICA, SCIATICA LATERALITY UNSPECIFIED, UNSPECIFIED BACK PAIN LATERALITY: ICD-10-CM

## 2022-12-27 DIAGNOSIS — M54.40 CHRONIC LOW BACK PAIN WITH SCIATICA, SCIATICA LATERALITY UNSPECIFIED, UNSPECIFIED BACK PAIN LATERALITY: ICD-10-CM

## 2022-12-27 DIAGNOSIS — F32.A DEPRESSION, UNSPECIFIED DEPRESSION TYPE: ICD-10-CM

## 2022-12-27 RX ORDER — CARISOPRODOL 350 MG/1
350 TABLET ORAL 3 TIMES DAILY PRN
Qty: 90 TABLET | Refills: 0 | Status: SHIPPED | OUTPATIENT
Start: 2022-12-27 | End: 2023-01-24 | Stop reason: SDUPTHER

## 2022-12-27 RX ORDER — TEMAZEPAM 30 MG/1
30 CAPSULE ORAL NIGHTLY PRN
Qty: 30 CAPSULE | Refills: 0 | Status: SHIPPED | OUTPATIENT
Start: 2022-12-27 | End: 2023-01-24 | Stop reason: SDUPTHER

## 2022-12-27 RX ORDER — OXYCODONE HYDROCHLORIDE 15 MG/1
15 TABLET ORAL EVERY 8 HOURS PRN
Qty: 90 TABLET | Refills: 0 | Status: SHIPPED | OUTPATIENT
Start: 2022-12-27 | End: 2023-01-24 | Stop reason: SDUPTHER

## 2022-12-27 NOTE — TELEPHONE ENCOUNTER
Caller: Bishop Briana CEFERINO    Relationship: Self    Best call back number: [phone]    Requested Prescriptions:   Requested Prescriptions     Pending Prescriptions Disp Refills   • temazepam (RESTORIL) 30 MG capsule 30 capsule 2     Sig: Take 1 capsule by mouth At Night As Needed for Sleep.   • oxyCODONE (ROXICODONE) 15 MG immediate release tablet 90 tablet 0     Sig: Take 1 tablet by mouth Every 8 (Eight) Hours As Needed for Moderate Pain.   • carisoprodol (SOMA) 350 MG tablet 90 tablet 0     Sig: Take 1 tablet by mouth 3 (Three) Times a Day As Needed for Muscle Spasms.        Pharmacy where request should be sent: [facility] [id number] - [place], [place] - [address] AT [place] [address] - [phone] [facility] [phone] FX     Additional details provided by patient: PATIENT STATES SHE HAS 1 DAY LEFT ON ALL 3 PRESCRIPTIONS.     Does the patient have less than a 3 day supply:  [x] Yes  [] No    Would you like a call back once the refill request has been completed: [x] Yes [] No    If the office needs to give you a call back, can they leave a voicemail: [x] Yes [] No    Digna Santos Rep   12/27/22 08:43 EST

## 2022-12-27 NOTE — TELEPHONE ENCOUNTER
Lv:10/19/22  Apt: 2/20/23  Oxycodone and Soma last written and filled 11/30/22 by Dr. Thacker.   Inspect shows pt is being prescribed clonazepam 1mg tabs by Dr. Ryder on 11/9/22 sold: 12/9/22  Temazepam 30mg last written and filled 11/30/22

## 2022-12-29 DIAGNOSIS — G89.4 CHRONIC PAIN SYNDROME: ICD-10-CM

## 2022-12-29 RX ORDER — PREGABALIN 150 MG/1
CAPSULE ORAL
Qty: 60 CAPSULE | Refills: 3 | Status: SHIPPED | OUTPATIENT
Start: 2022-12-29

## 2022-12-31 RX ORDER — ATORVASTATIN CALCIUM 20 MG/1
TABLET, FILM COATED ORAL
Qty: 30 TABLET | Refills: 3 | Status: SHIPPED | OUTPATIENT
Start: 2022-12-31 | End: 2023-02-22

## 2023-01-09 ENCOUNTER — PRIOR AUTHORIZATION (OUTPATIENT)
Dept: PSYCHIATRY | Facility: CLINIC | Age: 58
End: 2023-01-09
Payer: MEDICAID

## 2023-01-12 ENCOUNTER — TELEPHONE (OUTPATIENT)
Dept: PSYCHIATRY | Facility: CLINIC | Age: 58
End: 2023-01-12
Payer: MEDICAID

## 2023-01-12 NOTE — TELEPHONE ENCOUNTER
Pharmacist from [facility] says that he has given the pt the choice to take clonazepam or temazepam, but he will not fill both, because she is also taking carisoprodol which he says converts into diazepam.  I told him you are aware of the meds she takes because you review the inspect report regularly.

## 2023-01-24 DIAGNOSIS — G89.29 CHRONIC LOW BACK PAIN WITH SCIATICA, SCIATICA LATERALITY UNSPECIFIED, UNSPECIFIED BACK PAIN LATERALITY: ICD-10-CM

## 2023-01-24 DIAGNOSIS — F32.A DEPRESSION, UNSPECIFIED DEPRESSION TYPE: ICD-10-CM

## 2023-01-24 DIAGNOSIS — M54.40 CHRONIC LOW BACK PAIN WITH SCIATICA, SCIATICA LATERALITY UNSPECIFIED, UNSPECIFIED BACK PAIN LATERALITY: ICD-10-CM

## 2023-01-24 DIAGNOSIS — G89.29 CHRONIC BILATERAL LOW BACK PAIN WITHOUT SCIATICA: ICD-10-CM

## 2023-01-24 DIAGNOSIS — M54.50 CHRONIC BILATERAL LOW BACK PAIN WITHOUT SCIATICA: ICD-10-CM

## 2023-01-24 RX ORDER — CARISOPRODOL 350 MG/1
350 TABLET ORAL 3 TIMES DAILY PRN
Qty: 90 TABLET | Refills: 0 | Status: SHIPPED | OUTPATIENT
Start: 2023-01-24 | End: 2023-02-20 | Stop reason: SDUPTHER

## 2023-01-24 RX ORDER — TEMAZEPAM 30 MG/1
30 CAPSULE ORAL NIGHTLY PRN
Qty: 30 CAPSULE | Refills: 2 | Status: SHIPPED | OUTPATIENT
Start: 2023-01-24 | End: 2023-02-10 | Stop reason: SDUPTHER

## 2023-01-24 RX ORDER — OXYCODONE HYDROCHLORIDE 15 MG/1
15 TABLET ORAL EVERY 8 HOURS PRN
Qty: 90 TABLET | Refills: 0 | Status: SHIPPED | OUTPATIENT
Start: 2023-01-24 | End: 2023-02-20 | Stop reason: SDUPTHER

## 2023-01-24 NOTE — TELEPHONE ENCOUNTER
Caller: BishopBriana dean    Relationship: Self    Best call back number: [phone]    Requested Prescriptions:   Requested Prescriptions     Pending Prescriptions Disp Refills   • oxyCODONE (ROXICODONE) 15 MG immediate release tablet 90 tablet 0     Sig: Take 1 tablet by mouth Every 8 (Eight) Hours As Needed for Moderate Pain.   • temazepam (RESTORIL) 30 MG capsule 30 capsule 0     Sig: Take 1 capsule by mouth At Night As Needed for Sleep.   • carisoprodol (SOMA) 350 MG tablet 90 tablet 0     Sig: Take 1 tablet by mouth 3 (Three) Times a Day As Needed for Muscle Spasms.        Pharmacy where request should be sent: [facility] [id number] - [place], [place] - [address] AT [place] [address] - [phone] [facility] [phone] FX     Additional details provided by patient: LESS THAN 3 DAY SUPPLY    Does the patient have less than a 3 day supply:  [x] Yes  [] No    Would you like a call back once the refill request has been completed: [] Yes [x] No    If the office needs to give you a call back, can they leave a voicemail: [] Yes [x] No    Digna Steven Rep   01/24/23 08:09 EST

## 2023-01-28 DIAGNOSIS — F33.1 MAJOR DEPRESSIVE DISORDER, RECURRENT EPISODE, MODERATE: Chronic | ICD-10-CM

## 2023-01-30 RX ORDER — BREXPIPRAZOLE 3 MG/1
TABLET ORAL
Qty: 30 TABLET | Refills: 0 | Status: SHIPPED | OUTPATIENT
Start: 2023-01-30 | End: 2023-02-28 | Stop reason: DRUGHIGH

## 2023-02-09 ENCOUNTER — TELEPHONE (OUTPATIENT)
Dept: PSYCHIATRY | Facility: CLINIC | Age: 58
End: 2023-02-09
Payer: MEDICAID

## 2023-02-09 DIAGNOSIS — F32.A DEPRESSION, UNSPECIFIED DEPRESSION TYPE: ICD-10-CM

## 2023-02-09 DIAGNOSIS — F41.1 GENERALIZED ANXIETY DISORDER: Chronic | ICD-10-CM

## 2023-02-09 NOTE — TELEPHONE ENCOUNTER
Pt says she transferred her clonazepam to [facility], because [facility] said since her PA was denied, they will no longer fill controlled substances.  Other pts are starting to say this as well, so basically most Medicaid benzos are going to be denied and pts can't get their script.    Spoke with Josr from [facility] who confirmed rx was transferred by the pt and he cannot fill it.  He said it is a new policy starting March 1st for all of their pts.  I am not sure at this point if it is just a [facility] thing or if all pharmacies will start doing this?      Pt says [facility] told her to choose between temazepam and clonazepam.  She is in the process of calling [facility] and [facility] to see if one of them will agree to fill both.    [facility] [place] says [facility] will fill it for her, but they need a new script.

## 2023-02-10 RX ORDER — CLONAZEPAM 1 MG/1
1 TABLET ORAL 3 TIMES DAILY PRN
Qty: 90 TABLET | Refills: 3 | Status: SHIPPED | OUTPATIENT
Start: 2023-02-10 | End: 2023-02-28 | Stop reason: SDUPTHER

## 2023-02-10 RX ORDER — TEMAZEPAM 30 MG/1
30 CAPSULE ORAL NIGHTLY PRN
Qty: 30 CAPSULE | Refills: 2 | Status: SHIPPED | OUTPATIENT
Start: 2023-02-10 | End: 2023-02-28 | Stop reason: SDUPTHER

## 2023-02-10 NOTE — TELEPHONE ENCOUNTER
I see that temazepam was taken over by Dr. Ryder. Will cancel old temazepam script with [facility] when they open at 9 am [phone].  Pt informed too.

## 2023-02-20 ENCOUNTER — OFFICE VISIT (OUTPATIENT)
Dept: FAMILY MEDICINE CLINIC | Facility: CLINIC | Age: 58
End: 2023-02-20
Payer: MEDICAID

## 2023-02-20 ENCOUNTER — LAB (OUTPATIENT)
Dept: FAMILY MEDICINE CLINIC | Facility: CLINIC | Age: 58
End: 2023-02-20
Payer: MEDICAID

## 2023-02-20 VITALS
BODY MASS INDEX: 30 KG/M2 | SYSTOLIC BLOOD PRESSURE: 116 MMHG | OXYGEN SATURATION: 98 % | DIASTOLIC BLOOD PRESSURE: 67 MMHG | HEART RATE: 63 BPM | WEIGHT: 174.8 LBS | TEMPERATURE: 97.3 F

## 2023-02-20 DIAGNOSIS — G89.29 CHRONIC LOW BACK PAIN WITH SCIATICA, SCIATICA LATERALITY UNSPECIFIED, UNSPECIFIED BACK PAIN LATERALITY: ICD-10-CM

## 2023-02-20 DIAGNOSIS — M54.40 CHRONIC LOW BACK PAIN WITH SCIATICA, SCIATICA LATERALITY UNSPECIFIED, UNSPECIFIED BACK PAIN LATERALITY: ICD-10-CM

## 2023-02-20 DIAGNOSIS — I10 HYPERTENSION, ESSENTIAL: Primary | ICD-10-CM

## 2023-02-20 DIAGNOSIS — Z12.11 SCREENING FOR COLON CANCER: ICD-10-CM

## 2023-02-20 DIAGNOSIS — G89.29 CHRONIC BILATERAL LOW BACK PAIN WITHOUT SCIATICA: ICD-10-CM

## 2023-02-20 DIAGNOSIS — M54.50 CHRONIC BILATERAL LOW BACK PAIN WITHOUT SCIATICA: ICD-10-CM

## 2023-02-20 DIAGNOSIS — G89.4 CHRONIC PAIN SYNDROME: ICD-10-CM

## 2023-02-20 DIAGNOSIS — F32.A DEPRESSION, UNSPECIFIED DEPRESSION TYPE: ICD-10-CM

## 2023-02-20 DIAGNOSIS — E55.9 HYPOVITAMINOSIS D: ICD-10-CM

## 2023-02-20 PROCEDURE — 82306 VITAMIN D 25 HYDROXY: CPT | Performed by: FAMILY MEDICINE

## 2023-02-20 PROCEDURE — 80053 COMPREHEN METABOLIC PANEL: CPT | Performed by: FAMILY MEDICINE

## 2023-02-20 PROCEDURE — 99214 OFFICE O/P EST MOD 30 MIN: CPT | Performed by: FAMILY MEDICINE

## 2023-02-20 PROCEDURE — 80061 LIPID PANEL: CPT | Performed by: FAMILY MEDICINE

## 2023-02-20 PROCEDURE — 36415 COLL VENOUS BLD VENIPUNCTURE: CPT | Performed by: FAMILY MEDICINE

## 2023-02-20 RX ORDER — CARISOPRODOL 350 MG/1
350 TABLET ORAL 3 TIMES DAILY PRN
Qty: 90 TABLET | Refills: 0 | Status: SHIPPED | OUTPATIENT
Start: 2023-02-20 | End: 2023-03-16 | Stop reason: SDUPTHER

## 2023-02-20 RX ORDER — OXYCODONE HYDROCHLORIDE 15 MG/1
15 TABLET ORAL EVERY 8 HOURS PRN
Qty: 90 TABLET | Refills: 0 | Status: SHIPPED | OUTPATIENT
Start: 2023-02-20 | End: 2023-03-16 | Stop reason: SDUPTHER

## 2023-02-20 NOTE — PROGRESS NOTES
Subjective   Briana Alas is a 57 y.o. female.     History of Present Illness  Briana Alas is in for follow up on her chronic issues with high blood pressure, chronic pain and depression.  She sees psychiatry for the depression but wants me to know that she is in a much better place now emotionally.  She is getting out and circulating and having fun.  She is hoping that her psychiatrist will start working her down on medication.  There is no history of chest pain or dyspnea. There is no history of issue with bowel or bladder dysfunction. There is no history of dizziness or lightheadedness. There is no history of issue with sleep or mood with present medication.            /67 (BP Location: Left arm, Patient Position: Sitting, Cuff Size: Large Adult)   Pulse 63   Temp 97.3 °F (36.3 °C) (Temporal)   Wt 79.3 kg (174 lb 12.8 oz)   SpO2 98%   BMI 30.00 kg/m²       Chief Complaint   Patient presents with   • Hypertension     4 month f/u    • Depression     Wants to talk about getting off the antidepressants - and she knows she will have to talk to psychiatrist - sees her on the 28th of Feb           Current Outpatient Medications:   •  atenolol (TENORMIN) 50 MG tablet, TAKE ONE (1) TABLET BY MOUTH EVERY DAY, Disp: 30 tablet, Rfl: 5  •  atorvastatin (LIPITOR) 20 MG tablet, TAKE ONE (1) TABLET BY MOUTH AT BEDTIME, Disp: 30 tablet, Rfl: 3  •  busPIRone (BUSPAR) 15 MG tablet, Take 1 tablet by mouth 2 (Two) Times a Day., Disp: 60 tablet, Rfl: 3  •  carisoprodol (SOMA) 350 MG tablet, Take 1 tablet by mouth 3 (Three) Times a Day As Needed for Muscle Spasms., Disp: 90 tablet, Rfl: 0  •  clonazePAM (KlonoPIN) 1 MG tablet, Take 1 tablet by mouth 3 (Three) Times a Day As Needed for Anxiety., Disp: 90 tablet, Rfl: 3  •  DULoxetine (CYMBALTA) 60 MG capsule, TAKE ONE (1) CAPSULE BY MOUTH TWICE DAILY, Disp: 60 capsule, Rfl: 1  •  ibuprofen (ADVIL,MOTRIN) 800 MG tablet, Take 1 tablet by mouth Every 8 (Eight) Hours As  Needed for Moderate Pain ., Disp: 90 tablet, Rfl: 3  •  oxyCODONE (ROXICODONE) 15 MG immediate release tablet, Take 1 tablet by mouth Every 8 (Eight) Hours As Needed for Moderate Pain., Disp: 90 tablet, Rfl: 0  •  pregabalin (LYRICA) 150 MG capsule, TAKE ONE (1) CAPSULE BY MOUTH TWICE DAILY, Disp: 60 capsule, Rfl: 3  •  Rexulti 3 MG tablet, TAKE ONE (1) TABLET BY MOUTH EVERY MORNING, Disp: 30 tablet, Rfl: 0  •  rOPINIRole (REQUIP) 0.5 MG tablet, TAKE ONE (1) TABLET BY MOUTH TWICE DAILY (TAKE ONE (1) TABLET ONE HOUR BEFORE BEDTIME), Disp: 60 tablet, Rfl: 5  •  temazepam (RESTORIL) 30 MG capsule, Take 1 capsule by mouth At Night As Needed for Sleep., Disp: 30 capsule, Rfl: 2  •  vitamin D (ERGOCALCIFEROL) 1.25 MG (85769 UT) capsule capsule, Take 1 capsule by mouth 1 (One) Time Per Week., Disp: 4 capsule, Rfl: 5        The following portions of the patient's history were reviewed and updated as appropriate: allergies, current medications, past family history, past medical history, past social history, past surgical history, and problem list.    Review of Systems   Constitutional: Negative for activity change, fatigue and fever.   HENT: Negative for congestion, sinus pressure, sinus pain, sore throat and trouble swallowing.    Eyes: Negative for visual disturbance.   Respiratory: Negative for cough, chest tightness, shortness of breath and wheezing.    Cardiovascular: Negative for chest pain.   Gastrointestinal: Positive for diarrhea. Negative for abdominal distention, abdominal pain, constipation, nausea and vomiting.   Genitourinary: Negative for difficulty urinating, dysuria and urgency.   Musculoskeletal: Positive for arthralgias, back pain, myalgias and neck pain.   Skin: Positive for rash.   Neurological: Negative for dizziness, weakness, light-headedness and numbness.   Psychiatric/Behavioral: Negative for agitation, dysphoric mood, hallucinations, sleep disturbance and suicidal ideas. The patient is not  nervous/anxious.        Objective   Physical Exam  Vitals and nursing note reviewed.   Cardiovascular:      Rate and Rhythm: Normal rate and regular rhythm.      Heart sounds: Normal heart sounds. No murmur heard.  Pulmonary:      Effort: Pulmonary effort is normal.      Breath sounds: No wheezing or rales.   Abdominal:      General: Bowel sounds are normal.      Palpations: Abdomen is soft.      Tenderness: There is no abdominal tenderness. There is no guarding.   Musculoskeletal:      Cervical back: Neck supple.      Right lower leg: No edema.      Left lower leg: No edema.      Comments: Diffusely arthritic but appears at her baseline   Lymphadenopathy:      Cervical: No cervical adenopathy.   Neurological:      Mental Status: She is alert and oriented to person, place, and time. Mental status is at baseline.   Psychiatric:         Mood and Affect: Mood normal.           Assessment & Plan   Problems Addressed this Visit        Cardiac and Vasculature    Hypertension, essential - Primary    Relevant Orders    Comprehensive metabolic panel    Lipid panel       Endocrine and Metabolic    Hypovitaminosis D    Relevant Orders    Vitamin D 25 hydroxy       Musculoskeletal and Injuries    Chronic low back pain    Relevant Medications    carisoprodol (SOMA) 350 MG tablet    oxyCODONE (ROXICODONE) 15 MG immediate release tablet   Other Visit Diagnoses     Depression, unspecified depression type        Chronic pain syndrome        Screening for colon cancer        Relevant Orders    Ambulatory Referral For Screening Colonoscopy (Completed)      Diagnoses       Codes Comments    Hypertension, essential    -  Primary ICD-10-CM: I10  ICD-9-CM: 401.9     Depression, unspecified depression type     ICD-10-CM: F32.A  ICD-9-CM: 311     Chronic pain syndrome     ICD-10-CM: G89.4  ICD-9-CM: 338.4     Chronic low back pain with sciatica, sciatica laterality unspecified, unspecified back pain laterality     ICD-10-CM: M54.40,  G89.29  ICD-9-CM: 724.2, 724.3, 338.29     Screening for colon cancer     ICD-10-CM: Z12.11  ICD-9-CM: V76.51     Chronic low back pain with sciatica, sciatica laterality unspecified, unspecified back pain laterality     ICD-10-CM: M54.40, G89.29  ICD-9-CM: 724.2, 724.3, 338.29 INSPECT reviewed.   Stable.   Cont. current medication.   Refills sent.     Chronic bilateral low back pain without sciatica     ICD-10-CM: M54.50, G89.29  ICD-9-CM: 724.2, 338.29     Hypovitaminosis D     ICD-10-CM: E55.9  ICD-9-CM: 268.9           I will update her labs and adjust treatment plan as indicated  I have also advocated for a colonoscopy because of the mention of some diarrhea of late  I have asked her to keep her appointment with psychiatry for next week and discuss those medications with them  I have asked her to remain as active as she can tolerate  The current pain medication is allowing her to stay functional and and independent with her ADLs  She is to call for new concerns and see me again in a few months

## 2023-02-21 LAB
25(OH)D3 SERPL-MCNC: 13.2 NG/ML (ref 30–100)
ALBUMIN SERPL-MCNC: 4.5 G/DL (ref 3.5–5.2)
ALBUMIN/GLOB SERPL: 1.7 G/DL
ALP SERPL-CCNC: 72 U/L (ref 39–117)
ALT SERPL W P-5'-P-CCNC: 15 U/L (ref 1–33)
ANION GAP SERPL CALCULATED.3IONS-SCNC: 9.1 MMOL/L (ref 5–15)
AST SERPL-CCNC: 23 U/L (ref 1–32)
BILIRUB SERPL-MCNC: <0.2 MG/DL (ref 0–1.2)
BUN SERPL-MCNC: 17 MG/DL (ref 6–20)
BUN/CREAT SERPL: 18.9 (ref 7–25)
CALCIUM SPEC-SCNC: 9.8 MG/DL (ref 8.6–10.5)
CHLORIDE SERPL-SCNC: 102 MMOL/L (ref 98–107)
CHOLEST SERPL-MCNC: 203 MG/DL (ref 0–200)
CO2 SERPL-SCNC: 28.9 MMOL/L (ref 22–29)
CREAT SERPL-MCNC: 0.9 MG/DL (ref 0.57–1)
EGFRCR SERPLBLD CKD-EPI 2021: 74.3 ML/MIN/1.73
GLOBULIN UR ELPH-MCNC: 2.7 GM/DL
GLUCOSE SERPL-MCNC: 85 MG/DL (ref 65–99)
HDLC SERPL-MCNC: 62 MG/DL (ref 40–60)
LDLC SERPL CALC-MCNC: 123 MG/DL (ref 0–100)
LDLC/HDLC SERPL: 1.95 {RATIO}
POTASSIUM SERPL-SCNC: 4.7 MMOL/L (ref 3.5–5.2)
PROT SERPL-MCNC: 7.2 G/DL (ref 6–8.5)
SODIUM SERPL-SCNC: 140 MMOL/L (ref 136–145)
TRIGL SERPL-MCNC: 101 MG/DL (ref 0–150)
VLDLC SERPL-MCNC: 18 MG/DL (ref 5–40)

## 2023-02-22 RX ORDER — ATORVASTATIN CALCIUM 40 MG/1
40 TABLET, FILM COATED ORAL NIGHTLY
Qty: 90 TABLET | Refills: 1 | Status: SHIPPED | OUTPATIENT
Start: 2023-02-22

## 2023-02-22 RX ORDER — ERGOCALCIFEROL 1.25 MG/1
50000 CAPSULE ORAL 2 TIMES WEEKLY
Qty: 8 CAPSULE | Refills: 5 | Status: SHIPPED | OUTPATIENT
Start: 2023-02-23

## 2023-02-28 ENCOUNTER — OFFICE VISIT (OUTPATIENT)
Dept: PSYCHIATRY | Facility: CLINIC | Age: 58
End: 2023-02-28
Payer: MEDICAID

## 2023-02-28 DIAGNOSIS — F33.1 MAJOR DEPRESSIVE DISORDER, RECURRENT EPISODE, MODERATE: Primary | Chronic | ICD-10-CM

## 2023-02-28 DIAGNOSIS — F32.A DEPRESSION, UNSPECIFIED DEPRESSION TYPE: ICD-10-CM

## 2023-02-28 DIAGNOSIS — F41.1 GENERALIZED ANXIETY DISORDER: Chronic | ICD-10-CM

## 2023-02-28 DIAGNOSIS — Z79.899 ENCOUNTER FOR LONG-TERM (CURRENT) USE OF OTHER MEDICATIONS: ICD-10-CM

## 2023-02-28 PROCEDURE — 99214 OFFICE O/P EST MOD 30 MIN: CPT | Performed by: PSYCHIATRY & NEUROLOGY

## 2023-02-28 RX ORDER — CLONAZEPAM 1 MG/1
1 TABLET ORAL 3 TIMES DAILY PRN
Qty: 90 TABLET | Refills: 3 | Status: SHIPPED | OUTPATIENT
Start: 2023-02-28

## 2023-02-28 RX ORDER — TEMAZEPAM 30 MG/1
30 CAPSULE ORAL NIGHTLY PRN
Qty: 30 CAPSULE | Refills: 3 | Status: SHIPPED | OUTPATIENT
Start: 2023-02-28

## 2023-02-28 RX ORDER — DULOXETIN HYDROCHLORIDE 60 MG/1
120 CAPSULE, DELAYED RELEASE ORAL DAILY
Qty: 60 CAPSULE | Refills: 3 | Status: SHIPPED | OUTPATIENT
Start: 2023-02-28

## 2023-02-28 RX ORDER — BUSPIRONE HYDROCHLORIDE 15 MG/1
15 TABLET ORAL 2 TIMES DAILY
Qty: 60 TABLET | Refills: 3 | Status: SHIPPED | OUTPATIENT
Start: 2023-02-28

## 2023-02-28 NOTE — PROGRESS NOTES
Subjective   Briana Alas is a 57 y.o. female who presents today for follow up    Chief Complaint:   To f/u on Depression, anxiety      History of Present Illness: the pt has a long hx of depression, no specific triggers or stressors, was on zoloft, celexa , few unsuccessful trials   She was relatively stable on meds until her daughter passed away  Last year , still difficult to cope  , her daughter was special needs, pt's life was revolving around her , now she feels she has no purpose     Today the pt reported  feeling less depressed, still has her days, but not every day, she is working now, denied feeling hopeless/helpless,   She still feels  Anxious, anxiety is associated with increased tension and irritability , difficult to relax at night    Depression is rated as 2-3/10, denied AVH/SI/HI     Sleep - fair  on temazepam      Alleviating factors - to stay busy     Anxiety is manageable on clonazepam        The following portions of the patient's history were reviewed and updated as appropriate: allergies, current medications, past family history, past medical history, past social history, past surgical history and problem list.    PAST PSYCHIATRIC HISTORY  Axis I  Affective/Bipolar Disorder, Anxiety/Panic Disorder  No inpt. No SI/SA   Axis II  Defer     PAST OUTPATIENT TREATMENT  Diagnosis treated:  Affective Disorder, Anxiety/Panic Disorder  Treatment Type:  Medication Management  Psychotherapy - shante Osei at [facility]   Prior Psychiatric Medications:  lexapro - not effective  celexa -not effective  zoloft - not effective now    Support Groups:  None   Sequelae Of Mental Disorder:  emotional distress          Interval History  Some improvement     Side Effects  Denied       Past Medical History:  Past Medical History:   Diagnosis Date   • Anxiety    • Arthritis    • Back pain    • Depression    • Headache    • Hyperlipidemia    • Hypertension    • Injury of back    • Restless leg syndrome        Social  History:  Social History     Socioeconomic History   • Marital status: Legally    Tobacco Use   • Smoking status: Every Day     Packs/day: 0.50     Types: Cigarettes   • Smokeless tobacco: Never   Vaping Use   • Vaping Use: Never used   Substance and Sexual Activity   • Alcohol use: No   • Drug use: No   • Sexual activity: Defer       Family History:  Family History   Problem Relation Age of Onset   • Hypertension Mother    • Hyperlipidemia Mother    • Depression Mother    • Thyroid disease Mother    • Hypertension Father        Past Surgical History:  Past Surgical History:   Procedure Laterality Date   • ANKLE OPEN REDUCTION INTERNAL FIXATION Right    • FOOT SURGERY Bilateral     bunionectomy   • SHOULDER ARTHROSCOPY W/ ROTATOR CUFF REPAIR Right 12/13/2019    Procedure: RIGHT SHOULDER ARTHROSCOPY WITH ROTATOR CUFF REPAIR and Subacromial decompression;  Surgeon: Gino Ackerman MD;  Location: [facility] OR;  Service: Orthopedics   • TUBAL ABDOMINAL LIGATION         Problem List:  Patient Active Problem List   Diagnosis   • Fibromyalgia   • Abnormal gait   • Ankle pain, right   • Knee pain   • Leg pain, left   • Annular tear of lumbar disc   • Degeneration of lumbar intervertebral disc   • Generalized anxiety disorder   • Body mass index 32.0-32.9, adult   • Cervical myelopathy (HCC)   • Other dorsalgia   • Chronic low back pain   • Hx traumatic fracture   • Hyperlipidemia   • Hypertension, essential   • Hypovitaminosis D   • Inflammatory arthritis   • Joint pain   • Leg weakness, bilateral   • Lumbosacral radiculopathy   • Malaise and fatigue   • Neck pain, chronic   • Osteoarthritis of knee   • Pain in right shoulder   • Pain due to internal orthopedic prosthetic devices, implants and grafts, subsequent encounter   • Rotator cuff tendonitis   • Scoliosis   • Osteoarthritis of lumbosacral spine without myelopathy   • Spondylosis of lumbosacral region   • Tobacco abuse counseling   • Upper  respiratory tract infection   • Major depressive disorder, recurrent episode, moderate (HCC)   • Primary insomnia   • Bereavement       Allergy:   Allergies   Allergen Reactions   • Penicillins Rash        Discontinued Medications:  Medications Discontinued During This Encounter   Medication Reason   • Rexulti 3 MG tablet Dose adjustment   • busPIRone (BUSPAR) 15 MG tablet Reorder   • DULoxetine (CYMBALTA) 60 MG capsule Reorder   • clonazePAM (KlonoPIN) 1 MG tablet Reorder   • temazepam (RESTORIL) 30 MG capsule Reorder       Current Medications:   Current Outpatient Medications   Medication Sig Dispense Refill   • busPIRone (BUSPAR) 15 MG tablet Take 1 tablet by mouth 2 (Two) Times a Day. 60 tablet 3   • clonazePAM (KlonoPIN) 1 MG tablet Take 1 tablet by mouth 3 (Three) Times a Day As Needed for Anxiety. 90 tablet 3   • DULoxetine (CYMBALTA) 60 MG capsule Take 2 capsules by mouth Daily. 60 capsule 3   • temazepam (RESTORIL) 30 MG capsule Take 1 capsule by mouth At Night As Needed for Sleep. 30 capsule 3   • atenolol (TENORMIN) 50 MG tablet TAKE ONE (1) TABLET BY MOUTH EVERY DAY 30 tablet 5   • atorvastatin (LIPITOR) 40 MG tablet Take 1 tablet by mouth Every Night. 90 tablet 1   • Brexpiprazole 2 MG tablet Take 1 tablet by mouth Every Morning. 30 tablet 3   • carisoprodol (SOMA) 350 MG tablet Take 1 tablet by mouth 3 (Three) Times a Day As Needed for Muscle Spasms. 90 tablet 0   • ibuprofen (ADVIL,MOTRIN) 800 MG tablet Take 1 tablet by mouth Every 8 (Eight) Hours As Needed for Moderate Pain . 90 tablet 3   • oxyCODONE (ROXICODONE) 15 MG immediate release tablet Take 1 tablet by mouth Every 8 (Eight) Hours As Needed for Moderate Pain. 90 tablet 0   • pregabalin (LYRICA) 150 MG capsule TAKE ONE (1) CAPSULE BY MOUTH TWICE DAILY 60 capsule 3   • rOPINIRole (REQUIP) 0.5 MG tablet TAKE ONE (1) TABLET BY MOUTH TWICE DAILY (TAKE ONE (1) TABLET ONE HOUR BEFORE BEDTIME) 60 tablet 5   • vitamin D (ERGOCALCIFEROL) 1.25 MG (81040  UT) capsule capsule Take 1 capsule by mouth 2 (Two) Times a Week. Take 3 days apart 8 capsule 5     No current facility-administered medications for this visit.         Psychological ROS: positive for - anxiety, depression   negative for - hallucinations, hostility, irritability, memory difficulties, mood swings, obsessive thoughts or suicidal ideation      Physical Exam:   There were no vitals taken for this visit.    Mental Status Exam:   Hygiene:   good  Cooperation:  Cooperative  Eye Contact:  Good  Psychomotor Behavior:  Appropriate  Affect:  Appropriate and congruent with mood   Mood: anxious and fluctates  Hopelessness: Denies  Speech:  Normal  Thought Process:  Goal directed and Linear  Thought Content:  Mood congruent  Suicidal:  None  Homicidal:  None  Hallucinations:  None  Delusion:  None  Memory:  Intact  Orientation:  Person, Place, Time and Situation  Reliability:  good  Insight:  Good  Judgement:  Good  Impulse Control:  Good  Physical/Medical Issues:  Yes       MSE from 7/6/2022 reviewed and accepted with changes     PHQ-9 Depression Screening  Little interest or pleasure in doing things? 1-->several days   Feeling down, depressed, or hopeless? 1-->several days   Trouble falling or staying asleep, or sleeping too much? 1-->several days   Feeling tired or having little energy? 0-->not at all   Poor appetite or overeating? 0-->not at all   Feeling bad about yourself - or that you are a failure or have let yourself or your family down? 1-->several days   Trouble concentrating on things, such as reading the newspaper or watching television? 1-->several days   Moving or speaking so slowly that other people could have noticed? Or the opposite - being so fidgety or restless that you have been moving around a lot more than usual? 0-->not at all   Thoughts that you would be better off dead, or of hurting yourself in some way? 0-->not at all   PHQ-9 Total Score 5   If you checked off any problems, how  difficult have these problems made it for you to do your work, take care of things at home, or get along with other people? somewhat difficult     Briana Alas  reports that she has been smoking cigarettes. She has been smoking an average of .5 packs per day. She has never used smokeless tobacco.. I have educated her on the risk of diseases from using tobacco products such as cancer, COPD and heart disease.     I advised her to quit and she is not willing to quit.    I spent 3  minutes counseling the patient.           Current every day smoker 3-10 mintues spent counseling Not agreeable to stopping    I advised Briana of the risks of tobacco use.     Lab Results:   Office Visit on 02/20/2023   Component Date Value Ref Range Status   • Glucose 02/20/2023 85  65 - 99 mg/dL Final   • BUN 02/20/2023 17  6 - 20 mg/dL Final   • Creatinine 02/20/2023 0.90  0.57 - 1.00 mg/dL Final   • Sodium 02/20/2023 140  136 - 145 mmol/L Final   • Potassium 02/20/2023 4.7  3.5 - 5.2 mmol/L Final   • Chloride 02/20/2023 102  98 - 107 mmol/L Final   • CO2 02/20/2023 28.9  22.0 - 29.0 mmol/L Final   • Calcium 02/20/2023 9.8  8.6 - 10.5 mg/dL Final   • Total Protein 02/20/2023 7.2  6.0 - 8.5 g/dL Final   • Albumin 02/20/2023 4.5  3.5 - 5.2 g/dL Final   • ALT (SGPT) 02/20/2023 15  1 - 33 U/L Final   • AST (SGOT) 02/20/2023 23  1 - 32 U/L Final   • Alkaline Phosphatase 02/20/2023 72  39 - 117 U/L Final   • Total Bilirubin 02/20/2023 <0.2  0.0 - 1.2 mg/dL Final   • Globulin 02/20/2023 2.7  gm/dL Final   • A/G Ratio 02/20/2023 1.7  g/dL Final   • BUN/Creatinine Ratio 02/20/2023 18.9  7.0 - 25.0 Final   • Anion Gap 02/20/2023 9.1  5.0 - 15.0 mmol/L Final   • eGFR 02/20/2023 74.3  >60.0 mL/min/1.73 Final   • Total Cholesterol 02/20/2023 203 (H)  0 - 200 mg/dL Final   • Triglycerides 02/20/2023 101  0 - 150 mg/dL Final   • HDL Cholesterol 02/20/2023 62 (H)  40 - 60 mg/dL Final   • LDL Cholesterol  02/20/2023 123 (H)  0 - 100 mg/dL Final   • VLDL  Cholesterol 02/20/2023 18  5 - 40 mg/dL Final   • LDL/HDL Ratio 02/20/2023 1.95   Final   • 25 Hydroxy, Vitamin D 02/20/2023 13.2 (L)  30.0 - 100.0 ng/ml Final       Assessment & Plan   Problems Addressed this Visit        Mental Health    Generalized anxiety disorder (Chronic)    Relevant Medications    Brexpiprazole 2 MG tablet    DULoxetine (CYMBALTA) 60 MG capsule    busPIRone (BUSPAR) 15 MG tablet    clonazePAM (KlonoPIN) 1 MG tablet    temazepam (RESTORIL) 30 MG capsule    Other Relevant Orders    [employer] Full Urine Drug Screen -    Major depressive disorder, recurrent episode, moderate (HCC) - Primary (Chronic)    Relevant Medications    Brexpiprazole 2 MG tablet    DULoxetine (CYMBALTA) 60 MG capsule    busPIRone (BUSPAR) 15 MG tablet    temazepam (RESTORIL) 30 MG capsule   Other Visit Diagnoses     Encounter for long-term (current) use of other medications        Relevant Orders    [employer] Full Urine Drug Screen -    Depression, unspecified depression type        Relevant Medications    Brexpiprazole 2 MG tablet    DULoxetine (CYMBALTA) 60 MG capsule    busPIRone (BUSPAR) 15 MG tablet    temazepam (RESTORIL) 30 MG capsule      Diagnoses       Codes Comments    Major depressive disorder, recurrent episode, moderate (HCC)    -  Primary ICD-10-CM: F33.1  ICD-9-CM: 296.32     Generalized anxiety disorder     ICD-10-CM: F41.1  ICD-9-CM: 300.02     Encounter for long-term (current) use of other medications     ICD-10-CM: Z79.899  ICD-9-CM: V58.69     Depression, unspecified depression type     ICD-10-CM: F32.A  ICD-9-CM: 311           Visit Diagnoses:    ICD-10-CM ICD-9-CM   1. Major depressive disorder, recurrent episode, moderate (HCC)  F33.1 296.32   2. Generalized anxiety disorder  F41.1 300.02   3. Encounter for long-term (current) use of other medications  Z79.899 V58.69   4. Depression, unspecified depression type  F32.A 311       TREATMENT PLAN/GOALS: Continue supportive psychotherapy efforts and  medications as indicated. Treatment and medication options discussed during today's visit. Patient ackowledged and verbally consented to continue with current treatment plan and was educated on the importance of compliance with treatment and follow-up appointments.    MEDICATION ISSUES:    INSPECT reviewed as expected - on pain meds, on clonazepam  2/10/2023  (last refill)   and temazepam from PCP     1. Major depressive d/o moderate recurrent - cont cymbalta 120 mg BP is stable, decrease  rexulti  To 2 mg    BP is /67   Cont therapy  2. Generalized anxiety - cont cymbalta at 120 mg , cont clonazepam 1 mg TID, the pt is on opioids, will closely monitor,  The pt was informed about necessity to reduce clonazepam dose, will try to take 2.5 tabs per day    3 Insomnia - cont temazepam  4 bereavement - cont grief therapy     Labs TG 3/14/2022 , CMP WNL , vit D 8 (low) on supplements now   UDS 7/26/22 -consistent for clonazepam, temazepam and + fentanyl (?) , will repeat today , the denied fentanyl use     PHQ scored 5 and indicated mild depression   STEWART 7 scored 10  Patient screened positive for depression based on a PHQ-9 score of 5 on 2/28/2023. Follow-up recommendations include: Prescribed antidepressant medication treatment.      Discussed medication options and treatment plan of prescribed medication as well as the risks, benefits, and side effects including potential falls, possible impaired driving and metabolic adversities among others. Patient is agreeable to call the office with any worsening of symptoms or onset of side effects. Patient is agreeable to call 911 or go to the nearest ER should he/she begin having SI/HI. No medication side effects or related complaints today.     MEDS ORDERED DURING VISIT:  New Medications Ordered This Visit   Medications   • Brexpiprazole 2 MG tablet     Sig: Take 1 tablet by mouth Every Morning.     Dispense:  30 tablet     Refill:  3   • DULoxetine (CYMBALTA) 60 MG  capsule     Sig: Take 2 capsules by mouth Daily.     Dispense:  60 capsule     Refill:  3   • busPIRone (BUSPAR) 15 MG tablet     Sig: Take 1 tablet by mouth 2 (Two) Times a Day.     Dispense:  60 tablet     Refill:  3   • clonazePAM (KlonoPIN) 1 MG tablet     Sig: Take 1 tablet by mouth 3 (Three) Times a Day As Needed for Anxiety.     Dispense:  90 tablet     Refill:  3     Please dispense only when it is due   • temazepam (RESTORIL) 30 MG capsule     Sig: Take 1 capsule by mouth At Night As Needed for Sleep.     Dispense:  30 capsule     Refill:  3     Please dispense only when it is due       Return in about 4 months (around 6/28/2023).         This document has been electronically signed by Jade Ryder MD  February 28, 2023 08:45 EST    EMR Dragon transcription disclaimer:  Some of this encounter note is an electronic transcription translation of spoken language to printed text. The electronic translation of spoken language may permit erroneous, or at times, nonsensical words or phrases to be inadvertently transcribed; Although I have reviewed the note for such errors some may still exist.

## 2023-03-16 DIAGNOSIS — M54.40 CHRONIC LOW BACK PAIN WITH SCIATICA, SCIATICA LATERALITY UNSPECIFIED, UNSPECIFIED BACK PAIN LATERALITY: ICD-10-CM

## 2023-03-16 DIAGNOSIS — M54.50 CHRONIC BILATERAL LOW BACK PAIN WITHOUT SCIATICA: ICD-10-CM

## 2023-03-16 DIAGNOSIS — G89.29 CHRONIC LOW BACK PAIN WITH SCIATICA, SCIATICA LATERALITY UNSPECIFIED, UNSPECIFIED BACK PAIN LATERALITY: ICD-10-CM

## 2023-03-16 DIAGNOSIS — G89.29 CHRONIC BILATERAL LOW BACK PAIN WITHOUT SCIATICA: ICD-10-CM

## 2023-03-16 RX ORDER — CARISOPRODOL 350 MG/1
350 TABLET ORAL 3 TIMES DAILY PRN
Qty: 90 TABLET | Refills: 0 | Status: SHIPPED | OUTPATIENT
Start: 2023-03-16

## 2023-03-16 RX ORDER — OXYCODONE HYDROCHLORIDE 15 MG/1
15 TABLET ORAL EVERY 8 HOURS PRN
Qty: 90 TABLET | Refills: 0 | Status: SHIPPED | OUTPATIENT
Start: 2023-03-16

## 2023-03-16 NOTE — TELEPHONE ENCOUNTER
Caller: Briana Alas    Relationship: Self    Best call back number: [phone] (Mobile)    Requested Prescriptions:   Requested Prescriptions     Pending Prescriptions Disp Refills   • oxyCODONE (ROXICODONE) 15 MG immediate release tablet 90 tablet 0     Sig: Take 1 tablet by mouth Every 8 (Eight) Hours As Needed for Moderate Pain.   • carisoprodol (SOMA) 350 MG tablet 90 tablet 0     Sig: Take 1 tablet by mouth 3 (Three) Times a Day As Needed for Muscle Spasms.        Pharmacy where request should be sent: [address] - [phone]  - [phone] FX     Additional details provided by patient: 4 DAYS LEFT     Does the patient have less than a 3 day supply:  [] Yes  [x] No    Would you like a call back once the refill request has been completed: [x] Yes [] No    If the office needs to give you a call back, can they leave a voicemail: [] Yes [] No    Digna Blake Rep   03/16/23 09:50 EDT

## 2023-04-06 ENCOUNTER — OFFICE VISIT (OUTPATIENT)
Dept: FAMILY MEDICINE CLINIC | Facility: CLINIC | Age: 58
End: 2023-04-06
Payer: MEDICAID

## 2023-04-06 VITALS
TEMPERATURE: 98 F | OXYGEN SATURATION: 98 % | SYSTOLIC BLOOD PRESSURE: 103 MMHG | DIASTOLIC BLOOD PRESSURE: 54 MMHG | HEART RATE: 65 BPM | WEIGHT: 175.2 LBS | BODY MASS INDEX: 30.07 KG/M2

## 2023-04-06 DIAGNOSIS — G89.29 CHRONIC BILATERAL LOW BACK PAIN WITHOUT SCIATICA: Primary | ICD-10-CM

## 2023-04-06 DIAGNOSIS — M54.50 CHRONIC BILATERAL LOW BACK PAIN WITHOUT SCIATICA: Primary | ICD-10-CM

## 2023-04-06 PROCEDURE — 99213 OFFICE O/P EST LOW 20 MIN: CPT | Performed by: FAMILY MEDICINE

## 2023-04-06 PROCEDURE — 3074F SYST BP LT 130 MM HG: CPT | Performed by: FAMILY MEDICINE

## 2023-04-06 PROCEDURE — 3078F DIAST BP <80 MM HG: CPT | Performed by: FAMILY MEDICINE

## 2023-04-06 RX ORDER — PREDNISONE 10 MG/1
TABLET ORAL
Qty: 40 TABLET | Refills: 0 | Status: SHIPPED | OUTPATIENT
Start: 2023-04-06

## 2023-04-06 NOTE — PROGRESS NOTES
Subjective   Briana Alas is a 57 y.o. female.     History of Present Illness  Briana Alas is in for follow up on some chronic pain in the lower back and left hip area. She has been prone to sciatica flares in the past. She has not had any trauma or injury of note.  There is no history of chest pain or dyspnea. There is no history of issue with bowel or bladder dysfunction. There is no history of dizziness or lightheadedness. There is no history of issue with sleep or mood. There is no history of issue with present medication.            /54 (BP Location: Left arm, Patient Position: Sitting, Cuff Size: Large Adult)   Pulse 65   Temp 98 °F (36.7 °C) (Temporal)   Wt 79.5 kg (175 lb 3.2 oz)   SpO2 98%   BMI 30.07 kg/m²       Chief Complaint   Patient presents with   • hip pain     Left hip pain and its lower back across it and down her nerve in left leg            Current Outpatient Medications:   •  atenolol (TENORMIN) 50 MG tablet, TAKE ONE (1) TABLET BY MOUTH EVERY DAY, Disp: 30 tablet, Rfl: 5  •  atorvastatin (LIPITOR) 40 MG tablet, Take 1 tablet by mouth Every Night., Disp: 90 tablet, Rfl: 1  •  Brexpiprazole 2 MG tablet, Take 1 tablet by mouth Every Morning., Disp: 30 tablet, Rfl: 3  •  busPIRone (BUSPAR) 15 MG tablet, Take 1 tablet by mouth 2 (Two) Times a Day., Disp: 60 tablet, Rfl: 3  •  carisoprodol (SOMA) 350 MG tablet, Take 1 tablet by mouth 3 (Three) Times a Day As Needed for Muscle Spasms., Disp: 90 tablet, Rfl: 0  •  clonazePAM (KlonoPIN) 1 MG tablet, Take 1 tablet by mouth 3 (Three) Times a Day As Needed for Anxiety., Disp: 90 tablet, Rfl: 3  •  DULoxetine (CYMBALTA) 60 MG capsule, Take 2 capsules by mouth Daily., Disp: 60 capsule, Rfl: 3  •  ibuprofen (ADVIL,MOTRIN) 800 MG tablet, Take 1 tablet by mouth Every 8 (Eight) Hours As Needed for Moderate Pain ., Disp: 90 tablet, Rfl: 3  •  oxyCODONE (ROXICODONE) 15 MG immediate release tablet, Take 1 tablet by mouth Every 8 (Eight) Hours As  Needed for Moderate Pain., Disp: 90 tablet, Rfl: 0  •  pregabalin (LYRICA) 150 MG capsule, TAKE ONE (1) CAPSULE BY MOUTH TWICE DAILY, Disp: 60 capsule, Rfl: 3  •  rOPINIRole (REQUIP) 0.5 MG tablet, TAKE ONE (1) TABLET BY MOUTH TWICE DAILY (TAKE ONE (1) TABLET ONE HOUR BEFORE BEDTIME), Disp: 60 tablet, Rfl: 5  •  temazepam (RESTORIL) 30 MG capsule, Take 1 capsule by mouth At Night As Needed for Sleep., Disp: 30 capsule, Rfl: 3  •  vitamin D (ERGOCALCIFEROL) 1.25 MG (70641 UT) capsule capsule, Take 1 capsule by mouth 2 (Two) Times a Week. Take 3 days apart, Disp: 8 capsule, Rfl: 5        The following portions of the patient's history were reviewed and updated as appropriate: allergies, current medications, past family history, past medical history, past social history, past surgical history, and problem list.    Review of Systems   Unable to perform ROS: Acuity of condition       Objective   Physical Exam  Vitals and nursing note reviewed.   Constitutional:       Appearance: Normal appearance.   Cardiovascular:      Rate and Rhythm: Normal rate and regular rhythm.      Heart sounds: Normal heart sounds. No murmur heard.  Pulmonary:      Effort: Pulmonary effort is normal.      Breath sounds: No wheezing or rales.   Abdominal:      General: Bowel sounds are normal.      Palpations: Abdomen is soft.      Tenderness: There is no abdominal tenderness. There is no guarding.   Musculoskeletal:      Cervical back: Neck supple.      Right lower leg: No edema.      Left lower leg: No edema.      Comments: Pos straight leg raise in both legs   Lymphadenopathy:      Cervical: No cervical adenopathy.   Neurological:      General: No focal deficit present.      Mental Status: She is alert and oriented to person, place, and time.      Gait: Gait normal.      Deep Tendon Reflexes: Reflexes normal.   Psychiatric:         Mood and Affect: Mood normal.           Assessment & Plan   Problems Addressed this Visit        Musculoskeletal  and Injuries    Chronic low back pain - Primary   Diagnoses       Codes Comments    Chronic bilateral low back pain without sciatica    -  Primary ICD-10-CM: M54.50, G89.29  ICD-9-CM: 724.2, 338.29         I will put her on some steroids and have her keep her current pain management plan intact for now  Will refer for physical therapy  I have asked her to let me know if not improving and to see me otherwise as planned

## 2023-04-14 ENCOUNTER — TELEPHONE (OUTPATIENT)
Dept: FAMILY MEDICINE CLINIC | Facility: CLINIC | Age: 58
End: 2023-04-14
Payer: MEDICAID

## 2023-04-14 DIAGNOSIS — M54.40 CHRONIC LOW BACK PAIN WITH SCIATICA, SCIATICA LATERALITY UNSPECIFIED, UNSPECIFIED BACK PAIN LATERALITY: ICD-10-CM

## 2023-04-14 DIAGNOSIS — G89.29 CHRONIC LOW BACK PAIN WITH SCIATICA, SCIATICA LATERALITY UNSPECIFIED, UNSPECIFIED BACK PAIN LATERALITY: ICD-10-CM

## 2023-04-14 RX ORDER — CARISOPRODOL 350 MG/1
350 TABLET ORAL 3 TIMES DAILY PRN
Qty: 90 TABLET | Refills: 0 | Status: SHIPPED | OUTPATIENT
Start: 2023-04-14

## 2023-04-14 NOTE — TELEPHONE ENCOUNTER
Caller: Briana Alas    Relationship: Self    Best call back number: [phone]     Requested Prescriptions:   Requested Prescriptions     Pending Prescriptions Disp Refills   • carisoprodol (SOMA) 350 MG tablet 90 tablet 0     Sig: Take 1 tablet by mouth 3 (Three) Times a Day As Needed for Muscle Spasms.        Pharmacy where request should be sent: [facility] [address] [address] - [phone]  - [phone] FX     Last office visit with prescribing clinician: 4/6/2023   Last telemedicine visit with prescribing clinician: 8/21/2023   Next office visit with prescribing clinician: 8/21/2023     Additional details provided by patient: LESS THAN 3 DAYS     Does the patient have less than a 3 day supply:  [x] Yes  [] No    Would you like a call back once the refill request has been completed: [x] Yes [] No    If the office needs to give you a call back, can they leave a voicemail: [x] Yes [] No    PASHA Lopez   04/14/23 15:08 EDT

## 2023-04-14 NOTE — TELEPHONE ENCOUNTER
Caller: Briana Alas    Relationship: Self    Best call back number: [phone]    PATIENT HAS DECIDED HER STEROID MEDICATION HELPED AND SHE WOULD LIKE TO CANCEL HER PHYSICAL THERAPY REFERRAL

## 2023-04-17 ENCOUNTER — TELEPHONE (OUTPATIENT)
Dept: FAMILY MEDICINE CLINIC | Facility: CLINIC | Age: 58
End: 2023-04-17
Payer: MEDICAID

## 2023-04-17 DIAGNOSIS — R30.0 DYSURIA: Primary | ICD-10-CM

## 2023-04-17 DIAGNOSIS — G89.29 CHRONIC BILATERAL LOW BACK PAIN WITHOUT SCIATICA: ICD-10-CM

## 2023-04-17 DIAGNOSIS — M54.50 CHRONIC BILATERAL LOW BACK PAIN WITHOUT SCIATICA: ICD-10-CM

## 2023-04-17 RX ORDER — OXYCODONE HYDROCHLORIDE 15 MG/1
15 TABLET ORAL EVERY 8 HOURS PRN
Qty: 90 TABLET | Refills: 0 | Status: SHIPPED | OUTPATIENT
Start: 2023-04-17

## 2023-04-17 NOTE — TELEPHONE ENCOUNTER
Spoke to  Briana and informed of the orders. She is aware to go to [facility] to leave urine since our lab person wont be in.

## 2023-04-17 NOTE — TELEPHONE ENCOUNTER
Caller: Briana Alas    Relationship: Self    Best call back number: [phone]  Requested Prescriptions:   Requested Prescriptions     Pending Prescriptions Disp Refills   • oxyCODONE (ROXICODONE) 15 MG immediate release tablet 90 tablet 0     Sig: Take 1 tablet by mouth Every 8 (Eight) Hours As Needed for Moderate Pain.        Pharmacy where request should be sent: [facility] [address] [address] - [phone]  - [phone]      Last office visit with prescribing clinician: 4/6/2023   Last telemedicine visit with prescribing clinician: 8/21/2023   Next office visit with prescribing clinician: 8/21/2023     Additional details provided by patient: IS OUT OF MEDICATION    Does the patient have less than a 3 day supply:  [x] Yes  [] No    Would you like a call back once the refill request has been completed: [] Yes [] No    If the office needs to give you a call back, can they leave a voicemail: [] Yes [] No    Digna Gonzales Rep   04/17/23 12:13 EDT

## 2023-04-17 NOTE — TELEPHONE ENCOUNTER
Caller: Briana Alas    Relationship: Self    Best call back number: [phone]    What medication are you requesting: ANTIBIOTIC    What are your current symptoms: PAINFUL URINATION, URGENCY, INCONTINENCE, A LOT OF PRESSURE, CLOUDINESS, FREQUENCY    How long have you been experiencing symptoms: 4-5 DAYS    Have you had these symptoms before:    [x] Yes  [] No    Have you been treated for these symptoms before:   [x] Yes  [] No    If a prescription is needed, what is your preferred pharmacy and phone number: [address], [facility] - [phone]  - [phone] FX     Additional notes:    PLEASE CALL YOU TO CONFIRM OR DENY

## 2023-05-17 DIAGNOSIS — G89.29 CHRONIC LOW BACK PAIN WITH SCIATICA, SCIATICA LATERALITY UNSPECIFIED, UNSPECIFIED BACK PAIN LATERALITY: ICD-10-CM

## 2023-05-17 DIAGNOSIS — M54.40 CHRONIC LOW BACK PAIN WITH SCIATICA, SCIATICA LATERALITY UNSPECIFIED, UNSPECIFIED BACK PAIN LATERALITY: ICD-10-CM

## 2023-05-17 DIAGNOSIS — M54.50 CHRONIC BILATERAL LOW BACK PAIN WITHOUT SCIATICA: ICD-10-CM

## 2023-05-17 DIAGNOSIS — G89.29 CHRONIC BILATERAL LOW BACK PAIN WITHOUT SCIATICA: ICD-10-CM

## 2023-05-17 DIAGNOSIS — G89.4 CHRONIC PAIN SYNDROME: ICD-10-CM

## 2023-05-17 RX ORDER — PREGABALIN 150 MG/1
CAPSULE ORAL
Qty: 60 CAPSULE | Refills: 3 | Status: SHIPPED | OUTPATIENT
Start: 2023-05-17

## 2023-05-17 RX ORDER — CARISOPRODOL 350 MG/1
350 TABLET ORAL 3 TIMES DAILY PRN
Qty: 90 TABLET | Refills: 0 | Status: SHIPPED | OUTPATIENT
Start: 2023-05-17

## 2023-05-17 RX ORDER — OXYCODONE HYDROCHLORIDE 15 MG/1
15 TABLET ORAL EVERY 8 HOURS PRN
Qty: 90 TABLET | Refills: 0 | Status: SHIPPED | OUTPATIENT
Start: 2023-05-17

## 2023-05-17 RX ORDER — OXYCODONE HYDROCHLORIDE 15 MG/1
15 TABLET ORAL EVERY 8 HOURS PRN
Qty: 90 TABLET | Refills: 0 | OUTPATIENT
Start: 2023-05-17

## 2023-05-17 NOTE — TELEPHONE ENCOUNTER
Lv:4/6/23  Appt:8/21/23  lyrica Last written 12/29/22 sold: 4/28/23  Oxycodone written 4/17/23 sold: 4/18/23

## 2023-05-17 NOTE — TELEPHONE ENCOUNTER
Caller: Briana Alas    Relationship: Self    Best call back number: [phone]    Requested Prescriptions:   Requested Prescriptions     Pending Prescriptions Disp Refills   • oxyCODONE (ROXICODONE) 15 MG immediate release tablet 90 tablet 0     Sig: Take 1 tablet by mouth Every 8 (Eight) Hours As Needed for Moderate Pain.   • carisoprodol (SOMA) 350 MG tablet 90 tablet 0     Sig: Take 1 tablet by mouth 3 (Three) Times a Day As Needed for Muscle Spasms.        Pharmacy where request should be sent: [facility] [address] [address] - [phone]  - [phone] FX     Last office visit with prescribing clinician: 4/6/2023   Last telemedicine visit with prescribing clinician: 5/17/2023   Next office visit with prescribing clinician: 8/21/2023     Additional details provided by patient: HAS LESS THAN 3 DAYS     PLEASE GIVE PATIENT A CALLBACK   Does the patient have less than a 3 day supply:  [x] Yes  [] No    Would you like a call back once the refill request has been completed: [x] Yes [] No    If the office needs to give you a call back, can they leave a voicemail: [] Yes [x] No    Ida Torres, PCT   05/17/23 11:19 EDT

## 2023-05-21 RX ORDER — PREDNISONE 10 MG/1
TABLET ORAL
Qty: 40 TABLET | Refills: 0 | Status: SHIPPED | OUTPATIENT
Start: 2023-05-21

## 2023-06-09 DIAGNOSIS — F33.1 MAJOR DEPRESSIVE DISORDER, RECURRENT EPISODE, MODERATE: Chronic | ICD-10-CM

## 2023-06-09 RX ORDER — BREXPIPRAZOLE 2 MG/1
TABLET ORAL
Qty: 30 TABLET | Refills: 3 | Status: SHIPPED | OUTPATIENT
Start: 2023-06-09

## 2023-06-09 NOTE — TELEPHONE ENCOUNTER
Rx Refill Note  Requested Prescriptions     Pending Prescriptions Disp Refills    Rexulti 2 MG tablet [Pharmacy Med Name: Rexulti 2 mg tablet] 30 tablet 3     Sig: TAKE ONE (1) TABLET BY MOUTH EVERY MORNING      Last office visit with prescribing clinician: 2/28/2023   Last telemedicine visit with prescribing clinician: Visit date not found   Next office visit with prescribing clinician: 6/28/2023   Office Visit with Jade Ryder MD (02/28/2023) SCANNED - LABS (02/28/2023)                       Would you like a call back once the refill request has been completed: [] Yes [] No    If the office needs to give you a call back, can they leave a voicemail: [] Yes [] No    Symone Varela MA  06/09/23, 11:42 EDT

## 2023-06-10 RX ORDER — ROPINIROLE 0.5 MG/1
TABLET, FILM COATED ORAL
Qty: 60 TABLET | Refills: 5 | Status: SHIPPED | OUTPATIENT
Start: 2023-06-10

## 2023-06-13 DIAGNOSIS — M54.40 CHRONIC LOW BACK PAIN WITH SCIATICA, SCIATICA LATERALITY UNSPECIFIED, UNSPECIFIED BACK PAIN LATERALITY: ICD-10-CM

## 2023-06-13 DIAGNOSIS — G89.29 CHRONIC LOW BACK PAIN WITH SCIATICA, SCIATICA LATERALITY UNSPECIFIED, UNSPECIFIED BACK PAIN LATERALITY: ICD-10-CM

## 2023-06-13 DIAGNOSIS — M54.50 CHRONIC BILATERAL LOW BACK PAIN WITHOUT SCIATICA: ICD-10-CM

## 2023-06-13 DIAGNOSIS — G89.29 CHRONIC BILATERAL LOW BACK PAIN WITHOUT SCIATICA: ICD-10-CM

## 2023-06-13 RX ORDER — OXYCODONE HYDROCHLORIDE 15 MG/1
15 TABLET ORAL EVERY 8 HOURS PRN
Qty: 90 TABLET | Refills: 0 | Status: SHIPPED | OUTPATIENT
Start: 2023-06-13

## 2023-06-13 RX ORDER — CARISOPRODOL 350 MG/1
350 TABLET ORAL 3 TIMES DAILY PRN
Qty: 90 TABLET | Refills: 0 | Status: SHIPPED | OUTPATIENT
Start: 2023-06-13

## 2023-06-13 NOTE — TELEPHONE ENCOUNTER
Caller: Briana Alas    Relationship: Self    Best call back number: [phone]    Requested Prescriptions:   Requested Prescriptions     Pending Prescriptions Disp Refills    oxyCODONE (ROXICODONE) 15 MG immediate release tablet 90 tablet 0     Sig: Take 1 tablet by mouth Every 8 (Eight) Hours As Needed for Moderate Pain. Code M79.7    carisoprodol (SOMA) 350 MG tablet 90 tablet 0     Sig: Take 1 tablet by mouth 3 (Three) Times a Day As Needed for Muscle Spasms. Code M79.7        Pharmacy where request should be sent: [facility] [address] [address] - [phone]  - [phone] FX     Last office visit with prescribing clinician: 4/6/2023   Last telemedicine visit with prescribing clinician: Visit date not found   Next office visit with prescribing clinician: 8/21/2023     Does the patient have less than a 3 day supply:  [x] Yes  [] No    Would you like a call back once the refill request has been completed: [x] Yes [] No    If the office needs to give you a call back, can they leave a voicemail: [x] Yes [] No    Digna Saenz Rep   06/13/23 09:44 EDT

## 2023-08-02 RX ORDER — ATENOLOL 50 MG/1
TABLET ORAL
Qty: 30 TABLET | Refills: 5 | Status: SHIPPED | OUTPATIENT
Start: 2023-08-02

## 2023-08-10 ENCOUNTER — TELEPHONE (OUTPATIENT)
Dept: FAMILY MEDICINE CLINIC | Facility: CLINIC | Age: 58
End: 2023-08-10
Payer: MEDICAID

## 2023-08-10 DIAGNOSIS — M54.50 CHRONIC BILATERAL LOW BACK PAIN WITHOUT SCIATICA: ICD-10-CM

## 2023-08-10 DIAGNOSIS — G89.29 CHRONIC BILATERAL LOW BACK PAIN WITHOUT SCIATICA: ICD-10-CM

## 2023-08-10 RX ORDER — OXYCODONE HYDROCHLORIDE 15 MG/1
15 TABLET ORAL EVERY 8 HOURS PRN
Qty: 90 TABLET | Refills: 0 | Status: SHIPPED | OUTPATIENT
Start: 2023-08-10

## 2023-08-10 NOTE — TELEPHONE ENCOUNTER
Caller: Briana Alas    Relationship: Self    Best call back number:[phone]    Requested Prescriptions:   Requested Prescriptions     Pending Prescriptions Disp Refills    oxyCODONE (ROXICODONE) 15 MG immediate release tablet 90 tablet 0     Sig: Take 1 tablet by mouth Every 8 (Eight) Hours As Needed for Moderate Pain. Code M79.7        Pharmacy where request should be sent: [facility] [address] [address] - [phone]  - [phone]      Last office visit with prescribing clinician: 4/6/2023   Last telemedicine visit with prescribing clinician: Visit date not found   Next office visit with prescribing clinician: 8/21/2023     Additional details provided by patient:     Does the patient have less than a 3 day supply:  [] Yes  [] No    Would you like a call back once the refill request has been completed: [] Yes [] No    If the office needs to give you a call back, can they leave a voicemail: [] Yes [] No    Digna Saravia Rep   08/10/23 08:40 EDT

## 2023-08-19 DIAGNOSIS — F33.1 MAJOR DEPRESSIVE DISORDER, RECURRENT EPISODE, MODERATE: Chronic | ICD-10-CM

## 2023-08-19 DIAGNOSIS — F41.1 GENERALIZED ANXIETY DISORDER: Chronic | ICD-10-CM

## 2023-08-21 ENCOUNTER — LAB (OUTPATIENT)
Dept: FAMILY MEDICINE CLINIC | Facility: CLINIC | Age: 58
End: 2023-08-21
Payer: MEDICAID

## 2023-08-21 ENCOUNTER — OFFICE VISIT (OUTPATIENT)
Dept: FAMILY MEDICINE CLINIC | Facility: CLINIC | Age: 58
End: 2023-08-21
Payer: MEDICAID

## 2023-08-21 VITALS
OXYGEN SATURATION: 97 % | HEIGHT: 64 IN | BODY MASS INDEX: 29.37 KG/M2 | WEIGHT: 172 LBS | DIASTOLIC BLOOD PRESSURE: 50 MMHG | TEMPERATURE: 97.3 F | HEART RATE: 54 BPM | SYSTOLIC BLOOD PRESSURE: 79 MMHG

## 2023-08-21 DIAGNOSIS — I10 HYPERTENSION, ESSENTIAL: Primary | ICD-10-CM

## 2023-08-21 DIAGNOSIS — I95.9 HYPOTENSION, UNSPECIFIED HYPOTENSION TYPE: ICD-10-CM

## 2023-08-21 DIAGNOSIS — G89.29 CHRONIC LOW BACK PAIN WITH SCIATICA, SCIATICA LATERALITY UNSPECIFIED, UNSPECIFIED BACK PAIN LATERALITY: ICD-10-CM

## 2023-08-21 DIAGNOSIS — M54.40 CHRONIC LOW BACK PAIN WITH SCIATICA, SCIATICA LATERALITY UNSPECIFIED, UNSPECIFIED BACK PAIN LATERALITY: ICD-10-CM

## 2023-08-21 DIAGNOSIS — M21.619 BUNION: ICD-10-CM

## 2023-08-21 DIAGNOSIS — F41.1 GENERALIZED ANXIETY DISORDER: Chronic | ICD-10-CM

## 2023-08-21 DIAGNOSIS — N63.21 MASS OF UPPER OUTER QUADRANT OF LEFT BREAST: ICD-10-CM

## 2023-08-21 PROCEDURE — 85025 COMPLETE CBC W/AUTO DIFF WBC: CPT | Performed by: FAMILY MEDICINE

## 2023-08-21 PROCEDURE — 36415 COLL VENOUS BLD VENIPUNCTURE: CPT | Performed by: FAMILY MEDICINE

## 2023-08-21 PROCEDURE — 80061 LIPID PANEL: CPT | Performed by: FAMILY MEDICINE

## 2023-08-21 PROCEDURE — 80053 COMPREHEN METABOLIC PANEL: CPT | Performed by: FAMILY MEDICINE

## 2023-08-21 RX ORDER — DULOXETIN HYDROCHLORIDE 60 MG/1
CAPSULE, DELAYED RELEASE ORAL
Qty: 60 CAPSULE | Refills: 3 | Status: SHIPPED | OUTPATIENT
Start: 2023-08-21

## 2023-08-21 NOTE — PROGRESS NOTES
"Subjective   Briana Alas is a 57 y.o. female.     History of Present Illness  Briana Alas is in for follow up on her high blood pressure and chronic back pain.  She also has some concerns regarding foot pain and a mass on her left breast that she noticed a couple of days ago.  She was showering and felt a mass in the upper outer portion of her left breast.  It was not painful but she has not noticed anything like this before.  She has not had any breast discharge or bleeding.  She has some fatigue but otherwise has not felt poorly.. There is no history of chest pain or dyspnea. There is no history of issue with bowel or bladder dysfunction. There is no history of dizziness or lightheadedness. There is no history of issue with sleep or mood. There is no history of issue with present medication.       Hypertension  Associated symptoms include neck pain. Pertinent negatives include no chest pain or shortness of breath.   Breast Mass  Associated symptoms include arthralgias, fatigue, myalgias, neck pain and a rash. Pertinent negatives include no abdominal pain, chest pain, congestion, coughing, fever, nausea, numbness, sore throat, vomiting or weakness.   Foot Pain  Associated symptoms include arthralgias, fatigue, myalgias, neck pain and a rash. Pertinent negatives include no abdominal pain, chest pain, congestion, coughing, fever, nausea, numbness, sore throat, vomiting or weakness.        BP (!) 78/49 (BP Location: Left arm, Patient Position: Sitting, Cuff Size: Large Adult)   Pulse (!) 48   Temp 97.3 øF (36.3 øC) (Temporal)   Ht 162.6 cm (64\")   Wt 78 kg (172 lb)   SpO2 97%   BMI 29.52 kg/mý       Chief Complaint   Patient presents with    Hypertension    chronic low back pain     With sciatica. Goes down left leg. Steroid helped, but it came back.     Breast Mass     Mass on left breast. Noticed Saturday night.     Foot Pain     Discss bunions on both left. Left is worse. Wants to discuss new surgery? "           Current Outpatient Medications:     atenolol (TENORMIN) 50 MG tablet, TAKE ONE (1) TABLET BY MOUTH DAILY, Disp: 30 tablet, Rfl: 5    atorvastatin (LIPITOR) 40 MG tablet, Take 1 tablet by mouth Every Night., Disp: 90 tablet, Rfl: 1    busPIRone (BUSPAR) 15 MG tablet, Take 1 tablet by mouth 2 (Two) Times a Day., Disp: 60 tablet, Rfl: 3    carisoprodol (SOMA) 350 MG tablet, Take 1 tablet by mouth 3 (Three) Times a Day As Needed for Muscle Spasms., Disp: 90 tablet, Rfl: 0    clonazePAM (KlonoPIN) 1 MG tablet, Take 1 tablet by mouth 3 (Three) Times a Day As Needed for Anxiety., Disp: 90 tablet, Rfl: 3    ibuprofen (ADVIL,MOTRIN) 800 MG tablet, Take 1 tablet by mouth Every 8 (Eight) Hours As Needed for Moderate Pain ., Disp: 90 tablet, Rfl: 3    oxyCODONE (ROXICODONE) 15 MG immediate release tablet, Take 1 tablet by mouth Every 8 (Eight) Hours As Needed for Moderate Pain. Code M79.7, Disp: 90 tablet, Rfl: 0    pregabalin (LYRICA) 150 MG capsule, TAKE ONE (1) CAPSULE BY MOUTH TWICE DAILY, Disp: 60 capsule, Rfl: 3    Rexulti 2 MG tablet, TAKE ONE (1) TABLET BY MOUTH EVERY MORNING, Disp: 30 tablet, Rfl: 3    rOPINIRole (REQUIP) 0.5 MG tablet, TAKE ONE (1) TABLET BY MOUTH TWICE DAILY (TAKE ONE (1) TABLET ONE HOUR BEFORE BEDTIME), Disp: 60 tablet, Rfl: 5    temazepam (RESTORIL) 30 MG capsule, Take 1 capsule by mouth At Night As Needed for Sleep., Disp: 30 capsule, Rfl: 3    vitamin D (ERGOCALCIFEROL) 1.25 MG (56665 UT) capsule capsule, Take 1 capsule by mouth 2 (Two) Times a Week. Take 3 days apart, Disp: 8 capsule, Rfl: 5    DULoxetine (CYMBALTA) 60 MG capsule, TAKE TWO (2) CAPSULES BY MOUTH EVERY DAY, Disp: 60 capsule, Rfl: 3        The following portions of the patient's history were reviewed and updated as appropriate: allergies, current medications, past family history, past medical history, past social history, past surgical history, and problem list.    Review of Systems   Constitutional:  Positive for  fatigue. Negative for activity change and fever.   HENT:  Negative for congestion, sinus pressure, sinus pain, sore throat and trouble swallowing.    Eyes:  Negative for visual disturbance.   Respiratory:  Negative for cough, chest tightness, shortness of breath and wheezing.    Cardiovascular:  Negative for chest pain.   Gastrointestinal:  Negative for abdominal distention, abdominal pain, constipation, diarrhea, nausea and vomiting.   Genitourinary:  Negative for difficulty urinating, dysuria and urgency.   Musculoskeletal:  Positive for arthralgias, back pain, myalgias and neck pain.   Skin:  Positive for rash.   Neurological:  Negative for dizziness, weakness, light-headedness and numbness.   Psychiatric/Behavioral:  Negative for agitation, dysphoric mood, hallucinations, sleep disturbance and suicidal ideas. The patient is not nervous/anxious.      Objective   Physical Exam  Vitals and nursing note reviewed.   Cardiovascular:      Rate and Rhythm: Regular rhythm. Bradycardia present.      Heart sounds: No murmur heard.  Pulmonary:      Effort: Pulmonary effort is normal.      Breath sounds: No wheezing or rales.   Chest:       Abdominal:      General: Bowel sounds are normal.      Palpations: Abdomen is soft.      Tenderness: There is no abdominal tenderness. There is no guarding.   Musculoskeletal:      Cervical back: Neck supple.      Right lower leg: No edema.      Left lower leg: No edema.      Right foot: Bunion present.      Left foot: Bunion present.   Feet:      Comments: Bunions on both feet, left seems larger  Lymphadenopathy:      Cervical: No cervical adenopathy.   Neurological:      General: No focal deficit present.      Mental Status: She is alert and oriented to person, place, and time.   Psychiatric:         Mood and Affect: Mood normal.       Assessment & Plan   Problems Addressed this Visit          Cardiac and Vasculature    Hypertension, essential - Primary    Relevant Orders     Comprehensive metabolic panel    Lipid panel       Mental Health    Generalized anxiety disorder (Chronic)       Musculoskeletal and Injuries    Chronic low back pain    Relevant Orders    XR Spine Lumbar 4+ View     Other Visit Diagnoses       Bunion        Relevant Orders    Ambulatory Referral to Podiatry    Hypotension, unspecified hypotension type        Relevant Orders    CBC w AUTO Differential    Mass of upper outer quadrant of left breast        Relevant Orders    Mammo Diagnostic Digital Tomosynthesis Bilateral With CAD    US Breast Left Limited          Diagnoses         Codes Comments    Hypertension, essential    -  Primary ICD-10-CM: I10  ICD-9-CM: 401.9     Chronic low back pain with sciatica, sciatica laterality unspecified, unspecified back pain laterality     ICD-10-CM: M54.40, G89.29  ICD-9-CM: 724.2, 724.3, 338.29     Generalized anxiety disorder     ICD-10-CM: F41.1  ICD-9-CM: 300.02     Bunion     ICD-10-CM: M21.619  ICD-9-CM: 727.1     Hypotension, unspecified hypotension type     ICD-10-CM: I95.9  ICD-9-CM: 458.9     Mass of upper outer quadrant of left breast     ICD-10-CM: N63.21  ICD-9-CM: 611.72           I have asked her to reduce her atenolol by half until further notice  I am ordering diagnostic mammogram and a left breast ultrasound as soon as possible  I have referred her to podiatry for the bunions of her feet  I am getting x-rays of the lower back to look at the worsening lower back pain symptoms  I will set her up for follow-up later in the year and see her sooner if there are new concerns

## 2023-08-22 LAB
ALBUMIN SERPL-MCNC: 4.1 G/DL (ref 3.5–5.2)
ALBUMIN/GLOB SERPL: 1.6 G/DL
ALP SERPL-CCNC: 63 U/L (ref 39–117)
ALT SERPL W P-5'-P-CCNC: 13 U/L (ref 1–33)
ANION GAP SERPL CALCULATED.3IONS-SCNC: 12.2 MMOL/L (ref 5–15)
AST SERPL-CCNC: 19 U/L (ref 1–32)
BASOPHILS # BLD AUTO: 0.05 10*3/MM3 (ref 0–0.2)
BASOPHILS NFR BLD AUTO: 0.5 % (ref 0–1.5)
BILIRUB SERPL-MCNC: 0.3 MG/DL (ref 0–1.2)
BUN SERPL-MCNC: 17 MG/DL (ref 6–20)
BUN/CREAT SERPL: 13.7 (ref 7–25)
CALCIUM SPEC-SCNC: 9.7 MG/DL (ref 8.6–10.5)
CHLORIDE SERPL-SCNC: 100 MMOL/L (ref 98–107)
CHOLEST SERPL-MCNC: 148 MG/DL (ref 0–200)
CO2 SERPL-SCNC: 27.8 MMOL/L (ref 22–29)
CREAT SERPL-MCNC: 1.24 MG/DL (ref 0.57–1)
DEPRECATED RDW RBC AUTO: 45.7 FL (ref 37–54)
EGFRCR SERPLBLD CKD-EPI 2021: 50.5 ML/MIN/1.73
EOSINOPHIL # BLD AUTO: 0.12 10*3/MM3 (ref 0–0.4)
EOSINOPHIL NFR BLD AUTO: 1.2 % (ref 0.3–6.2)
ERYTHROCYTE [DISTWIDTH] IN BLOOD BY AUTOMATED COUNT: 13.7 % (ref 12.3–15.4)
GLOBULIN UR ELPH-MCNC: 2.5 GM/DL
GLUCOSE SERPL-MCNC: 64 MG/DL (ref 65–99)
HCT VFR BLD AUTO: 42.1 % (ref 34–46.6)
HDLC SERPL-MCNC: 37 MG/DL (ref 40–60)
HGB BLD-MCNC: 14.3 G/DL (ref 12–15.9)
IMM GRANULOCYTES # BLD AUTO: 0.03 10*3/MM3 (ref 0–0.05)
IMM GRANULOCYTES NFR BLD AUTO: 0.3 % (ref 0–0.5)
LDLC SERPL CALC-MCNC: 78 MG/DL (ref 0–100)
LDLC/HDLC SERPL: 1.94 {RATIO}
LYMPHOCYTES # BLD AUTO: 3.87 10*3/MM3 (ref 0.7–3.1)
LYMPHOCYTES NFR BLD AUTO: 38.1 % (ref 19.6–45.3)
MCH RBC QN AUTO: 31.1 PG (ref 26.6–33)
MCHC RBC AUTO-ENTMCNC: 34 G/DL (ref 31.5–35.7)
MCV RBC AUTO: 91.5 FL (ref 79–97)
MONOCYTES # BLD AUTO: 0.73 10*3/MM3 (ref 0.1–0.9)
MONOCYTES NFR BLD AUTO: 7.2 % (ref 5–12)
NEUTROPHILS NFR BLD AUTO: 5.35 10*3/MM3 (ref 1.7–7)
NEUTROPHILS NFR BLD AUTO: 52.7 % (ref 42.7–76)
NRBC BLD AUTO-RTO: 0 /100 WBC (ref 0–0.2)
PLATELET # BLD AUTO: 277 10*3/MM3 (ref 140–450)
PMV BLD AUTO: 10.3 FL (ref 6–12)
POTASSIUM SERPL-SCNC: 4.1 MMOL/L (ref 3.5–5.2)
PROT SERPL-MCNC: 6.6 G/DL (ref 6–8.5)
RBC # BLD AUTO: 4.6 10*6/MM3 (ref 3.77–5.28)
SODIUM SERPL-SCNC: 140 MMOL/L (ref 136–145)
TRIGL SERPL-MCNC: 197 MG/DL (ref 0–150)
VLDLC SERPL-MCNC: 33 MG/DL (ref 5–40)
WBC NRBC COR # BLD: 10.15 10*3/MM3 (ref 3.4–10.8)

## 2023-08-28 RX ORDER — PREDNISONE 10 MG/1
TABLET ORAL
Qty: 40 TABLET | Refills: 0 | Status: SHIPPED | OUTPATIENT
Start: 2023-08-28

## 2023-08-31 DIAGNOSIS — F41.1 GENERALIZED ANXIETY DISORDER: Chronic | ICD-10-CM

## 2023-09-01 RX ORDER — CLONAZEPAM 1 MG/1
TABLET ORAL
Qty: 90 TABLET | Refills: 0 | Status: SHIPPED | OUTPATIENT
Start: 2023-09-01

## 2023-09-01 NOTE — TELEPHONE ENCOUNTER
Rx Refill Note  Requested Prescriptions     Pending Prescriptions Disp Refills    clonazePAM (KlonoPIN) 1 MG tablet [Pharmacy Med Name: CLONAZEPAM 1 MG TABLET] 90 tablet      Sig: TAKE 1 TABLET BY MOUTH 3 TIMES A DAY AS NEEDED FOR ANXIETY.      Last office visit with prescribing clinician: 2/28/2023   Last telemedicine visit with prescribing clinician: Visit date not found   Next office visit with presOffice Visit with Jade Ryder MD (02/28/2023) cribing clinician: 9/6/2023     SCANNED - LABS (02/28/2023)                     Would you like a call back once the refill request has been completed: [] Yes [] No    If the office needs to give you a call back, can they leave a voicemail: [] Yes [] No    Symone Varela MA  09/01/23, 12:40 EDT

## 2023-09-05 DIAGNOSIS — F41.1 GENERALIZED ANXIETY DISORDER: Chronic | ICD-10-CM

## 2023-09-05 DIAGNOSIS — M54.50 CHRONIC BILATERAL LOW BACK PAIN WITHOUT SCIATICA: ICD-10-CM

## 2023-09-05 DIAGNOSIS — M54.40 CHRONIC LOW BACK PAIN WITH SCIATICA, SCIATICA LATERALITY UNSPECIFIED, UNSPECIFIED BACK PAIN LATERALITY: ICD-10-CM

## 2023-09-05 DIAGNOSIS — G89.29 CHRONIC BILATERAL LOW BACK PAIN WITHOUT SCIATICA: ICD-10-CM

## 2023-09-05 DIAGNOSIS — G89.29 CHRONIC LOW BACK PAIN WITH SCIATICA, SCIATICA LATERALITY UNSPECIFIED, UNSPECIFIED BACK PAIN LATERALITY: ICD-10-CM

## 2023-09-05 DIAGNOSIS — G89.4 CHRONIC PAIN SYNDROME: ICD-10-CM

## 2023-09-05 RX ORDER — CARISOPRODOL 350 MG/1
350 TABLET ORAL 3 TIMES DAILY PRN
Qty: 90 TABLET | Refills: 0 | Status: SHIPPED | OUTPATIENT
Start: 2023-09-05

## 2023-09-05 RX ORDER — ERGOCALCIFEROL 1.25 MG/1
CAPSULE ORAL
Qty: 8 CAPSULE | Refills: 5 | Status: SHIPPED | OUTPATIENT
Start: 2023-09-05

## 2023-09-05 RX ORDER — OXYCODONE HYDROCHLORIDE 15 MG/1
15 TABLET ORAL EVERY 8 HOURS PRN
Qty: 90 TABLET | Refills: 0 | Status: SHIPPED | OUTPATIENT
Start: 2023-09-05

## 2023-09-05 RX ORDER — PREGABALIN 150 MG/1
CAPSULE ORAL
Qty: 60 CAPSULE | Refills: 5 | Status: SHIPPED | OUTPATIENT
Start: 2023-09-05

## 2023-09-05 RX ORDER — BUSPIRONE HYDROCHLORIDE 15 MG/1
TABLET ORAL
Qty: 60 TABLET | Refills: 0 | Status: SHIPPED | OUTPATIENT
Start: 2023-09-05 | End: 2023-09-06 | Stop reason: SDUPTHER

## 2023-09-06 ENCOUNTER — OFFICE VISIT (OUTPATIENT)
Dept: PSYCHIATRY | Facility: CLINIC | Age: 58
End: 2023-09-06
Payer: MEDICAID

## 2023-09-06 DIAGNOSIS — F51.01 PRIMARY INSOMNIA: Chronic | ICD-10-CM

## 2023-09-06 DIAGNOSIS — F41.1 GENERALIZED ANXIETY DISORDER: Chronic | ICD-10-CM

## 2023-09-06 DIAGNOSIS — F33.1 MAJOR DEPRESSIVE DISORDER, RECURRENT EPISODE, MODERATE: Primary | Chronic | ICD-10-CM

## 2023-09-06 RX ORDER — BUSPIRONE HYDROCHLORIDE 15 MG/1
15 TABLET ORAL 2 TIMES DAILY
Qty: 60 TABLET | Refills: 3 | Status: SHIPPED | OUTPATIENT
Start: 2023-09-06

## 2023-09-06 RX ORDER — DULOXETIN HYDROCHLORIDE 60 MG/1
60 CAPSULE, DELAYED RELEASE ORAL 2 TIMES DAILY
Qty: 60 CAPSULE | Refills: 3 | Status: SHIPPED | OUTPATIENT
Start: 2023-09-06

## 2023-09-06 RX ORDER — BREXPIPRAZOLE 2 MG/1
1 TABLET ORAL EVERY MORNING
Qty: 30 TABLET | Refills: 3 | Status: SHIPPED | OUTPATIENT
Start: 2023-09-06

## 2023-09-06 NOTE — PROGRESS NOTES
Subjective   Briana Alas is a 58 y.o. female who presents today for follow up    Chief Complaint:     Depression, anxiety , taking care of her mom after hip surgery      History of Present Illness: the pt has a long hx of depression, no specific triggers or stressors, was on zoloft, celexa , few unsuccessful trials   She was relatively stable on meds until her daughter passed away  Last year , still difficult to cope  , her daughter was special needs, pt's life was revolving around her , now she feels she has no purpose     Today the pt reported  feeling less depressed, but more anxious, now she is taking care of her mom after hip surgery   The pt still has her days, but not every day,  denied feeling hopeless/helpless,  Anxiety is still intense at times and is associated with increased tension and irritability , difficult to relax at night    Depression is rated as 2-3/10, denied AVH/SI/HI     Sleep - fair  on temazepam, but started having difficulties with sleep maintenance       Alleviating factors - to stay busy     Anxiety is manageable on clonazepam        The following portions of the patient's history were reviewed and updated as appropriate: allergies, current medications, past family history, past medical history, past social history, past surgical history and problem list.    PAST PSYCHIATRIC HISTORY  Axis I  Affective/Bipolar Disorder, Anxiety/Panic Disorder  No inpt. No SI/SA   Axis II  Defer     PAST OUTPATIENT TREATMENT  Diagnosis treated:  Affective Disorder, Anxiety/Panic Disorder  Treatment Type:  Medication Management  Psychotherapy - shante Osei at [facility]   Prior Psychiatric Medications:  lexapro - not effective  celexa -not effective  zoloft - not effective now    Support Groups:  None   Sequelae Of Mental Disorder:  emotional distress          Interval History  No changes      Side Effects  Denied       Past Medical History:  Past Medical History:   Diagnosis Date    Anxiety      Arthritis     Back pain     Depression     Headache     Hyperlipidemia     Hypertension     Injury of back     Restless leg syndrome        Social History:  Social History     Socioeconomic History    Marital status: Legally    Tobacco Use    Smoking status: Every Day     Packs/day: 1.00     Years: 44.00     Pack years: 44.00     Types: Cigarettes     Start date: 1979     Passive exposure: Never    Smokeless tobacco: Never   Vaping Use    Vaping Use: Never used   Substance and Sexual Activity    Alcohol use: No    Drug use: No    Sexual activity: Defer       Family History:  Family History   Problem Relation Age of Onset    Hypertension Mother     Hyperlipidemia Mother     Depression Mother     Thyroid disease Mother     Hypertension Father        Past Surgical History:  Past Surgical History:   Procedure Laterality Date    ANKLE OPEN REDUCTION INTERNAL FIXATION Right     FOOT SURGERY Bilateral     bunionectomy    SHOULDER ARTHROSCOPY W/ ROTATOR CUFF REPAIR Right 12/13/2019    Procedure: RIGHT SHOULDER ARTHROSCOPY WITH ROTATOR CUFF REPAIR and Subacromial decompression;  Surgeon: Gino Ackerman MD;  Location: [facility] MAIN OR;  Service: Orthopedics    TUBAL ABDOMINAL LIGATION         Problem List:  Patient Active Problem List   Diagnosis    Fibromyalgia    Abnormal gait    Ankle pain, right    Knee pain    Leg pain, left    Annular tear of lumbar disc    Degeneration of lumbar intervertebral disc    Generalized anxiety disorder    Body mass index 32.0-32.9, adult    Cervical myelopathy    Other dorsalgia    Chronic low back pain    Hx traumatic fracture    Hyperlipidemia    Hypertension, essential    Hypovitaminosis D    Inflammatory arthritis    Joint pain    Leg weakness, bilateral    Lumbosacral radiculopathy    Malaise and fatigue    Neck pain, chronic    Osteoarthritis of knee    Pain in right shoulder    Pain due to internal orthopedic prosthetic devices, implants and grafts, subsequent  encounter    Rotator cuff tendonitis    Scoliosis    Osteoarthritis of lumbosacral spine without myelopathy    Spondylosis of lumbosacral region    Tobacco abuse counseling    Upper respiratory tract infection    Major depressive disorder, recurrent episode, moderate    Primary insomnia    Bereavement       Allergy:   Allergies   Allergen Reactions    Penicillins Rash        Discontinued Medications:  Medications Discontinued During This Encounter   Medication Reason    Rexulti 2 MG tablet Reorder    DULoxetine (CYMBALTA) 60 MG capsule Reorder    busPIRone (BUSPAR) 15 MG tablet Reorder         Current Medications:   Current Outpatient Medications   Medication Sig Dispense Refill    Brexpiprazole (Rexulti) 2 MG tablet Take 1 tablet by mouth Every Morning. 30 tablet 3    busPIRone (BUSPAR) 15 MG tablet Take 1 tablet by mouth 2 (Two) Times a Day. 60 tablet 3    DULoxetine (CYMBALTA) 60 MG capsule Take 1 capsule by mouth 2 (Two) Times a Day. 60 capsule 3    atenolol (TENORMIN) 50 MG tablet TAKE ONE (1) TABLET BY MOUTH DAILY 30 tablet 5    atorvastatin (LIPITOR) 40 MG tablet Take 1 tablet by mouth Every Night. 90 tablet 1    carisoprodol (SOMA) 350 MG tablet Take 1 tablet by mouth 3 (Three) Times a Day As Needed for Muscle Spasms. 90 tablet 0    clonazePAM (KlonoPIN) 1 MG tablet TAKE 1 TABLET BY MOUTH 3 TIMES A DAY AS NEEDED FOR ANXIETY. 90 tablet 0    ibuprofen (ADVIL,MOTRIN) 800 MG tablet Take 1 tablet by mouth Every 8 (Eight) Hours As Needed for Moderate Pain . 90 tablet 3    oxyCODONE (ROXICODONE) 15 MG immediate release tablet Take 1 tablet by mouth Every 8 (Eight) Hours As Needed for Moderate Pain. Code M79.7 90 tablet 0    predniSONE (DELTASONE) 10 MG tablet TAKE 4 TABS BY MOUTH DAILY X4 DAYS, 3 TABS DAILY X4 DAYS, 2 TABS DAILY X4 DAYS, 1 TAB DAILY X4 DAYS 40 tablet 0    pregabalin (LYRICA) 150 MG capsule TAKE ONE (1) CAPSULE BY MOUTH TWICE DAILY 60 capsule 5    rOPINIRole (REQUIP) 0.5 MG tablet TAKE ONE (1)  TABLET BY MOUTH TWICE DAILY (TAKE ONE (1) TABLET ONE HOUR BEFORE BEDTIME) 60 tablet 5    temazepam (RESTORIL) 30 MG capsule Take 1 capsule by mouth At Night As Needed for Sleep. 30 capsule 3    vitamin D (ERGOCALCIFEROL) 1.25 MG (29437 UT) capsule capsule TAKE ONE (1) CAPSULE BY MOUTH TWICE A WEEK. TAKE THREE (3) DAYS APART 8 capsule 5     No current facility-administered medications for this visit.         Psychological ROS: positive for - anxiety, depression   negative for - hallucinations, hostility, irritability, memory difficulties, mood swings, obsessive thoughts or suicidal ideation      Physical Exam:   There were no vitals taken for this visit.    Mental Status Exam:   Hygiene:   good  Cooperation:  Cooperative  Eye Contact:  Good  Psychomotor Behavior:  Appropriate  Affect:  Appropriate and congruent with mood   Mood: anxious and fluctates  Hopelessness: Denies  Speech:  Normal  Thought Process:  Goal directed and Linear  Thought Content:  Mood congruent  Suicidal:  None  Homicidal:  None  Hallucinations:  None  Delusion:  None  Memory:  Intact  Orientation:  Person, Place, Time and Situation  Reliability:  good  Insight:  Good  Judgement:  Good  Impulse Control:  Good  Physical/Medical Issues:  Yes        MSE from 2/28/23  reviewed and accepted without changes     PHQ-9 Depression Screening  Little interest or pleasure in doing things? 2-->more than half the days   Feeling down, depressed, or hopeless? 2-->more than half the days   Trouble falling or staying asleep, or sleeping too much? 2-->more than half the days   Feeling tired or having little energy? 1-->several days   Poor appetite or overeating? 0-->not at all   Feeling bad about yourself - or that you are a failure or have let yourself or your family down? 1-->several days   Trouble concentrating on things, such as reading the newspaper or watching television? 1-->several days   Moving or speaking so slowly that other people could have noticed? Or  the opposite - being so fidgety or restless that you have been moving around a lot more than usual? 0-->not at all   Thoughts that you would be better off dead, or of hurting yourself in some way? 0-->not at all   PHQ-9 Total Score 9   If you checked off any problems, how difficult have these problems made it for you to do your work, take care of things at home, or get along with other people? very difficult     Briana Alas  reports that she has been smoking cigarettes. She started smoking about 44 years ago. She has a 44.00 pack-year smoking history. She has never been exposed to tobacco smoke. She has never used smokeless tobacco.. I have educated her on the risk of diseases from using tobacco products such as cancer, COPD and heart disease.     I advised her to quit and she is not willing to quit.    I spent 3  minutes counseling the patient.           Current every day smoker 3-10 mintues spent counseling Not agreeable to stopping    I advised Briana of the risks of tobacco use.     Lab Results:   Office Visit on 08/21/2023   Component Date Value Ref Range Status    Glucose 08/21/2023 64 (L)  65 - 99 mg/dL Final    BUN 08/21/2023 17  6 - 20 mg/dL Final    Creatinine 08/21/2023 1.24 (H)  0.57 - 1.00 mg/dL Final    Sodium 08/21/2023 140  136 - 145 mmol/L Final    Potassium 08/21/2023 4.1  3.5 - 5.2 mmol/L Final    Chloride 08/21/2023 100  98 - 107 mmol/L Final    CO2 08/21/2023 27.8  22.0 - 29.0 mmol/L Final    Calcium 08/21/2023 9.7  8.6 - 10.5 mg/dL Final    Total Protein 08/21/2023 6.6  6.0 - 8.5 g/dL Final    Albumin 08/21/2023 4.1  3.5 - 5.2 g/dL Final    ALT (SGPT) 08/21/2023 13  1 - 33 U/L Final    AST (SGOT) 08/21/2023 19  1 - 32 U/L Final    Alkaline Phosphatase 08/21/2023 63  39 - 117 U/L Final    Total Bilirubin 08/21/2023 0.3  0.0 - 1.2 mg/dL Final    Globulin 08/21/2023 2.5  gm/dL Final    A/G Ratio 08/21/2023 1.6  g/dL Final    BUN/Creatinine Ratio 08/21/2023 13.7  7.0 - 25.0 Final    Anion Gap  08/21/2023 12.2  5.0 - 15.0 mmol/L Final    eGFR 08/21/2023 50.5 (L)  >60.0 mL/min/1.73 Final    Total Cholesterol 08/21/2023 148  0 - 200 mg/dL Final    Triglycerides 08/21/2023 197 (H)  0 - 150 mg/dL Final    HDL Cholesterol 08/21/2023 37 (L)  40 - 60 mg/dL Final    LDL Cholesterol  08/21/2023 78  0 - 100 mg/dL Final    VLDL Cholesterol 08/21/2023 33  5 - 40 mg/dL Final    LDL/HDL Ratio 08/21/2023 1.94   Final    WBC 08/21/2023 10.15  3.40 - 10.80 10*3/mm3 Final    RBC 08/21/2023 4.60  3.77 - 5.28 10*6/mm3 Final    Hemoglobin 08/21/2023 14.3  12.0 - 15.9 g/dL Final    Hematocrit 08/21/2023 42.1  34.0 - 46.6 % Final    MCV 08/21/2023 91.5  79.0 - 97.0 fL Final    MCH 08/21/2023 31.1  26.6 - 33.0 pg Final    MCHC 08/21/2023 34.0  31.5 - 35.7 g/dL Final    RDW 08/21/2023 13.7  12.3 - 15.4 % Final    RDW-SD 08/21/2023 45.7  37.0 - 54.0 fl Final    MPV 08/21/2023 10.3  6.0 - 12.0 fL Final    Platelets 08/21/2023 277  140 - 450 10*3/mm3 Final    Neutrophil % 08/21/2023 52.7  42.7 - 76.0 % Final    Lymphocyte % 08/21/2023 38.1  19.6 - 45.3 % Final    Monocyte % 08/21/2023 7.2  5.0 - 12.0 % Final    Eosinophil % 08/21/2023 1.2  0.3 - 6.2 % Final    Basophil % 08/21/2023 0.5  0.0 - 1.5 % Final    Immature Grans % 08/21/2023 0.3  0.0 - 0.5 % Final    Neutrophils, Absolute 08/21/2023 5.35  1.70 - 7.00 10*3/mm3 Final    Lymphocytes, Absolute 08/21/2023 3.87 (H)  0.70 - 3.10 10*3/mm3 Final    Monocytes, Absolute 08/21/2023 0.73  0.10 - 0.90 10*3/mm3 Final    Eosinophils, Absolute 08/21/2023 0.12  0.00 - 0.40 10*3/mm3 Final    Basophils, Absolute 08/21/2023 0.05  0.00 - 0.20 10*3/mm3 Final    Immature Grans, Absolute 08/21/2023 0.03  0.00 - 0.05 10*3/mm3 Final    nRBC 08/21/2023 0.0  0.0 - 0.2 /100 WBC Final       Assessment & Plan   Problems Addressed this Visit          Mental Health    Generalized anxiety disorder (Chronic)    Relevant Medications    Brexpiprazole (Rexulti) 2 MG tablet    DULoxetine (CYMBALTA) 60 MG capsule     busPIRone (BUSPAR) 15 MG tablet    Major depressive disorder, recurrent episode, moderate - Primary (Chronic)    Relevant Medications    Brexpiprazole (Rexulti) 2 MG tablet    DULoxetine (CYMBALTA) 60 MG capsule    busPIRone (BUSPAR) 15 MG tablet       Sleep    Primary insomnia (Chronic)    Relevant Orders    Ambulatory Referral to Sleep Medicine     Diagnoses         Codes Comments    Major depressive disorder, recurrent episode, moderate    -  Primary ICD-10-CM: F33.1  ICD-9-CM: 296.32     Generalized anxiety disorder     ICD-10-CM: F41.1  ICD-9-CM: 300.02     Primary insomnia     ICD-10-CM: F51.01  ICD-9-CM: 307.42             Visit Diagnoses:    ICD-10-CM ICD-9-CM   1. Major depressive disorder, recurrent episode, moderate  F33.1 296.32   2. Generalized anxiety disorder  F41.1 300.02   3. Primary insomnia  F51.01 307.42         TREATMENT PLAN/GOALS: Continue supportive psychotherapy efforts and medications as indicated. Treatment and medication options discussed during today's visit. Patient ackowledged and verbally consented to continue with current treatment plan and was educated on the importance of compliance with treatment and follow-up appointments.    MEDICATION ISSUES:    INSPECT reviewed as expected - on pain meds, on clonazepam  9/1//2023  (last refill)   and temazepam from PCP     1. Major depressive d/o moderate recurrent - cont cymbalta 120 mg BP is stable, cont  rexulti   2 mg       Cont therapy  2. Generalized anxiety - cont cymbalta at 120 mg , cont clonazepam 1 mg TID, but she was advised to decrease to BID on those days when she feels better, will address dose reduction next appt in 4 months   the pt is on opioids, will closely monitor    3 Insomnia - cont temazepam, referred to sleep medicine        Labs 8/21/23 TG  197, chol 148  , CMP WNL      UDS 7/26/22 -consistent for clonazepam, temazepam and + fentanyl (?) , will repeat today , the denied fentanyl use   2/28/23 - consistent    LABORATORY - SCAN - MULTIPLE LAB REPORT, [facility], 02/28/2023 (02/28/2023)     PHQ scored 9 and indicated mild depression   STEWART 7 scored 8  Patient screened positive for depression based on a PHQ-9 score of 9 on 9/6/2023. Follow-up recommendations include: Prescribed antidepressant medication treatment.      Discussed medication options and treatment plan of prescribed medication as well as the risks, benefits, and side effects including potential falls, possible impaired driving and metabolic adversities among others. Patient is agreeable to call the office with any worsening of symptoms or onset of side effects. Patient is agreeable to call 911 or go to the nearest ER should he/she begin having SI/HI. No medication side effects or related complaints today.     MEDS ORDERED DURING VISIT:  New Medications Ordered This Visit   Medications    Brexpiprazole (Rexulti) 2 MG tablet     Sig: Take 1 tablet by mouth Every Morning.     Dispense:  30 tablet     Refill:  3    DULoxetine (CYMBALTA) 60 MG capsule     Sig: Take 1 capsule by mouth 2 (Two) Times a Day.     Dispense:  60 capsule     Refill:  3    busPIRone (BUSPAR) 15 MG tablet     Sig: Take 1 tablet by mouth 2 (Two) Times a Day.     Dispense:  60 tablet     Refill:  3       Return in about 4 months (around 1/6/2024).         This document has been electronically signed by Jade Ryder MD  September 6, 2023 08:16 EDT    EMR Dragon transcription disclaimer:  Some of this encounter note is an electronic transcription translation of spoken language to printed text. The electronic translation of spoken language may permit erroneous, or at times, nonsensical words or phrases to be inadvertently transcribed; Although I have reviewed the note for such errors some may still exist.

## 2023-09-16 RX ORDER — IBUPROFEN 800 MG/1
TABLET ORAL
Qty: 90 TABLET | Refills: 3 | Status: SHIPPED | OUTPATIENT
Start: 2023-09-16

## 2023-09-26 DIAGNOSIS — F32.A DEPRESSION, UNSPECIFIED DEPRESSION TYPE: ICD-10-CM

## 2023-09-26 RX ORDER — TEMAZEPAM 30 MG/1
30 CAPSULE ORAL NIGHTLY PRN
Qty: 30 CAPSULE | Refills: 2 | Status: SHIPPED | OUTPATIENT
Start: 2023-09-26

## 2023-09-26 NOTE — TELEPHONE ENCOUNTER
Rx Refill Note  Requested Prescriptions     Pending Prescriptions Disp Refills    temazepam (RESTORIL) 30 MG capsule [Pharmacy Med Name: TEMAZEPAM 30 MG CAPSULE] 30 capsule      Sig: TAKE 1 CAPSULE BY MOUTH AT NIGHT AS NEEDED FOR SLEEP.      Last office visit with prescribing clinician: 9/6/2023   Last telemedicine visit with prescribing clinician: Visit date not found   Next office visit with prescribing clinician: 1/3/2024   Office Visit with Jade Ryder MD (09/06/2023) SCANNED - LABS (02/28/2023)                       Would you like a call back once the refill request has been completed: [] Yes [] No    If the office needs to give you a call back, can they leave a voicemail: [] Yes [] No    Symone Varela MA  09/26/23, 11:27 EDT    UPLOADED

## 2023-09-28 DIAGNOSIS — F41.1 GENERALIZED ANXIETY DISORDER: Chronic | ICD-10-CM

## 2023-09-28 RX ORDER — CLONAZEPAM 1 MG/1
TABLET ORAL
Qty: 90 TABLET | Refills: 1 | Status: SHIPPED | OUTPATIENT
Start: 2023-09-28

## 2023-09-28 NOTE — TELEPHONE ENCOUNTER
Rx Refill Note  Requested Prescriptions     Pending Prescriptions Disp Refills    clonazePAM (KlonoPIN) 1 MG tablet [Pharmacy Med Name: CLONAZEPAM 1 MG TABLET] 90 tablet 0     Sig: TAKE 1 TABLET BY MOUTH THREE TIMES A DAY AS NEEDED FOR ANXIETY      Last office visit with prescribing clinician: 9/6/2023   Last telemedicine visit with prescribing clinician: Visit date not found   Next office visit with prescribing clinician: 1/3/2024   Office Visit with Jade Ryder MD (09/06/2023)   SCANNED - LABS (02/28/2023)                     Would you like a call back once the refill request has been completed: [] Yes [] No    If the office needs to give you a call back, can they leave a voicemail: [] Yes [] No    Symone Varela MA  09/28/23, 14:36 EDT    UPLOADED

## 2023-10-03 DIAGNOSIS — M54.40 CHRONIC LOW BACK PAIN WITH SCIATICA, SCIATICA LATERALITY UNSPECIFIED, UNSPECIFIED BACK PAIN LATERALITY: ICD-10-CM

## 2023-10-03 DIAGNOSIS — G89.29 CHRONIC LOW BACK PAIN WITH SCIATICA, SCIATICA LATERALITY UNSPECIFIED, UNSPECIFIED BACK PAIN LATERALITY: ICD-10-CM

## 2023-10-03 DIAGNOSIS — M54.50 CHRONIC BILATERAL LOW BACK PAIN WITHOUT SCIATICA: ICD-10-CM

## 2023-10-03 DIAGNOSIS — G89.29 CHRONIC BILATERAL LOW BACK PAIN WITHOUT SCIATICA: ICD-10-CM

## 2023-10-03 RX ORDER — OXYCODONE HYDROCHLORIDE 15 MG/1
15 TABLET ORAL EVERY 8 HOURS PRN
Qty: 90 TABLET | Refills: 0 | Status: SHIPPED | OUTPATIENT
Start: 2023-10-03

## 2023-10-03 RX ORDER — CARISOPRODOL 350 MG/1
350 TABLET ORAL 3 TIMES DAILY PRN
Qty: 90 TABLET | Refills: 0 | Status: SHIPPED | OUTPATIENT
Start: 2023-10-03

## 2023-10-03 NOTE — TELEPHONE ENCOUNTER
Caller: Bishop Briana CEFERINO    Relationship: Self    Best call back number: [phone]    Requested Prescriptions:   Requested Prescriptions     Pending Prescriptions Disp Refills    oxyCODONE (ROXICODONE) 15 MG immediate release tablet 90 tablet 0     Sig: Take 1 tablet by mouth Every 8 (Eight) Hours As Needed for Moderate Pain. Code M79.7    carisoprodol (SOMA) 350 MG tablet 90 tablet 0     Sig: Take 1 tablet by mouth 3 (Three) Times a Day As Needed for Muscle Spasms.        Pharmacy where request should be sent: [facility] [id number] - [place], [place] - [address] AT [facility] [address] - [phone] [facility] [phone] FX     Last office visit with prescribing clinician: 8/21/2023   Last telemedicine visit with prescribing clinician: Visit date not found   Next office visit with prescribing clinician: 12/28/2023     Additional details provided by patient: THE PATIENT IS RUNNING LOW ON THESE MEDICATIONS. THE PATIENT FOUND THESE MEDICATIONS CHEAPER AT THE [facility] LISTED.     Does the patient have less than a 3 day supply:  [x] Yes  [] No    Would you like a call back once the refill request has been completed: [x] Yes [] No    If the office needs to give you a call back, can they leave a voicemail: [x] Yes [] No    Digna Reeder Rep   10/03/23 11:50 EDT

## 2023-10-13 ENCOUNTER — TELEPHONE (OUTPATIENT)
Dept: FAMILY MEDICINE CLINIC | Facility: CLINIC | Age: 58
End: 2023-10-13

## 2023-10-13 NOTE — TELEPHONE ENCOUNTER
Caller: Briana Alas    Relationship: Self    Best call back number: [phone]    What is the medical concern/diagnosis: BUNYAN PAIN     What specialty or service is being requested: FOOT AND ANKLE SPECIALIST     Any additional details:    PATIENT HAS BEEN SET UP WITH SONAM CHAHAL 11/6/23 AND WONDERS IF WE CAN GET HER IN ANY SOONER WITH ANY OTHER SPECIALIST.

## 2023-10-27 DIAGNOSIS — G89.29 CHRONIC BILATERAL LOW BACK PAIN WITHOUT SCIATICA: ICD-10-CM

## 2023-10-27 DIAGNOSIS — G89.29 CHRONIC LOW BACK PAIN WITH SCIATICA, SCIATICA LATERALITY UNSPECIFIED, UNSPECIFIED BACK PAIN LATERALITY: ICD-10-CM

## 2023-10-27 DIAGNOSIS — M54.40 CHRONIC LOW BACK PAIN WITH SCIATICA, SCIATICA LATERALITY UNSPECIFIED, UNSPECIFIED BACK PAIN LATERALITY: ICD-10-CM

## 2023-10-27 DIAGNOSIS — M54.50 CHRONIC BILATERAL LOW BACK PAIN WITHOUT SCIATICA: ICD-10-CM

## 2023-10-27 RX ORDER — OXYCODONE HYDROCHLORIDE 15 MG/1
15 TABLET ORAL EVERY 8 HOURS PRN
Qty: 90 TABLET | Refills: 0 | Status: SHIPPED | OUTPATIENT
Start: 2023-10-27

## 2023-10-27 RX ORDER — CARISOPRODOL 350 MG/1
350 TABLET ORAL 3 TIMES DAILY PRN
Qty: 90 TABLET | Refills: 0 | Status: SHIPPED | OUTPATIENT
Start: 2023-10-27

## 2023-10-27 NOTE — TELEPHONE ENCOUNTER
Incoming Refill Request      Medication requested (name and dose):   oxyCODONE (ROXICODONE) 15 MG immediate release tablet  15 mg, Every 8 Hours PRN       carisoprodol (SOMA) 350 MG tablet  350 mg, 3 Times Daily PRN     Pharmacy where request should be sent:     [facility] [id number] - [place], [place] - [address] AT [facility] [address] - [phone]  - [phone]  [phone]       Additional details provided by patient: NONE    Best call back number: [phone]    Does the patient have less than a 3 day supply:  [x] Yes  [] No    Digna Florence Rep  10/27/23, 14:06 EDT

## 2023-11-02 RX ORDER — ATORVASTATIN CALCIUM 40 MG/1
TABLET, FILM COATED ORAL
Qty: 90 TABLET | Refills: 1 | Status: SHIPPED | OUTPATIENT
Start: 2023-11-02

## 2023-11-06 ENCOUNTER — OFFICE VISIT (OUTPATIENT)
Dept: PODIATRY | Facility: CLINIC | Age: 58
End: 2023-11-06
Payer: MEDICAID

## 2023-11-06 VITALS
BODY MASS INDEX: 29.37 KG/M2 | HEART RATE: 69 BPM | RESPIRATION RATE: 20 BRPM | HEIGHT: 64 IN | OXYGEN SATURATION: 94 % | WEIGHT: 172 LBS

## 2023-11-06 DIAGNOSIS — M20.12 ACQUIRED HALLUX VALGUS, LEFT: ICD-10-CM

## 2023-11-06 DIAGNOSIS — M21.862 ACQUIRED POSTERIOR EQUINUS, LEFT: ICD-10-CM

## 2023-11-06 DIAGNOSIS — M79.672 LEFT FOOT PAIN: Primary | ICD-10-CM

## 2023-11-06 NOTE — PROGRESS NOTES
"11/06/2023  Foot and Ankle Surgery - New Patient   Provider: Dr. Philip Eldridge DPM  Location: [facility]    Subjective:  Briana Alas is a 58 y.o. female.     Chief Complaint   Patient presents with    Left Foot - Bunions    Right Foot - Bunions    Establish Care     PILY Garcia MD 8/21/2023       HPI: The patient is a 58-year-old female who presents to the clinic for issues involving her bilateral feet.    She reports her left foot pain is worse than her right. She states she had her bilateral feet removed approximately 20 years ago by  * * * (00:33-00:34 - Unclear). The patient notes she is unable to \"put her feet together\". She reports her left foot is painful when she wears shoes, so she wears sandals. She states her grandmother had issues with her feet. The patient reports she is barefoot at home. She affirms her foot pain is located at the superior aspect of her toes. The patient denies any heart or kidney issues. She denies taking any blood thinners.    Allergies   Allergen Reactions    Penicillins Rash       Past Medical History:   Diagnosis Date    Anxiety     Arthritis     Back pain     Depression     Headache     Hyperlipidemia     Hypertension     Injury of back     Restless leg syndrome        Past Surgical History:   Procedure Laterality Date    ANKLE OPEN REDUCTION INTERNAL FIXATION Right     FOOT SURGERY Bilateral     bunionectomy    SHOULDER ARTHROSCOPY W/ ROTATOR CUFF REPAIR Right 12/13/2019    Procedure: RIGHT SHOULDER ARTHROSCOPY WITH ROTATOR CUFF REPAIR and Subacromial decompression;  Surgeon: Gino Ackerman MD;  Location: [facility] OR;  Service: Orthopedics    TUBAL ABDOMINAL LIGATION         Family History   Problem Relation Age of Onset    Hypertension Mother     Hyperlipidemia Mother     Depression Mother     Thyroid disease Mother     Hypertension Father        Social History     Socioeconomic History    Marital status: Legally    Tobacco Use    " Smoking status: Every Day     Packs/day: 1.00     Years: 44.00     Additional pack years: 0.00     Total pack years: 44.00     Types: Cigarettes     Start date: 1979     Passive exposure: Never    Smokeless tobacco: Never   Vaping Use    Vaping Use: Never used   Substance and Sexual Activity    Alcohol use: No    Drug use: No    Sexual activity: Defer        Current Outpatient Medications on File Prior to Visit   Medication Sig Dispense Refill    atenolol (TENORMIN) 50 MG tablet TAKE ONE (1) TABLET BY MOUTH DAILY 30 tablet 5    atorvastatin (LIPITOR) 40 MG tablet TAKE ONE (1) TABLET BY MOUTH EVERY EVENING 90 tablet 1    Brexpiprazole (Rexulti) 2 MG tablet Take 1 tablet by mouth Every Morning. 30 tablet 3    busPIRone (BUSPAR) 15 MG tablet Take 1 tablet by mouth 2 (Two) Times a Day. 60 tablet 3    carisoprodol (SOMA) 350 MG tablet Take 1 tablet by mouth 3 (Three) Times a Day As Needed for Muscle Spasms. 90 tablet 0    clonazePAM (KlonoPIN) 1 MG tablet TAKE 1 TABLET BY MOUTH THREE TIMES A DAY AS NEEDED FOR ANXIETY 90 tablet 1    DULoxetine (CYMBALTA) 60 MG capsule Take 1 capsule by mouth 2 (Two) Times a Day. 60 capsule 3    ibuprofen (ADVIL,MOTRIN) 800 MG tablet TAKE ONE TABLET BY MOUTH EVERY 8 HOURS AS NEEDED FOR MODERATE PAIN 90 tablet 3    oxyCODONE (ROXICODONE) 15 MG immediate release tablet Take 1 tablet by mouth Every 8 (Eight) Hours As Needed for Moderate Pain. Code M79.7 90 tablet 0    predniSONE (DELTASONE) 10 MG tablet TAKE 4 TABS BY MOUTH DAILY X4 DAYS, 3 TABS DAILY X4 DAYS, 2 TABS DAILY X4 DAYS, 1 TAB DAILY X4 DAYS 40 tablet 0    pregabalin (LYRICA) 150 MG capsule TAKE ONE (1) CAPSULE BY MOUTH TWICE DAILY 60 capsule 5    rOPINIRole (REQUIP) 0.5 MG tablet TAKE ONE (1) TABLET BY MOUTH TWICE DAILY (TAKE ONE (1) TABLET ONE HOUR BEFORE BEDTIME) 60 tablet 5    temazepam (RESTORIL) 30 MG capsule TAKE 1 CAPSULE BY MOUTH AT NIGHT AS NEEDED FOR SLEEP. 30 capsule 2    vitamin D (ERGOCALCIFEROL) 1.25 MG (69945 UT)  "capsule capsule TAKE ONE (1) CAPSULE BY MOUTH TWICE A WEEK. TAKE THREE (3) DAYS APART 8 capsule 5     No current facility-administered medications on file prior to visit.       Review of Systems:  General: Denies fever, chills, fatigue, and weakness.  Eyes: Denies vision loss, blurry vision, and excessive redness.  ENT: Denies hearing issues and difficulty swallowing.  Cardiovascular: Denies palpitations, chest pain, or syncopal episodes.  Respiratory: Denies shortness of breath, wheezing, and coughing.  GI: Denies abdominal pain, nausea, and vomiting.   : Denies frequency, hematuria, and urgency.  Musculoskeletal: Denies muscle cramps, joint pains, and stiffness.  Derm: Denies rash, open wounds, or suspicious lesions.  Neuro: Denies headaches, numbness, loss of coordination, and tremors.  Psych: Denies anxiety and depression.  Endocrine: Denies temperature intolerance and changes in appetite.  Heme: Denies bleeding disorders or abnormal bruising.     Objective   Pulse 69   Resp 20   Ht 162.6 cm (64\")   Wt 78 kg (172 lb)   SpO2 94%   BMI 29.52 kg/m²     Foot/Ankle Exam    GENERAL  Orientation:  AAOx3  Affect:  appropriate    VASCULAR     Right Foot Vascularity   Normal vascular exam    Dorsalis pedis:  2+  Posterior tibial:  2+  Skin temperature:  warm  Edema grading:  None  CFT:  < 3 seconds  Pedal hair growth:  Present  Varicosities:  none     Left Foot Vascularity   Normal vascular exam    Dorsalis pedis:  2+  Posterior tibial:  2+  Skin temperature:  warm  Edema grading:  None  CFT:  < 3 seconds  Pedal hair growth:  Present  Varicosities:  none     NEUROLOGIC     Right Foot Neurologic   Light touch sensation: normal  Hot/Cold sensation: normal  Achilles reflex:  2+     Left Foot Neurologic   Light touch sensation: normal  Hot/Cold sensation:  normal  Achilles reflex:  2+    MUSCULOSKELETAL     Right Foot Musculoskeletal   Arch:  Normal     Left Foot Musculoskeletal   Arch:  Normal    MUSCLE STRENGTH     " Right Foot Muscle Strength   Normal strength    Foot dorsiflexion:  5  Foot plantar flexion:  5  Foot inversion:  5  Foot eversion:  5     Left Foot Muscle Strength   Normal strength    Foot dorsiflexion:  5  Foot plantar flexion:  5  Foot inversion:  5  Foot eversion:  5    DERMATOLOGIC      Right Foot Dermatologic   Skin  Right foot skin is intact.   Nails comment:  Nails 1-5     Left Foot Dermatologic   Skin  Left foot skin is intact.   Nails comment:  Nails 1-5    TESTS     Right Foot Tests   Anterior drawer: negative  Varus tilt: negative     Left Foot Tests   Anterior drawer: negative  Varus tilt: negative     Right foot additional comments: 11/6/2023: Relatively normal findings involving the right lower extremity. No significant pain with palpation.       Left foot additional comments: 11/6/2023: Moderate bunion deformity involving the left foot with discomfort with palpation to the dorsal medial prominence and mild underlying bursitis. Decreased medial arch with stance. Moderate equinus contracture with knee extended and flexed.       Assessment & Plan   Diagnoses and all orders for this visit:    1. Left foot pain (Primary)  -     XR Foot 3+ View Bilateral; Future    2. Acquired hallux valgus, left  -     XR Foot 3+ View Bilateral; Future  -     XR Chest 2 View; Future  -     ECG 12 Lead; Future  -     Case Request; Standing  -     CBC (No Diff); Future  -     Basic Metabolic Panel; Future  -     ceFAZolin (ANCEF) 2,000 mg in sodium chloride 0.9 % 100 mL IVPB  -     Case Request    3. Acquired posterior equinus, left    Other orders  -     Follow Anesthesia Guidelines / Protocol; Future  -     Follow Anesthesia Guidelines / Protocol; Standing  -     Verify / Perform Chlorhexidine Skin Prep; Standing  -     Verify / Perform Chlorhexidine Skin Prep if Indicated (If Not Already Completed); Standing  -     Obtain Informed Consent; Future  -     Provide NPO Instructions to Patient; Future  -     Chlorhexidine  Skin Prep; Future      Patient is a 58-year-old female that presents with pain involving both feet but the left is significantly worse than the right.  Her complaints involve the first metatarsal phalangeal joint she does have a moderate to severe bunion deformity.  Imaging was independently reviewed and discussed with patient.  We reviewed the diagnosis and treatment options.  Patient states that she has tried conservative care options of shoe gear changes and inserts without any significant improvement.  She states that with the weather change she is unable to wear sandals and is worried that wearing closed toed shoes will cause increased pain.  I did discuss definitive treatment options which would require surgery.  We reviewed different surgical options including distal metatarsal osteotomy and with internal fixation.  I reviewed the procedure, risk, goals, and recovery with her at length.  She understands that she would require off weightbearing for 2 weeks after surgery and decreased activity thereafter.  Patient states that she is tired of dealing with the pain and limitation would like to proceed with surgery planning.  All questions were answered to patient's satisfaction.  We will proceed in the near future.  Greater than 45 minutes was spent before, during, and after evaluation for patient care.    Orders Placed This Encounter   Procedures    XR Foot 3+ View Bilateral     Bilateral feet pain left is worse for several months  Rm 15     Standing Status:   Future     Number of Occurrences:   1     Standing Expiration Date:   11/6/2024     Order Specific Question:   Reason for Exam:     Answer:   bilateral feet pain     Order Specific Question:   Release to patient     Answer:   Routine Release [[id number]]    XR Chest 2 View     Standing Status:   Future     Standing Expiration Date:   11/7/2024     Order Specific Question:   Reason for Exam:     Answer:   Preop     Order Specific Question:   Release to  patient     Answer:   Routine Release [[id number]]    CBC (No Diff)     Standing Status:   Future     Standing Expiration Date:   11/7/2024     Order Specific Question:   Release to patient     Answer:   Routine Release [[id number]]    Basic Metabolic Panel     Standing Status:   Future     Standing Expiration Date:   11/7/2024     Order Specific Question:   Release to patient     Answer:   Routine Release [[id number]]    Obtain Informed Consent     Standing Status:   Future     Order Specific Question:   Informed Consent Given For     Answer:   Open bunionectomy with distal metatarsal osteotomy and internal fixation to the left foot    Provide NPO Instructions to Patient     Standing Status:   Future    Chlorhexidine Skin Prep     Chlorhexidine Skin Prep and Instructions For All Patients Having A Procedure Requiring an Outward Incision if Not Allergic. If Allergic, Give Antibacterial Skin Wipes and Instructions. Do Not Use For Facial Cases or on Any Mucus Membranes.     Standing Status:   Future    ECG 12 Lead     Standing Status:   Future     Standing Expiration Date:   11/7/2024     Order Specific Question:   Reason for Exam:     Answer:   Preop     Order Specific Question:   Release to patient     Answer:   Routine Release [[id number]]        Note is dictated utilizing voice recognition software. Unfortunately this leads to occasional typographical errors. I apologize in advance if the situation occurs. If questions occur please do not hesitate to call our office.    Transcribed from ambient dictation for GILBERTO Eldridge DPM by Jaelyn Pettit.  11/06/23   12:48 EST    Patient or patient representative verbalized consent to the visit recording.  I have personally performed the services described in this document as transcribed by the above individual, and it is both accurate and complete.

## 2023-11-10 ENCOUNTER — TELEPHONE (OUTPATIENT)
Dept: FAMILY MEDICINE CLINIC | Facility: CLINIC | Age: 58
End: 2023-11-10
Payer: MEDICAID

## 2023-11-10 NOTE — TELEPHONE ENCOUNTER
Spoke to Briana and gave her the information. She said it is for permanent disability. She said she will call them to see what she needs to do.

## 2023-11-10 NOTE — TELEPHONE ENCOUNTER
Caller: Briana Alas    Relationship: Self    Best call back number:     [phone]      What was the call regarding: PATIENT NEEDS DISABILITY PAPERWORK TO BE PROCESSED BY DR. HEATON.  PATIENT NEEDS PAPERWORK FILLED OUT ASAP.  UNABLE TO FIND A SOONER APPOINTMENT WITH DR. HEATON.  PLEASE ADVISE    Is it okay if the provider responds through MyChart: NO

## 2023-11-14 PROBLEM — M20.12 ACQUIRED HALLUX VALGUS, LEFT: Status: ACTIVE | Noted: 2023-11-06

## 2023-11-22 DIAGNOSIS — F41.1 GENERALIZED ANXIETY DISORDER: Chronic | ICD-10-CM

## 2023-11-27 NOTE — TELEPHONE ENCOUNTER
Rx Refill Note  Requested Prescriptions     Pending Prescriptions Disp Refills    clonazePAM (KlonoPIN) 1 MG tablet [Pharmacy Med Name: CLONAZEPAM 1 MG TABLET] 90 tablet 1     Sig: TAKE 1 TABLET BY MOUTH THREE TIMES A DAY AS NEEDED FOR ANXIETY      Last office visit with prescribing clinician: 9/6/2023   Last telemedicine visit with prescribing clinician: Visit date not found   Next office visit with prescribing clinician: 1/3/2024   Office Visit with Jade Ryder MD (09/06/2023)   SCANNED - LABS (02/28/2023)                     Would you like a call back once the refill request has been completed: [] Yes [] No    If the office needs to give you a call back, can they leave a voicemail: [] Yes [] No    Symone Varela MA  11/27/23, 15:57 EST    UPLOADED

## 2023-11-28 DIAGNOSIS — G89.29 CHRONIC LOW BACK PAIN WITH SCIATICA, SCIATICA LATERALITY UNSPECIFIED, UNSPECIFIED BACK PAIN LATERALITY: ICD-10-CM

## 2023-11-28 DIAGNOSIS — M54.50 CHRONIC BILATERAL LOW BACK PAIN WITHOUT SCIATICA: ICD-10-CM

## 2023-11-28 DIAGNOSIS — G89.29 CHRONIC BILATERAL LOW BACK PAIN WITHOUT SCIATICA: ICD-10-CM

## 2023-11-28 DIAGNOSIS — M54.40 CHRONIC LOW BACK PAIN WITH SCIATICA, SCIATICA LATERALITY UNSPECIFIED, UNSPECIFIED BACK PAIN LATERALITY: ICD-10-CM

## 2023-11-28 RX ORDER — CARISOPRODOL 350 MG/1
350 TABLET ORAL 3 TIMES DAILY PRN
Qty: 90 TABLET | Refills: 0 | Status: SHIPPED | OUTPATIENT
Start: 2023-11-28

## 2023-11-28 RX ORDER — CLONAZEPAM 1 MG/1
TABLET ORAL
Qty: 90 TABLET | Refills: 0 | Status: SHIPPED | OUTPATIENT
Start: 2023-11-28

## 2023-11-28 RX ORDER — OXYCODONE HYDROCHLORIDE 15 MG/1
15 TABLET ORAL EVERY 8 HOURS PRN
Qty: 90 TABLET | Refills: 0 | Status: SHIPPED | OUTPATIENT
Start: 2023-11-28

## 2023-11-28 NOTE — TELEPHONE ENCOUNTER
Caller: Briana Alas    Relationship: Self    Best call back number:     [phone] (Mobile)       Requested Prescriptions:   Requested Prescriptions     Pending Prescriptions Disp Refills    oxyCODONE (ROXICODONE) 15 MG immediate release tablet 90 tablet 0     Sig: Take 1 tablet by mouth Every 8 (Eight) Hours As Needed for Moderate Pain. Code M79.7    carisoprodol (SOMA) 350 MG tablet 90 tablet 0     Sig: Take 1 tablet by mouth 3 (Three) Times a Day As Needed for Muscle Spasms.        Pharmacy where request should be sent: [facility] [id number] - [place], [place] - [address] AT [facility] [address] - [phone] [facility] [phone] FX     Last office visit with prescribing clinician: 8/21/2023   Last telemedicine visit with prescribing clinician: Visit date not found   Next office visit with prescribing clinician: 12/28/2023     Additional details provided by patient:     Does the patient have less than a 3 day supply:  [] Yes  [x] No    Would you like a call back once the refill request has been completed: [] Yes [] No    If the office needs to give you a call back, can they leave a voicemail: [] Yes [] No    Digna Blake Rep   11/28/23 10:47 EST

## 2023-11-30 ENCOUNTER — OFFICE VISIT (OUTPATIENT)
Dept: FAMILY MEDICINE CLINIC | Facility: CLINIC | Age: 58
End: 2023-11-30
Payer: MEDICAID

## 2023-11-30 VITALS
WEIGHT: 177.6 LBS | OXYGEN SATURATION: 95 % | SYSTOLIC BLOOD PRESSURE: 109 MMHG | TEMPERATURE: 97.4 F | HEART RATE: 64 BPM | DIASTOLIC BLOOD PRESSURE: 69 MMHG | BODY MASS INDEX: 30.48 KG/M2

## 2023-11-30 DIAGNOSIS — M54.50 CHRONIC BILATERAL LOW BACK PAIN WITHOUT SCIATICA: ICD-10-CM

## 2023-11-30 DIAGNOSIS — M79.642 LEFT HAND PAIN: ICD-10-CM

## 2023-11-30 DIAGNOSIS — G89.29 CHRONIC BILATERAL LOW BACK PAIN WITHOUT SCIATICA: ICD-10-CM

## 2023-11-30 DIAGNOSIS — I10 HYPERTENSION, ESSENTIAL: Primary | ICD-10-CM

## 2023-11-30 PROCEDURE — 99214 OFFICE O/P EST MOD 30 MIN: CPT | Performed by: FAMILY MEDICINE

## 2023-11-30 PROCEDURE — 3078F DIAST BP <80 MM HG: CPT | Performed by: FAMILY MEDICINE

## 2023-11-30 PROCEDURE — 3074F SYST BP LT 130 MM HG: CPT | Performed by: FAMILY MEDICINE

## 2023-11-30 NOTE — LETTER
November 30, 2023     GILBERTO Eldridge DPM  [address] [place] [address]    Patient: Briana Alas   YOB: 1965   Date of Visit: 11/30/2023       Dear GILBERTO Eldridge DPM:    I am writing to you about our mutual patient Briana Alas .  The biggest issue with her is chronic pain.  I understand that you plan to do a bunion procedure on her in 2 weeks.  She already takes quite a bit of pain medication, and if you would be more comfortable with me managing her pain postoperatively, I am here to do that.  If you would rather manage the postoperative pain and then may resume normal care after that, I am fine with that as well.  Please let me know what ever you are comfortable with and I will be glad to assist.  Below are the relevant portions of my assessment and plan of care.    Encounter Diagnosis and Orders:  Diagnoses and all orders for this visit:    1. Hypertension, essential (Primary)    2. Left hand pain  -     XR Hand 3+ View Left; Future  -     Ambulatory Referral to Hand Surgery    3. Chronic bilateral low back pain without sciatica        If you have questions, please do not hesitate to call me. I look forward to following Briana along with you.         Sincerely,        Kamran Garcia MD        CC: No Recipients

## 2023-11-30 NOTE — PROGRESS NOTES
Subjective   Briana Alas is a 58 y.o. female.     History of Present Illness  Briana Alas is in for follow up on her high blood pressure but she also has some other concerns that she has some upcoming foot surgery and some hand pain that she wants to discuss. Her left thumb is giving her difficulty with flexion/extension. She is having difficulty doing ADLs with her left hand as a result. There is no history of chest pain or dyspnea. There is no history of issue with bowel or bladder dysfunction. There is no history of dizziness or lightheadedness. There is no history of issue with sleep or mood. There is no history of issue with present medication.       Hypertension  Associated symptoms include neck pain. Pertinent negatives include no chest pain or shortness of breath.   Hand Pain   Pertinent negatives include no chest pain or numbness.          /69 (BP Location: Left arm, Patient Position: Sitting, Cuff Size: Large Adult)   Pulse 64   Temp 97.4 °F (36.3 °C) (Temporal)   Wt 80.6 kg (177 lb 9.6 oz)   SpO2 95%   BMI 30.48 kg/m²       Chief Complaint   Patient presents with    pain management for her surgery      12/15/23 with Dr. Eldridge at [facility] - wants to discuss it with you    Hypertension     She was told to cut atenolol medicine in half because BP was to low last visit     Hand Pain     Left thumb shooting pain and can only bend it so much and cannot  or open things with it            Current Outpatient Medications:     atenolol (TENORMIN) 50 MG tablet, TAKE ONE (1) TABLET BY MOUTH DAILY, Disp: 30 tablet, Rfl: 5    atorvastatin (LIPITOR) 40 MG tablet, TAKE ONE (1) TABLET BY MOUTH EVERY EVENING, Disp: 90 tablet, Rfl: 1    Brexpiprazole (Rexulti) 2 MG tablet, Take 1 tablet by mouth Every Morning., Disp: 30 tablet, Rfl: 3    busPIRone (BUSPAR) 15 MG tablet, Take 1 tablet by mouth 2 (Two) Times a Day., Disp: 60 tablet, Rfl: 3    carisoprodol (SOMA) 350 MG tablet, Take 1 tablet by mouth 3 (Three)  Times a Day As Needed for Muscle Spasms., Disp: 90 tablet, Rfl: 0    clonazePAM (KlonoPIN) 1 MG tablet, TAKE 1 TABLET BY MOUTH THREE TIMES A DAY AS NEEDED FOR ANXIETY, Disp: 90 tablet, Rfl: 0    DULoxetine (CYMBALTA) 60 MG capsule, Take 1 capsule by mouth 2 (Two) Times a Day., Disp: 60 capsule, Rfl: 3    ibuprofen (ADVIL,MOTRIN) 800 MG tablet, TAKE ONE TABLET BY MOUTH EVERY 8 HOURS AS NEEDED FOR MODERATE PAIN, Disp: 90 tablet, Rfl: 3    oxyCODONE (ROXICODONE) 15 MG immediate release tablet, Take 1 tablet by mouth Every 8 (Eight) Hours As Needed for Moderate Pain. Code M79.7, Disp: 90 tablet, Rfl: 0    predniSONE (DELTASONE) 10 MG tablet, TAKE 4 TABS BY MOUTH DAILY X4 DAYS, 3 TABS DAILY X4 DAYS, 2 TABS DAILY X4 DAYS, 1 TAB DAILY X4 DAYS, Disp: 40 tablet, Rfl: 0    pregabalin (LYRICA) 150 MG capsule, TAKE ONE (1) CAPSULE BY MOUTH TWICE DAILY, Disp: 60 capsule, Rfl: 5    rOPINIRole (REQUIP) 0.5 MG tablet, TAKE ONE (1) TABLET BY MOUTH TWICE DAILY (TAKE ONE (1) TABLET ONE HOUR BEFORE BEDTIME), Disp: 60 tablet, Rfl: 5    temazepam (RESTORIL) 30 MG capsule, TAKE 1 CAPSULE BY MOUTH AT NIGHT AS NEEDED FOR SLEEP., Disp: 30 capsule, Rfl: 2    vitamin D (ERGOCALCIFEROL) 1.25 MG (49326 UT) capsule capsule, TAKE ONE (1) CAPSULE BY MOUTH TWICE A WEEK. TAKE THREE (3) DAYS APART, Disp: 8 capsule, Rfl: 5            The following portions of the patient's history were reviewed and updated as appropriate: allergies, current medications, past family history, past medical history, past social history, past surgical history, and problem list.    Review of Systems   Constitutional:  Positive for fatigue. Negative for activity change and fever.   HENT:  Negative for congestion, sinus pressure, sinus pain, sore throat and trouble swallowing.    Eyes:  Negative for visual disturbance.   Respiratory:  Negative for cough, chest tightness, shortness of breath and wheezing.    Cardiovascular:  Negative for chest pain.   Gastrointestinal:   Negative for abdominal distention, abdominal pain, constipation, diarrhea, nausea and vomiting.   Genitourinary:  Negative for difficulty urinating, dysuria and urgency.   Musculoskeletal:  Positive for arthralgias, back pain, gait problem, myalgias and neck pain.   Skin:  Positive for rash.   Neurological:  Negative for dizziness, weakness, light-headedness and numbness.   Psychiatric/Behavioral:  Negative for agitation, dysphoric mood, hallucinations, sleep disturbance and suicidal ideas. The patient is not nervous/anxious.        Objective   Physical Exam  Vitals and nursing note reviewed.   HENT:      Right Ear: Tympanic membrane and ear canal normal.      Left Ear: Tympanic membrane and ear canal normal.   Cardiovascular:      Rate and Rhythm: Normal rate and regular rhythm.      Heart sounds: Normal heart sounds. No murmur heard.  Pulmonary:      Effort: Pulmonary effort is normal.      Breath sounds: No wheezing or rales.   Abdominal:      General: Bowel sounds are normal.      Palpations: Abdomen is soft.      Tenderness: There is no abdominal tenderness. There is no guarding.   Musculoskeletal:      Cervical back: Neck supple.      Comments: Stiffness in the left thumb with limited range at the proximal knuckle   Lymphadenopathy:      Cervical: No cervical adenopathy.   Neurological:      General: No focal deficit present.      Mental Status: She is alert and oriented to person, place, and time.   Psychiatric:         Mood and Affect: Mood normal.           Assessment & Plan   Problems Addressed this Visit          Cardiac and Vasculature    Hypertension, essential - Primary       Musculoskeletal and Injuries    Chronic low back pain     Other Visit Diagnoses       Left hand pain        Relevant Orders    XR Hand 3+ View Left    Ambulatory Referral to Hand Surgery (Completed)          Diagnoses         Codes Comments    Hypertension, essential    -  Primary ICD-10-CM: I10  ICD-9-CM: 401.9     Left hand pain      ICD-10-CM: M79.642  ICD-9-CM: 729.5     Chronic bilateral low back pain without sciatica     ICD-10-CM: M54.50, G89.29  ICD-9-CM: 724.2, 338.29           I will refer to the hand surgery office for this left thumb issue because I believe she will need an injection there  I am sending for an x-ray as well  I will reach out to her podiatrist to discuss pain management around her surgery  I have been managing her pain for quite some time, and he may be more comfortable with me prescribing pain medication as she is going to need a little more than she normally takes, and she already takes quite a bit  I will plan to see her back sometime in January or February after she has had a chance to adequately heal from the foot surgery that will take place in 2 weeks

## 2023-12-07 ENCOUNTER — LAB (OUTPATIENT)
Dept: LAB | Facility: HOSPITAL | Age: 58
End: 2023-12-07
Payer: MEDICAID

## 2023-12-07 ENCOUNTER — HOSPITAL ENCOUNTER (OUTPATIENT)
Dept: CARDIOLOGY | Facility: HOSPITAL | Age: 58
Discharge: HOME | End: 2023-12-07
Payer: MEDICAID

## 2023-12-07 ENCOUNTER — HOSPITAL ENCOUNTER (OUTPATIENT)
Dept: GENERAL RADIOLOGY | Facility: HOSPITAL | Age: 58
Discharge: HOME | End: 2023-12-07
Payer: MEDICAID

## 2023-12-07 DIAGNOSIS — M20.12 ACQUIRED HALLUX VALGUS, LEFT: ICD-10-CM

## 2023-12-07 DIAGNOSIS — R30.0 DYSURIA: ICD-10-CM

## 2023-12-07 LAB
ANION GAP SERPL CALCULATED.3IONS-SCNC: 10 MMOL/L (ref 5–15)
BACTERIA UR QL AUTO: ABNORMAL /HPF
BILIRUB UR QL STRIP: NEGATIVE
BUN SERPL-MCNC: 11 MG/DL (ref 6–20)
BUN/CREAT SERPL: 10.5 (ref 7–25)
CALCIUM SPEC-SCNC: 9.6 MG/DL (ref 8.6–10.5)
CHLORIDE SERPL-SCNC: 102 MMOL/L (ref 98–107)
CLARITY UR: CLEAR
CO2 SERPL-SCNC: 29 MMOL/L (ref 22–29)
COLOR UR: YELLOW
CREAT SERPL-MCNC: 1.05 MG/DL (ref 0.57–1)
DEPRECATED RDW RBC AUTO: 43.9 FL (ref 37–54)
EGFRCR SERPLBLD CKD-EPI 2021: 61.3 ML/MIN/1.73
ERYTHROCYTE [DISTWIDTH] IN BLOOD BY AUTOMATED COUNT: 13.1 % (ref 12.3–15.4)
GLUCOSE SERPL-MCNC: 81 MG/DL (ref 65–99)
GLUCOSE UR STRIP-MCNC: NEGATIVE MG/DL
HCT VFR BLD AUTO: 41.5 % (ref 34–46.6)
HGB BLD-MCNC: 14.3 G/DL (ref 12–15.9)
HGB UR QL STRIP.AUTO: NEGATIVE
HOLD SPECIMEN: NORMAL
HYALINE CASTS UR QL AUTO: ABNORMAL /LPF
KETONES UR QL STRIP: NEGATIVE
LEUKOCYTE ESTERASE UR QL STRIP.AUTO: ABNORMAL
MCH RBC QN AUTO: 31.6 PG (ref 26.6–33)
MCHC RBC AUTO-ENTMCNC: 34.5 G/DL (ref 31.5–35.7)
MCV RBC AUTO: 91.6 FL (ref 79–97)
NITRITE UR QL STRIP: NEGATIVE
PH UR STRIP.AUTO: 6 [PH] (ref 5–8)
PLATELET # BLD AUTO: 294 10*3/MM3 (ref 140–450)
PMV BLD AUTO: 10 FL (ref 6–12)
POTASSIUM SERPL-SCNC: 4.7 MMOL/L (ref 3.5–5.2)
PROT UR QL STRIP: NEGATIVE
QT INTERVAL: 406 MS
QTC INTERVAL: 409 MS
RBC # BLD AUTO: 4.53 10*6/MM3 (ref 3.77–5.28)
RBC # UR STRIP: ABNORMAL /HPF
REF LAB TEST METHOD: ABNORMAL
SODIUM SERPL-SCNC: 141 MMOL/L (ref 136–145)
SP GR UR STRIP: 1.01 (ref 1–1.03)
SQUAMOUS #/AREA URNS HPF: ABNORMAL /HPF
UROBILINOGEN UR QL STRIP: ABNORMAL
WBC # UR STRIP: ABNORMAL /HPF
WBC NRBC COR # BLD AUTO: 12.08 10*3/MM3 (ref 3.4–10.8)

## 2023-12-07 PROCEDURE — 80048 BASIC METABOLIC PNL TOTAL CA: CPT

## 2023-12-07 PROCEDURE — 71046 X-RAY EXAM CHEST 2 VIEWS: CPT

## 2023-12-07 PROCEDURE — 81001 URINALYSIS AUTO W/SCOPE: CPT

## 2023-12-07 PROCEDURE — 85027 COMPLETE CBC AUTOMATED: CPT

## 2023-12-07 PROCEDURE — 93005 ELECTROCARDIOGRAM TRACING: CPT | Performed by: PODIATRIST

## 2023-12-07 PROCEDURE — 87086 URINE CULTURE/COLONY COUNT: CPT

## 2023-12-07 PROCEDURE — 36415 COLL VENOUS BLD VENIPUNCTURE: CPT

## 2023-12-08 RX ORDER — SULFAMETHOXAZOLE AND TRIMETHOPRIM 800; 160 MG/1; MG/1
1 TABLET ORAL 2 TIMES DAILY
Qty: 14 TABLET | Refills: 0 | Status: SHIPPED | OUTPATIENT
Start: 2023-12-08 | End: 2023-12-15

## 2023-12-08 NOTE — PAT
Dr. Eldridge notified of possible UTI per Dr. Garcia, waiting on culture results, Rx called in for antibiotic per Dr. Garcia

## 2023-12-09 LAB — BACTERIA SPEC AEROBE CULT: NO GROWTH

## 2023-12-11 ENCOUNTER — OFFICE VISIT (OUTPATIENT)
Dept: CARDIOLOGY | Facility: CLINIC | Age: 58
End: 2023-12-11
Payer: MEDICAID

## 2023-12-11 ENCOUNTER — TELEPHONE (OUTPATIENT)
Dept: CARDIOLOGY | Facility: CLINIC | Age: 58
End: 2023-12-11

## 2023-12-11 VITALS
OXYGEN SATURATION: 98 % | HEART RATE: 52 BPM | WEIGHT: 173 LBS | SYSTOLIC BLOOD PRESSURE: 101 MMHG | HEIGHT: 64 IN | BODY MASS INDEX: 29.53 KG/M2 | DIASTOLIC BLOOD PRESSURE: 66 MMHG

## 2023-12-11 DIAGNOSIS — G89.29 CHRONIC BILATERAL LOW BACK PAIN WITHOUT SCIATICA: ICD-10-CM

## 2023-12-11 DIAGNOSIS — Z01.810 PREOPERATIVE CARDIOVASCULAR EXAMINATION: Primary | ICD-10-CM

## 2023-12-11 DIAGNOSIS — E78.5 DYSLIPIDEMIA: ICD-10-CM

## 2023-12-11 DIAGNOSIS — M54.50 CHRONIC BILATERAL LOW BACK PAIN WITHOUT SCIATICA: ICD-10-CM

## 2023-12-11 DIAGNOSIS — I10 ESSENTIAL HYPERTENSION: ICD-10-CM

## 2023-12-11 DIAGNOSIS — R94.31 ABNORMAL EKG: ICD-10-CM

## 2023-12-11 PROCEDURE — 1160F RVW MEDS BY RX/DR IN RCRD: CPT | Performed by: INTERNAL MEDICINE

## 2023-12-11 PROCEDURE — 1159F MED LIST DOCD IN RCRD: CPT | Performed by: INTERNAL MEDICINE

## 2023-12-11 PROCEDURE — 3078F DIAST BP <80 MM HG: CPT | Performed by: INTERNAL MEDICINE

## 2023-12-11 PROCEDURE — 3074F SYST BP LT 130 MM HG: CPT | Performed by: INTERNAL MEDICINE

## 2023-12-11 PROCEDURE — 99204 OFFICE O/P NEW MOD 45 MIN: CPT | Performed by: INTERNAL MEDICINE

## 2023-12-11 RX ORDER — OXYCODONE HYDROCHLORIDE 15 MG/1
15 TABLET ORAL EVERY 8 HOURS PRN
Qty: 90 TABLET | Refills: 0 | Status: SHIPPED | OUTPATIENT
Start: 2023-12-11

## 2023-12-11 NOTE — TELEPHONE ENCOUNTER
Caller: Briana Alas    Relationship: Self    Best call back number:     Requested Prescriptions:   Requested Prescriptions     Pending Prescriptions Disp Refills    oxyCODONE (ROXICODONE) 15 MG immediate release tablet 90 tablet 0     Sig: Take 1 tablet by mouth Every 8 (Eight) Hours As Needed for Moderate Pain. Code M79.7        Pharmacy where request should be sent:  [facility]  [address] [facility]  [phone]    Last office visit with prescribing clinician: 11/30/2023   Last telemedicine visit with prescribing clinician: Visit date not found   Next office visit with prescribing clinician: 1/31/2024     Additional details provided by patient: PATIENT SAYS THAT THIS SHOULD BE 3O MG 3 TIMES A DAY FOR TWO WEEKS ONLY AS SHE WILL BE HAVING FOOT SURGERY ON 12/15/23    Does the patient have less than a 3 day supply:   xYes  [] No    Would you like a call back once the refill request has been completed: [x] Yes [] No    If the office needs to give you a call back, can they leave a voicemail: [x] Yes [] No    Digna Martin Rep   12/11/23 10:25 EST

## 2023-12-11 NOTE — PROGRESS NOTES
Encounter Date:12/11/2023      Patient ID: Briana Alas is a 58 y.o. female.    Chief Complaint:  Abnormal EKG  Preoperative cardiovascular valuation.  Hypertension  Dyslipidemia    History of Present Illness    Since I have last seen, the patient has been without any chest discomfort ,shortness of breath, palpitations, dizziness or syncope.  Denies having any headache ,abdominal pain ,nausea, vomiting , diarrhea constipation, loss of weight or loss of appetite.  Denies having any excessive bruising ,hematuria or blood in the stool.    Review of all systems negative except as indicated.    Reviewed ROS.  Assessment and Plan     ]]]]]]]]]]]]]]]]]]]]]  Impression  =============  - Preoperative cardiovascular evaluation.  Patient is asymptomatic from cardiovascular standpoint.    - Dyslipidemia hypertension    - Abnormal EKG-12/7/2023.  Poor R wave progression anteriorly not rule out anteroseptal infarction age undetermined.    - Status post right ankle surgery abdominal tubal ligation right arthroscopic shoulder surgery.    - Smoker    - Family history of coronary artery disease    - Allergic to penicillin    ============  Plan  ===============  EKG 12/7/2023 revealed poor R wave progression anteriorly.  Rule out anteroseptal infarction age undetermined..      Patient is asymptomatic from cardiovascular standpoint.  Patient to have an echocardiogram to assess left ventricular function.  Okay with planned procedure from cardiac standpoint.    Medications were reviewed and updated.    Further plan will depend on patient's progress.  ]]]]]]]]]]]]]]]]]]]]]]]]]]]]]]           Diagnosis Plan   1. Preoperative cardiovascular examination  Adult Transthoracic Echo Complete W/ Cont if Necessary Per Protocol      2. Abnormal EKG  Adult Transthoracic Echo Complete W/ Cont if Necessary Per Protocol      3. Essential hypertension  Adult Transthoracic Echo Complete W/ Cont if Necessary Per Protocol      4. Dyslipidemia  Adult  Transthoracic Echo Complete W/ Cont if Necessary Per Protocol      LAB RESULTS (LAST 7 DAYS)    CBC  Results from last 7 days   Lab Units 12/07/23  1311   WBC 10*3/mm3 12.08*   RBC 10*6/mm3 4.53   HEMOGLOBIN g/dL 14.3   HEMATOCRIT % 41.5   MCV fL 91.6   PLATELETS 10*3/mm3 294       BMP  Results from last 7 days   Lab Units 12/07/23  1311   SODIUM mmol/L 141   POTASSIUM mmol/L 4.7   CHLORIDE mmol/L 102   CO2 mmol/L 29.0   BUN mg/dL 11   CREATININE mg/dL 1.05*   GLUCOSE mg/dL 81       CMP   Results from last 7 days   Lab Units 12/07/23  1311   SODIUM mmol/L 141   POTASSIUM mmol/L 4.7   CHLORIDE mmol/L 102   CO2 mmol/L 29.0   BUN mg/dL 11   CREATININE mg/dL 1.05*   GLUCOSE mg/dL 81         BNP        TROPONIN        CoAg        Creatinine Clearance  Estimated Creatinine Clearance: 58.5 mL/min (A) (by C-G formula based on SCr of 1.05 mg/dL (H)).    ABG        Radiology  No radiology results for the last day                The following portions of the patient's history were reviewed and updated as appropriate: allergies, current medications, past family history, past medical history, past social history, past surgical history, and problem list.    Review of Systems   Constitutional: Negative for malaise/fatigue.   Cardiovascular:  Negative for chest pain, leg swelling, palpitations and syncope.   Respiratory:  Negative for shortness of breath.    Skin:  Negative for rash.   Gastrointestinal:  Negative for nausea and vomiting.   Neurological:  Negative for dizziness, light-headedness and numbness.   All other systems reviewed and are negative.        Current Outpatient Medications:     atenolol (TENORMIN) 50 MG tablet, TAKE ONE (1) TABLET BY MOUTH DAILY (Patient taking differently: Take 0.5 tablets by mouth Daily.), Disp: 30 tablet, Rfl: 5    atorvastatin (LIPITOR) 40 MG tablet, TAKE ONE (1) TABLET BY MOUTH EVERY EVENING (Patient taking differently: Take 1 tablet by mouth Every Night.), Disp: 90 tablet, Rfl: 1     Brexpiprazole (Rexulti) 2 MG tablet, Take 1 tablet by mouth Every Morning., Disp: 30 tablet, Rfl: 3    busPIRone (BUSPAR) 15 MG tablet, Take 1 tablet by mouth 2 (Two) Times a Day., Disp: 60 tablet, Rfl: 3    carisoprodol (SOMA) 350 MG tablet, Take 1 tablet by mouth 3 (Three) Times a Day As Needed for Muscle Spasms., Disp: 90 tablet, Rfl: 0    clonazePAM (KlonoPIN) 1 MG tablet, TAKE 1 TABLET BY MOUTH THREE TIMES A DAY AS NEEDED FOR ANXIETY (Patient taking differently: Take 1 tablet by mouth As Needed.), Disp: 90 tablet, Rfl: 0    DULoxetine (CYMBALTA) 60 MG capsule, Take 1 capsule by mouth 2 (Two) Times a Day., Disp: 60 capsule, Rfl: 3    oxyCODONE (ROXICODONE) 15 MG immediate release tablet, Take 1 tablet by mouth Every 8 (Eight) Hours As Needed for Moderate Pain. Code M79.7, Disp: 90 tablet, Rfl: 0    pregabalin (LYRICA) 150 MG capsule, TAKE ONE (1) CAPSULE BY MOUTH TWICE DAILY (Patient taking differently: Take 1 capsule by mouth 2 (Two) Times a Day.), Disp: 60 capsule, Rfl: 5    rOPINIRole (REQUIP) 0.5 MG tablet, TAKE ONE (1) TABLET BY MOUTH TWICE DAILY (TAKE ONE (1) TABLET ONE HOUR BEFORE BEDTIME) (Patient taking differently: Take 1 tablet by mouth 2 (Two) Times a Day.), Disp: 60 tablet, Rfl: 5    sulfamethoxazole-trimethoprim (Bactrim DS) 800-160 MG per tablet, Take 1 tablet by mouth 2 (Two) Times a Day for 7 days., Disp: 14 tablet, Rfl: 0    temazepam (RESTORIL) 30 MG capsule, TAKE 1 CAPSULE BY MOUTH AT NIGHT AS NEEDED FOR SLEEP., Disp: 30 capsule, Rfl: 2    vitamin D (ERGOCALCIFEROL) 1.25 MG (74708 UT) capsule capsule, TAKE ONE (1) CAPSULE BY MOUTH TWICE A WEEK. TAKE THREE (3) DAYS APART (Patient taking differently: Take 1 capsule by mouth 2 (Two) Times a Week.), Disp: 8 capsule, Rfl: 5    Allergies   Allergen Reactions    Penicillins Rash       Family History   Problem Relation Age of Onset    Hypertension Mother     Hyperlipidemia Mother     Depression Mother     Thyroid disease Mother     Atrial  "fibrillation Mother     Heart attack Father     Hypertension Father        Past Surgical History:   Procedure Laterality Date    ANKLE OPEN REDUCTION INTERNAL FIXATION Right     FOOT SURGERY Bilateral     bunionectomy    SHOULDER ARTHROSCOPY W/ ROTATOR CUFF REPAIR Right 12/13/2019    Procedure: RIGHT SHOULDER ARTHROSCOPY WITH ROTATOR CUFF REPAIR and Subacromial decompression;  Surgeon: Gino Ackerman MD;  Location: [facility] MAIN OR;  Service: Orthopedics    TUBAL ABDOMINAL LIGATION         Past Medical History:   Diagnosis Date    Anxiety     Arthritis     Back pain     Depression     Headache     Hyperlipidemia     Hypertension     Injury of back     Restless leg syndrome        Family History   Problem Relation Age of Onset    Hypertension Mother     Hyperlipidemia Mother     Depression Mother     Thyroid disease Mother     Atrial fibrillation Mother     Heart attack Father     Hypertension Father        Social History     Socioeconomic History    Marital status: Legally    Tobacco Use    Smoking status: Every Day     Packs/day: 1.00     Years: 44.00     Additional pack years: 0.00     Total pack years: 44.00     Types: Cigarettes     Start date: 1979     Passive exposure: Never    Smokeless tobacco: Never    Tobacco comments:     Dont smoke dos    Vaping Use    Vaping Use: Never used   Substance and Sexual Activity    Alcohol use: No    Drug use: No    Sexual activity: Defer         Procedures      Objective:       Physical Exam    /66 (BP Location: Right arm, Patient Position: Sitting, Cuff Size: Adult)   Pulse 52   Ht 162.6 cm (64\")   Wt 78.5 kg (173 lb)   SpO2 98% Comment: RA  BMI 29.70 kg/m²   The patient is alert, oriented and in no distress.    Vital signs as noted above.    Head and neck revealed no carotid bruits or jugular venous distension.  No thyromegaly or lymphadenopathy is present.    Lungs clear.  No wheezing.  Breath sounds are normal bilaterally.    Heart normal first " and second heart sounds.  No murmur..  No pericardial rub is present.  No gallop is present.    Abdomen soft and nontender.  No organomegaly is present.    Extremities revealed good peripheral pulses without any pedal edema.    Skin warm and dry.    Musculoskeletal system is grossly normal.    CNS grossly normal.    Reviewed and updated.

## 2023-12-11 NOTE — TELEPHONE ENCOUNTER
FACILITY: [facility]AN[facility]ba  DR: Chente-podiatry  PHONE: [phone]  FAX: [phone]  PROCEDURE: L bunionectomy  SCHEDULED: 12/15/23  MEDS TO HOLD: Not on any anti-coags    Gave to Dr Haney's ma for surgery clearance appt. On 12/11/23

## 2023-12-13 ENCOUNTER — TELEPHONE (OUTPATIENT)
Dept: FAMILY MEDICINE CLINIC | Facility: CLINIC | Age: 58
End: 2023-12-13
Payer: MEDICAID

## 2023-12-13 NOTE — TELEPHONE ENCOUNTER
Pharmacy Name:        [facility] [id number] - [place], [place] - [address] AT [facility] [address] - [phone]  - [phone] FX (Pharmacy) [phone] (Work)       Pharmacy representative name: CHRISTIAN     Pharmacy representative phone number: [phone]    What medication are you calling in regards to:   oxyCODONE (ROXICODONE) 15 MG immediate release     What question does the pharmacy have:   PHARMACY STATED THAT PATIENT SHOULD HAVE 2 WKS WORTH OF MEDICATION LEFT, AND THAT DR HEATON HAS AUTHORIZED AN EARLY FILL DUE TO AN UPCOMING SURGERY     PLEASE CALL AND ADVISE     Who is the provider that prescribed the medication: DR FLORES HEATON

## 2023-12-13 NOTE — TELEPHONE ENCOUNTER
Spoke to Renato and gave him the information. He will make the medicine ready and inform the patient.

## 2023-12-13 NOTE — TELEPHONE ENCOUNTER
Caller: Briana Alas    Relationship: Self    Best call back number: [phone]     What medication are you requesting: oxyCODONE (ROXICODONE) 30 MG immediate release tablet     What are your current symptoms: PAIN CONTROL FOR 2 WEEKS FOR SURGERY    How long have you been experiencing symptoms:     Have you had these symptoms before:    [] Yes  [] No    Have you been treated for these symptoms before:   [] Yes  [] No    If a prescription is needed, what is your preferred pharmacy and phone number: [id number] - [place], [place] - [address] AT [facility] [address] - [phone]  - [phone] FX     Additional notes: PHARMACY WILL NOT FILL 15 MG WITH THE WAY IT IS WROTE BECAUSE SHE IS NOT DUE FOR A REFILL. PLEASE CALL AND ADVISE.

## 2023-12-14 ENCOUNTER — ANESTHESIA EVENT (OUTPATIENT)
Dept: PERIOP | Facility: HOSPITAL | Age: 58
End: 2023-12-14
Payer: MEDICAID

## 2023-12-15 ENCOUNTER — HOSPITAL ENCOUNTER (OUTPATIENT)
Facility: HOSPITAL | Age: 58
Setting detail: HOSPITAL OUTPATIENT SURGERY
Discharge: HOME | End: 2023-12-15
Attending: PODIATRIST | Admitting: PODIATRIST
Payer: MEDICAID

## 2023-12-15 ENCOUNTER — ANESTHESIA (OUTPATIENT)
Dept: PERIOP | Facility: HOSPITAL | Age: 58
End: 2023-12-15
Payer: MEDICAID

## 2023-12-15 VITALS
HEART RATE: 60 BPM | TEMPERATURE: 97.1 F | RESPIRATION RATE: 14 BRPM | DIASTOLIC BLOOD PRESSURE: 51 MMHG | HEIGHT: 64 IN | WEIGHT: 174 LBS | OXYGEN SATURATION: 98 % | SYSTOLIC BLOOD PRESSURE: 135 MMHG | BODY MASS INDEX: 29.71 KG/M2

## 2023-12-15 DIAGNOSIS — M20.12 ACQUIRED HALLUX VALGUS, LEFT: ICD-10-CM

## 2023-12-15 PROCEDURE — 28296 COR HLX VLGS DSTL MTAR OSTEO: CPT | Performed by: PODIATRIST

## 2023-12-15 PROCEDURE — C1713 ANCHOR/SCREW BN/BN,TIS/BN: HCPCS | Performed by: PODIATRIST

## 2023-12-15 PROCEDURE — 25010000002 FENTANYL CITRATE (PF) 100 MCG/2ML SOLUTION: Performed by: NURSE ANESTHETIST, CERTIFIED REGISTERED

## 2023-12-15 PROCEDURE — 25010000002 HYDROMORPHONE 1 MG/ML SOLUTION: Performed by: NURSE ANESTHETIST, CERTIFIED REGISTERED

## 2023-12-15 PROCEDURE — 25010000002 DEXAMETHASONE PER 1 MG: Performed by: NURSE ANESTHETIST, CERTIFIED REGISTERED

## 2023-12-15 PROCEDURE — S0260 H&P FOR SURGERY: HCPCS | Performed by: PODIATRIST

## 2023-12-15 PROCEDURE — 25010000002 BUPIVACAINE 0.5 % SOLUTION: Performed by: PODIATRIST

## 2023-12-15 PROCEDURE — 25810000003 LACTATED RINGERS PER 1000 ML: Performed by: PODIATRIST

## 2023-12-15 PROCEDURE — 25010000002 CEFAZOLIN PER 500 MG: Performed by: PODIATRIST

## 2023-12-15 PROCEDURE — 25010000002 PROPOFOL 200 MG/20ML EMULSION: Performed by: NURSE ANESTHETIST, CERTIFIED REGISTERED

## 2023-12-15 PROCEDURE — 25010000002 ONDANSETRON PER 1 MG: Performed by: NURSE ANESTHETIST, CERTIFIED REGISTERED

## 2023-12-15 DEVICE — MINI MONSTER HEADED, SHORT THREAD, 3.0 X 22MM
Type: IMPLANTABLE DEVICE | Site: FOOT | Status: FUNCTIONAL
Brand: MONSTER SCREW SYSTEM

## 2023-12-15 RX ORDER — ONDANSETRON 2 MG/ML
4 INJECTION INTRAMUSCULAR; INTRAVENOUS ONCE AS NEEDED
Status: DISCONTINUED | OUTPATIENT
Start: 2023-12-15 | End: 2023-12-15 | Stop reason: HOSPADM

## 2023-12-15 RX ORDER — DEXAMETHASONE SODIUM PHOSPHATE 4 MG/ML
INJECTION, SOLUTION INTRA-ARTICULAR; INTRALESIONAL; INTRAMUSCULAR; INTRAVENOUS; SOFT TISSUE AS NEEDED
Status: DISCONTINUED | OUTPATIENT
Start: 2023-12-15 | End: 2023-12-15 | Stop reason: SURG

## 2023-12-15 RX ORDER — HYDROCODONE BITARTRATE AND ACETAMINOPHEN 7.5; 325 MG/1; MG/1
1 TABLET ORAL EVERY 4 HOURS PRN
Status: DISCONTINUED | OUTPATIENT
Start: 2023-12-15 | End: 2023-12-15 | Stop reason: HOSPADM

## 2023-12-15 RX ORDER — FLUMAZENIL 0.1 MG/ML
0.1 INJECTION INTRAVENOUS AS NEEDED
Status: DISCONTINUED | OUTPATIENT
Start: 2023-12-15 | End: 2023-12-15 | Stop reason: HOSPADM

## 2023-12-15 RX ORDER — FENTANYL CITRATE 50 UG/ML
25 INJECTION, SOLUTION INTRAMUSCULAR; INTRAVENOUS
Status: DISCONTINUED | OUTPATIENT
Start: 2023-12-15 | End: 2023-12-15 | Stop reason: HOSPADM

## 2023-12-15 RX ORDER — SODIUM CHLORIDE 0.9 % (FLUSH) 0.9 %
10 SYRINGE (ML) INJECTION AS NEEDED
Status: DISCONTINUED | OUTPATIENT
Start: 2023-12-15 | End: 2023-12-15 | Stop reason: HOSPADM

## 2023-12-15 RX ORDER — SODIUM CHLORIDE, SODIUM LACTATE, POTASSIUM CHLORIDE, CALCIUM CHLORIDE 600; 310; 30; 20 MG/100ML; MG/100ML; MG/100ML; MG/100ML
1000 INJECTION, SOLUTION INTRAVENOUS CONTINUOUS
Status: DISCONTINUED | OUTPATIENT
Start: 2023-12-15 | End: 2023-12-15 | Stop reason: HOSPADM

## 2023-12-15 RX ORDER — ACETAMINOPHEN 650 MG/1
325 SUPPOSITORY RECTAL EVERY 4 HOURS PRN
Status: DISCONTINUED | OUTPATIENT
Start: 2023-12-15 | End: 2023-12-15 | Stop reason: HOSPADM

## 2023-12-15 RX ORDER — NALOXONE HCL 0.4 MG/ML
0.4 VIAL (ML) INJECTION AS NEEDED
Status: DISCONTINUED | OUTPATIENT
Start: 2023-12-15 | End: 2023-12-15 | Stop reason: HOSPADM

## 2023-12-15 RX ORDER — LABETALOL HYDROCHLORIDE 5 MG/ML
5 INJECTION, SOLUTION INTRAVENOUS
Status: DISCONTINUED | OUTPATIENT
Start: 2023-12-15 | End: 2023-12-15 | Stop reason: HOSPADM

## 2023-12-15 RX ORDER — HYDRALAZINE HYDROCHLORIDE 20 MG/ML
5 INJECTION INTRAMUSCULAR; INTRAVENOUS
Status: DISCONTINUED | OUTPATIENT
Start: 2023-12-15 | End: 2023-12-15 | Stop reason: HOSPADM

## 2023-12-15 RX ORDER — PHENYLEPHRINE HCL IN 0.9% NACL 1 MG/10 ML
SYRINGE (ML) INTRAVENOUS AS NEEDED
Status: DISCONTINUED | OUTPATIENT
Start: 2023-12-15 | End: 2023-12-15 | Stop reason: SURG

## 2023-12-15 RX ORDER — PROCHLORPERAZINE EDISYLATE 5 MG/ML
10 INJECTION INTRAMUSCULAR; INTRAVENOUS ONCE AS NEEDED
Status: DISCONTINUED | OUTPATIENT
Start: 2023-12-15 | End: 2023-12-15 | Stop reason: HOSPADM

## 2023-12-15 RX ORDER — ALBUTEROL SULFATE 2.5 MG/3ML
2.5 SOLUTION RESPIRATORY (INHALATION) ONCE AS NEEDED
Status: DISCONTINUED | OUTPATIENT
Start: 2023-12-15 | End: 2023-12-15 | Stop reason: HOSPADM

## 2023-12-15 RX ORDER — PROPOFOL 10 MG/ML
INJECTION, EMULSION INTRAVENOUS AS NEEDED
Status: DISCONTINUED | OUTPATIENT
Start: 2023-12-15 | End: 2023-12-15 | Stop reason: SURG

## 2023-12-15 RX ORDER — ONDANSETRON 2 MG/ML
INJECTION INTRAMUSCULAR; INTRAVENOUS AS NEEDED
Status: DISCONTINUED | OUTPATIENT
Start: 2023-12-15 | End: 2023-12-15 | Stop reason: SURG

## 2023-12-15 RX ORDER — LIDOCAINE HYDROCHLORIDE 20 MG/ML
INJECTION, SOLUTION EPIDURAL; INFILTRATION; INTRACAUDAL; PERINEURAL AS NEEDED
Status: DISCONTINUED | OUTPATIENT
Start: 2023-12-15 | End: 2023-12-15 | Stop reason: SURG

## 2023-12-15 RX ORDER — BUPIVACAINE HYDROCHLORIDE 5 MG/ML
INJECTION, SOLUTION PERINEURAL AS NEEDED
Status: DISCONTINUED | OUTPATIENT
Start: 2023-12-15 | End: 2023-12-15 | Stop reason: HOSPADM

## 2023-12-15 RX ORDER — DIPHENHYDRAMINE HYDROCHLORIDE 50 MG/ML
12.5 INJECTION INTRAMUSCULAR; INTRAVENOUS
Status: DISCONTINUED | OUTPATIENT
Start: 2023-12-15 | End: 2023-12-15 | Stop reason: HOSPADM

## 2023-12-15 RX ORDER — LIDOCAINE HYDROCHLORIDE 10 MG/ML
0.5 INJECTION, SOLUTION INFILTRATION; PERINEURAL ONCE AS NEEDED
Status: DISCONTINUED | OUTPATIENT
Start: 2023-12-15 | End: 2023-12-15 | Stop reason: HOSPADM

## 2023-12-15 RX ORDER — ACETAMINOPHEN 325 MG/1
650 TABLET ORAL ONCE AS NEEDED
Status: DISCONTINUED | OUTPATIENT
Start: 2023-12-15 | End: 2023-12-15 | Stop reason: HOSPADM

## 2023-12-15 RX ORDER — FENTANYL CITRATE 50 UG/ML
INJECTION, SOLUTION INTRAMUSCULAR; INTRAVENOUS AS NEEDED
Status: DISCONTINUED | OUTPATIENT
Start: 2023-12-15 | End: 2023-12-15 | Stop reason: SURG

## 2023-12-15 RX ORDER — EPHEDRINE SULFATE 5 MG/ML
INJECTION INTRAVENOUS AS NEEDED
Status: DISCONTINUED | OUTPATIENT
Start: 2023-12-15 | End: 2023-12-15 | Stop reason: SURG

## 2023-12-15 RX ORDER — FENTANYL CITRATE 50 UG/ML
50 INJECTION, SOLUTION INTRAMUSCULAR; INTRAVENOUS
Status: DISCONTINUED | OUTPATIENT
Start: 2023-12-15 | End: 2023-12-15 | Stop reason: HOSPADM

## 2023-12-15 RX ADMIN — DEXAMETHASONE SODIUM PHOSPHATE 4 MG: 4 INJECTION, SOLUTION INTRAMUSCULAR; INTRAVENOUS at 08:26

## 2023-12-15 RX ADMIN — CEFAZOLIN 2000 MG: 2 INJECTION, POWDER, FOR SOLUTION INTRAMUSCULAR; INTRAVENOUS at 08:20

## 2023-12-15 RX ADMIN — ONDANSETRON 4 MG: 2 INJECTION INTRAMUSCULAR; INTRAVENOUS at 08:26

## 2023-12-15 RX ADMIN — PROPOFOL 150 MG: 10 INJECTION, EMULSION INTRAVENOUS at 08:13

## 2023-12-15 RX ADMIN — Medication 200 MCG: at 08:18

## 2023-12-15 RX ADMIN — HYDROMORPHONE HYDROCHLORIDE 0.5 MG: 1 INJECTION, SOLUTION INTRAMUSCULAR; INTRAVENOUS; SUBCUTANEOUS at 09:18

## 2023-12-15 RX ADMIN — Medication 200 MCG: at 08:16

## 2023-12-15 RX ADMIN — LIDOCAINE HYDROCHLORIDE 40 MG: 20 INJECTION, SOLUTION EPIDURAL; INFILTRATION; INTRACAUDAL; PERINEURAL at 08:13

## 2023-12-15 RX ADMIN — FENTANYL CITRATE 50 MCG: 50 INJECTION, SOLUTION INTRAMUSCULAR; INTRAVENOUS at 08:23

## 2023-12-15 RX ADMIN — HYDROCODONE BITARTRATE AND ACETAMINOPHEN 1 TABLET: 7.5; 325 TABLET ORAL at 09:37

## 2023-12-15 RX ADMIN — FENTANYL CITRATE 50 MCG: 50 INJECTION, SOLUTION INTRAMUSCULAR; INTRAVENOUS at 08:39

## 2023-12-15 RX ADMIN — SODIUM CHLORIDE, POTASSIUM CHLORIDE, SODIUM LACTATE AND CALCIUM CHLORIDE 1000 ML: 600; 310; 30; 20 INJECTION, SOLUTION INTRAVENOUS at 06:40

## 2023-12-15 RX ADMIN — EPHEDRINE SULFATE 10 MG: 5 INJECTION INTRAVENOUS at 08:19

## 2023-12-15 NOTE — ANESTHESIA POSTPROCEDURE EVALUATION
Patient: Briana DE LA VEGA Bishop    Procedure Summary       Date: 12/15/23 Room / Location: [facility] OR  / [facility] MAIN OR    Anesthesia Start: 0802 Anesthesia Stop: 0908    Procedure: BUNIONECTOMY JHONATAN (Left: Foot) Diagnosis:       Acquired hallux valgus, left      (Acquired hallux valgus, left [M20.12])    Surgeons: GILBERTO Eldridge DPM Provider: Lex Duval MD    Anesthesia Type: general ASA Status: 3            Anesthesia Type: general    Vitals  Vitals Value Taken Time   /56 12/15/23 0948   Temp 97.2 °F (36.2 °C) 12/15/23 0948   Pulse 61 12/15/23 0949   Resp 11 12/15/23 0948   SpO2 94 % 12/15/23 0949   Vitals shown include unfiled device data.        Post Anesthesia Care and Evaluation    Patient location during evaluation: PACU  Patient participation: complete - patient participated  Level of consciousness: awake  Pain scale: See nurse's notes for pain score.  Pain management: adequate    Airway patency: patent  Anesthetic complications: No anesthetic complications  PONV Status: none  Cardiovascular status: acceptable  Respiratory status: acceptable and spontaneous ventilation  Hydration status: acceptable    Comments: Patient seen and examined postoperatively; vital signs stable; SpO2 greater than or equal to 90%; cardiopulmonary status stable; nausea/vomiting adequately controlled; pain adequately controlled; no apparent anesthesia complications; patient discharged from anesthesia care when discharge criteria were met

## 2023-12-15 NOTE — OP NOTE
Operative Note   Foot and Ankle Surgery   Provider: Dr. Philip Eldridge   Location: [facility]      Procedure:  1.  Open bunionectomy with distal metatarsal osteotomy and internal fixation, left foot    Pre-operative Diagnosis:   1.  Hallux valgus with bunion, left foot    Post-operative Diagnosis: Same    Surgeon: Philip Eldridge    Assistant: Jeremy Meehan, PGY 3    Anesthesia: General    Implants: [place] 3.0 mm headed cannulated screw    Findings: No unexpected findings    Specimen: None    Blood Loss: Less than 5cc    Complications: None    Post Op Plan: Discharge home.  Nonweightbearing activity.  Follow-up with me in 2 weeks    Summary:    Patient is a 58-year-old female that has been seen in office for issues involving her feet.  She complains of mostly left foot pain.  She locates the discomfort to the first metatarsal phalangeal joint.  She does have moderate bunion deformity.  Imaging was reviewed in office.  I did discuss bunion deformity with her along with treatment options.  Patient has tried and failed conservative options.  We did review open bunionectomy with distal metatarsal osteotomy and internal fixation.  She understands that she will require nonweightbearing and decreased overall activity after surgery.  Patient understands and agrees and would like to proceed.  We did review risk of recurrence and continued pain which may require additional surgeries.    Procedure, risks, complications, and goals were discussed with the patient at bedside.  Risks include but are not limited to infection, complications from anesthesia (including death), chronic pain or numbness, hematoma/seroma, deep vein thrombosis, wound complications, and potential for additional surgical procedures.  Patient understands and elects to proceed with surgery at this time. Informed consent was obtained before proceeding to the operating suite.  All questions were answered to the patient's satisfaction. No guarantees or  assurances were given or implied.    Procedure:    Patient was brought to the operating room and placed on the operative table in a supine position.  A pneumatic tourniquet was placed about the patient's ankle. A bump was placed under the patient's ipsilateral hip.  The operative limb was scrubbed prepped and draped in the usual sterile fashion.  A formal time-out was conducted prior to skin incision. The limb was elevated exsanguinated pneumatic ankle tourniquet was inflated to 250 mm of mercury.      A Jeronimo block was performed utilizing 20 cc of 1% lidocaine plain prior to the procedure.  Attention was then directed to the dorsal medial aspect of the 1st metatarsophalangeal joint.  A linear incision was performed through the skin to the level of the subcutaneous tissue layer parallel to the extensor hallucis longus tendon.  Blunt dissection was performed through the subcutaneous tissue layer. All vital structures were identified and  retracted; bleeders were cauterized as needed.  A significant amount of Ethibond suture was noted to the medial and lateral first MTPJ joint capsule.  The redundant suture was removed from the capsule.  A full-thickness incision was then performed through the capsule to the level of bone.  Capsular structures were retracted giving adequate exposure to the 1st metatarsophalangeal joint.  The joint was inspected and showed no gross signs of degeneration.  A sagittal saw was then used to remove the dorsal medial eminence.  The saw was passed from distal to proximal leaving a flat surface to the medial aspect of the 1st metatarsal.    Attention was then directed to the 1st interspace via the same skin incision for a lateral release.  Sharp dissection was performed to the level of the adductor hallucis tendon the tendon was identified and isolated.  The tendon was transected using a #15 blade.  The fibular sesamoid was freed from the underlying connective tissue.  Once the lateral release  was fully completed, that hallux was noted to drift into a more medial corrected position.    Attention was then placed back to the medial aspect of the joint, where a guidewire was introduced in to the head of the 1st metatarsal to serve as an axis guide.  Dorsal proximal and dorsal plantar  osteotomies into the head of the 1st metatarsal were then performed.  The osteotomies were made through and through, releasing the capital fragment.  The K-wire was then removed.  The capital fragment was translated laterally, thus functionally decreasing the intermetatarsal angle. Adequate reduction was checked with C-arm fluoroscopy and the head was temporarily fixated with another k-wire. Definitive fixation was performed utilizing 3.0 mm headed cannulated screw. The screw was placed from a dorsal proximal to plantar distal orientation across the osteotomy.  The joint was checked and showed no extension of the screw into the joint. The residual medial bony prominence was resected with the sagittal saw. The wires were removed and stable fixation of the capital fragment was identified along with acceptable reduction of the bunion deformity.  The wound was irrigated with copious amounts of normal saline.  Capsule balancing was performed and repaired utilizing a 2-0 Vicryl in a simple interrupted manner.  The subcutaneous tissue was closed with a 4-0 Vicryl in a similar manner.  Skin was reapproximated utilizing 3-0 nylon. The wound was dressed with Xeroform and sterile compressive dressings.  The ankle tourniquet was released and a prompt hyperemic response was noted to the digits. The patient tolerated the procedure and anesthesia well.  The patient is transferred from the operating room to the recovery room with neurovascular status intact to the operative limb.      Dr. Philip Eldridge DPM  [facility]  [phone]    Note is dictated utilizing voice recognition software. Unfortunately this leads to occasional  typographical errors. I apologize in advance if the situation occurs. If questions occur please do not hesitate to call our office.

## 2023-12-15 NOTE — ANESTHESIA PROCEDURE NOTES
Airway  Urgency: elective    Date/Time: 12/15/2023 8:13 AM  Airway not difficult    General Information and Staff    Patient location during procedure: OR  CRNA/CAA: Al Farooq CRNA    Indications and Patient Condition  Indications for airway management: airway protection    Preoxygenated: yes  Mask difficulty assessment: 1 - vent by mask    Final Airway Details  Final airway type: supraglottic airway      Successful airway: LMA and I-gel  Size 4     Number of attempts at approach: 1  Assessment: lips, teeth, and gum same as pre-op and atraumatic intubation

## 2023-12-15 NOTE — H&P
12/15/23   Foot and Ankle Surgery - Pre-Op H&P  Provider: Dr. Philip Eldridge DPM  Location: [facility]    Subjective:  Briana Alas is a 58 y.o. female.     CC: left foot pain    HPI: Patient presents for surgery involving the left foot.  She denies any new issues or concerns.    Allergies   Allergen Reactions    Penicillins Rash       Past Medical History:   Diagnosis Date    Anxiety     Arthritis     Back pain     Depression     Headache     Hyperlipidemia     Hypertension     Injury of back     Restless leg syndrome        Past Surgical History:   Procedure Laterality Date    ANKLE OPEN REDUCTION INTERNAL FIXATION Right     FOOT SURGERY Bilateral     bunionectomy    SHOULDER ARTHROSCOPY W/ ROTATOR CUFF REPAIR Right 12/13/2019    Procedure: RIGHT SHOULDER ARTHROSCOPY WITH ROTATOR CUFF REPAIR and Subacromial decompression;  Surgeon: Gino Ackerman MD;  Location: [facility] OR;  Service: Orthopedics    TUBAL ABDOMINAL LIGATION         Family History   Problem Relation Age of Onset    Hypertension Mother     Hyperlipidemia Mother     Depression Mother     Thyroid disease Mother     Atrial fibrillation Mother     Heart attack Father     Hypertension Father        Social History     Socioeconomic History    Marital status: Legally    Tobacco Use    Smoking status: Every Day     Packs/day: 1.00     Years: 44.00     Additional pack years: 0.00     Total pack years: 44.00     Types: Cigarettes     Start date: 1979     Passive exposure: Never    Smokeless tobacco: Never    Tobacco comments:     Dont smoke dos    Vaping Use    Vaping Use: Never used   Substance and Sexual Activity    Alcohol use: No    Drug use: No    Sexual activity: Defer          Current Facility-Administered Medications:     ceFAZolin 2000 mg IVPB in 100 mL NS (MBP), 2,000 mg, Intravenous, Once, GILBERTO Eldridge DPM    lactated ringers infusion 1,000 mL, 1,000 mL, Intravenous, Continuous, GILBERTO Eldridge DPM, Last Rate:  "25 mL/hr at 12/15/23 0640, 1,000 mL at 12/15/23 0640    lidocaine (XYLOCAINE) 1 % injection 0.5 mL, 0.5 mL, Intradermal, Once PRN, GILBERTO Eldridge DPM    sodium chloride 0.9 % flush 10 mL, 10 mL, Intravenous, PRN, GILBERTO Eldridge DPM    Review of Systems:  General: Denies fever, chills, fatigue, and weakness.  Eyes: Denies vision loss, blurry vision, and excessive redness.  ENT: Denies hearing issues and difficulty swallowing.  Cardiovascular: Denies palpitations, chest pain, or syncopal episodes.  Respiratory: Denies shortness of breath, wheezing, and coughing.  GI: Denies abdominal pain, nausea, and vomiting.   : Denies frequency, hematuria, and urgency.  Musculoskeletal: +left foot pain  Derm: Denies rash, open wounds, or suspicious lesions.  Neuro: Denies headaches, numbness, loss of coordination, and tremors.  Psych: Denies anxiety and depression.  Endocrine: Denies temperature intolerance and changes in appetite.  Heme: Denies bleeding disorders or abnormal bruising.     Objective   /57 (BP Location: Left arm, Patient Position: Lying)   Pulse 73   Temp 98.1 °F (36.7 °C) (Oral)   Resp 10   Ht 162.6 cm (64\")   Wt 78.9 kg (174 lb)   SpO2 93%   BMI 29.87 kg/m²     GENERAL  Orientation:  AAOx3  Affect:  appropriate     VASCULAR      Right Foot Vascularity   Normal vascular exam    Dorsalis pedis:  2+  Posterior tibial:  2+  Skin temperature:  warm  Edema grading:  None  CFT:  < 3 seconds  Pedal hair growth:  Present  Varicosities:  none      Left Foot Vascularity   Normal vascular exam    Dorsalis pedis:  2+  Posterior tibial:  2+  Skin temperature:  warm  Edema grading:  None  CFT:  < 3 seconds  Pedal hair growth:  Present  Varicosities:  none     NEUROLOGIC      Right Foot Neurologic   Light touch sensation: normal  Hot/Cold sensation: normal  Achilles reflex:  2+      Left Foot Neurologic   Light touch sensation: normal  Hot/Cold sensation:  normal  Achilles reflex:  2+     MUSCULOSKELETAL   "    Right Foot Musculoskeletal   Arch:  Normal      Left Foot Musculoskeletal   Arch:  Normal     MUSCLE STRENGTH      Right Foot Muscle Strength   Normal strength    Foot dorsiflexion:  5  Foot plantar flexion:  5  Foot inversion:  5  Foot eversion:  5      Left Foot Muscle Strength   Normal strength    Foot dorsiflexion:  5  Foot plantar flexion:  5  Foot inversion:  5  Foot eversion:  5     DERMATOLOGIC       Right Foot Dermatologic   Skin  Right foot skin is intact.   Nails comment:  Nails 1-5      Left Foot Dermatologic   Skin  Left foot skin is intact.   Nails comment:  Nails 1-5     TESTS      Right Foot Tests   Anterior drawer: negative  Varus tilt: negative      Left Foot Tests   Anterior drawer: negative  Varus tilt: negative     Right foot additional comments: 11/6/2023: Relatively normal findings involving the right lower extremity. No significant pain with palpation.        Left foot additional comments: 11/6/2023: Moderate bunion deformity involving the left foot with discomfort with palpation to the dorsal medial prominence and mild underlying bursitis. Decreased medial arch with stance. Moderate equinus contracture with knee extended and flexed.                    Assessment & Plan     Acquired hallux valgus, left    Patient continues to have pain involving the left great toe.  Will proceed with surgery as planned.    Note is dictated utilizing voice recognition software. Unfortunately this leads to occasional typographical errors. I apologize in advance if the situation occurs. If questions occur please do not hesitate to call our office.

## 2023-12-15 NOTE — ANESTHESIA PREPROCEDURE EVALUATION
Anesthesia Evaluation     Patient summary reviewed and Nursing notes reviewed   NPO Solid Status: > 8 hours  NPO Liquid Status: > 8 hours           Airway   Mallampati: II  TM distance: >3 FB  Neck ROM: full  No difficulty expected  Dental - normal exam   (+) partials and upper dentures    Pulmonary - normal exam   (+) a smoker Current,  Cardiovascular - normal exam    ECG reviewed    (+) hypertension, hyperlipidemia      Neuro/Psych  (+) headaches, numbness, psychiatric history  GI/Hepatic/Renal/Endo      Musculoskeletal     (+) back pain, neck pain  Abdominal  - normal exam    Bowel sounds: normal.   Substance History      OB/GYN          Other   arthritis,     ROS/Med Hx Other: Sinus rhythm  Anteroseptal infarct, age indeterminate      Dr Terrazas  Patient is asymptomatic from cardiovascular standpoint.  Patient to have an echocardiogram to assess left ventricular function.  Okay with planned procedure from cardiac standpoint.      CXR NAD                    Anesthesia Plan    ASA 3     general     intravenous induction     Anesthetic plan, risks, benefits, and alternatives have been provided, discussed and informed consent has been obtained with: patient.    Plan discussed with CRNA and CAA.      CODE STATUS:

## 2023-12-26 DIAGNOSIS — F32.A DEPRESSION, UNSPECIFIED DEPRESSION TYPE: ICD-10-CM

## 2023-12-26 DIAGNOSIS — F41.1 GENERALIZED ANXIETY DISORDER: Chronic | ICD-10-CM

## 2023-12-26 RX ORDER — TEMAZEPAM 30 MG/1
30 CAPSULE ORAL NIGHTLY PRN
Qty: 30 CAPSULE | Refills: 0 | Status: SHIPPED | OUTPATIENT
Start: 2023-12-26

## 2023-12-26 RX ORDER — CLONAZEPAM 1 MG/1
TABLET ORAL
Qty: 90 TABLET | Refills: 0 | Status: SHIPPED | OUTPATIENT
Start: 2023-12-26

## 2023-12-26 NOTE — TELEPHONE ENCOUNTER
Rx Refill Note  Requested Prescriptions     Pending Prescriptions Disp Refills    clonazePAM (KlonoPIN) 1 MG tablet [Pharmacy Med Name: CLONAZEPAM 1 MG TABLET] 90 tablet 0     Sig: TAKE 1 TABLET BY MOUTH THREE TIMES A DAY AS NEEDED FOR ANXIETY    temazepam (RESTORIL) 30 MG capsule [Pharmacy Med Name: TEMAZEPAM 30 MG CAPSULE] 30 capsule      Sig: TAKE 1 CAPSULE BY MOUTH AT NIGHT AS NEEDED FOR SLEEP.      Last office visit with prescribing clinician: 9/6/2023   Last telemedicine visit with prescribing clinician: Visit date not found   Next office visit with prescribing clinician: 1/3/2024   Office Visit with Jade Ryder MD (09/06/2023)   SCANNED - LABS (02/28/2023)                     Would you like a call back once the refill request has been completed: [] Yes [] No    If the office needs to give you a call back, can they leave a voicemail: [] Yes [] No    Symone Varela MA  12/26/23, 13:39 EST     Unable to reach parenr and unable to lvm Mail box is full.

## 2023-12-27 ENCOUNTER — TELEPHONE (OUTPATIENT)
Dept: FAMILY MEDICINE CLINIC | Facility: CLINIC | Age: 58
End: 2023-12-27
Payer: MEDICAID

## 2023-12-27 NOTE — TELEPHONE ENCOUNTER
Pregabalin 150MG capsules PA submitted to covermymeds. Waiting on determination.   Key: [id number]  PA Case ID #: [id number]

## 2023-12-27 NOTE — TELEPHONE ENCOUNTER
The request has been approved. The authorization is effective from 12/27/2023 to 12/26/2024, as long as the member is enrolled in their current health plan. The request was approved as submitted. This request has been approved with a quantity limit of 3 capsules per day. A written notification letter will follow with additional details.

## 2023-12-28 ENCOUNTER — OFFICE VISIT (OUTPATIENT)
Dept: PODIATRY | Facility: CLINIC | Age: 58
End: 2023-12-28
Payer: MEDICAID

## 2023-12-28 VITALS — BODY MASS INDEX: 29.71 KG/M2 | WEIGHT: 174 LBS | HEIGHT: 64 IN | OXYGEN SATURATION: 99 % | RESPIRATION RATE: 20 BRPM

## 2023-12-28 DIAGNOSIS — M79.672 LEFT FOOT PAIN: Primary | ICD-10-CM

## 2023-12-28 DIAGNOSIS — M20.12 ACQUIRED HALLUX VALGUS, LEFT: ICD-10-CM

## 2023-12-28 DIAGNOSIS — M21.862 ACQUIRED POSTERIOR EQUINUS, LEFT: ICD-10-CM

## 2023-12-28 PROCEDURE — 1159F MED LIST DOCD IN RCRD: CPT | Performed by: PODIATRIST

## 2023-12-28 PROCEDURE — 1160F RVW MEDS BY RX/DR IN RCRD: CPT | Performed by: PODIATRIST

## 2023-12-28 PROCEDURE — 99024 POSTOP FOLLOW-UP VISIT: CPT | Performed by: PODIATRIST

## 2023-12-28 NOTE — PROGRESS NOTES
12/28/2023  Foot and Ankle Surgery - Established Patient/Follow-up  Provider: Dr. Philip Eldridge DPM  Location: [facility]    Subjective:  Briana Alas is a 58 y.o. female.     Chief Complaint   Patient presents with    Left Foot - Post-op     Surg-12/15/23 BUNIONECTOMY JHONATAN  PCP- cassandra Garcia 11/30/23        HPI:  The patient is a 58-year-old female who presents to the office today for a follow-up 2 weeks status post bunionectomy of the left foot.    The patient is doing well overall and notes improvement in her symptoms. She has been performing range-of-motion exercises with her left ankle. She inquires if there is any hardware in place.    Allergies   Allergen Reactions    Penicillins Rash       Current Outpatient Medications on File Prior to Visit   Medication Sig Dispense Refill    atenolol (TENORMIN) 50 MG tablet TAKE ONE (1) TABLET BY MOUTH DAILY (Patient taking differently: Take 0.5 tablets by mouth Daily.) 30 tablet 5    atorvastatin (LIPITOR) 40 MG tablet TAKE ONE (1) TABLET BY MOUTH EVERY EVENING (Patient taking differently: Take 1 tablet by mouth Every Night.) 90 tablet 1    Brexpiprazole (Rexulti) 2 MG tablet Take 1 tablet by mouth Every Morning. 30 tablet 3    busPIRone (BUSPAR) 15 MG tablet Take 1 tablet by mouth 2 (Two) Times a Day. 60 tablet 3    carisoprodol (SOMA) 350 MG tablet Take 1 tablet by mouth 3 (Three) Times a Day As Needed for Muscle Spasms. 90 tablet 0    clonazePAM (KlonoPIN) 1 MG tablet TAKE 1 TABLET BY MOUTH THREE TIMES A DAY AS NEEDED FOR ANXIETY 90 tablet 0    DULoxetine (CYMBALTA) 60 MG capsule Take 1 capsule by mouth 2 (Two) Times a Day. 60 capsule 3    oxyCODONE (ROXICODONE) 15 MG immediate release tablet Take 1 tablet by mouth Every 8 (Eight) Hours As Needed for Moderate Pain. Code M79.7 90 tablet 0    pregabalin (LYRICA) 150 MG capsule TAKE ONE (1) CAPSULE BY MOUTH TWICE DAILY (Patient taking differently: Take 1 capsule by mouth 2 (Two) Times a Day.) 60  "capsule 5    rOPINIRole (REQUIP) 0.5 MG tablet TAKE ONE (1) TABLET BY MOUTH TWICE DAILY (TAKE ONE (1) TABLET ONE HOUR BEFORE BEDTIME) (Patient taking differently: Take 1 tablet by mouth 2 (Two) Times a Day.) 60 tablet 5    temazepam (RESTORIL) 30 MG capsule TAKE 1 CAPSULE BY MOUTH AT NIGHT AS NEEDED FOR SLEEP. 30 capsule 0    vitamin D (ERGOCALCIFEROL) 1.25 MG (27990 UT) capsule capsule TAKE ONE (1) CAPSULE BY MOUTH TWICE A WEEK. TAKE THREE (3) DAYS APART (Patient taking differently: Take 1 capsule by mouth 2 (Two) Times a Week.) 8 capsule 5     No current facility-administered medications on file prior to visit.       Objective   Resp 20   Ht 162.6 cm (64\")   Wt 78.9 kg (174 lb)   SpO2 99%   BMI 29.87 kg/m²     Foot/Ankle Exam    GENERAL  Orientation:  AAOx3  Affect:  appropriate    VASCULAR     Right Foot Vascularity   Normal vascular exam    Dorsalis pedis:  2+  Posterior tibial:  2+  Skin temperature:  warm  Edema grading:  None  CFT:  < 3 seconds  Pedal hair growth:  Present  Varicosities:  none     Left Foot Vascularity   Normal vascular exam    Dorsalis pedis:  2+  Posterior tibial:  2+  Skin temperature:  warm  Edema grading:  None  CFT:  < 3 seconds  Pedal hair growth:  Present  Varicosities:  none     NEUROLOGIC     Right Foot Neurologic   Light touch sensation: normal  Hot/Cold sensation: normal  Achilles reflex:  2+     Left Foot Neurologic   Light touch sensation: normal  Hot/Cold sensation:  normal  Achilles reflex:  2+    MUSCULOSKELETAL     Right Foot Musculoskeletal   Arch:  Normal     Left Foot Musculoskeletal   Arch:  Normal    MUSCLE STRENGTH     Right Foot Muscle Strength   Normal strength    Foot dorsiflexion:  5  Foot plantar flexion:  5  Foot inversion:  5  Foot eversion:  5     Left Foot Muscle Strength   Normal strength    Foot dorsiflexion:  5  Foot plantar flexion:  5  Foot inversion:  5  Foot eversion:  5    DERMATOLOGIC      Right Foot Dermatologic   Skin  Right foot skin is intact. "   Nails comment:  Nails 1-5     Left Foot Dermatologic   Skin  Left foot skin is intact.   Nails comment:  Nails 1-5    TESTS     Right Foot Tests   Anterior drawer: negative  Varus tilt: negative     Left Foot Tests   Anterior drawer: negative  Varus tilt: negative     Right foot additional comments: 11/6/2023: Relatively normal findings involving the right lower extremity. No significant pain with palpation.       Left foot additional comments: 11/6/2023: Moderate bunion deformity involving the left foot with discomfort with palpation to the dorsal medial prominence and mild underlying bursitis. Decreased medial arch with stance. Moderate equinus contracture with knee extended and flexed.     12/28/2023: Incision site is dry and stable with intact sutures. No evidence of dehiscence or infection. Moderate soft tissue rigidity.      Assessment & Plan   Diagnoses and all orders for this visit:    1. Left foot pain (Primary)    2. Acquired hallux valgus, left    3. Acquired posterior equinus, left        Patient presents to the office today for a follow-up 2 weeks status post bunionectomy of the left foot. No results were obtained or interpreted today. The incision site is dry and stable with intact sutures. There is no evidence of dehiscence or infection. Sutures were removed today. She may shower and bathe as needed. The patient may begin weight-bearing in the boot and advised she will experience discomfort with these transition periods. Discussed monitoring her activity and making the transitions gradually. Re-iterated the importance of continuing her range-of-motion exercises and manual manipulation. The patient will return to the office in 2 weeks for re-evaluation and repeat x-rays.     No orders of the defined types were placed in this encounter.         Note is dictated utilizing voice recognition software. Unfortunately this leads to occasional typographical errors. I apologize in advance if the situation  occurs. If questions occur please do not hesitate to call our office.    Transcribed from ambient dictation for GILBERTO Eldridge DPM by Luisa Engel.  12/28/23   09:52 EST    Patient or patient representative verbalized consent to the visit recording.  I have personally performed the services described in this document as transcribed by the above individual, and it is both accurate and complete.

## 2023-12-29 NOTE — PROGRESS NOTES
Encounter Date:01/08/2024  Last seen 12/11/2023      Patient ID: Briana Alas is a 58 y.o. female.    Chief Complaint:  Abnormal EKG  Preoperative cardiovascular evaluation.  Hypertension  Dyslipidemia     History of Present Illness  Patient had left foot surgery since last visit without any perioperative cardiovascular problems.    Since I have last seen, the patient has been without any chest discomfort ,shortness of breath, palpitations, dizziness or syncope.  Denies having any headache ,abdominal pain ,nausea, vomiting , diarrhea constipation, loss of weight or loss of appetite.  Denies having any excessive bruising ,hematuria or blood in the stool.    Review of all systems negative except as indicated.    Reviewed ROS.    Assessment and Plan      ]]]]]]]]]]]]]]]]]]]]]  Impression  =============  - Patient had left foot surgery since last visit without any perioperative cardiovascular problems.    Echocardiogram 1/8/2024   Structurally and functionally normal cardiac valves except for minimal aortic regurgitation..  Left ventricular size and contractility is normal with ejection fraction of 60%.    - Dyslipidemia hypertension     - Abnormal EKG-12/7/2023.  Poor R wave progression anteriorly not rule out anteroseptal infarction age undetermined.     - Status post right ankle surgery abdominal tubal ligation right arthroscopic shoulder surgery.     - Smoker     - Family history of coronary artery disease     - Allergic to penicillin     ============  Plan  ===============  EKG 12/7/2023 revealed poor R wave progression anteriorly.  Rule out anteroseptal infarction age undetermined..      Patient had left foot surgery since last visit without perioperative cardiovascular problems.  Patient is asymptomatic from cardiovascular standpoint.    Echocardiogram 1/8/2024-as above.  Echocardiogram results were discussed and educated patient.    Hypertension-well-controlled  107/70    Dyslipidemia-continue atorvastatin      Medications were reviewed and updated.    Follow-up in the office in 1 year.     Further plan will depend on patient's progress.    Reviewed and updated-1/8/2024  ]]]]]]]]]]]]]]]]]]]]]]]]]]]]]]                   Diagnosis Plan   1. Abnormal EKG        2. Essential hypertension        3. Dyslipidemia        LAB RESULTS (LAST 7 DAYS)    CBC        BMP        CMP         BNP        TROPONIN        CoAg        Creatinine Clearance  CrCl cannot be calculated (Patient's most recent lab result is older than the maximum 30 days allowed.).    ABG        Radiology  No radiology results for the last day                The following portions of the patient's history were reviewed and updated as appropriate: allergies, current medications, past family history, past medical history, past social history, past surgical history, and problem list.    Review of Systems   Constitutional: Negative for malaise/fatigue.   Cardiovascular:  Negative for chest pain, leg swelling, palpitations and syncope.   Respiratory:  Negative for shortness of breath.    Skin:  Negative for rash.   Gastrointestinal:  Negative for nausea and vomiting.   Neurological:  Negative for dizziness, light-headedness and numbness.   All other systems reviewed and are negative.        Current Outpatient Medications:     atenolol (TENORMIN) 50 MG tablet, TAKE ONE (1) TABLET BY MOUTH DAILY (Patient taking differently: Take 0.5 tablets by mouth Daily.), Disp: 30 tablet, Rfl: 5    atorvastatin (LIPITOR) 40 MG tablet, TAKE ONE (1) TABLET BY MOUTH EVERY EVENING (Patient taking differently: Take 1 tablet by mouth Every Night.), Disp: 90 tablet, Rfl: 1    Brexpiprazole (Rexulti) 2 MG tablet, Take 1 tablet by mouth Every Morning., Disp: 30 tablet, Rfl: 3    busPIRone (BUSPAR) 15 MG tablet, Take 1 tablet by mouth 2 (Two) Times a Day., Disp: 60 tablet, Rfl: 3    carisoprodol (SOMA) 350 MG tablet, Take 1 tablet by mouth 3 (Three) Times a Day As Needed for Muscle Spasms.,  Disp: 90 tablet, Rfl: 0    clonazePAM (KlonoPIN) 1 MG tablet, Take 1 tablet by mouth 3 (Three) Times a Day As Needed for Anxiety., Disp: 90 tablet, Rfl: 3    DULoxetine (CYMBALTA) 60 MG capsule, Take 1 capsule by mouth 2 (Two) Times a Day., Disp: 60 capsule, Rfl: 3    oxyCODONE (ROXICODONE) 15 MG immediate release tablet, Take 1 tablet by mouth Every 8 (Eight) Hours As Needed for Moderate Pain. Code M79.7, Disp: 90 tablet, Rfl: 0    pregabalin (LYRICA) 150 MG capsule, TAKE ONE (1) CAPSULE BY MOUTH TWICE DAILY (Patient taking differently: Take 1 capsule by mouth 2 (Two) Times a Day.), Disp: 60 capsule, Rfl: 5    rOPINIRole (REQUIP) 0.5 MG tablet, TAKE ONE (1) TABLET BY MOUTH TWICE DAILY (TAKE ONE (1) TABLET ONE HOUR BEFORE BEDTIME) (Patient taking differently: Take 1 tablet by mouth 2 (Two) Times a Day.), Disp: 60 tablet, Rfl: 5    temazepam (RESTORIL) 30 MG capsule, Take 1 capsule by mouth At Night As Needed for Sleep., Disp: 30 capsule, Rfl: 3    vitamin D (ERGOCALCIFEROL) 1.25 MG (28704 UT) capsule capsule, TAKE ONE (1) CAPSULE BY MOUTH TWICE A WEEK. TAKE THREE (3) DAYS APART (Patient taking differently: Take 1 capsule by mouth 2 (Two) Times a Week.), Disp: 8 capsule, Rfl: 5    Allergies   Allergen Reactions    Penicillins Rash       Family History   Problem Relation Age of Onset    Hypertension Mother     Hyperlipidemia Mother     Depression Mother     Thyroid disease Mother     Atrial fibrillation Mother     Heart attack Father     Hypertension Father        Past Surgical History:   Procedure Laterality Date    ANKLE OPEN REDUCTION INTERNAL FIXATION Right     BUNIONECTOMY Left 12/15/2023    Procedure: BUNIONECTOMY JHONATAN;  Surgeon: GILBERTO lEdridge DPM;  Location: [facility];  Service: Podiatry;  Laterality: Left;    FOOT SURGERY Bilateral     bunionectomy    SHOULDER ARTHROSCOPY W/ ROTATOR CUFF REPAIR Right 12/13/2019    Procedure: RIGHT SHOULDER ARTHROSCOPY WITH ROTATOR CUFF REPAIR and Subacromial  "decompression;  Surgeon: Gino Ackerman MD;  Location: [facility];  Service: Orthopedics    TUBAL ABDOMINAL LIGATION         Past Medical History:   Diagnosis Date    Anxiety     Arthritis     Back pain     Depression     Headache     Hyperlipidemia     Hypertension     Injury of back     Restless leg syndrome        Family History   Problem Relation Age of Onset    Hypertension Mother     Hyperlipidemia Mother     Depression Mother     Thyroid disease Mother     Atrial fibrillation Mother     Heart attack Father     Hypertension Father        Social History     Socioeconomic History    Marital status: Legally    Tobacco Use    Smoking status: Every Day     Packs/day: 1.00     Years: 44.00     Additional pack years: 0.00     Total pack years: 44.00     Types: Cigarettes     Start date: 1979     Passive exposure: Never    Smokeless tobacco: Never    Tobacco comments:     Dont smoke dos    Vaping Use    Vaping Use: Former    Quit date: 1/1/2020    Substances: Nicotine, Flavoring    Devices: Disposable   Substance and Sexual Activity    Alcohol use: No    Drug use: No    Sexual activity: Defer         Procedures      Objective:       Physical Exam    /70 (BP Location: Right arm, Patient Position: Sitting, Cuff Size: Adult)   Pulse 70   Ht 162.6 cm (64\")   Wt 79.4 kg (175 lb) Comment: PATIENT HAS SURGICAL BOOT ON - \"~3-5#\"  SpO2 95% Comment: RA  BMI 30.04 kg/m²   The patient is alert, oriented and in no distress.    Vital signs as noted above.    Head and neck revealed no carotid bruits or jugular venous distension.  No thyromegaly or lymphadenopathy is present.    Lungs clear.  No wheezing.  Breath sounds are normal bilaterally.    Heart normal first and second heart sounds.  No murmur..  No pericardial rub is present.  No gallop is present.    Abdomen soft and nontender.  No organomegaly is present.    Extremities revealed good peripheral pulses without any pedal edema.  Left foot in a " boot.    Skin warm and dry.    Musculoskeletal system is grossly normal.    CNS grossly normal.    Reviewed and updated

## 2024-01-02 ENCOUNTER — TELEPHONE (OUTPATIENT)
Dept: ORTHOPEDIC SURGERY | Facility: CLINIC | Age: 59
End: 2024-01-02
Payer: MEDICAID

## 2024-01-02 NOTE — TELEPHONE ENCOUNTER
Patient called in stating she is having a lot of pain in the top of the foot. No redness and no heat. No drainage of incision. She stated she is having trouble keeping the boot on due to the pain. She is asking if she needs to be seen sooner than the 11th or if there is anything she can be given to alleviate the pain.

## 2024-01-03 ENCOUNTER — OFFICE VISIT (OUTPATIENT)
Dept: PSYCHIATRY | Facility: CLINIC | Age: 59
End: 2024-01-03
Payer: MEDICAID

## 2024-01-03 DIAGNOSIS — Z79.899 ENCOUNTER FOR LONG-TERM (CURRENT) USE OF OTHER MEDICATIONS: Primary | ICD-10-CM

## 2024-01-03 DIAGNOSIS — F41.1 GENERALIZED ANXIETY DISORDER: Chronic | ICD-10-CM

## 2024-01-03 DIAGNOSIS — Z63.4 BEREAVEMENT: ICD-10-CM

## 2024-01-03 DIAGNOSIS — F51.01 PRIMARY INSOMNIA: Chronic | ICD-10-CM

## 2024-01-03 DIAGNOSIS — F33.1 MAJOR DEPRESSIVE DISORDER, RECURRENT EPISODE, MODERATE: Chronic | ICD-10-CM

## 2024-01-03 DIAGNOSIS — F32.A DEPRESSION, UNSPECIFIED DEPRESSION TYPE: ICD-10-CM

## 2024-01-03 RX ORDER — CLONAZEPAM 1 MG/1
1 TABLET ORAL 3 TIMES DAILY PRN
Qty: 90 TABLET | Refills: 3 | Status: SHIPPED | OUTPATIENT
Start: 2024-01-03

## 2024-01-03 RX ORDER — TEMAZEPAM 30 MG/1
30 CAPSULE ORAL NIGHTLY PRN
Qty: 30 CAPSULE | Refills: 3 | Status: SHIPPED | OUTPATIENT
Start: 2024-01-03

## 2024-01-03 RX ORDER — BREXPIPRAZOLE 2 MG/1
1 TABLET ORAL EVERY MORNING
Qty: 30 TABLET | Refills: 3 | Status: SHIPPED | OUTPATIENT
Start: 2024-01-03

## 2024-01-03 RX ORDER — DULOXETIN HYDROCHLORIDE 60 MG/1
60 CAPSULE, DELAYED RELEASE ORAL 2 TIMES DAILY
Qty: 60 CAPSULE | Refills: 3 | Status: SHIPPED | OUTPATIENT
Start: 2024-01-03

## 2024-01-03 RX ORDER — BUSPIRONE HYDROCHLORIDE 15 MG/1
15 TABLET ORAL 2 TIMES DAILY
Qty: 60 TABLET | Refills: 3 | Status: SHIPPED | OUTPATIENT
Start: 2024-01-03

## 2024-01-03 SDOH — SOCIAL STABILITY - SOCIAL INSECURITY: DISSAPEARANCE AND DEATH OF FAMILY MEMBER: Z63.4

## 2024-01-03 NOTE — PROGRESS NOTES
Subjective   Briana Alas is a 58 y.o. female who presents today for follow up    Chief Complaint:     Depression, anxiety      History of Present Illness: the pt has a long hx of depression, no specific triggers or stressors, was on zoloft, celexa , few unsuccessful trials   She was relatively stable on meds until her daughter passed away  Last year , still difficult to cope  , her daughter was special needs, pt's life was revolving around her , now she feels she has no purpose     Today the pt reported feeling depressed,  father  few months ago,   The pt had foot surgery   The pt now stays with her mom   Depression is rated as 8/10 , almost every day, all day long     denied feeling hopeless/helpless,  Anxiety is still intense , associated with increased tension, irritability, unpleasant somatic sensations    denied AVH/SI/HI   Sleep - improved   on temazepam,  up to 6 hrs      Alleviating factors - to stay busy     Anxiety is manageable on clonazepam , taking 2-3 tabs per day         The following portions of the patient's history were reviewed and updated as appropriate: allergies, current medications, past family history, past medical history, past social history, past surgical history and problem list.    PAST PSYCHIATRIC HISTORY  Axis I  Affective/Bipolar Disorder, Anxiety/Panic Disorder  No inpt. No SI/SA   Axis II  Defer     PAST OUTPATIENT TREATMENT  Diagnosis treated:  Affective Disorder, Anxiety/Panic Disorder  Treatment Type:  Medication Management  Psychotherapy - [facility]   Prior Psychiatric Medications:  lexapro - not effective  celexa -not effective  zoloft - not effective now    Support Groups:  None   Sequelae Of Mental Disorder:  emotional distress          Interval History  Depression anxiety - worse      Side Effects  Denied       Past Medical History:  Past Medical History:   Diagnosis Date    Anxiety     Arthritis     Back pain     Depression     Headache      Hyperlipidemia     Hypertension     Injury of back     Restless leg syndrome        Social History:  Social History     Socioeconomic History    Marital status: Legally    Tobacco Use    Smoking status: Every Day     Packs/day: 1.00     Years: 44.00     Additional pack years: 0.00     Total pack years: 44.00     Types: Cigarettes     Start date: 1979     Passive exposure: Never    Smokeless tobacco: Never    Tobacco comments:     Dont smoke dos    Vaping Use    Vaping Use: Never used   Substance and Sexual Activity    Alcohol use: No    Drug use: No    Sexual activity: Defer       Family History:  Family History   Problem Relation Age of Onset    Hypertension Mother     Hyperlipidemia Mother     Depression Mother     Thyroid disease Mother     Atrial fibrillation Mother     Heart attack Father     Hypertension Father        Past Surgical History:  Past Surgical History:   Procedure Laterality Date    ANKLE OPEN REDUCTION INTERNAL FIXATION Right     BUNIONECTOMY Left 12/15/2023    Procedure: BUNIONECTOMY JHONATAN;  Surgeon: GILBERTO Eldridge DPM;  Location: [facility];  Service: Podiatry;  Laterality: Left;    FOOT SURGERY Bilateral     bunionectomy    SHOULDER ARTHROSCOPY W/ ROTATOR CUFF REPAIR Right 12/13/2019    Procedure: RIGHT SHOULDER ARTHROSCOPY WITH ROTATOR CUFF REPAIR and Subacromial decompression;  Surgeon: Gino Ackerman MD;  Location: [facility];  Service: Orthopedics    TUBAL ABDOMINAL LIGATION         Problem List:  Patient Active Problem List   Diagnosis    Fibromyalgia    Abnormal gait    Ankle pain, right    Knee pain    Leg pain, left    Annular tear of lumbar disc    Degeneration of lumbar intervertebral disc    Generalized anxiety disorder    Body mass index 32.0-32.9, adult    Cervical myelopathy    Other dorsalgia    Chronic low back pain    Hx traumatic fracture    Hyperlipidemia    Hypertension, essential    Hypovitaminosis D    Inflammatory arthritis    Joint pain     Leg weakness, bilateral    Lumbosacral radiculopathy    Malaise and fatigue    Neck pain, chronic    Osteoarthritis of knee    Pain in right shoulder    Pain due to internal orthopedic prosthetic devices, implants and grafts, subsequent encounter    Rotator cuff tendonitis    Scoliosis    Osteoarthritis of lumbosacral spine without myelopathy    Spondylosis of lumbosacral region    Tobacco abuse counseling    Upper respiratory tract infection    Major depressive disorder, recurrent episode, moderate    Primary insomnia    Bereavement    Acquired hallux valgus, left       Allergy:   Allergies   Allergen Reactions    Penicillins Rash        Discontinued Medications:  Medications Discontinued During This Encounter   Medication Reason    Brexpiprazole (Rexulti) 2 MG tablet Reorder    DULoxetine (CYMBALTA) 60 MG capsule Reorder    busPIRone (BUSPAR) 15 MG tablet Reorder    clonazePAM (KlonoPIN) 1 MG tablet Reorder    temazepam (RESTORIL) 30 MG capsule Reorder           Current Medications:   Current Outpatient Medications   Medication Sig Dispense Refill    Brexpiprazole (Rexulti) 2 MG tablet Take 1 tablet by mouth Every Morning. 30 tablet 3    busPIRone (BUSPAR) 15 MG tablet Take 1 tablet by mouth 2 (Two) Times a Day. 60 tablet 3    clonazePAM (KlonoPIN) 1 MG tablet Take 1 tablet by mouth 3 (Three) Times a Day As Needed for Anxiety. 90 tablet 3    DULoxetine (CYMBALTA) 60 MG capsule Take 1 capsule by mouth 2 (Two) Times a Day. 60 capsule 3    temazepam (RESTORIL) 30 MG capsule Take 1 capsule by mouth At Night As Needed for Sleep. 30 capsule 3    atenolol (TENORMIN) 50 MG tablet TAKE ONE (1) TABLET BY MOUTH DAILY (Patient taking differently: Take 0.5 tablets by mouth Daily.) 30 tablet 5    atorvastatin (LIPITOR) 40 MG tablet TAKE ONE (1) TABLET BY MOUTH EVERY EVENING (Patient taking differently: Take 1 tablet by mouth Every Night.) 90 tablet 1    carisoprodol (SOMA) 350 MG tablet Take 1 tablet by mouth 3 (Three) Times a  Day As Needed for Muscle Spasms. 90 tablet 0    oxyCODONE (ROXICODONE) 15 MG immediate release tablet Take 1 tablet by mouth Every 8 (Eight) Hours As Needed for Moderate Pain. Code M79.7 90 tablet 0    pregabalin (LYRICA) 150 MG capsule TAKE ONE (1) CAPSULE BY MOUTH TWICE DAILY (Patient taking differently: Take 1 capsule by mouth 2 (Two) Times a Day.) 60 capsule 5    rOPINIRole (REQUIP) 0.5 MG tablet TAKE ONE (1) TABLET BY MOUTH TWICE DAILY (TAKE ONE (1) TABLET ONE HOUR BEFORE BEDTIME) (Patient taking differently: Take 1 tablet by mouth 2 (Two) Times a Day.) 60 tablet 5    vitamin D (ERGOCALCIFEROL) 1.25 MG (90664 UT) capsule capsule TAKE ONE (1) CAPSULE BY MOUTH TWICE A WEEK. TAKE THREE (3) DAYS APART (Patient taking differently: Take 1 capsule by mouth 2 (Two) Times a Week.) 8 capsule 5     No current facility-administered medications for this visit.         Psychological ROS: positive for - anxiety, depression   negative for - hallucinations, hostility, irritability, memory difficulties, mood swings, obsessive thoughts or suicidal ideation      Physical Exam:   There were no vitals taken for this visit.    Mental Status Exam:   Hygiene:   good  Cooperation:  Cooperative  Eye Contact:  Good  Psychomotor Behavior:  Appropriate  Affect:  Appropriate and congruent with mood   Mood: anxious and fluctates  Hopelessness: Denies  Speech:  Normal  Thought Process:  Goal directed and Linear  Thought Content:  Mood congruent  Suicidal:  None  Homicidal:  None  Hallucinations:  None  Delusion:  None  Memory:  Intact  Orientation:  Person, Place, Time and Situation  Reliability:  good  Insight:  Good  Judgement:  Good  Impulse Control:  Good  Physical/Medical Issues:  Yes        MSE from 9/6/23  reviewed and accepted without changes     PHQ-9 Depression Screening  Little interest or pleasure in doing things? 2-->more than half the days   Feeling down, depressed, or hopeless? 3-->nearly every day   Trouble falling or staying  asleep, or sleeping too much? 0-->not at all   Feeling tired or having little energy? 1-->several days   Poor appetite or overeating? 0-->not at all   Feeling bad about yourself - or that you are a failure or have let yourself or your family down? 0-->not at all   Trouble concentrating on things, such as reading the newspaper or watching television? 1-->several days   Moving or speaking so slowly that other people could have noticed? Or the opposite - being so fidgety or restless that you have been moving around a lot more than usual? 1-->several days   Thoughts that you would be better off dead, or of hurting yourself in some way? 0-->not at all   PHQ-9 Total Score 8   If you checked off any problems, how difficult have these problems made it for you to do your work, take care of things at home, or get along with other people? very difficult     Briana Alas  reports that she has been smoking cigarettes. She started smoking about 45 years ago. She has a 44.00 pack-year smoking history. She has never been exposed to tobacco smoke. She has never used smokeless tobacco.. I have educated her on the risk of diseases from using tobacco products such as cancer, COPD and heart disease.     I advised her to quit and she is not willing to quit.    I spent 3  minutes counseling the patient.           Current every day smoker 3-10 mintues spent counseling Not agreeable to stopping    I advised Briana of the risks of tobacco use.     Lab Results:   Hospital Outpatient Visit on 12/07/2023   Component Date Value Ref Range Status    QT Interval 12/07/2023 406  ms Final    QTC Interval 12/07/2023 409  ms Final   Lab on 12/07/2023   Component Date Value Ref Range Status    Color, UA 12/07/2023 Yellow  Yellow, Straw Final    Appearance, UA 12/07/2023 Clear  Clear Final    pH, UA 12/07/2023 6.0  5.0 - 8.0 Final    Specific Gravity, UA 12/07/2023 1.015  1.005 - 1.030 Final    Glucose, UA 12/07/2023 Negative  Negative Final     Ketones, UA 12/07/2023 Negative  Negative Final    Bilirubin, UA 12/07/2023 Negative  Negative Final    Blood, UA 12/07/2023 Negative  Negative Final    Protein, UA 12/07/2023 Negative  Negative Final    Leuk Esterase, UA 12/07/2023 Small (1+) (A)  Negative Final    Nitrite, UA 12/07/2023 Negative  Negative Final    Urobilinogen, UA 12/07/2023 0.2 E.U./dL  0.2 - 1.0 E.U./dL Final    WBC 12/07/2023 12.08 (H)  3.40 - 10.80 10*3/mm3 Final    RBC 12/07/2023 4.53  3.77 - 5.28 10*6/mm3 Final    Hemoglobin 12/07/2023 14.3  12.0 - 15.9 g/dL Final    Hematocrit 12/07/2023 41.5  34.0 - 46.6 % Final    MCV 12/07/2023 91.6  79.0 - 97.0 fL Final    MCH 12/07/2023 31.6  26.6 - 33.0 pg Final    MCHC 12/07/2023 34.5  31.5 - 35.7 g/dL Final    RDW 12/07/2023 13.1  12.3 - 15.4 % Final    RDW-SD 12/07/2023 43.9  37.0 - 54.0 fl Final    MPV 12/07/2023 10.0  6.0 - 12.0 fL Final    Platelets 12/07/2023 294  140 - 450 10*3/mm3 Final    Glucose 12/07/2023 81  65 - 99 mg/dL Final    BUN 12/07/2023 11  6 - 20 mg/dL Final    Creatinine 12/07/2023 1.05 (H)  0.57 - 1.00 mg/dL Final    Sodium 12/07/2023 141  136 - 145 mmol/L Final    Potassium 12/07/2023 4.7  3.5 - 5.2 mmol/L Final    Chloride 12/07/2023 102  98 - 107 mmol/L Final    CO2 12/07/2023 29.0  22.0 - 29.0 mmol/L Final    Calcium 12/07/2023 9.6  8.6 - 10.5 mg/dL Final    BUN/Creatinine Ratio 12/07/2023 10.5  7.0 - 25.0 Final    Anion Gap 12/07/2023 10.0  5.0 - 15.0 mmol/L Final    eGFR 12/07/2023 61.3  >60.0 mL/min/1.73 Final    Extra Tube 12/07/2023 Hold for add-ons.   Final    Auto resulted.    RBC, UA 12/07/2023 0-2  None Seen, 0-2 /HPF Final    WBC, UA 12/07/2023 11-20 (A)  None Seen, 0-2 /HPF Final    Bacteria, UA 12/07/2023 None Seen  None Seen /HPF Final    Squamous Epithelial Cells, UA 12/07/2023 7-12 (A)  None Seen, 0-2 /HPF Final    Hyaline Casts, UA 12/07/2023 0-2  None Seen /LPF Final    Methodology 12/07/2023 Automated Microscopy   Final    Urine Culture 12/07/2023 No  growth   Final       Assessment & Plan   Problems Addressed this Visit       Generalized anxiety disorder (Chronic)    Relevant Medications    clonazePAM (KlonoPIN) 1 MG tablet    DULoxetine (CYMBALTA) 60 MG capsule    busPIRone (BUSPAR) 15 MG tablet    Brexpiprazole (Rexulti) 2 MG tablet    temazepam (RESTORIL) 30 MG capsule    Major depressive disorder, recurrent episode, moderate (Chronic)    Relevant Medications    DULoxetine (CYMBALTA) 60 MG capsule    busPIRone (BUSPAR) 15 MG tablet    Brexpiprazole (Rexulti) 2 MG tablet    temazepam (RESTORIL) 30 MG capsule    Primary insomnia (Chronic)    Bereavement     Other Visit Diagnoses       Encounter for long-term (current) use of other medications    -  Primary    Relevant Orders    [facility] Full Urine Drug Screen -    Depression, unspecified depression type        Relevant Medications    DULoxetine (CYMBALTA) 60 MG capsule    busPIRone (BUSPAR) 15 MG tablet    Brexpiprazole (Rexulti) 2 MG tablet    temazepam (RESTORIL) 30 MG capsule          Diagnoses         Codes Comments    Encounter for long-term (current) use of other medications    -  Primary ICD-10-CM: Z79.899  ICD-9-CM: V58.69     Major depressive disorder, recurrent episode, moderate     ICD-10-CM: F33.1  ICD-9-CM: 296.32     Generalized anxiety disorder     ICD-10-CM: F41.1  ICD-9-CM: 300.02     Bereavement     ICD-10-CM: Z63.4  ICD-9-CM: V62.82     Primary insomnia     ICD-10-CM: F51.01  ICD-9-CM: 307.42     Depression, unspecified depression type     ICD-10-CM: F32.A  ICD-9-CM: 311             Visit Diagnoses:    ICD-10-CM ICD-9-CM   1. Encounter for long-term (current) use of other medications  Z79.899 V58.69   2. Major depressive disorder, recurrent episode, moderate  F33.1 296.32   3. Generalized anxiety disorder  F41.1 300.02   4. Bereavement  Z63.4 V62.82   5. Primary insomnia  F51.01 307.42   6. Depression, unspecified depression type  F32.A 311     Correct ds 33.1, f41.1, z63.4, f51.01 z79.899        TREATMENT PLAN/GOALS: Continue supportive psychotherapy efforts and medications as indicated. Treatment and medication options discussed during today's visit. Patient ackowledged and verbally consented to continue with current treatment plan and was educated on the importance of compliance with treatment and follow-up appointments.    MEDICATION ISSUES:    INSPECT reviewed as expected - on pain meds, on clonazepam  12/26/2023      and temazepam       1. Major depressive d/o moderate recurrent - cont cymbalta 120 mg BP is stable, cont  rexulti   2 mg , no changes necessary       Cont therapy  2. Generalized anxiety - cont cymbalta at 120 mg , cont clonazepam 1 mg TID, but she was advised to decrease to BID on those days when she feels better, will address dose reduction next appt in 4 months   the pt is on opioids, will closely monitor    3 Insomnia - cont temazepam on current dose      Labs 12/7/23 - WNL      Lipids 8/21/23 -  , chol 148        UDS 7/26/22 -consistent for clonazepam, temazepam and + fentanyl (?) , will repeat today , the denied fentanyl use   2/28/23 - consistent , will repeat today   LABORATORY - SCAN [facility] M[facility] [place], 02/28/2023 (02/28/2023)     PHQ scored 8 and indicated mild depression   STEWART 7 scored 12  Patient screened positive for depression based on a PHQ-9 score of 8 on 1/3/2024. Follow-up recommendations include: Prescribed antidepressant medication treatment.      Discussed medication options and treatment plan of prescribed medication as well as the risks, benefits, and side effects including potential falls, possible impaired driving and metabolic adversities among others. Patient is agreeable to call the office with any worsening of symptoms or onset of side effects. Patient is agreeable to call 911 or go to the nearest ER should he/she begin having SI/HI. No medication side effects or related complaints today.     MEDS ORDERED DURING VISIT:  New  Medications Ordered This Visit   Medications    clonazePAM (KlonoPIN) 1 MG tablet     Sig: Take 1 tablet by mouth 3 (Three) Times a Day As Needed for Anxiety.     Dispense:  90 tablet     Refill:  3     Not to exceed 5 additional fills before 05/26/2024    DULoxetine (CYMBALTA) 60 MG capsule     Sig: Take 1 capsule by mouth 2 (Two) Times a Day.     Dispense:  60 capsule     Refill:  3    busPIRone (BUSPAR) 15 MG tablet     Sig: Take 1 tablet by mouth 2 (Two) Times a Day.     Dispense:  60 tablet     Refill:  3    Brexpiprazole (Rexulti) 2 MG tablet     Sig: Take 1 tablet by mouth Every Morning.     Dispense:  30 tablet     Refill:  3    temazepam (RESTORIL) 30 MG capsule     Sig: Take 1 capsule by mouth At Night As Needed for Sleep.     Dispense:  30 capsule     Refill:  3     Please dispense when it is due       Return in about 4 months (around 5/3/2024).         This document has been electronically signed by Jade Ryder MD  January 3, 2024 10:09 EST    EMR Dragon transcription disclaimer:  Some of this encounter note is an electronic transcription translation of spoken language to printed text. The electronic translation of spoken language may permit erroneous, or at times, nonsensical words or phrases to be inadvertently transcribed; Although I have reviewed the note for such errors some may still exist.

## 2024-01-08 ENCOUNTER — OFFICE VISIT (OUTPATIENT)
Dept: CARDIOLOGY | Facility: CLINIC | Age: 59
End: 2024-01-08
Payer: MEDICAID

## 2024-01-08 ENCOUNTER — HOSPITAL ENCOUNTER (OUTPATIENT)
Dept: CARDIOLOGY | Facility: HOSPITAL | Age: 59
Discharge: HOME | End: 2024-01-08
Admitting: INTERNAL MEDICINE
Payer: MEDICAID

## 2024-01-08 VITALS
WEIGHT: 175 LBS | HEIGHT: 64 IN | OXYGEN SATURATION: 95 % | DIASTOLIC BLOOD PRESSURE: 70 MMHG | SYSTOLIC BLOOD PRESSURE: 107 MMHG | BODY MASS INDEX: 29.88 KG/M2 | HEART RATE: 70 BPM

## 2024-01-08 DIAGNOSIS — G89.29 CHRONIC BILATERAL LOW BACK PAIN WITHOUT SCIATICA: ICD-10-CM

## 2024-01-08 DIAGNOSIS — I10 ESSENTIAL HYPERTENSION: ICD-10-CM

## 2024-01-08 DIAGNOSIS — M54.40 CHRONIC LOW BACK PAIN WITH SCIATICA, SCIATICA LATERALITY UNSPECIFIED, UNSPECIFIED BACK PAIN LATERALITY: ICD-10-CM

## 2024-01-08 DIAGNOSIS — E78.5 DYSLIPIDEMIA: ICD-10-CM

## 2024-01-08 DIAGNOSIS — R94.31 ABNORMAL EKG: ICD-10-CM

## 2024-01-08 DIAGNOSIS — R94.31 ABNORMAL EKG: Primary | ICD-10-CM

## 2024-01-08 DIAGNOSIS — M54.50 CHRONIC BILATERAL LOW BACK PAIN WITHOUT SCIATICA: ICD-10-CM

## 2024-01-08 DIAGNOSIS — G89.29 CHRONIC LOW BACK PAIN WITH SCIATICA, SCIATICA LATERALITY UNSPECIFIED, UNSPECIFIED BACK PAIN LATERALITY: ICD-10-CM

## 2024-01-08 DIAGNOSIS — Z01.810 PREOPERATIVE CARDIOVASCULAR EXAMINATION: ICD-10-CM

## 2024-01-08 LAB
BH CV ECHO MEAS - ACS: 1.92 CM
BH CV ECHO MEAS - AO MAX PG: 3.7 MMHG
BH CV ECHO MEAS - AO MEAN PG: 2.17 MMHG
BH CV ECHO MEAS - AO ROOT DIAM: 3.3 CM
BH CV ECHO MEAS - AO V2 MAX: 95.6 CM/SEC
BH CV ECHO MEAS - AO V2 VTI: 17.3 CM
BH CV ECHO MEAS - AVA(I,D): 2.13 CM2
BH CV ECHO MEAS - EDV(CUBED): 50.1 ML
BH CV ECHO MEAS - EDV(MOD-SP4): 55.2 ML
BH CV ECHO MEAS - EF(MOD-BP): 51 %
BH CV ECHO MEAS - EF(MOD-SP4): 50.5 %
BH CV ECHO MEAS - ESV(CUBED): 20.4 ML
BH CV ECHO MEAS - ESV(MOD-SP4): 27.3 ML
BH CV ECHO MEAS - FS: 25.8 %
BH CV ECHO MEAS - IVS/LVPW: 1.13 CM
BH CV ECHO MEAS - IVSD: 1.23 CM
BH CV ECHO MEAS - LA DIMENSION: 4.1 CM
BH CV ECHO MEAS - LV MASS(C)D: 138.1 GRAMS
BH CV ECHO MEAS - LV MAX PG: 1.78 MMHG
BH CV ECHO MEAS - LV MEAN PG: 0.97 MMHG
BH CV ECHO MEAS - LV V1 MAX: 66.8 CM/SEC
BH CV ECHO MEAS - LV V1 VTI: 11.7 CM
BH CV ECHO MEAS - LVIDD: 3.7 CM
BH CV ECHO MEAS - LVIDS: 2.7 CM
BH CV ECHO MEAS - LVOT AREA: 3.2 CM2
BH CV ECHO MEAS - LVOT DIAM: 2.01 CM
BH CV ECHO MEAS - LVPWD: 1.08 CM
BH CV ECHO MEAS - MV A MAX VEL: 53.5 CM/SEC
BH CV ECHO MEAS - MV DEC SLOPE: 128.2 CM/SEC2
BH CV ECHO MEAS - MV DEC TIME: 0.33 SEC
BH CV ECHO MEAS - MV E MAX VEL: 42.2 CM/SEC
BH CV ECHO MEAS - MV E/A: 0.79
BH CV ECHO MEAS - MV MAX PG: 1.77 MMHG
BH CV ECHO MEAS - MV MEAN PG: 0.64 MMHG
BH CV ECHO MEAS - MV V2 VTI: 17 CM
BH CV ECHO MEAS - MVA(VTI): 2.17 CM2
BH CV ECHO MEAS - PA ACC TIME: 0.11 SEC
BH CV ECHO MEAS - PULM A REVS DUR: 0.11 SEC
BH CV ECHO MEAS - PULM A REVS VEL: 23.3 CM/SEC
BH CV ECHO MEAS - PULM DIAS VEL: 23.4 CM/SEC
BH CV ECHO MEAS - PULM S/D: 1.18
BH CV ECHO MEAS - PULM SYS VEL: 27.7 CM/SEC
BH CV ECHO MEAS - RAP SYSTOLE: 3 MMHG
BH CV ECHO MEAS - RV MAX PG: 1.67 MMHG
BH CV ECHO MEAS - RV V1 MAX: 64.6 CM/SEC
BH CV ECHO MEAS - RV V1 VTI: 12.4 CM
BH CV ECHO MEAS - RVDD: 3.1 CM
BH CV ECHO MEAS - RVSP: 14.7 MMHG
BH CV ECHO MEAS - SV(LVOT): 36.9 ML
BH CV ECHO MEAS - SV(MOD-SP4): 27.8 ML
BH CV ECHO MEAS - TR MAX PG: 11.7 MMHG
BH CV ECHO MEAS - TR MAX VEL: 171 CM/SEC

## 2024-01-08 PROCEDURE — 93306 TTE W/DOPPLER COMPLETE: CPT

## 2024-01-08 PROCEDURE — 93306 TTE W/DOPPLER COMPLETE: CPT | Performed by: INTERNAL MEDICINE

## 2024-01-08 PROCEDURE — 93356 MYOCRD STRAIN IMG SPCKL TRCK: CPT | Performed by: INTERNAL MEDICINE

## 2024-01-08 PROCEDURE — 93356 MYOCRD STRAIN IMG SPCKL TRCK: CPT

## 2024-01-08 RX ORDER — OXYCODONE HYDROCHLORIDE 15 MG/1
15 TABLET ORAL EVERY 8 HOURS PRN
Qty: 90 TABLET | Refills: 0 | Status: SHIPPED | OUTPATIENT
Start: 2024-01-08

## 2024-01-08 RX ORDER — CARISOPRODOL 350 MG/1
350 TABLET ORAL 3 TIMES DAILY PRN
Qty: 90 TABLET | Refills: 0 | Status: SHIPPED | OUTPATIENT
Start: 2024-01-08

## 2024-01-08 NOTE — TELEPHONE ENCOUNTER
Caller: BishopBriana    Relationship: Self    Best call back number: [phone]    Requested Prescriptions:   Requested Prescriptions     Pending Prescriptions Disp Refills    oxyCODONE (ROXICODONE) 15 MG immediate release tablet 90 tablet 0     Sig: Take 1 tablet by mouth Every 8 (Eight) Hours As Needed for Moderate Pain. Code M79.7    carisoprodol (SOMA) 350 MG tablet 90 tablet 0     Sig: Take 1 tablet by mouth 3 (Three) Times a Day As Needed for Muscle Spasms.        Pharmacy where request should be sent: [facility] [id number] - [place], [place] - [address] AT [address] - [phone] [facility] [phone] FX     Last office visit with prescribing clinician: 11/30/2023   Last telemedicine visit with prescribing clinician: Visit date not found   Next office visit with prescribing clinician: 1/31/2024     Additional details provided by patient: PATIENT STATES SHE IS COMPLETLEY OUT OF THE SOMA MEDICATION AND HAS 3 DAYS LEFT ON THE OXYCODONE .     Does the patient have less than a 3 day supply:  [x] Yes  [] No    Would you like a call back once the refill request has been completed: [x] Yes [] No    If the office needs to give you a call back, can they leave a voicemail: [x] Yes [] No    Genesis Talley, PCT   01/08/24 10:14 EST

## 2024-01-11 ENCOUNTER — OFFICE VISIT (OUTPATIENT)
Dept: PODIATRY | Facility: CLINIC | Age: 59
End: 2024-01-11
Payer: MEDICAID

## 2024-01-11 VITALS — RESPIRATION RATE: 20 BRPM | WEIGHT: 175 LBS | HEIGHT: 64 IN | BODY MASS INDEX: 29.88 KG/M2

## 2024-01-11 DIAGNOSIS — M20.12 ACQUIRED HALLUX VALGUS, LEFT: Primary | ICD-10-CM

## 2024-01-11 NOTE — PROGRESS NOTES
01/11/2024  Foot and Ankle Surgery - Established Patient/Follow-up  Provider: Dr. Philip Eldridge DPM  Location: [facility]    Subjective:  Briana Alas is a 58 y.o. female.     Chief Complaint   Patient presents with    Left Ankle - Post-op     12/15/2023 Dr Eldridge   .Bunionectomy Gerry    Follow-up     PILY Garcia MD 11/30/2023       HPI: Briana Alas is a 58-year-old female who presents to the office today for a follow-up regarding her left foot.    The patient is 4 weeks status post left foot surgery. She reports improvement in her left foot swelling; however, she continues to experience constant swelling. She has been working on her range of motion on her toe. The patient has been ambulating without her boot. She denies pain, except for short tenderness.    Allergies   Allergen Reactions    Penicillins Rash       Current Outpatient Medications on File Prior to Visit   Medication Sig Dispense Refill    atenolol (TENORMIN) 50 MG tablet TAKE ONE (1) TABLET BY MOUTH DAILY (Patient taking differently: Take 0.5 tablets by mouth Daily.) 30 tablet 5    atorvastatin (LIPITOR) 40 MG tablet TAKE ONE (1) TABLET BY MOUTH EVERY EVENING (Patient taking differently: Take 1 tablet by mouth Every Night.) 90 tablet 1    Brexpiprazole (Rexulti) 2 MG tablet Take 1 tablet by mouth Every Morning. 30 tablet 3    busPIRone (BUSPAR) 15 MG tablet Take 1 tablet by mouth 2 (Two) Times a Day. 60 tablet 3    carisoprodol (SOMA) 350 MG tablet Take 1 tablet by mouth 3 (Three) Times a Day As Needed for Muscle Spasms. 90 tablet 0    clonazePAM (KlonoPIN) 1 MG tablet Take 1 tablet by mouth 3 (Three) Times a Day As Needed for Anxiety. 90 tablet 3    DULoxetine (CYMBALTA) 60 MG capsule Take 1 capsule by mouth 2 (Two) Times a Day. 60 capsule 3    oxyCODONE (ROXICODONE) 15 MG immediate release tablet Take 1 tablet by mouth Every 8 (Eight) Hours As Needed for Moderate Pain. Code M79.7 90 tablet 0    pregabalin (LYRICA) 150 MG capsule  "TAKE ONE (1) CAPSULE BY MOUTH TWICE DAILY (Patient taking differently: Take 1 capsule by mouth 2 (Two) Times a Day.) 60 capsule 5    rOPINIRole (REQUIP) 0.5 MG tablet TAKE ONE (1) TABLET BY MOUTH TWICE DAILY (TAKE ONE (1) TABLET ONE HOUR BEFORE BEDTIME) (Patient taking differently: Take 1 tablet by mouth 2 (Two) Times a Day.) 60 tablet 5    temazepam (RESTORIL) 30 MG capsule Take 1 capsule by mouth At Night As Needed for Sleep. 30 capsule 3    vitamin D (ERGOCALCIFEROL) 1.25 MG (83093 UT) capsule capsule TAKE ONE (1) CAPSULE BY MOUTH TWICE A WEEK. TAKE THREE (3) DAYS APART (Patient taking differently: Take 1 capsule by mouth 2 (Two) Times a Week.) 8 capsule 5     No current facility-administered medications on file prior to visit.       Objective   Resp 20   Ht 162.6 cm (64\")   Wt 79.4 kg (175 lb)   BMI 30.04 kg/m²     Foot/Ankle Exam  GENERAL  Orientation:  AAOx3  Affect:  appropriate    VASCULAR     Right Foot Vascularity   Normal vascular exam    Dorsalis pedis:  2+  Posterior tibial:  2+  Skin temperature:  warm  Edema grading:  None  CFT:  < 3 seconds  Pedal hair growth:  Present  Varicosities:  none     Left Foot Vascularity   Normal vascular exam    Dorsalis pedis:  2+  Posterior tibial:  2+  Skin temperature:  warm  Edema grading:  None  CFT:  < 3 seconds  Pedal hair growth:  Present  Varicosities:  none     NEUROLOGIC     Right Foot Neurologic   Light touch sensation: normal  Hot/Cold sensation: normal  Achilles reflex:  2+     Left Foot Neurologic   Light touch sensation: normal  Hot/Cold sensation:  normal  Achilles reflex:  2+    MUSCULOSKELETAL     Right Foot Musculoskeletal   Arch:  Normal     Left Foot Musculoskeletal   Arch:  Normal    MUSCLE STRENGTH     Right Foot Muscle Strength   Normal strength    Foot dorsiflexion:  5  Foot plantar flexion:  5  Foot inversion:  5  Foot eversion:  5     Left Foot Muscle Strength   Normal strength    Foot dorsiflexion:  5  Foot plantar flexion:  5  Foot " inversion:  5  Foot eversion:  5    DERMATOLOGIC      Right Foot Dermatologic   Skin  Right foot skin is intact.   Nails comment:  Nails 1-5     Left Foot Dermatologic   Skin  Left foot skin is intact.   Nails comment:  Nails 1-5    TESTS     Right Foot Tests   Anterior drawer: negative  Varus tilt: negative     Left Foot Tests   Anterior drawer: negative  Varus tilt: negative     Right foot additional comments: 11/6/2023: Relatively normal findings involving the right lower extremity. No significant pain with palpation.       Left foot additional comments: 11/6/2023: Moderate bunion deformity involving the left foot with discomfort with palpation to the dorsal medial prominence and mild underlying bursitis. Decreased medial arch with stance. Moderate equinus contracture with knee extended and flexed.     12/28/2023: Incision site is dry and stable with intact sutures. No evidence of dehiscence or infection. Moderate soft tissue rigidity.    01/11/2024: Less discomfort with palpation involving the operative site. Incision site is well healed. Decreasing swelling. Mild soft tissue rigidity involving the first metatarsophalangeal joint. Mild residual valgus orientation, but no pain with palpation.    Assessment & Plan   Diagnoses and all orders for this visit:    1. Acquired hallux valgus, left (Primary)  -     XR Foot 3+ View Left      The patient is a 58-year-old female who presents to the office today for a follow-up regarding her left foot. The incision site is well healed. I explained that the swelling is normal at this point in her recovery. I recommend massaging the area, moisturizing the area, and range of motion exercises. She may transition out of the boot and into a regular tennis shoe, even around the house. I advised her to limit weightbearing activities without the shoes. I advised the patient to avoid aggressive ambulation on uneven terrain and excessive activity. The patient will return to the office  in 4 weeks for reevaluation and repeat x-rays.    Orders Placed This Encounter   Procedures    XR Foot 3+ View Left     Order Specific Question:   Reason for Exam:     Answer:   Bunionectomy Gerry 12/15/2023 rm 15 wb     Order Specific Question:   Release to patient     Answer:   Routine Release [[id number]]          Note is dictated utilizing voice recognition software. Unfortunately this leads to occasional typographical errors. I apologize in advance if the situation occurs. If questions occur please do not hesitate to call our office.    Transcribed from ambient dictation for GILBERTO Eldridge DPM by Bisi Harrington.  01/11/24   10:41 EST    Patient or patient representative verbalized consent to the visit recording.  I have personally performed the services described in this document as transcribed by the above individual, and it is both accurate and complete.

## 2024-01-22 RX ORDER — ROPINIROLE 0.5 MG/1
TABLET, FILM COATED ORAL
Qty: 60 TABLET | Refills: 5 | Status: SHIPPED | OUTPATIENT
Start: 2024-01-22

## 2024-01-31 ENCOUNTER — OFFICE VISIT (OUTPATIENT)
Dept: FAMILY MEDICINE CLINIC | Facility: CLINIC | Age: 59
End: 2024-01-31
Payer: MEDICAID

## 2024-01-31 VITALS
TEMPERATURE: 97.4 F | OXYGEN SATURATION: 97 % | WEIGHT: 177.8 LBS | HEART RATE: 72 BPM | SYSTOLIC BLOOD PRESSURE: 102 MMHG | BODY MASS INDEX: 30.52 KG/M2 | DIASTOLIC BLOOD PRESSURE: 66 MMHG

## 2024-01-31 DIAGNOSIS — I10 HYPERTENSION, ESSENTIAL: Primary | ICD-10-CM

## 2024-01-31 DIAGNOSIS — G89.29 CHRONIC BILATERAL LOW BACK PAIN WITHOUT SCIATICA: ICD-10-CM

## 2024-01-31 DIAGNOSIS — M54.40 CHRONIC LOW BACK PAIN WITH SCIATICA, SCIATICA LATERALITY UNSPECIFIED, UNSPECIFIED BACK PAIN LATERALITY: ICD-10-CM

## 2024-01-31 DIAGNOSIS — G89.29 CHRONIC LOW BACK PAIN WITH SCIATICA, SCIATICA LATERALITY UNSPECIFIED, UNSPECIFIED BACK PAIN LATERALITY: ICD-10-CM

## 2024-01-31 DIAGNOSIS — F41.1 GENERALIZED ANXIETY DISORDER: ICD-10-CM

## 2024-01-31 DIAGNOSIS — J06.9 ACUTE URI: ICD-10-CM

## 2024-01-31 DIAGNOSIS — M54.50 CHRONIC BILATERAL LOW BACK PAIN WITHOUT SCIATICA: ICD-10-CM

## 2024-01-31 RX ORDER — PREDNISONE 10 MG/1
TABLET ORAL
Qty: 40 TABLET | Refills: 0 | Status: SHIPPED | OUTPATIENT
Start: 2024-01-31

## 2024-01-31 RX ORDER — CARISOPRODOL 350 MG/1
350 TABLET ORAL 3 TIMES DAILY PRN
Qty: 90 TABLET | Refills: 0 | Status: SHIPPED | OUTPATIENT
Start: 2024-01-31

## 2024-01-31 RX ORDER — BENZONATATE 200 MG/1
200 CAPSULE ORAL 3 TIMES DAILY PRN
Qty: 30 CAPSULE | Refills: 1 | Status: SHIPPED | OUTPATIENT
Start: 2024-01-31

## 2024-01-31 RX ORDER — OXYCODONE HYDROCHLORIDE 15 MG/1
15 TABLET ORAL EVERY 8 HOURS PRN
Qty: 90 TABLET | Refills: 0 | Status: SHIPPED | OUTPATIENT
Start: 2024-01-31

## 2024-01-31 NOTE — PROGRESS NOTES
Subjective   Briana Alas is a 58 y.o. female.     History of Present Illness  Briana Alas is in for follow up on her issues with high blood pressure.  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 Her sciatica in the left leg has been the biggest problem. She lost her father over the holidays, which is also the anniversary of her daughter's death.  There is no history of chest pain or dyspnea. There is no history of issue with bowel or bladder dysfunction. There is no history of dizziness or lightheadedness. There is no history of issue with sleep or mood. There is no history of issue with present medication.       Hypertension  Associated symptoms include neck pain. Pertinent negatives include no chest pain or shortness of breath.          /66 (BP Location: Left arm, Patient Position: Sitting, Cuff Size: Large Adult)   Pulse 72   Temp 97.4 °F (36.3 °C) (Temporal)   Wt 80.6 kg (177 lb 12.8 oz)   SpO2 97%   BMI 30.52 kg/m²       Chief Complaint   Patient presents with    Hypertension           Current Outpatient Medications:     atenolol (TENORMIN) 50 MG tablet, TAKE ONE (1) TABLET BY MOUTH DAILY (Patient taking differently: Take 0.5 tablets by mouth Daily.), Disp: 30 tablet, Rfl: 5    atorvastatin (LIPITOR) 40 MG tablet, TAKE ONE (1) TABLET BY MOUTH EVERY EVENING (Patient taking differently: Take 1 tablet by mouth Every Night.), Disp: 90 tablet, Rfl: 1    Brexpiprazole (Rexulti) 2 MG tablet, Take 1 tablet by mouth Every Morning., Disp: 30 tablet, Rfl: 3    busPIRone (BUSPAR) 15 MG tablet, Take 1 tablet by mouth 2 (Two) Times a Day., Disp: 60 tablet, Rfl: 3    carisoprodol (SOMA) 350 MG tablet, Take 1 tablet by mouth 3 (Three) Times a Day As Needed for Muscle Spasms., Disp: 90 tablet, Rfl: 0    clonazePAM (KlonoPIN) 1 MG tablet, Take 1 tablet by mouth 3 (Three) Times a Day As Needed for Anxiety., Disp: 90 tablet, Rfl: 3    DULoxetine (CYMBALTA) 60 MG capsule, Take  1 capsule by mouth 2 (Two) Times a Day., Disp: 60 capsule, Rfl: 3    oxyCODONE (ROXICODONE) 15 MG immediate release tablet, Take 1 tablet by mouth Every 8 (Eight) Hours As Needed for Moderate Pain. Code M79.7, Disp: 90 tablet, Rfl: 0    pregabalin (LYRICA) 150 MG capsule, TAKE ONE (1) CAPSULE BY MOUTH TWICE DAILY (Patient taking differently: Take 1 capsule by mouth 2 (Two) Times a Day.), Disp: 60 capsule, Rfl: 5    rOPINIRole (REQUIP) 0.5 MG tablet, TAKE ONE (1) TABLET BY MOUTH TWICE DAILY. TAKE ONE (1) TABLET ONE (1) HOUR BEFORE bedtime, Disp: 60 tablet, Rfl: 5    temazepam (RESTORIL) 30 MG capsule, Take 1 capsule by mouth At Night As Needed for Sleep., Disp: 30 capsule, Rfl: 3    vitamin D (ERGOCALCIFEROL) 1.25 MG (54163 UT) capsule capsule, TAKE ONE (1) CAPSULE BY MOUTH TWICE A WEEK. TAKE THREE (3) DAYS APART (Patient taking differently: Take 1 capsule by mouth 2 (Two) Times a Week.), Disp: 8 capsule, Rfl: 5    benzonatate (TESSALON) 200 MG capsule, Take 1 capsule by mouth 3 (Three) Times a Day As Needed for Cough., Disp: 30 capsule, Rfl: 1    predniSONE (DELTASONE) 10 MG tablet, Take 4 tabs po daily x 4 d, then 3 tabs po daily x 4 d , then 2 tabs po daily x 4 d, then 1 tab po daily x 4 d, Disp: 40 tablet, Rfl: 0            The following portions of the patient's history were reviewed and updated as appropriate: allergies, current medications, past family history, past medical history, past social history, past surgical history, and problem list.    Review of Systems   Constitutional:  Negative for activity change, fatigue and fever.   HENT:  Negative for congestion, sinus pressure, sinus pain, sore throat and trouble swallowing.    Eyes:  Negative for visual disturbance.   Respiratory:  Negative for cough, chest tightness, shortness of breath and wheezing.    Cardiovascular:  Negative for chest pain.   Gastrointestinal:  Negative for abdominal distention, abdominal pain, constipation, diarrhea, nausea and  vomiting.   Genitourinary:  Negative for difficulty urinating, dysuria and urgency.   Musculoskeletal:  Positive for arthralgias, back pain, gait problem, myalgias and neck pain.   Skin:  Positive for rash.   Neurological:  Negative for dizziness, weakness, light-headedness and numbness.   Psychiatric/Behavioral:  Positive for dysphoric mood. Negative for agitation, hallucinations, sleep disturbance and suicidal ideas. The patient is not nervous/anxious.        Objective   Physical Exam  Vitals and nursing note reviewed.   Cardiovascular:      Rate and Rhythm: Normal rate and regular rhythm.      Heart sounds: Normal heart sounds. No murmur heard.  Pulmonary:      Effort: Pulmonary effort is normal.      Breath sounds: No wheezing or rales.   Abdominal:      General: Bowel sounds are normal.      Palpations: Abdomen is soft.      Tenderness: There is no abdominal tenderness. There is no guarding.   Musculoskeletal:      Cervical back: Neck supple.      Right lower leg: No edema.      Left lower leg: No edema.   Lymphadenopathy:      Cervical: No cervical adenopathy.   Neurological:      Mental Status: She is alert and oriented to person, place, and time. Mental status is at baseline.   Psychiatric:      Comments: Mood is flat but not suicidal           Assessment & Plan   Problems Addressed this Visit          Cardiac and Vasculature    Hypertension, essential - Primary       Mental Health    Generalized anxiety disorder (Chronic)       Musculoskeletal and Injuries    Chronic low back pain    Relevant Medications    carisoprodol (SOMA) 350 MG tablet    oxyCODONE (ROXICODONE) 15 MG immediate release tablet     Other Visit Diagnoses       Acute URI              Diagnoses         Codes Comments    Hypertension, essential    -  Primary ICD-10-CM: I10  ICD-9-CM: 401.9     Chronic low back pain with sciatica, sciatica laterality unspecified, unspecified back pain laterality     ICD-10-CM: M54.40, G89.29  ICD-9-CM: 724.2,  724.3, 338.29 INSPECT reviewed.   Stable.   Cont. current medication.   Refills sent.     Chronic bilateral low back pain without sciatica     ICD-10-CM: M54.50, G89.29  ICD-9-CM: 724.2, 338.29     Generalized anxiety disorder     ICD-10-CM: F41.1  ICD-9-CM: 300.02     Acute URI     ICD-10-CM: J06.9  ICD-9-CM: 465.9           I have asked her to consider counseling for grief  I do think at times she will be fine as far as her father's death goes  We did discuss some other ways for her to try to help handle the continued grief from losing her daughter  I have asked her to be a little more active physically  I am sending out medication for a cough that she has been battling for a few weeks  I have asked her to let me know if not improving and see me again in a few months

## 2024-02-01 ENCOUNTER — TELEPHONE (OUTPATIENT)
Dept: FAMILY MEDICINE CLINIC | Facility: CLINIC | Age: 59
End: 2024-02-01
Payer: MEDICAID

## 2024-02-01 NOTE — TELEPHONE ENCOUNTER
She had told you yesterday that she had taken extra of her pain medicine and you had said its okay. She went to get it but the pharmacy would not fill it. She was wondering if there was a way we could get it filled for her since she is out since she took the few extra. She is just asking if we could or is there a way to.

## 2024-02-16 DIAGNOSIS — G89.4 CHRONIC PAIN SYNDROME: ICD-10-CM

## 2024-02-16 RX ORDER — PREGABALIN 150 MG/1
150 CAPSULE ORAL 2 TIMES DAILY
Qty: 60 CAPSULE | Refills: 3 | Status: SHIPPED | OUTPATIENT
Start: 2024-02-16

## 2024-02-20 ENCOUNTER — OFFICE VISIT (OUTPATIENT)
Dept: PODIATRY | Facility: CLINIC | Age: 59
End: 2024-02-20
Payer: MEDICAID

## 2024-02-20 VITALS
WEIGHT: 178.8 LBS | BODY MASS INDEX: 30.52 KG/M2 | RESPIRATION RATE: 18 BRPM | HEIGHT: 64 IN | OXYGEN SATURATION: 99 % | HEART RATE: 53 BPM

## 2024-02-20 DIAGNOSIS — M20.12 ACQUIRED HALLUX VALGUS, LEFT: Primary | ICD-10-CM

## 2024-02-20 NOTE — PROGRESS NOTES
02/20/2024  Foot and Ankle Surgery - Established Patient/Follow-up  Provider: Dr. Philip Eldridge DPM  Location: [facility]    Subjective:  Briana Alas is a 58 y.o. female.     Chief Complaint   Patient presents with    Left Foot - Post-op     12/15/2024   Open bunionectomy with distal metatarsal osteotomy and internal fixation, left foot      Post-op     FLORES HEATON MD  11/30/2023       HPI: The patient is a 58-year-old female who presents to the clinic for a follow-up regarding her left foot.    She reports she is doing well. She states she is 8 weeks status post left foot surgery. She notes she has been exercising her toe and it is looser than it was previously. She notes pain when she rests her foot in a certain position. She reports she has inserts in her shoes. She denies pain when she wears shoes.    Additionally, she reports she would like to proceed with a right foot bunionectomy in the future. She states it does not bother her; however, it is an issue.    Allergies   Allergen Reactions    Penicillins Rash       Current Outpatient Medications on File Prior to Visit   Medication Sig Dispense Refill    atenolol (TENORMIN) 50 MG tablet TAKE ONE (1) TABLET BY MOUTH DAILY (Patient taking differently: Take 0.5 tablets by mouth Daily.) 30 tablet 5    atorvastatin (LIPITOR) 40 MG tablet TAKE ONE (1) TABLET BY MOUTH EVERY EVENING (Patient taking differently: Take 1 tablet by mouth Every Night.) 90 tablet 1    benzonatate (TESSALON) 200 MG capsule Take 1 capsule by mouth 3 (Three) Times a Day As Needed for Cough. 30 capsule 1    Brexpiprazole (Rexulti) 2 MG tablet Take 1 tablet by mouth Every Morning. 30 tablet 3    busPIRone (BUSPAR) 15 MG tablet Take 1 tablet by mouth 2 (Two) Times a Day. 60 tablet 3    carisoprodol (SOMA) 350 MG tablet Take 1 tablet by mouth 3 (Three) Times a Day As Needed for Muscle Spasms. 90 tablet 0    clonazePAM (KlonoPIN) 1 MG tablet Take 1 tablet by mouth 3  "(Three) Times a Day As Needed for Anxiety. 90 tablet 3    DULoxetine (CYMBALTA) 60 MG capsule Take 1 capsule by mouth 2 (Two) Times a Day. 60 capsule 3    oxyCODONE (ROXICODONE) 15 MG immediate release tablet Take 1 tablet by mouth Every 8 (Eight) Hours As Needed for Moderate Pain. Code M79.7 90 tablet 0    predniSONE (DELTASONE) 10 MG tablet Take 4 tabs po daily x 4 d, then 3 tabs po daily x 4 d , then 2 tabs po daily x 4 d, then 1 tab po daily x 4 d 40 tablet 0    pregabalin (LYRICA) 150 MG capsule Take 1 capsule by mouth 2 (Two) Times a Day. 60 capsule 3    rOPINIRole (REQUIP) 0.5 MG tablet TAKE ONE (1) TABLET BY MOUTH TWICE DAILY. TAKE ONE (1) TABLET ONE (1) HOUR BEFORE bedtime 60 tablet 5    temazepam (RESTORIL) 30 MG capsule Take 1 capsule by mouth At Night As Needed for Sleep. 30 capsule 3    vitamin D (ERGOCALCIFEROL) 1.25 MG (30435 UT) capsule capsule TAKE ONE (1) CAPSULE BY MOUTH TWICE A WEEK. TAKE THREE (3) DAYS APART (Patient taking differently: Take 1 capsule by mouth 2 (Two) Times a Week.) 8 capsule 5     No current facility-administered medications on file prior to visit.       Objective   Pulse 53   Resp 18   Ht 162.6 cm (64\")   Wt 81.1 kg (178 lb 12.8 oz)   SpO2 99%   BMI 30.69 kg/m²     Foot/Ankle Exam    GENERAL  Orientation:  AAOx3  Affect:  appropriate    VASCULAR     Right Foot Vascularity   Normal vascular exam    Dorsalis pedis:  2+  Posterior tibial:  2+  Skin temperature:  warm  Edema grading:  None  CFT:  < 3 seconds  Pedal hair growth:  Present  Varicosities:  none     Left Foot Vascularity   Normal vascular exam    Dorsalis pedis:  2+  Posterior tibial:  2+  Skin temperature:  warm  Edema grading:  None  CFT:  < 3 seconds  Pedal hair growth:  Present  Varicosities:  none     NEUROLOGIC     Right Foot Neurologic   Light touch sensation: normal  Hot/Cold sensation: normal  Achilles reflex:  2+     Left Foot Neurologic   Light touch sensation: normal  Hot/Cold sensation:  " normal  Achilles reflex:  2+    MUSCULOSKELETAL     Right Foot Musculoskeletal   Arch:  Normal     Left Foot Musculoskeletal   Arch:  Normal    MUSCLE STRENGTH     Right Foot Muscle Strength   Normal strength    Foot dorsiflexion:  5  Foot plantar flexion:  5  Foot inversion:  5  Foot eversion:  5     Left Foot Muscle Strength   Normal strength    Foot dorsiflexion:  5  Foot plantar flexion:  5  Foot inversion:  5  Foot eversion:  5    DERMATOLOGIC      Right Foot Dermatologic   Skin  Right foot skin is intact.   Nails comment:  Nails 1-5     Left Foot Dermatologic   Skin  Left foot skin is intact.   Nails comment:  Nails 1-5    TESTS     Right Foot Tests   Anterior drawer: negative  Varus tilt: negative     Left Foot Tests   Anterior drawer: negative  Varus tilt: negative     Right foot additional comments: 11/6/2023: Relatively normal findings involving the right lower extremity. No significant pain with palpation.       Left foot additional comments: 11/6/2023: Moderate bunion deformity involving the left foot with discomfort with palpation to the dorsal medial prominence and mild underlying bursitis. Decreased medial arch with stance. Moderate equinus contracture with knee extended and flexed.     12/28/2023: Incision site is dry and stable with intact sutures. No evidence of dehiscence or infection. Moderate soft tissue rigidity.    01/11/2024: Less discomfort with palpation involving the operative site. Incision site is well healed. Decreasing swelling. Mild soft tissue rigidity involving the first metatarsophalangeal joint. Mild residual valgus orientation, but no pain with palpation.    02/20/2024:  Incision site is well healed. No significant swelling or issues      Assessment & Plan   Diagnoses and all orders for this visit:    1. Acquired hallux valgus, left (Primary)  -     XR Foot 3+ View Left      Patient is a 58-year-old female who presents to the office today for a follow-up regarding her left foot.  X-rays were reviewed with the patient today. Her incision site is well healed. Explained the swelling will continue to modify and improve as time goes on. Recommend the patient wear supportive tennis shoes. Advised the patient to continue with her exercises with her feet. The patient will return to the office in 3 months for reevaluation and imaging.    Orders Placed This Encounter   Procedures    XR Foot 3+ View Left     Order Specific Question:   Reason for Exam:     Answer:   12/15/2024  Open bunionectomy with distal metatarsal osteotomy and internal fixation, left foot   ROOM 13   WB     Order Specific Question:   Does this patient have a diabetic monitoring/medication delivering device on?     Answer:   No     Order Specific Question:   Release to patient     Answer:   Routine Release [[id number]]          Note is dictated utilizing voice recognition software. Unfortunately this leads to occasional typographical errors. I apologize in advance if the situation occurs. If questions occur please do not hesitate to call our office.    Transcribed from ambient dictation for GILBERTO Eldridge DPM by Laron Wilcox.  02/20/24   12:00 EST    Patient or patient representative verbalized consent to the visit recording.  I have personally performed the services described in this document as transcribed by the above individual, and it is both accurate and complete.

## 2024-02-26 DIAGNOSIS — G89.29 CHRONIC LOW BACK PAIN WITH SCIATICA, SCIATICA LATERALITY UNSPECIFIED, UNSPECIFIED BACK PAIN LATERALITY: ICD-10-CM

## 2024-02-26 DIAGNOSIS — G89.29 CHRONIC BILATERAL LOW BACK PAIN WITHOUT SCIATICA: ICD-10-CM

## 2024-02-26 DIAGNOSIS — M54.50 CHRONIC BILATERAL LOW BACK PAIN WITHOUT SCIATICA: ICD-10-CM

## 2024-02-26 DIAGNOSIS — M54.40 CHRONIC LOW BACK PAIN WITH SCIATICA, SCIATICA LATERALITY UNSPECIFIED, UNSPECIFIED BACK PAIN LATERALITY: ICD-10-CM

## 2024-02-26 RX ORDER — OXYCODONE HYDROCHLORIDE 15 MG/1
15 TABLET ORAL EVERY 8 HOURS PRN
Qty: 90 TABLET | Refills: 0 | Status: SHIPPED | OUTPATIENT
Start: 2024-02-26

## 2024-02-26 RX ORDER — CARISOPRODOL 350 MG/1
350 TABLET ORAL 3 TIMES DAILY PRN
Qty: 90 TABLET | Refills: 0 | Status: SHIPPED | OUTPATIENT
Start: 2024-02-26

## 2024-02-26 NOTE — TELEPHONE ENCOUNTER
Caller: Briana Alas    Relationship: Self    Best call back number: [phone]    Requested Prescriptions:   Requested Prescriptions     Pending Prescriptions Disp Refills    oxyCODONE (ROXICODONE) 15 MG immediate release tablet 90 tablet 0     Sig: Take 1 tablet by mouth Every 8 (Eight) Hours As Needed for Moderate Pain. Code M79.7    carisoprodol (SOMA) 350 MG tablet 90 tablet 0     Sig: Take 1 tablet by mouth 3 (Three) Times a Day As Needed for Muscle Spasms.        Pharmacy where request should be sent: [facility] [id number] - [place], [place] - [address] AT [facility] [address] - [phone] [facility] [phone] FX     Last office visit with prescribing clinician: 1/31/2024   Last telemedicine visit with prescribing clinician: Visit date not found   Next office visit with prescribing clinician: 6/3/2024     Additional details provided by patient:     Does the patient have less than a 3 day supply:  [] Yes  [x] No        Digna Clinton Rep   02/26/24 11:46 EST

## 2024-03-08 RX ORDER — ATORVASTATIN CALCIUM 40 MG/1
TABLET, FILM COATED ORAL
Qty: 30 TABLET | Refills: 0 | Status: SHIPPED | OUTPATIENT
Start: 2024-03-08

## 2024-03-10 RX ORDER — BENZONATATE 200 MG/1
CAPSULE ORAL
Qty: 30 CAPSULE | Refills: 1 | Status: SHIPPED | OUTPATIENT
Start: 2024-03-10

## 2024-03-27 DIAGNOSIS — M54.40 CHRONIC LOW BACK PAIN WITH SCIATICA, SCIATICA LATERALITY UNSPECIFIED, UNSPECIFIED BACK PAIN LATERALITY: ICD-10-CM

## 2024-03-27 DIAGNOSIS — M54.50 CHRONIC BILATERAL LOW BACK PAIN WITHOUT SCIATICA: ICD-10-CM

## 2024-03-27 DIAGNOSIS — G89.29 CHRONIC LOW BACK PAIN WITH SCIATICA, SCIATICA LATERALITY UNSPECIFIED, UNSPECIFIED BACK PAIN LATERALITY: ICD-10-CM

## 2024-03-27 DIAGNOSIS — G89.29 CHRONIC BILATERAL LOW BACK PAIN WITHOUT SCIATICA: ICD-10-CM

## 2024-03-27 RX ORDER — OXYCODONE HYDROCHLORIDE 15 MG/1
15 TABLET ORAL EVERY 8 HOURS PRN
Qty: 90 TABLET | Refills: 0 | Status: SHIPPED | OUTPATIENT
Start: 2024-03-27

## 2024-03-27 RX ORDER — CARISOPRODOL 350 MG/1
350 TABLET ORAL 3 TIMES DAILY PRN
Qty: 90 TABLET | Refills: 0 | Status: SHIPPED | OUTPATIENT
Start: 2024-03-27

## 2024-03-27 NOTE — TELEPHONE ENCOUNTER
Incoming Refill Request      Medication requested (name and dose):     carisoprodol (SOMA) 350 MG tablet  350 mg, 3 Times Daily PRN     oxyCODONE (ROXICODONE) 15 MG immediate release tablet  15 mg, Every 8 Hours PRN     Pharmacy where request should be sent: [facility] [id number] - [place], [place] - [address] AT [facility] [address] - [phone]  - [phone]  [phone]     Additional details provided by patient: NONE    Best call back number: [phone]    Does the patient have less than a 3 day supply:  [x] Yes  [] No    Digna Florence Rep  03/27/24, 09:02 EDT

## 2024-04-05 ENCOUNTER — OFFICE VISIT (OUTPATIENT)
Dept: FAMILY MEDICINE CLINIC | Facility: CLINIC | Age: 59
End: 2024-04-05
Payer: MEDICAID

## 2024-04-05 VITALS
DIASTOLIC BLOOD PRESSURE: 70 MMHG | TEMPERATURE: 99 F | HEART RATE: 84 BPM | SYSTOLIC BLOOD PRESSURE: 128 MMHG | WEIGHT: 179.6 LBS | OXYGEN SATURATION: 94 % | BODY MASS INDEX: 30.66 KG/M2 | HEIGHT: 64 IN

## 2024-04-05 DIAGNOSIS — H10.32 ACUTE BACTERIAL CONJUNCTIVITIS OF LEFT EYE: Primary | ICD-10-CM

## 2024-04-05 PROCEDURE — 1159F MED LIST DOCD IN RCRD: CPT

## 2024-04-05 PROCEDURE — 99213 OFFICE O/P EST LOW 20 MIN: CPT

## 2024-04-05 PROCEDURE — 3074F SYST BP LT 130 MM HG: CPT

## 2024-04-05 PROCEDURE — 3078F DIAST BP <80 MM HG: CPT

## 2024-04-05 PROCEDURE — 1160F RVW MEDS BY RX/DR IN RCRD: CPT

## 2024-04-05 RX ORDER — POLYMYXIN B SULFATE AND TRIMETHOPRIM 1; 10000 MG/ML; [USP'U]/ML
1 SOLUTION OPHTHALMIC EVERY 4 HOURS
Qty: 10 ML | Refills: 0 | Status: SHIPPED | OUTPATIENT
Start: 2024-04-05 | End: 2024-04-12

## 2024-04-05 NOTE — PROGRESS NOTES
"Chief Complaint  Eye Drainage (Red and painful left side)    Subjective        Briana Alas presents to [facility]  History of Present Illness    Pt presents to the office today for left eye pain, redness, and drainage. This started yesterday. She says she feels it is spreading to her right eye now. She feels well otherwise.     Objective   Vital Signs:  /70 (BP Location: Left arm, Patient Position: Sitting, Cuff Size: Large Adult)   Pulse 84   Temp 99 °F (37.2 °C) (Oral)   Ht 162.6 cm (64\")   Wt 81.5 kg (179 lb 9.6 oz)   SpO2 94%   BMI 30.83 kg/m²   Estimated body mass index is 30.83 kg/m² as calculated from the following:    Height as of this encounter: 162.6 cm (64\").    Weight as of this encounter: 81.5 kg (179 lb 9.6 oz).                Physical Exam  Vitals reviewed.   Constitutional:       General: She is not in acute distress.     Appearance: Normal appearance. She is not ill-appearing, toxic-appearing or diaphoretic.   Eyes:      General:         Left eye: Discharge present.     Conjunctiva/sclera:      Right eye: Right conjunctiva is injected.      Left eye: Left conjunctiva is injected. Exudate present.   Cardiovascular:      Rate and Rhythm: Normal rate and regular rhythm.      Pulses: Normal pulses.      Heart sounds: Normal heart sounds.   Pulmonary:      Effort: Pulmonary effort is normal. No respiratory distress.      Breath sounds: Normal breath sounds.   Skin:     General: Skin is warm and dry.      Capillary Refill: Capillary refill takes less than 2 seconds.   Neurological:      General: No focal deficit present.      Mental Status: She is alert and oriented to person, place, and time.   Psychiatric:         Mood and Affect: Mood normal.         Behavior: Behavior normal.         Thought Content: Thought content normal.         Judgment: Judgment normal.        Result Review :                Assessment and Plan   Diagnoses and all orders for this " visit:    1. Acute bacterial conjunctivitis of left eye (Primary)  -     trimethoprim-polymyxin b (POLYTRIM) 08139-5.1 UNIT/ML-% ophthalmic solution; Administer 1 drop to both eyes Every 4 (Four) Hours for 7 days.  Dispense: 10 mL; Refill: 0             Follow Up   Return if symptoms worsen or fail to improve.  Patient was given instructions and counseling regarding her condition or for health maintenance advice. Please see specific information pulled into the AVS if appropriate.

## 2024-04-24 RX ORDER — ATORVASTATIN CALCIUM 40 MG/1
40 TABLET, FILM COATED ORAL EVERY EVENING
Qty: 30 TABLET | Refills: 0 | Status: SHIPPED | OUTPATIENT
Start: 2024-04-24

## 2024-04-25 ENCOUNTER — TELEPHONE (OUTPATIENT)
Dept: FAMILY MEDICINE CLINIC | Facility: CLINIC | Age: 59
End: 2024-04-25

## 2024-04-25 DIAGNOSIS — G89.29 CHRONIC BILATERAL LOW BACK PAIN WITHOUT SCIATICA: Primary | ICD-10-CM

## 2024-04-25 DIAGNOSIS — G89.29 CHRONIC BILATERAL LOW BACK PAIN WITHOUT SCIATICA: ICD-10-CM

## 2024-04-25 DIAGNOSIS — G89.29 CHRONIC LOW BACK PAIN WITH SCIATICA, SCIATICA LATERALITY UNSPECIFIED, UNSPECIFIED BACK PAIN LATERALITY: ICD-10-CM

## 2024-04-25 DIAGNOSIS — M54.40 CHRONIC LOW BACK PAIN WITH SCIATICA, SCIATICA LATERALITY UNSPECIFIED, UNSPECIFIED BACK PAIN LATERALITY: ICD-10-CM

## 2024-04-25 DIAGNOSIS — M54.50 CHRONIC BILATERAL LOW BACK PAIN WITHOUT SCIATICA: Primary | ICD-10-CM

## 2024-04-25 DIAGNOSIS — M54.50 CHRONIC BILATERAL LOW BACK PAIN WITHOUT SCIATICA: ICD-10-CM

## 2024-04-25 RX ORDER — OXYCODONE HYDROCHLORIDE 15 MG/1
15 TABLET ORAL EVERY 8 HOURS PRN
Qty: 90 TABLET | Refills: 0 | Status: SHIPPED | OUTPATIENT
Start: 2024-04-25

## 2024-04-25 RX ORDER — CARISOPRODOL 350 MG/1
350 TABLET ORAL 3 TIMES DAILY PRN
Qty: 90 TABLET | Refills: 0 | Status: SHIPPED | OUTPATIENT
Start: 2024-04-25

## 2024-04-25 NOTE — TELEPHONE ENCOUNTER
Spoke to Briana and she wanted to know if you can change it take 4 times a day for a month. She has been having trouble more.   I did tell her that per the system it shows was filled on 04/01/24 so its to soon to fill.

## 2024-04-25 NOTE — TELEPHONE ENCOUNTER
Caller: Briana Alas    Relationship: Self    Best call back number: [phone]     Requested Prescriptions:   Requested Prescriptions     Pending Prescriptions Disp Refills    oxyCODONE (ROXICODONE) 15 MG immediate release tablet 90 tablet 0     Sig: Take 1 tablet by mouth Every 8 (Eight) Hours As Needed for Moderate Pain. Code M79.7    carisoprodol (SOMA) 350 MG tablet 90 tablet 0     Sig: Take 1 tablet by mouth 3 (Three) Times a Day As Needed for Muscle Spasms.        Pharmacy where request should be sent: [facility] [id number] - [place], [place] - [address] AT [facility] [address] - [phone] [facility] [phone] FX     Last office visit with prescribing clinician: 1/31/2024   Last telemedicine visit with prescribing clinician: Visit date not found   Next office visit with prescribing clinician: 6/3/2024     Additional details provided by patient:   INCREASE IN PAIN- SCIATICA      Does the patient have less than a 3 day supply:  [x] Yes  [] No    Would you like a call back once the refill request has been completed: [] Yes [] No    If the office needs to give you a call back, can they leave a voicemail: [] Yes [] No    Jesús Barry   04/25/24 10:17 EDT

## 2024-04-25 NOTE — TELEPHONE ENCOUNTER
Caller: Briana Alas    Relationship: Self    Best call back number: [phone]    What medication are you requesting: carisoprodol (SOMA) 350 MG tablet   oxyCODONE (ROXICODONE) 15 MG immediate release tablet     Have you had these symptoms before:    [x] Yes  [] No    Have you been treated for these symptoms before:   [x] Yes  [] No    If a prescription is needed, what is your preferred pharmacy and phone number: [facility] [id number] - [place], [place] - [address] AT [facility] [address] - [phone] [facility] [phone] FX     Additional notes:    PHARMACY WILL NOT FILL, TOO EARLY. WILL NEED A NEW  PRESCRIPTION CALLED IN FOR 4 A DAY ON BOTH OF THE MEDICATIONS

## 2024-04-26 NOTE — TELEPHONE ENCOUNTER
[facility]     HUB TO RELAY      Dr. Thacker says 4 times a day is to much of that medication. If she is not doing well with 3 then she needs to let me refer her to pain management. I will put in a referral.

## 2024-05-02 ENCOUNTER — OFFICE VISIT (OUTPATIENT)
Dept: PSYCHIATRY | Facility: CLINIC | Age: 59
End: 2024-05-02
Payer: MEDICAID

## 2024-05-02 DIAGNOSIS — F51.01 PRIMARY INSOMNIA: Chronic | ICD-10-CM

## 2024-05-02 DIAGNOSIS — F41.1 GENERALIZED ANXIETY DISORDER: Chronic | ICD-10-CM

## 2024-05-02 DIAGNOSIS — F33.1 MAJOR DEPRESSIVE DISORDER, RECURRENT EPISODE, MODERATE: Primary | Chronic | ICD-10-CM

## 2024-05-02 RX ORDER — TEMAZEPAM 30 MG/1
30 CAPSULE ORAL NIGHTLY PRN
Qty: 30 CAPSULE | Refills: 3 | Status: SHIPPED | OUTPATIENT
Start: 2024-05-02

## 2024-05-02 RX ORDER — CLONAZEPAM 1 MG/1
1 TABLET ORAL 2 TIMES DAILY PRN
Qty: 60 TABLET | Refills: 3 | Status: SHIPPED | OUTPATIENT
Start: 2024-05-02

## 2024-05-02 RX ORDER — BREXPIPRAZOLE 2 MG/1
1 TABLET ORAL EVERY MORNING
Qty: 30 TABLET | Refills: 3 | Status: SHIPPED | OUTPATIENT
Start: 2024-05-02

## 2024-05-02 RX ORDER — BUSPIRONE HYDROCHLORIDE 15 MG/1
15 TABLET ORAL 2 TIMES DAILY
Qty: 60 TABLET | Refills: 3 | Status: SHIPPED | OUTPATIENT
Start: 2024-05-02

## 2024-05-02 RX ORDER — DULOXETIN HYDROCHLORIDE 60 MG/1
60 CAPSULE, DELAYED RELEASE ORAL 2 TIMES DAILY
Qty: 60 CAPSULE | Refills: 3 | Status: SHIPPED | OUTPATIENT
Start: 2024-05-02

## 2024-05-02 NOTE — PROGRESS NOTES
Subjective   Briana Alas is a 58 y.o. female who presents today for follow up    Chief Complaint:     Depression, anxiety      History of Present Illness: the pt has a long hx of depression, no specific triggers or stressors, was on zoloft, celexa , few unsuccessful trials   She was relatively stable on meds until her daughter passed away  Last year , still difficult to cope  , her daughter was special needs, pt's life was revolving around her , now she feels she has no purpose       Today the pt reported feeling better, less depressed,     Depression is rated as 6-7/10 , more days when not as depressed    denied feeling hopeless/helpless,  Anxiety is still intense , but she was able to use less clonazepam   When anxious -  increased tension, irritability, unpleasant somatic sensations    denied AVH/SI/HI   Sleep - improved   on temazepam,  up to 6 hrs vs 2 hrs without it   Alleviating factors - to stay busy          The following portions of the patient's history were reviewed and updated as appropriate: allergies, current medications, past family history, past medical history, past social history, past surgical history and problem list.    PAST PSYCHIATRIC HISTORY  Axis I  Affective/Bipolar Disorder, Anxiety/Panic Disorder  No inpt. No SI/SA   Axis II  Defer     PAST OUTPATIENT TREATMENT  Diagnosis treated:  Affective Disorder, Anxiety/Panic Disorder  Treatment Type:  Medication Management  Psychotherapy - shante Osei at [facility]   Prior Psychiatric Medications:  lexapro - not effective  celexa -not effective  zoloft - not effective now    Support Groups:  None   Sequelae Of Mental Disorder:  emotional distress          Interval History  Some improvement      Side Effects  Denied       Past Medical History:  Past Medical History:   Diagnosis Date    Anxiety     Arthritis     Back pain     Depression     Headache     Hyperlipidemia     Hypertension     Injury of back     Restless leg syndrome      PCP -   Jose     Social History:  Social History     Socioeconomic History    Marital status: Legally    Tobacco Use    Smoking status: Every Day     Current packs/day: 1.00     Average packs/day: 1 pack/day for 45.3 years (45.3 ttl pk-yrs)     Types: Cigarettes     Start date: 1979     Passive exposure: Never    Smokeless tobacco: Never    Tobacco comments:     Dont smoke dos    Vaping Use    Vaping status: Former    Quit date: 1/1/2020    Substances: Nicotine, Flavoring    Devices: Disposable   Substance and Sexual Activity    Alcohol use: No    Drug use: No    Sexual activity: Defer       Family History:  Family History   Problem Relation Age of Onset    Hypertension Mother     Hyperlipidemia Mother     Depression Mother     Thyroid disease Mother     Atrial fibrillation Mother     Heart attack Father     Hypertension Father        Past Surgical History:  Past Surgical History:   Procedure Laterality Date    ANKLE OPEN REDUCTION INTERNAL FIXATION Right     BUNIONECTOMY Left 12/15/2023    Procedure: BUNIONECTOMY JHONATAN;  Surgeon: GILBERTO Eldridge DPM;  Location: [facility];  Service: Podiatry;  Laterality: Left;    FOOT SURGERY Bilateral     bunionectomy    SHOULDER ARTHROSCOPY W/ ROTATOR CUFF REPAIR Right 12/13/2019    Procedure: RIGHT SHOULDER ARTHROSCOPY WITH ROTATOR CUFF REPAIR and Subacromial decompression;  Surgeon: Gino Ackerman MD;  Location: [facility];  Service: Orthopedics    TUBAL ABDOMINAL LIGATION         Problem List:  Patient Active Problem List   Diagnosis    Fibromyalgia    Abnormal gait    Ankle pain, right    Knee pain    Leg pain, left    Annular tear of lumbar disc    Degeneration of lumbar intervertebral disc    Generalized anxiety disorder    Body mass index 32.0-32.9, adult    Cervical myelopathy    Other dorsalgia    Chronic low back pain    Hx traumatic fracture    Hyperlipidemia    Hypertension, essential    Hypovitaminosis D    Inflammatory arthritis     Joint pain    Leg weakness, bilateral    Lumbosacral radiculopathy    Malaise and fatigue    Neck pain, chronic    Osteoarthritis of knee    Pain in right shoulder    Pain due to internal orthopedic prosthetic devices, implants and grafts, subsequent encounter    Rotator cuff tendonitis    Scoliosis    Osteoarthritis of lumbosacral spine without myelopathy    Spondylosis of lumbosacral region    Tobacco abuse counseling    Upper respiratory tract infection    Major depressive disorder, recurrent episode, moderate    Primary insomnia    Bereavement    Acquired hallux valgus, left       Allergy:   Allergies   Allergen Reactions    Penicillins Rash        Discontinued Medications:  Medications Discontinued During This Encounter   Medication Reason    clonazePAM (KlonoPIN) 1 MG tablet Reorder    DULoxetine (CYMBALTA) 60 MG capsule Reorder    busPIRone (BUSPAR) 15 MG tablet Reorder    Brexpiprazole (Rexulti) 2 MG tablet Reorder    temazepam (RESTORIL) 30 MG capsule Reorder             Current Medications:   Current Outpatient Medications   Medication Sig Dispense Refill    Brexpiprazole (Rexulti) 2 MG tablet Take 1 tablet by mouth Every Morning. 30 tablet 3    busPIRone (BUSPAR) 15 MG tablet Take 1 tablet by mouth 2 (Two) Times a Day. 60 tablet 3    clonazePAM (KlonoPIN) 1 MG tablet Take 1 tablet by mouth 2 (Two) Times a Day As Needed for Anxiety. 60 tablet 3    DULoxetine (CYMBALTA) 60 MG capsule Take 1 capsule by mouth 2 (Two) Times a Day. 60 capsule 3    temazepam (RESTORIL) 30 MG capsule Take 1 capsule by mouth At Night As Needed for Sleep. 30 capsule 3    atenolol (TENORMIN) 50 MG tablet TAKE ONE (1) TABLET BY MOUTH DAILY (Patient taking differently: Take 0.5 tablets by mouth Daily.) 30 tablet 5    atorvastatin (LIPITOR) 40 MG tablet TAKE ONE TABLET BY MOUTH EVERY EVENING 30 tablet 0    benzonatate (TESSALON) 200 MG capsule TAKE 1 CAPSULE BY MOUTH 3 TIMES A DAY AS NEEDED FOR COUGH 30 capsule 1    carisoprodol (SOMA)  350 MG tablet Take 1 tablet by mouth 3 (Three) Times a Day As Needed for Muscle Spasms. 90 tablet 0    oxyCODONE (ROXICODONE) 15 MG immediate release tablet Take 1 tablet by mouth Every 8 (Eight) Hours As Needed for Moderate Pain. Code M79.7 90 tablet 0    predniSONE (DELTASONE) 10 MG tablet Take 4 tabs po daily x 4 d, then 3 tabs po daily x 4 d , then 2 tabs po daily x 4 d, then 1 tab po daily x 4 d 40 tablet 0    pregabalin (LYRICA) 150 MG capsule Take 1 capsule by mouth 2 (Two) Times a Day. 60 capsule 3    rOPINIRole (REQUIP) 0.5 MG tablet TAKE ONE (1) TABLET BY MOUTH TWICE DAILY. TAKE ONE (1) TABLET ONE (1) HOUR BEFORE bedtime 60 tablet 5    vitamin D (ERGOCALCIFEROL) 1.25 MG (26054 UT) capsule capsule TAKE ONE (1) CAPSULE BY MOUTH TWICE A WEEK. TAKE THREE (3) DAYS APART (Patient taking differently: Take 1 capsule by mouth 2 (Two) Times a Week.) 8 capsule 5     No current facility-administered medications for this visit.         Psychological ROS: positive for - anxiety, depression   negative for - hallucinations, hostility, irritability, memory difficulties, mood swings, obsessive thoughts or suicidal ideation      Physical Exam:   There were no vitals taken for this visit.    Mental Status Exam:   Hygiene:   good  Cooperation:  Cooperative  Eye Contact:  Good  Psychomotor Behavior:  Appropriate  Affect:  Appropriate and congruent with mood   Mood: anxious and fluctates  Hopelessness: Denies  Speech:  Normal  Thought Process:  Goal directed and Linear  Thought Content:  Mood congruent  Suicidal:  None  Homicidal:  None  Hallucinations:  None  Delusion:  None  Memory:  Intact  Orientation:  Person, Place, Time and Situation  Reliability:  good  Insight:  Good  Judgement:  Good  Impulse Control:  Good  Physical/Medical Issues:  Yes        MSE from 1/3/24  reviewed and accepted without changes     PHQ-9 Depression Screening  Little interest or pleasure in doing things? 2-->more than half the days   Feeling down,  depressed, or hopeless? 1-->several days   Trouble falling or staying asleep, or sleeping too much? 0-->not at all   Feeling tired or having little energy? 1-->several days   Poor appetite or overeating? 0-->not at all   Feeling bad about yourself - or that you are a failure or have let yourself or your family down? 1-->several days   Trouble concentrating on things, such as reading the newspaper or watching television? 1-->several days   Moving or speaking so slowly that other people could have noticed? Or the opposite - being so fidgety or restless that you have been moving around a lot more than usual? 0-->not at all   Thoughts that you would be better off dead, or of hurting yourself in some way? 0-->not at all   PHQ-9 Total Score 6   If you checked off any problems, how difficult have these problems made it for you to do your work, take care of things at home, or get along with other people? somewhat difficult     Briana Alas  reports that she has been smoking cigarettes. She started smoking about 45 years ago. She has a 45.3 pack-year smoking history. She has never been exposed to tobacco smoke. She has never used smokeless tobacco.. I have educated her on the risk of diseases from using tobacco products such as cancer, COPD and heart disease.     I advised her to quit and she is not willing to quit.    I spent 3  minutes counseling the patient.           Current every day smoker 3-10 mintues spent counseling Not agreeable to stopping    I advised Briana of the risks of tobacco use.     Lab Results:   No visits with results within 3 Month(s) from this visit.   Latest known visit with results is:   Hospital Outpatient Visit on 01/08/2024   Component Date Value Ref Range Status    LVIDd 01/08/2024 3.7  cm Final    LVIDs 01/08/2024 2.7  cm Final    IVSd 01/08/2024 1.23  cm Final    LVPWd 01/08/2024 1.08  cm Final    FS 01/08/2024 25.8  % Final    IVS/LVPW 01/08/2024 1.13  cm Final    ESV(cubed) 01/08/2024 20.4   ml Final    EDV(cubed) 01/08/2024 50.1  ml Final    LV mass(C)d 01/08/2024 138.1  grams Final    LVOT area 01/08/2024 3.2  cm2 Final    LVOT diam 01/08/2024 2.01  cm Final    EDV(MOD-sp4) 01/08/2024 55.2  ml Final    ESV(MOD-sp4) 01/08/2024 27.3  ml Final    SV(MOD-sp4) 01/08/2024 27.8  ml Final    EF(MOD-sp4) 01/08/2024 50.5  % Final    MV E max benjamin 01/08/2024 42.2  cm/sec Final    MV A max benjamin 01/08/2024 53.5  cm/sec Final    MV dec time 01/08/2024 0.33  sec Final    MV E/A 01/08/2024 0.79   Final    Pulm A Revs Dur 01/08/2024 0.11  sec Final    TR max benjamin 01/08/2024 171.0  cm/sec Final    SV(LVOT) 01/08/2024 36.9  ml Final    RVIDd 01/08/2024 3.1  cm Final    LA dimension (2D)  01/08/2024 4.1  cm Final    Pulm Sys Benjamin 01/08/2024 27.7  cm/sec Final    Pulm Mcguire Benjamin 01/08/2024 23.4  cm/sec Final    Pulm S/D 01/08/2024 1.18   Final    Pulm A Revs Benjamin 01/08/2024 23.3  cm/sec Final    LV V1 max 01/08/2024 66.8  cm/sec Final    LV V1 max PG 01/08/2024 1.78  mmHg Final    LV V1 mean PG 01/08/2024 0.97  mmHg Final    LV V1 VTI 01/08/2024 11.7  cm Final    Ao pk benjamin 01/08/2024 95.6  cm/sec Final    Ao max PG 01/08/2024 3.7  mmHg Final    Ao mean PG 01/08/2024 2.17  mmHg Final    Ao V2 VTI 01/08/2024 17.3  cm Final    ISAK(I,D) 01/08/2024 2.13  cm2 Final    MV max PG 01/08/2024 1.77  mmHg Final    MV mean PG 01/08/2024 0.64  mmHg Final    MV V2 VTI 01/08/2024 17.0  cm Final    MVA(VTI) 01/08/2024 2.17  cm2 Final    MV dec slope 01/08/2024 128.2  cm/sec2 Final    TR max PG 01/08/2024 11.7  mmHg Final    RVSP(TR) 01/08/2024 14.7  mmHg Final    RAP systole 01/08/2024 3.0  mmHg Final    RV V1 max PG 01/08/2024 1.67  mmHg Final    RV V1 max 01/08/2024 64.6  cm/sec Final    RV V1 VTI 01/08/2024 12.4  cm Final    PA acc time 01/08/2024 0.11  sec Final    Ao root diam 01/08/2024 3.3  cm Final    ACS 01/08/2024 1.92  cm Final    EF(MOD-bp) 01/08/2024 51.0  % Final       Assessment & Plan   Problems Addressed this Visit        Generalized anxiety disorder (Chronic)    Relevant Medications    clonazePAM (KlonoPIN) 1 MG tablet    Brexpiprazole (Rexulti) 2 MG tablet    busPIRone (BUSPAR) 15 MG tablet    DULoxetine (CYMBALTA) 60 MG capsule    temazepam (RESTORIL) 30 MG capsule    Major depressive disorder, recurrent episode, moderate - Primary (Chronic)    Relevant Medications    Brexpiprazole (Rexulti) 2 MG tablet    busPIRone (BUSPAR) 15 MG tablet    DULoxetine (CYMBALTA) 60 MG capsule    temazepam (RESTORIL) 30 MG capsule    Primary insomnia (Chronic)    Relevant Medications    temazepam (RESTORIL) 30 MG capsule     Diagnoses         Codes Comments    Major depressive disorder, recurrent episode, moderate    -  Primary ICD-10-CM: F33.1  ICD-9-CM: 296.32     Generalized anxiety disorder     ICD-10-CM: F41.1  ICD-9-CM: 300.02     Primary insomnia     ICD-10-CM: F51.01  ICD-9-CM: 307.42             Visit Diagnoses:    ICD-10-CM ICD-9-CM   1. Major depressive disorder, recurrent episode, moderate  F33.1 296.32   2. Generalized anxiety disorder  F41.1 300.02   3. Primary insomnia  F51.01 307.42              TREATMENT PLAN/GOALS: Continue supportive psychotherapy efforts and medications as indicated. Treatment and medication options discussed during today's visit. Patient ackowledged and verbally consented to continue with current treatment plan and was educated on the importance of compliance with treatment and follow-up appointments.    MEDICATION ISSUES:    INSPECT reviewed as expected - on pain meds,    clonazepam and temazepam on  4/17/24           1. Major depressive d/o moderate recurrent - cont cymbalta 120 mg BP is stable, cont  rexulti   2 mg , no changes necessary       Cont therapy  2. Generalized anxiety - cont cymbalta at 120 mg , decrease  clonazepam 1 mg to BID PRN,    the pt is on opioids, will closely monitor    3 Insomnia - cont temazepam 30 mg po QHS      Labs 12/7/23 - WNL      Lipids 8/21/23 -  , chol 148 , did not  have any new labs        UDS 7/26/22 -consistent for clonazepam, temazepam and + fentanyl (?) , will repeat today , the denied fentanyl use   2/28/23 - consistent ,    LABORATORY - SCAN - MULTIPLE LAB REPORT, [facility], 02/28/2023 (02/28/2023)   UDS 1/3/24 - consistent   LABORATORY - SCAN - FULL URINE DRUG SCREEN, MAIN, 01/03/2024 (01/03/2024)   PHQ scored 6 and indicated mild depression   STEWART 7 scored 8   Patient screened positive for depression based on a PHQ-9 score of 6 on 5/2/2024. Follow-up recommendations include: Prescribed antidepressant medication treatment.      Discussed medication options and treatment plan of prescribed medication as well as the risks, benefits, and side effects including potential falls, possible impaired driving and metabolic adversities among others. Patient is agreeable to call the office with any worsening of symptoms or onset of side effects. Patient is agreeable to call 911 or go to the nearest ER should he/she begin having SI/HI. No medication side effects or related complaints today.     MEDS ORDERED DURING VISIT:  New Medications Ordered This Visit   Medications    clonazePAM (KlonoPIN) 1 MG tablet     Sig: Take 1 tablet by mouth 2 (Two) Times a Day As Needed for Anxiety.     Dispense:  60 tablet     Refill:  3     Please dispense when it is due, dose was reduced to BID    Brexpiprazole (Rexulti) 2 MG tablet     Sig: Take 1 tablet by mouth Every Morning.     Dispense:  30 tablet     Refill:  3    busPIRone (BUSPAR) 15 MG tablet     Sig: Take 1 tablet by mouth 2 (Two) Times a Day.     Dispense:  60 tablet     Refill:  3    DULoxetine (CYMBALTA) 60 MG capsule     Sig: Take 1 capsule by mouth 2 (Two) Times a Day.     Dispense:  60 capsule     Refill:  3    temazepam (RESTORIL) 30 MG capsule     Sig: Take 1 capsule by mouth At Night As Needed for Sleep.     Dispense:  30 capsule     Refill:  3     Please dispense when it is due       Return in about 4 months (around  9/2/2024).         This document has been electronically signed by Jade Ryder MD  May 2, 2024 10:08 EDT    EMR Dragon transcription disclaimer:  Some of this encounter note is an electronic transcription translation of spoken language to printed text. The electronic translation of spoken language may permit erroneous, or at times, nonsensical words or phrases to be inadvertently transcribed; Although I have reviewed the note for such errors some may still exist.

## 2024-05-14 DIAGNOSIS — F41.1 GENERALIZED ANXIETY DISORDER: Chronic | ICD-10-CM

## 2024-05-15 RX ORDER — CLONAZEPAM 1 MG/1
1 TABLET ORAL 3 TIMES DAILY PRN
Qty: 90 TABLET | OUTPATIENT
Start: 2024-05-15

## 2024-05-23 DIAGNOSIS — G89.29 CHRONIC BILATERAL LOW BACK PAIN WITHOUT SCIATICA: ICD-10-CM

## 2024-05-23 DIAGNOSIS — G89.29 CHRONIC LOW BACK PAIN WITH SCIATICA, SCIATICA LATERALITY UNSPECIFIED, UNSPECIFIED BACK PAIN LATERALITY: ICD-10-CM

## 2024-05-23 DIAGNOSIS — M54.50 CHRONIC BILATERAL LOW BACK PAIN WITHOUT SCIATICA: ICD-10-CM

## 2024-05-23 DIAGNOSIS — M54.40 CHRONIC LOW BACK PAIN WITH SCIATICA, SCIATICA LATERALITY UNSPECIFIED, UNSPECIFIED BACK PAIN LATERALITY: ICD-10-CM

## 2024-05-23 NOTE — TELEPHONE ENCOUNTER
PATIENT CALLED FOR MEDICATION REFILL OF   oxyCODONE (ROXICODONE) 15 MG immediate release tablet   SHE HAS 3 DAYS LEFT    carisoprodol (SOMA) 350 MG tablet   SHE HAS 3 DAYS LEFT    [facility] [id number] - [place], [place] - [address] AT [facility] [address] - [phone]  - [phone]  [phone]     CALL BACK NUMBER [phone]

## 2024-05-24 RX ORDER — OXYCODONE HYDROCHLORIDE 15 MG/1
15 TABLET ORAL EVERY 8 HOURS PRN
Qty: 90 TABLET | Refills: 0 | Status: SHIPPED | OUTPATIENT
Start: 2024-05-24

## 2024-05-24 RX ORDER — CARISOPRODOL 350 MG/1
350 TABLET ORAL 3 TIMES DAILY PRN
Qty: 90 TABLET | Refills: 0 | Status: SHIPPED | OUTPATIENT
Start: 2024-05-24

## 2024-05-28 ENCOUNTER — TELEPHONE (OUTPATIENT)
Dept: FAMILY MEDICINE CLINIC | Facility: CLINIC | Age: 59
End: 2024-05-28
Payer: MEDICAID

## 2024-05-28 NOTE — TELEPHONE ENCOUNTER
oxyCODONE HCl 15MG tablets pa request has been submitted. Waiting on determination.   Key: [id number]  PA Case ID #: [id number]  [facility]

## 2024-05-31 RX ORDER — ERGOCALCIFEROL 1.25 MG/1
CAPSULE ORAL
Qty: 8 CAPSULE | Refills: 5 | Status: SHIPPED | OUTPATIENT
Start: 2024-05-31

## 2024-05-31 RX ORDER — ATORVASTATIN CALCIUM 40 MG/1
40 TABLET, FILM COATED ORAL EVERY EVENING
Qty: 30 TABLET | Refills: 0 | Status: SHIPPED | OUTPATIENT
Start: 2024-05-31

## 2024-06-03 ENCOUNTER — OFFICE VISIT (OUTPATIENT)
Dept: FAMILY MEDICINE CLINIC | Facility: CLINIC | Age: 59
End: 2024-06-03
Payer: MEDICAID

## 2024-06-03 ENCOUNTER — LAB (OUTPATIENT)
Dept: FAMILY MEDICINE CLINIC | Facility: CLINIC | Age: 59
End: 2024-06-03
Payer: MEDICAID

## 2024-06-03 VITALS
HEIGHT: 64 IN | SYSTOLIC BLOOD PRESSURE: 137 MMHG | BODY MASS INDEX: 30.39 KG/M2 | OXYGEN SATURATION: 97 % | HEART RATE: 65 BPM | TEMPERATURE: 98.9 F | DIASTOLIC BLOOD PRESSURE: 75 MMHG | WEIGHT: 178 LBS

## 2024-06-03 DIAGNOSIS — E78.5 HYPERLIPIDEMIA, UNSPECIFIED HYPERLIPIDEMIA TYPE: ICD-10-CM

## 2024-06-03 DIAGNOSIS — Z12.11 SCREENING FOR COLON CANCER: ICD-10-CM

## 2024-06-03 DIAGNOSIS — F17.200 SMOKING: ICD-10-CM

## 2024-06-03 DIAGNOSIS — M54.40 CHRONIC LOW BACK PAIN WITH SCIATICA, SCIATICA LATERALITY UNSPECIFIED, UNSPECIFIED BACK PAIN LATERALITY: ICD-10-CM

## 2024-06-03 DIAGNOSIS — G89.29 CHRONIC LOW BACK PAIN WITH SCIATICA, SCIATICA LATERALITY UNSPECIFIED, UNSPECIFIED BACK PAIN LATERALITY: ICD-10-CM

## 2024-06-03 DIAGNOSIS — I10 HYPERTENSION, ESSENTIAL: Primary | ICD-10-CM

## 2024-06-03 PROCEDURE — 36415 COLL VENOUS BLD VENIPUNCTURE: CPT | Performed by: FAMILY MEDICINE

## 2024-06-03 PROCEDURE — 80053 COMPREHEN METABOLIC PANEL: CPT | Performed by: FAMILY MEDICINE

## 2024-06-03 PROCEDURE — 3075F SYST BP GE 130 - 139MM HG: CPT | Performed by: FAMILY MEDICINE

## 2024-06-03 PROCEDURE — 99214 OFFICE O/P EST MOD 30 MIN: CPT | Performed by: FAMILY MEDICINE

## 2024-06-03 PROCEDURE — 1126F AMNT PAIN NOTED NONE PRSNT: CPT | Performed by: FAMILY MEDICINE

## 2024-06-03 PROCEDURE — 85025 COMPLETE CBC W/AUTO DIFF WBC: CPT | Performed by: FAMILY MEDICINE

## 2024-06-03 PROCEDURE — 80061 LIPID PANEL: CPT | Performed by: FAMILY MEDICINE

## 2024-06-03 PROCEDURE — 3078F DIAST BP <80 MM HG: CPT | Performed by: FAMILY MEDICINE

## 2024-06-03 NOTE — PROGRESS NOTES
"Subjective   Briana Alas is a 58 y.o. female.     History of Present Illness  Briana Alas is in for follow up on her chronic issues with high blood pressure and high cholesterol.  She also has low vitamin D and chronic low back pain.  She has seen pain management and had injections in the past for the low back pain and they have not helped.  Her back pain is getting worse in the lumbar area.  It is affecting her sleep and quality of life.  It is affecting daily activities and functionality.  There is no history of chest pain or dyspnea. There is no history of issue with bowel or bladder dysfunction. There is no history of dizziness or lightheadedness. There is no history of issue with present medication.       Hypertension  Associated symptoms include neck pain. Pertinent negatives include no chest pain or shortness of breath.          /75 (BP Location: Left arm, Patient Position: Sitting, Cuff Size: Large Adult)   Pulse 65   Temp 98.9 °F (37.2 °C) (Temporal)   Ht 162.6 cm (64\")   Wt 80.7 kg (178 lb)   SpO2 97%   BMI 30.55 kg/m²       Chief Complaint   Patient presents with    Hypertension    chronic low back pain     Sciatica bothering her again today. Doesn't want injections. They have not helped in the past.            Current Outpatient Medications:     atenolol (TENORMIN) 50 MG tablet, TAKE ONE (1) TABLET BY MOUTH DAILY (Patient taking differently: Take 0.5 tablets by mouth Daily.), Disp: 30 tablet, Rfl: 5    atorvastatin (LIPITOR) 40 MG tablet, TAKE ONE TABLET BY MOUTH EVERY EVENING, Disp: 30 tablet, Rfl: 0    benzonatate (TESSALON) 200 MG capsule, TAKE 1 CAPSULE BY MOUTH 3 TIMES A DAY AS NEEDED FOR COUGH, Disp: 30 capsule, Rfl: 1    Brexpiprazole (Rexulti) 2 MG tablet, Take 1 tablet by mouth Every Morning., Disp: 30 tablet, Rfl: 3    busPIRone (BUSPAR) 15 MG tablet, Take 1 tablet by mouth 2 (Two) Times a Day., Disp: 60 tablet, Rfl: 3    carisoprodol (SOMA) 350 MG tablet, Take 1 tablet by " mouth 3 (Three) Times a Day As Needed for Muscle Spasms., Disp: 90 tablet, Rfl: 0    clonazePAM (KlonoPIN) 1 MG tablet, Take 1 tablet by mouth 2 (Two) Times a Day As Needed for Anxiety., Disp: 60 tablet, Rfl: 3    DULoxetine (CYMBALTA) 60 MG capsule, Take 1 capsule by mouth 2 (Two) Times a Day., Disp: 60 capsule, Rfl: 3    oxyCODONE (ROXICODONE) 15 MG immediate release tablet, Take 1 tablet by mouth Every 8 (Eight) Hours As Needed for Moderate Pain. Code M79.7, Disp: 90 tablet, Rfl: 0    predniSONE (DELTASONE) 10 MG tablet, Take 4 tabs po daily x 4 d, then 3 tabs po daily x 4 d , then 2 tabs po daily x 4 d, then 1 tab po daily x 4 d, Disp: 40 tablet, Rfl: 0    pregabalin (LYRICA) 150 MG capsule, Take 1 capsule by mouth 2 (Two) Times a Day., Disp: 60 capsule, Rfl: 3    rOPINIRole (REQUIP) 0.5 MG tablet, TAKE ONE (1) TABLET BY MOUTH TWICE DAILY. TAKE ONE (1) TABLET ONE (1) HOUR BEFORE bedtime, Disp: 60 tablet, Rfl: 5    temazepam (RESTORIL) 30 MG capsule, Take 1 capsule by mouth At Night As Needed for Sleep., Disp: 30 capsule, Rfl: 3    vitamin D (ERGOCALCIFEROL) 1.25 MG (19801 UT) capsule capsule, TAKE ONE (1) CAPSULE BY MOUTH TWICE A WEEK THREE DAYS APART, Disp: 8 capsule, Rfl: 5    BMI is >= 30 and <35. (Class 1 Obesity). The following options were offered after discussion;: exercise counseling/recommendations and nutrition counseling/recommendations       The following portions of the patient's history were reviewed and updated as appropriate: allergies, current medications, past family history, past medical history, past social history, past surgical history, and problem list.    Review of Systems   Constitutional:  Negative for activity change, fatigue and fever.   HENT:  Negative for congestion, sinus pressure, sinus pain, sore throat and trouble swallowing.    Eyes:  Negative for visual disturbance.   Respiratory:  Negative for cough, chest tightness, shortness of breath and wheezing.    Cardiovascular:   Negative for chest pain.   Gastrointestinal:  Negative for abdominal distention, abdominal pain, constipation, diarrhea, nausea and vomiting.   Genitourinary:  Negative for difficulty urinating, dysuria and urgency.   Musculoskeletal:  Positive for arthralgias, back pain (Worse than before), gait problem, myalgias and neck pain.   Skin:  Positive for rash.   Neurological:  Negative for dizziness, weakness, light-headedness and numbness.   Psychiatric/Behavioral:  Positive for dysphoric mood and sleep disturbance (Back pain beginning to disrupt). Negative for agitation, hallucinations and suicidal ideas. The patient is not nervous/anxious.        Objective   Physical Exam  Vitals and nursing note reviewed.   Constitutional:       Appearance: Normal appearance.   HENT:      Right Ear: Tympanic membrane and ear canal normal.      Left Ear: Tympanic membrane and ear canal normal.      Mouth/Throat:      Pharynx: Oropharynx is clear.   Cardiovascular:      Rate and Rhythm: Normal rate and regular rhythm.      Heart sounds: Normal heart sounds. No murmur heard.  Pulmonary:      Effort: Pulmonary effort is normal.      Breath sounds: No wheezing or rales.   Abdominal:      General: Bowel sounds are normal.      Palpations: Abdomen is soft.      Tenderness: There is no abdominal tenderness. There is no guarding.   Musculoskeletal:      Cervical back: Neck supple.      Right lower leg: No edema.      Left lower leg: No edema.      Comments: Uncomfortable over the lower back on exam but no palpable defect or deformity  Positive straight leg raise on the left but negative on the right  Lower extremity reflexes were abnormal   Lymphadenopathy:      Cervical: No cervical adenopathy.   Neurological:      Mental Status: She is alert and oriented to person, place, and time. Mental status is at baseline.   Psychiatric:         Mood and Affect: Mood normal.       Briana Alas  reports that she has been smoking cigarettes. She  started smoking about 45 years ago. She has a 45.4 pack-year smoking history. She has never been exposed to tobacco smoke. She has never used smokeless tobacco. I have educated her on the risk of diseases from using tobacco products such as cancer, COPD, and heart disease.     I advised her to quit and she is not willing to quit.    I spent 5 minutes counseling the patient.          Assessment & Plan   Problems Addressed this Visit          Cardiac and Vasculature    Hyperlipidemia    Hypertension, essential - Primary    Relevant Orders    Comprehensive metabolic panel    Lipid panel    CBC w AUTO Differential       Musculoskeletal and Injuries    Chronic low back pain    Relevant Orders    MRI Lumbar Spine Without Contrast    Ambulatory Referral to Spine Surgery (Completed)     Other Visit Diagnoses       Smoking        Relevant Orders     CT Chest Low Dose Cancer Screening WO    Screening for colon cancer        Relevant Orders    Cologuard - Stool, Per Rectum          Diagnoses         Codes Comments    Hypertension, essential    -  Primary ICD-10-CM: I10  ICD-9-CM: 401.9     Chronic low back pain with sciatica, sciatica laterality unspecified, unspecified back pain laterality     ICD-10-CM: M54.40, G89.29  ICD-9-CM: 724.2, 724.3, 338.29     Hyperlipidemia, unspecified hyperlipidemia type     ICD-10-CM: E78.5  ICD-9-CM: 272.4     Smoking     ICD-10-CM: F17.200  ICD-9-CM: 305.1     Screening for colon cancer     ICD-10-CM: Z12.11  ICD-9-CM: V76.51             I will again order a Cologuard for her  She says this time she will do it  I am referring for opinion to spine surgery as injections have not helped  I have asked her to let me know if she has questions after seeing them  I have asked her to be as active as she can tolerate  I offered pneumonia vaccine but she declined  We discussed smoking cessation but she is not interested  I will order a CT lung cancer screening  I have also ordered some baseline labs

## 2024-06-04 LAB
ALBUMIN SERPL-MCNC: 4.3 G/DL (ref 3.5–5.2)
ALBUMIN/GLOB SERPL: 1.6 G/DL
ALP SERPL-CCNC: 70 U/L (ref 39–117)
ALT SERPL W P-5'-P-CCNC: 17 U/L (ref 1–33)
ANION GAP SERPL CALCULATED.3IONS-SCNC: 9.3 MMOL/L (ref 5–15)
AST SERPL-CCNC: 18 U/L (ref 1–32)
BASOPHILS # BLD AUTO: 0.09 10*3/MM3 (ref 0–0.2)
BASOPHILS NFR BLD AUTO: 0.9 % (ref 0–1.5)
BILIRUB SERPL-MCNC: <0.2 MG/DL (ref 0–1.2)
BUN SERPL-MCNC: 12 MG/DL (ref 6–20)
BUN/CREAT SERPL: 11.9 (ref 7–25)
CALCIUM SPEC-SCNC: 9.8 MG/DL (ref 8.6–10.5)
CHLORIDE SERPL-SCNC: 104 MMOL/L (ref 98–107)
CHOLEST SERPL-MCNC: 182 MG/DL (ref 0–200)
CO2 SERPL-SCNC: 27.7 MMOL/L (ref 22–29)
CREAT SERPL-MCNC: 1.01 MG/DL (ref 0.57–1)
DEPRECATED RDW RBC AUTO: 43.4 FL (ref 37–54)
EGFRCR SERPLBLD CKD-EPI 2021: 64.3 ML/MIN/1.73
EOSINOPHIL # BLD AUTO: 0.24 10*3/MM3 (ref 0–0.4)
EOSINOPHIL NFR BLD AUTO: 2.4 % (ref 0.3–6.2)
ERYTHROCYTE [DISTWIDTH] IN BLOOD BY AUTOMATED COUNT: 12.8 % (ref 12.3–15.4)
GLOBULIN UR ELPH-MCNC: 2.7 GM/DL
GLUCOSE SERPL-MCNC: 93 MG/DL (ref 65–99)
HCT VFR BLD AUTO: 46.6 % (ref 34–46.6)
HDLC SERPL-MCNC: 45 MG/DL (ref 40–60)
HGB BLD-MCNC: 15.7 G/DL (ref 12–15.9)
IMM GRANULOCYTES # BLD AUTO: 0.06 10*3/MM3 (ref 0–0.05)
IMM GRANULOCYTES NFR BLD AUTO: 0.6 % (ref 0–0.5)
LDLC SERPL CALC-MCNC: 108 MG/DL (ref 0–100)
LDLC/HDLC SERPL: 2.32 {RATIO}
LYMPHOCYTES # BLD AUTO: 2.93 10*3/MM3 (ref 0.7–3.1)
LYMPHOCYTES NFR BLD AUTO: 28.7 % (ref 19.6–45.3)
MCH RBC QN AUTO: 31.5 PG (ref 26.6–33)
MCHC RBC AUTO-ENTMCNC: 33.7 G/DL (ref 31.5–35.7)
MCV RBC AUTO: 93.4 FL (ref 79–97)
MONOCYTES # BLD AUTO: 0.61 10*3/MM3 (ref 0.1–0.9)
MONOCYTES NFR BLD AUTO: 6 % (ref 5–12)
NEUTROPHILS NFR BLD AUTO: 6.27 10*3/MM3 (ref 1.7–7)
NEUTROPHILS NFR BLD AUTO: 61.4 % (ref 42.7–76)
NRBC BLD AUTO-RTO: 0 /100 WBC (ref 0–0.2)
PLATELET # BLD AUTO: 323 10*3/MM3 (ref 140–450)
PMV BLD AUTO: 10.1 FL (ref 6–12)
POTASSIUM SERPL-SCNC: 5.1 MMOL/L (ref 3.5–5.2)
PROT SERPL-MCNC: 7 G/DL (ref 6–8.5)
RBC # BLD AUTO: 4.99 10*6/MM3 (ref 3.77–5.28)
SODIUM SERPL-SCNC: 141 MMOL/L (ref 136–145)
TRIGL SERPL-MCNC: 164 MG/DL (ref 0–150)
VLDLC SERPL-MCNC: 29 MG/DL (ref 5–40)
WBC NRBC COR # BLD AUTO: 10.2 10*3/MM3 (ref 3.4–10.8)

## 2024-06-10 ENCOUNTER — APPOINTMENT (OUTPATIENT)
Dept: MRI IMAGING | Facility: HOSPITAL | Age: 59
End: 2024-06-10
Payer: MEDICAID

## 2024-06-10 ENCOUNTER — HOSPITAL ENCOUNTER (OUTPATIENT)
Facility: HOSPITAL | Age: 59
Setting detail: OBSERVATION
Discharge: HOME | End: 2024-06-12
Attending: INTERNAL MEDICINE | Admitting: INTERNAL MEDICINE
Payer: MEDICAID

## 2024-06-10 ENCOUNTER — APPOINTMENT (OUTPATIENT)
Dept: CT IMAGING | Facility: HOSPITAL | Age: 59
End: 2024-06-10
Payer: MEDICAID

## 2024-06-10 ENCOUNTER — APPOINTMENT (OUTPATIENT)
Dept: GENERAL RADIOLOGY | Facility: HOSPITAL | Age: 59
End: 2024-06-10
Payer: MEDICAID

## 2024-06-10 DIAGNOSIS — M54.89 OTHER DORSALGIA: ICD-10-CM

## 2024-06-10 DIAGNOSIS — G89.4 CHRONIC PAIN SYNDROME: ICD-10-CM

## 2024-06-10 DIAGNOSIS — R41.82 ALTERED MENTAL STATUS, UNSPECIFIED ALTERED MENTAL STATUS TYPE: Primary | ICD-10-CM

## 2024-06-10 DIAGNOSIS — G89.29 CHRONIC BILATERAL LOW BACK PAIN WITHOUT SCIATICA: ICD-10-CM

## 2024-06-10 DIAGNOSIS — M54.50 CHRONIC BILATERAL LOW BACK PAIN WITHOUT SCIATICA: ICD-10-CM

## 2024-06-10 LAB
ALBUMIN SERPL-MCNC: 5 G/DL (ref 3.5–5.2)
ALBUMIN/GLOB SERPL: 1.9 G/DL
ALP SERPL-CCNC: 83 U/L (ref 39–117)
ALT SERPL W P-5'-P-CCNC: 31 U/L (ref 1–33)
AMPHET+METHAMPHET UR QL: NEGATIVE
ANION GAP SERPL CALCULATED.3IONS-SCNC: 13.6 MMOL/L (ref 5–15)
APTT PPP: 27.9 SECONDS (ref 24–31)
AST SERPL-CCNC: 32 U/L (ref 1–32)
B PARAPERT DNA SPEC QL NAA+PROBE: NOT DETECTED
B PERT DNA SPEC QL NAA+PROBE: NOT DETECTED
BARBITURATES UR QL SCN: NEGATIVE
BASOPHILS # BLD AUTO: 0.07 10*3/MM3 (ref 0–0.2)
BASOPHILS NFR BLD AUTO: 0.4 % (ref 0–1.5)
BENZODIAZ UR QL SCN: POSITIVE
BILIRUB SERPL-MCNC: 0.8 MG/DL (ref 0–1.2)
BILIRUB UR QL STRIP: NEGATIVE
BUN SERPL-MCNC: 13 MG/DL (ref 6–20)
BUN/CREAT SERPL: 12 (ref 7–25)
C PNEUM DNA NPH QL NAA+NON-PROBE: NOT DETECTED
CALCIUM SPEC-SCNC: 10.6 MG/DL (ref 8.6–10.5)
CANNABINOIDS SERPL QL: NEGATIVE
CHLORIDE SERPL-SCNC: 103 MMOL/L (ref 98–107)
CHOLEST SERPL-MCNC: 225 MG/DL (ref 0–200)
CLARITY UR: CLEAR
CO2 SERPL-SCNC: 26.4 MMOL/L (ref 22–29)
COCAINE UR QL: NEGATIVE
COLOR UR: YELLOW
CREAT SERPL-MCNC: 1.08 MG/DL (ref 0.57–1)
CRP SERPL-MCNC: 1.86 MG/DL (ref 0–0.5)
DEPRECATED RDW RBC AUTO: 46.6 FL (ref 37–54)
EGFRCR SERPLBLD CKD-EPI 2021: 59.3 ML/MIN/1.73
EOSINOPHIL # BLD AUTO: 0.19 10*3/MM3 (ref 0–0.4)
EOSINOPHIL NFR BLD AUTO: 1.2 % (ref 0.3–6.2)
ERYTHROCYTE [DISTWIDTH] IN BLOOD BY AUTOMATED COUNT: 13.2 % (ref 12.3–15.4)
ERYTHROCYTE [SEDIMENTATION RATE] IN BLOOD: 17 MM/HR (ref 0–30)
ETHANOL UR QL: <0.01 %
FLUAV SUBTYP SPEC NAA+PROBE: NOT DETECTED
FLUBV RNA ISLT QL NAA+PROBE: NOT DETECTED
GEN 5 2HR TROPONIN T REFLEX: 7 NG/L
GLOBULIN UR ELPH-MCNC: 2.7 GM/DL
GLUCOSE BLDC GLUCOMTR-MCNC: 110 MG/DL (ref 70–105)
GLUCOSE SERPL-MCNC: 103 MG/DL (ref 65–99)
GLUCOSE UR STRIP-MCNC: NEGATIVE MG/DL
HADV DNA SPEC NAA+PROBE: NOT DETECTED
HBA1C MFR BLD: 5.71 % (ref 4.8–5.6)
HCOV 229E RNA SPEC QL NAA+PROBE: NOT DETECTED
HCOV HKU1 RNA SPEC QL NAA+PROBE: NOT DETECTED
HCOV NL63 RNA SPEC QL NAA+PROBE: NOT DETECTED
HCOV OC43 RNA SPEC QL NAA+PROBE: NOT DETECTED
HCT VFR BLD AUTO: 48.9 % (ref 34–46.6)
HDLC SERPL-MCNC: 41 MG/DL (ref 40–60)
HGB BLD-MCNC: 16.1 G/DL (ref 12–15.9)
HGB UR QL STRIP.AUTO: NEGATIVE
HMPV RNA NPH QL NAA+NON-PROBE: NOT DETECTED
HOLD SPECIMEN: NORMAL
HPIV1 RNA ISLT QL NAA+PROBE: NOT DETECTED
HPIV2 RNA SPEC QL NAA+PROBE: NOT DETECTED
HPIV3 RNA NPH QL NAA+PROBE: NOT DETECTED
HPIV4 P GENE NPH QL NAA+PROBE: NOT DETECTED
IMM GRANULOCYTES # BLD AUTO: 0.06 10*3/MM3 (ref 0–0.05)
IMM GRANULOCYTES NFR BLD AUTO: 0.4 % (ref 0–0.5)
INR PPP: 0.99 (ref 0.93–1.1)
KETONES UR QL STRIP: NEGATIVE
LDLC SERPL CALC-MCNC: 151 MG/DL (ref 0–100)
LDLC/HDLC SERPL: 3.6 {RATIO}
LEUKOCYTE ESTERASE UR QL STRIP.AUTO: NEGATIVE
LYMPHOCYTES # BLD AUTO: 2.32 10*3/MM3 (ref 0.7–3.1)
LYMPHOCYTES NFR BLD AUTO: 14.2 % (ref 19.6–45.3)
M PNEUMO IGG SER IA-ACNC: NOT DETECTED
MAGNESIUM SERPL-MCNC: 2 MG/DL (ref 1.6–2.6)
MCH RBC QN AUTO: 31.3 PG (ref 26.6–33)
MCHC RBC AUTO-ENTMCNC: 32.9 G/DL (ref 31.5–35.7)
MCV RBC AUTO: 95.1 FL (ref 79–97)
METHADONE UR QL SCN: NEGATIVE
MONOCYTES # BLD AUTO: 1.09 10*3/MM3 (ref 0.1–0.9)
MONOCYTES NFR BLD AUTO: 6.7 % (ref 5–12)
NEUTROPHILS NFR BLD AUTO: 12.66 10*3/MM3 (ref 1.7–7)
NEUTROPHILS NFR BLD AUTO: 77.1 % (ref 42.7–76)
NITRITE UR QL STRIP: NEGATIVE
NRBC BLD AUTO-RTO: 0 /100 WBC (ref 0–0.2)
OPIATES UR QL: NEGATIVE
OXYCODONE UR QL SCN: NEGATIVE
PH UR STRIP.AUTO: <=5 [PH] (ref 5–8)
PHOSPHATE SERPL-MCNC: 3.9 MG/DL (ref 2.5–4.5)
PLATELET # BLD AUTO: 367 10*3/MM3 (ref 140–450)
PMV BLD AUTO: 9.9 FL (ref 6–12)
POTASSIUM SERPL-SCNC: 4.4 MMOL/L (ref 3.5–5.2)
PROT SERPL-MCNC: 7.7 G/DL (ref 6–8.5)
PROT UR QL STRIP: NEGATIVE
PROTHROMBIN TIME: 10.8 SECONDS (ref 9.6–11.7)
RBC # BLD AUTO: 5.14 10*6/MM3 (ref 3.77–5.28)
RHINOVIRUS RNA SPEC NAA+PROBE: NOT DETECTED
RSV RNA NPH QL NAA+NON-PROBE: NOT DETECTED
SARS-COV-2 RNA NPH QL NAA+NON-PROBE: NOT DETECTED
SODIUM SERPL-SCNC: 143 MMOL/L (ref 136–145)
SP GR UR STRIP: 1.01 (ref 1–1.03)
TRIGL SERPL-MCNC: 181 MG/DL (ref 0–150)
TROPONIN T DELTA: -2 NG/L
TROPONIN T SERPL HS-MCNC: 9 NG/L
TSH SERPL DL<=0.05 MIU/L-ACNC: 0.9 UIU/ML (ref 0.27–4.2)
UROBILINOGEN UR QL STRIP: NORMAL
VIT B12 BLD-MCNC: 331 PG/ML (ref 211–946)
VLDLC SERPL-MCNC: 33 MG/DL (ref 5–40)
WBC NRBC COR # BLD AUTO: 16.39 10*3/MM3 (ref 3.4–10.8)
WHOLE BLOOD HOLD SPECIMEN: NORMAL

## 2024-06-10 PROCEDURE — 81003 URINALYSIS AUTO W/O SCOPE: CPT | Performed by: NURSE PRACTITIONER

## 2024-06-10 PROCEDURE — 97162 PT EVAL MOD COMPLEX 30 MIN: CPT

## 2024-06-10 PROCEDURE — G0378 HOSPITAL OBSERVATION PER HR: HCPCS

## 2024-06-10 PROCEDURE — 70450 CT HEAD/BRAIN W/O DYE: CPT

## 2024-06-10 PROCEDURE — 96372 THER/PROPH/DIAG INJ SC/IM: CPT

## 2024-06-10 PROCEDURE — 93005 ELECTROCARDIOGRAM TRACING: CPT | Performed by: NURSE PRACTITIONER

## 2024-06-10 PROCEDURE — 99285 EMERGENCY DEPT VISIT HI MDM: CPT

## 2024-06-10 PROCEDURE — 85610 PROTHROMBIN TIME: CPT | Performed by: NURSE PRACTITIONER

## 2024-06-10 PROCEDURE — 25010000002 OLANZAPINE 10 MG RECONSTITUTED SOLUTION 1 EACH VIAL: Performed by: INTERNAL MEDICINE

## 2024-06-10 PROCEDURE — 83036 HEMOGLOBIN GLYCOSYLATED A1C: CPT | Performed by: INTERNAL MEDICINE

## 2024-06-10 PROCEDURE — 85730 THROMBOPLASTIN TIME PARTIAL: CPT | Performed by: NURSE PRACTITIONER

## 2024-06-10 PROCEDURE — 80061 LIPID PANEL: CPT | Performed by: INTERNAL MEDICINE

## 2024-06-10 PROCEDURE — 84100 ASSAY OF PHOSPHORUS: CPT | Performed by: NURSE PRACTITIONER

## 2024-06-10 PROCEDURE — 36415 COLL VENOUS BLD VENIPUNCTURE: CPT

## 2024-06-10 PROCEDURE — 80307 DRUG TEST PRSMV CHEM ANLYZR: CPT | Performed by: NURSE PRACTITIONER

## 2024-06-10 PROCEDURE — 70551 MRI BRAIN STEM W/O DYE: CPT

## 2024-06-10 PROCEDURE — 87040 BLOOD CULTURE FOR BACTERIA: CPT | Performed by: NURSE PRACTITIONER

## 2024-06-10 PROCEDURE — 83735 ASSAY OF MAGNESIUM: CPT | Performed by: NURSE PRACTITIONER

## 2024-06-10 PROCEDURE — 71045 X-RAY EXAM CHEST 1 VIEW: CPT

## 2024-06-10 PROCEDURE — 82607 VITAMIN B-12: CPT | Performed by: NURSE PRACTITIONER

## 2024-06-10 PROCEDURE — 73564 X-RAY EXAM KNEE 4 OR MORE: CPT

## 2024-06-10 PROCEDURE — 97166 OT EVAL MOD COMPLEX 45 MIN: CPT

## 2024-06-10 PROCEDURE — 84484 ASSAY OF TROPONIN QUANT: CPT | Performed by: NURSE PRACTITIONER

## 2024-06-10 PROCEDURE — 85652 RBC SED RATE AUTOMATED: CPT | Performed by: NURSE PRACTITIONER

## 2024-06-10 PROCEDURE — 0202U NFCT DS 22 TRGT SARS-COV-2: CPT | Performed by: NURSE PRACTITIONER

## 2024-06-10 PROCEDURE — 86140 C-REACTIVE PROTEIN: CPT | Performed by: NURSE PRACTITIONER

## 2024-06-10 PROCEDURE — 82948 REAGENT STRIP/BLOOD GLUCOSE: CPT

## 2024-06-10 PROCEDURE — 80050 GENERAL HEALTH PANEL: CPT | Performed by: NURSE PRACTITIONER

## 2024-06-10 PROCEDURE — 82077 ASSAY SPEC XCP UR&BREATH IA: CPT | Performed by: NURSE PRACTITIONER

## 2024-06-10 RX ORDER — ATORVASTATIN CALCIUM 40 MG/1
40 TABLET, FILM COATED ORAL DAILY
Status: DISCONTINUED | OUTPATIENT
Start: 2024-06-10 | End: 2024-06-11

## 2024-06-10 RX ORDER — ACETAMINOPHEN 325 MG/1
650 TABLET ORAL EVERY 4 HOURS PRN
Status: DISCONTINUED | OUTPATIENT
Start: 2024-06-10 | End: 2024-06-12 | Stop reason: HOSPADM

## 2024-06-10 RX ORDER — ONDANSETRON 4 MG/1
4 TABLET, ORALLY DISINTEGRATING ORAL EVERY 6 HOURS PRN
Status: DISCONTINUED | OUTPATIENT
Start: 2024-06-10 | End: 2024-06-12 | Stop reason: HOSPADM

## 2024-06-10 RX ORDER — ROPINIROLE 0.5 MG/1
0.5 TABLET, FILM COATED ORAL 2 TIMES DAILY
COMMUNITY

## 2024-06-10 RX ORDER — UREA 10 %
5 LOTION (ML) TOPICAL NIGHTLY PRN
Status: DISCONTINUED | OUTPATIENT
Start: 2024-06-10 | End: 2024-06-12 | Stop reason: HOSPADM

## 2024-06-10 RX ORDER — ATENOLOL 25 MG/1
25 TABLET ORAL DAILY
COMMUNITY
End: 2024-06-10

## 2024-06-10 RX ORDER — SODIUM CHLORIDE 0.9 % (FLUSH) 0.9 %
10 SYRINGE (ML) INJECTION EVERY 12 HOURS SCHEDULED
Status: DISCONTINUED | OUTPATIENT
Start: 2024-06-10 | End: 2024-06-12 | Stop reason: HOSPADM

## 2024-06-10 RX ORDER — POLYETHYLENE GLYCOL 3350 17 G/17G
17 POWDER, FOR SOLUTION ORAL DAILY PRN
Status: DISCONTINUED | OUTPATIENT
Start: 2024-06-10 | End: 2024-06-12 | Stop reason: HOSPADM

## 2024-06-10 RX ORDER — OLANZAPINE 10 MG/2ML
5 INJECTION, POWDER, LYOPHILIZED, FOR SOLUTION INTRAMUSCULAR ONCE
Status: COMPLETED | OUTPATIENT
Start: 2024-06-10 | End: 2024-06-10

## 2024-06-10 RX ORDER — ONDANSETRON 2 MG/ML
4 INJECTION INTRAMUSCULAR; INTRAVENOUS EVERY 6 HOURS PRN
Status: DISCONTINUED | OUTPATIENT
Start: 2024-06-10 | End: 2024-06-12 | Stop reason: HOSPADM

## 2024-06-10 RX ORDER — BISACODYL 10 MG
10 SUPPOSITORY, RECTAL RECTAL DAILY PRN
Status: DISCONTINUED | OUTPATIENT
Start: 2024-06-10 | End: 2024-06-12 | Stop reason: HOSPADM

## 2024-06-10 RX ORDER — ALUMINA, MAGNESIA, AND SIMETHICONE 2400; 2400; 240 MG/30ML; MG/30ML; MG/30ML
15 SUSPENSION ORAL EVERY 6 HOURS PRN
Status: DISCONTINUED | OUTPATIENT
Start: 2024-06-10 | End: 2024-06-12 | Stop reason: HOSPADM

## 2024-06-10 RX ORDER — ERGOCALCIFEROL 1.25 MG/1
50000 CAPSULE ORAL WEEKLY
COMMUNITY

## 2024-06-10 RX ORDER — CLOPIDOGREL BISULFATE 75 MG/1
75 TABLET ORAL DAILY
Status: DISCONTINUED | OUTPATIENT
Start: 2024-06-10 | End: 2024-06-12 | Stop reason: HOSPADM

## 2024-06-10 RX ORDER — ASPIRIN 81 MG/1
81 TABLET ORAL DAILY
Status: DISCONTINUED | OUTPATIENT
Start: 2024-06-10 | End: 2024-06-12 | Stop reason: HOSPADM

## 2024-06-10 RX ORDER — AMOXICILLIN 250 MG
2 CAPSULE ORAL 2 TIMES DAILY PRN
Status: DISCONTINUED | OUTPATIENT
Start: 2024-06-10 | End: 2024-06-12 | Stop reason: HOSPADM

## 2024-06-10 RX ORDER — BISACODYL 5 MG/1
5 TABLET, DELAYED RELEASE ORAL DAILY PRN
Status: DISCONTINUED | OUTPATIENT
Start: 2024-06-10 | End: 2024-06-12 | Stop reason: HOSPADM

## 2024-06-10 RX ORDER — NITROGLYCERIN 0.4 MG/1
0.4 TABLET SUBLINGUAL
Status: DISCONTINUED | OUTPATIENT
Start: 2024-06-10 | End: 2024-06-12 | Stop reason: HOSPADM

## 2024-06-10 RX ORDER — SODIUM CHLORIDE 0.9 % (FLUSH) 0.9 %
10 SYRINGE (ML) INJECTION AS NEEDED
Status: DISCONTINUED | OUTPATIENT
Start: 2024-06-10 | End: 2024-06-12 | Stop reason: HOSPADM

## 2024-06-10 RX ORDER — ATENOLOL 50 MG/1
25 TABLET ORAL DAILY
COMMUNITY
End: 2024-06-12 | Stop reason: HOSPADM

## 2024-06-10 RX ORDER — SODIUM CHLORIDE 9 MG/ML
40 INJECTION, SOLUTION INTRAVENOUS AS NEEDED
Status: DISCONTINUED | OUTPATIENT
Start: 2024-06-10 | End: 2024-06-12 | Stop reason: HOSPADM

## 2024-06-10 RX ADMIN — OLANZAPINE 5 MG: 10 INJECTION, POWDER, FOR SOLUTION INTRAMUSCULAR at 16:16

## 2024-06-10 RX ADMIN — Medication 10 ML: at 20:47

## 2024-06-10 RX ADMIN — ASPIRIN 81 MG: 81 TABLET, COATED ORAL at 12:34

## 2024-06-10 RX ADMIN — ATORVASTATIN CALCIUM 40 MG: 40 TABLET, FILM COATED ORAL at 13:47

## 2024-06-10 RX ADMIN — Medication 10 ML: at 12:35

## 2024-06-10 RX ADMIN — Medication 5 MG: at 21:08

## 2024-06-10 RX ADMIN — CLOPIDOGREL BISULFATE 75 MG: 75 TABLET ORAL at 13:47

## 2024-06-10 NOTE — Clinical Note
Level of Care: Observation Unit [28]   Diagnosis: AMS (altered mental status) [5381230]   Admitting Physician: CARLEEN GARCIA [[id number]]   Attending Physician: CARLEEN GARCIA [[id number]]

## 2024-06-10 NOTE — THERAPY EVALUATION
Patient Name: Briana Alas  : 1965    MRN: [id number]                              Today's Date: 6/10/2024       Admit Date: 6/10/2024    Visit Dx:     ICD-10-CM ICD-9-CM   1. Altered mental status, unspecified altered mental status type  R41.82 780.97     Patient Active Problem List   Diagnosis    Fibromyalgia    Abnormal gait    Ankle pain, right    Knee pain    Leg pain, left    Annular tear of lumbar disc    Degeneration of lumbar intervertebral disc    Generalized anxiety disorder    Body mass index 32.0-32.9, adult    Cervical myelopathy    Other dorsalgia    Chronic low back pain    Hx traumatic fracture    Hyperlipidemia    Hypertension, essential    Hypovitaminosis D    Inflammatory arthritis    Joint pain    Leg weakness, bilateral    Lumbosacral radiculopathy    Malaise and fatigue    Neck pain, chronic    Osteoarthritis of knee    Pain in right shoulder    Pain due to internal orthopedic prosthetic devices, implants and grafts, subsequent encounter    Rotator cuff tendonitis    Scoliosis    Osteoarthritis of lumbosacral spine without myelopathy    Spondylosis of lumbosacral region    Tobacco abuse counseling    Upper respiratory tract infection    Major depressive disorder, recurrent episode, moderate    Primary insomnia    Bereavement    Acquired hallux valgus, left    AMS (altered mental status)     Past Medical History:   Diagnosis Date    Anxiety     Arthritis     Back pain     Depression     Headache     Hyperlipidemia     Hypertension     Injury of back     Restless leg syndrome      Past Surgical History:   Procedure Laterality Date    ANKLE OPEN REDUCTION INTERNAL FIXATION Right     BUNIONECTOMY Left 12/15/2023    Procedure: BUNIONECTOMY JHONATAN;  Surgeon: GILBERTO Eldridge DPM;  Location: [facility] OR;  Service: Podiatry;  Laterality: Left;    FOOT SURGERY Bilateral     bunionectomy    SHOULDER ARTHROSCOPY W/ ROTATOR CUFF REPAIR Right 2019    Procedure: RIGHT SHOULDER ARTHROSCOPY  WITH ROTATOR CUFF REPAIR and Subacromial decompression;  Surgeon: Gino Ackerman MD;  Location: [facility] MAIN OR;  Service: Orthopedics    TUBAL ABDOMINAL LIGATION        General Information       Row Name 06/10/24 1503          OT Time and Intention    Document Type evaluation  -MS     Mode of Treatment occupational therapy  -MS       Row Name 06/10/24 1503          General Information    Patient Profile Reviewed yes  -MS     Prior Level of Function independent:;ADL's;all household mobility;community mobility  -MS     Existing Precautions/Restrictions fall  -MS     Barriers to Rehab cognitive status  limited reliable transportation  -MS       Row Name 06/10/24 1503          Occupational Profile    Reason for Services/Referral (Occupational Profile) Pt is a 57 y/o F admitted to [facility] 6/10/24 with c/o AMS with slowed speech, L knee pain s/p fall. XR chest (-) acute. XR L knee (-) acute. CT head (-) acute. MRI brain pending. PMHx significant for fibromyalgia, STEWART, MDD, HTN, anxiety and depression. At baseline pt resides with adult son in mobile home with 4 JANIS. Pt typically (I) with ADLs and mobility without AD. Pt can drive, however does not have car, pt and son are reliable on others for transportation.  -MS     Successful Occupations (Occupational Profile) retired nurse  -MS     Environmental Supports and Barriers (Occupational Profile) limited support, limited transportation  -MS       Row Name 06/10/24 1503          Living Environment    People in Home child(maite), adult  -MS       Row Name 06/10/24 1503          Home Main Entrance    Number of Stairs, Main Entrance four  -MS       Row Name 06/10/24 1503          Stairs Within Home, Primary    Number of Stairs, Within Home, Primary none  -MS       Row Name 06/10/24 1503          Cognition    Orientation Status (Cognition) oriented x 3;disoriented to;time  disoriented to month, scored 9/28 indicating questionable cognition, difficulty word finding, demo  left-right confusion  -MS       Row Name 06/10/24 1503          Safety Issues, Functional Mobility    Safety Issues Affecting Function (Mobility) insight into deficits/self-awareness;judgment;problem-solving  -MS     Impairments Affecting Function (Mobility) balance;cognition;endurance/activity tolerance;pain;visual/perceptual  -MS     Cognitive Impairments, Mobility Safety/Performance insight into deficits/self-awareness;judgment;problem-solving/reasoning;safety precaution awareness  -MS               User Key  (r) = Recorded By, (t) = Taken By, (c) = Cosigned By      Initials Name Provider Type    MS Pittman Suzie, OT Occupational Therapist                     Mobility/ADL's       Row Name 06/10/24 1511          Bed Mobility    Bed Mobility supine-sit;sit-supine  -MS     Supine-Sit [place] (Bed Mobility) supervision  -MS     Sit-Supine [place] (Bed Mobility) supervision  -MS     Assistive Device (Bed Mobility) head of bed elevated  -MS       Row Name 06/10/24 1511          Transfers    Transfers sit-stand transfer  -MS       Row Name 06/10/24 1511          Sit-Stand Transfer    Sit-Stand [place] (Transfers) contact guard  -MS       Row Name 06/10/24 1511          Activities of Daily Living    BADL Assessment/Intervention lower body dressing  -MS       Row Name 06/10/24 1511          Lower Body Dressing Assessment/Training    [place] Level (Lower Body Dressing) don;socks;supervision  -MS     Position (Lower Body Dressing) long sitting  -MS               User Key  (r) = Recorded By, (t) = Taken By, (c) = Cosigned By      Initials Name Provider Type    MS Suzie Pittman OT Occupational Therapist                   Obj/Interventions       Row Name 06/10/24 1513          Sensory Assessment (Somatosensory)    Sensory Assessment (Somatosensory) sensation intact  -MS     Sensory Assessment pt reports LLE tingling upon admission, resolved  -MS       Row Name 06/10/24 1513          Vision  Assessment/Intervention    Visual Impairment/Limitations WFL  -MS       Row Name 06/10/24 1513          Range of Motion Comprehensive    Comment, General Range of Motion BUE WNL  -MS       Row Name 06/10/24 1513          Strength Comprehensive (MMT)    Comment, General Manual Muscle Testing (MMT) Assessment BUE grossly 4-/5  -MS       Row Name 06/10/24 1513          Balance    Balance Assessment sitting static balance;sitting dynamic balance;standing static balance;standing dynamic balance  -MS     Static Sitting Balance standby assist  -MS     Dynamic Sitting Balance contact guard  -MS     Position, Sitting Balance unsupported;sitting edge of bed  -MS     Static Standing Balance contact guard  -MS     Dynamic Standing Balance contact guard;minimal assist  -MS     Position/Device Used, Standing Balance unsupported  -MS               User Key  (r) = Recorded By, (t) = Taken By, (c) = Cosigned By      Initials Name Provider Type    Suzie Kelly, RAS Occupational Therapist                   Goals/Plan       Row Name 06/10/24 1523          Bed Mobility Goal 1 (OT)    Activity/Assistive Device (Bed Mobility Goal 1, OT) bed mobility activities, all  -MS     [place] Level/Cues Needed (Bed Mobility Goal 1, OT) modified independence  -MS     Time Frame (Bed Mobility Goal 1, OT) long term goal (LTG);2 weeks  -MS     Progress/Outcomes (Bed Mobility Goal 1, OT) new goal  -MS       Row Name 06/10/24 1523          Transfer Goal 1 (OT)    Activity/Assistive Device (Transfer Goal 1, OT) transfers, all  -MS     [place] Level/Cues Needed (Transfer Goal 1, OT) modified independence  -MS     Time Frame (Transfer Goal 1, OT) long term goal (LTG);2 weeks  -MS     Progress/Outcome (Transfer Goal 1, OT) new goal  -MS       Row Name 06/10/24 1523          Toileting Goal 1 (OT)    Activity/Device (Toileting Goal 1, OT) toileting skills, all  -MS     [place] Level/Cues Needed (Toileting Goal 1, OT) modified independence   -MS     Time Frame (Toileting Goal 1, OT) long term goal (LTG);2 weeks  -MS     Progress/Outcome (Toileting Goal 1, OT) new goal  -MS       Row Name 06/10/24 1523          Problem Specific Goal 1 (OT)    Problem Specific Goal 1 (OT) attend to ADL task >5 minutes without cues  -MS     Time Frame (Problem Specific Goal 1, OT) long term goal (LTG);2 weeks  -MS     Progress/Outcome (Problem Specific Goal 1, OT) new goal  -MS       Row Name 06/10/24 1523          Therapy Assessment/Plan (OT)    Planned Therapy Interventions (OT) activity tolerance training;adaptive equipment training;BADL retraining;IADL retraining;functional balance retraining;occupation/activity based interventions;passive ROM/stretching;patient/caregiver education/training;transfer/mobility retraining;strengthening exercise;ROM/therapeutic exercise  -MS               User Key  (r) = Recorded By, (t) = Taken By, (c) = Cosigned By      Initials Name Provider Type    MS Suzie Pittman, OT Occupational Therapist                   Clinical Impression       Row Name 06/10/24 1517          Pain Assessment    Pretreatment Pain Rating 8/10  -MS     Posttreatment Pain Rating 8/10  -MS     Pain Location - Side/Orientation Left  -MS     Pain Location - back;knee  -MS     Pain Intervention(s) Repositioned;Emotional support  -MS       Row Name 06/10/24 1517          Plan of Care Review    Plan of Care Reviewed With patient  -MS     Progress no change  -MS     Outcome Evaluation Pt is a 59 y/o F admitted to [facility] 6/10/24 with c/o AMS with slowed speech, L knee pain s/p fall. MRI brain pending. At baseline pt resides with adult son in mobile home with 4 JANIS. Pt typically (I) with ADLs and mobility without AD. Pt can drive, however does not have car, pt and son are reliable on others for transportation. This date pt A&Ox3, disoriented to month, scored 9/28 on SBT indicating questionable cognitive impairment. Pt demo slowed speech at times and demo difficulty with word  finding, requires cues to attend to verbal sequencing task. Pt demo left-right confusion in BUE/BLE and with visual scanning, demo poor insight to deficit. Pt requires supervision for bed mobility, CGA to come to standing and CGA with ambulation, CGA-min A for higher level balance task. Anticipate pt to progress and likely to be safe to dc home with assistance. OT recommending OPOT to improve executive function and safety awareness, pt expresses difficulty with transportation, may be agreeable to HHOT, will follow within acute setting.  -MS       Row Name 06/10/24 1517          Therapy Assessment/Plan (OT)    Rehab Potential (OT) good, to achieve stated therapy goals  -MS     Criteria for Skilled Therapeutic Interventions Met (OT) yes;meets criteria;skilled treatment is necessary  -MS     Therapy Frequency (OT) 3 times/wk  -MS     Predicted Duration of Therapy Intervention (OT) until d/c  -MS       Row Name 06/10/24 1517          Therapy Plan Review/Discharge Plan (OT)    Anticipated Discharge Disposition (OT) home with assist;home with outpatient therapy services  -MS       Row Name 06/10/24 1517          Vital Signs    Pretreatment Heart Rate (beats/min) 59  -MS     Posttreatment Heart Rate (beats/min) 65  -MS     Pre SpO2 (%) 95  -MS     O2 Delivery Pre Treatment room air  -MS     O2 Delivery Intra Treatment room air  -MS     O2 Delivery Post Treatment room air  -MS     Pre Patient Position Supine  -MS     Intra Patient Position Standing  -MS     Post Patient Position Supine  -MS       Row Name 06/10/24 1517          Positioning and Restraints    Pre-Treatment Position in bed  -MS     Post Treatment Position bed  -MS     In Bed supine;call light within reach;encouraged to call for assist  on stretcher, no alarm available  -MS               User Key  (r) = Recorded By, (t) = Taken By, (c) = Cosigned By      Initials Name Provider Type    Suzie Kelly, OT Occupational Therapist                   Outcome  Measures       Row Name 06/10/24 1523          How much help from another is currently needed...    Putting on and taking off regular lower body clothing? 3  -MS     Bathing (including washing, rinsing, and drying) 3  -MS     Toileting (which includes using toilet bed pan or urinal) 3  -MS     Putting on and taking off regular upper body clothing 3  -MS     Taking care of personal grooming (such as brushing teeth) 3  -MS     Eating meals 4  -MS     AM-PAC 6 Clicks Score (OT) 19  -MS       Row Name 06/10/24 1523          Functional Assessment    Outcome Measure Options AM-PAC 6 Clicks Daily Activity (OT)  -MS               User Key  (r) = Recorded By, (t) = Taken By, (c) = Cosigned By      Initials Name Provider Type    MS Suzie Pittman OT Occupational Therapist                    Occupational Therapy Education       Title: PT OT SLP Therapies (Done)       Topic: Occupational Therapy (Done)       Point: ADL training (Done)       Description:   Instruct learner(s) on proper safety adaptation and remediation techniques during self care or transfers.   Instruct in proper use of assistive devices.                  Learning Progress Summary             Patient Acceptance, E,TB, VU by MS at 6/10/2024 1524                         Point: Body mechanics (Done)       Description:   Instruct learner(s) on proper positioning and spine alignment during self-care, functional mobility activities and/or exercises.                  Learning Progress Summary             Patient Acceptance, E,TB, VU by MS at 6/10/2024 1524                                         User Key       Initials Effective Dates Name Provider Type Discipline    MS 07/13/22 -  Suzie Pittman OT Occupational Therapist OT                  OT Recommendation and Plan  Planned Therapy Interventions (OT): activity tolerance training, adaptive equipment training, BADL retraining, IADL retraining, functional balance retraining, occupation/activity based interventions,  passive ROM/stretching, patient/caregiver education/training, transfer/mobility retraining, strengthening exercise, ROM/therapeutic exercise  Therapy Frequency (OT): 3 times/wk  Plan of Care Review  Plan of Care Reviewed With: patient  Progress: no change  Outcome Evaluation: Pt is a 57 y/o F admitted to [facility] 6/10/24 with c/o AMS with slowed speech, L knee pain s/p fall. MRI brain pending. At baseline pt resides with adult son in mobile home with 4 JANIS. Pt typically (I) with ADLs and mobility without AD. Pt can drive, however does not have car, pt and son are reliable on others for transportation. This date pt A&Ox3, disoriented to month, scored 9/28 on SBT indicating questionable cognitive impairment. Pt demo slowed speech at times and demo difficulty with word finding, requires cues to attend to verbal sequencing task. Pt demo left-right confusion in BUE/BLE and with visual scanning, demo poor insight to deficit. Pt requires supervision for bed mobility, CGA to come to standing and CGA with ambulation, CGA-min A for higher level balance task. Anticipate pt to progress and likely to be safe to dc home with assistance. OT recommending OPOT to improve executive function and safety awareness, pt expresses difficulty with transportation, may be agreeable to HHOT, will follow within acute setting.     Time Calculation:                   Suzie Pittman OT  6/10/2024

## 2024-06-10 NOTE — THERAPY EVALUATION
Patient Name: Briana Alas  : 1965    MRN: [id number]                              Today's Date: 6/10/2024       Admit Date: 6/10/2024    Visit Dx:     ICD-10-CM ICD-9-CM   1. Altered mental status, unspecified altered mental status type  R41.82 780.97     Patient Active Problem List   Diagnosis    Fibromyalgia    Abnormal gait    Ankle pain, right    Knee pain    Leg pain, left    Annular tear of lumbar disc    Degeneration of lumbar intervertebral disc    Generalized anxiety disorder    Body mass index 32.0-32.9, adult    Cervical myelopathy    Other dorsalgia    Chronic low back pain    Hx traumatic fracture    Hyperlipidemia    Hypertension, essential    Hypovitaminosis D    Inflammatory arthritis    Joint pain    Leg weakness, bilateral    Lumbosacral radiculopathy    Malaise and fatigue    Neck pain, chronic    Osteoarthritis of knee    Pain in right shoulder    Pain due to internal orthopedic prosthetic devices, implants and grafts, subsequent encounter    Rotator cuff tendonitis    Scoliosis    Osteoarthritis of lumbosacral spine without myelopathy    Spondylosis of lumbosacral region    Tobacco abuse counseling    Upper respiratory tract infection    Major depressive disorder, recurrent episode, moderate    Primary insomnia    Bereavement    Acquired hallux valgus, left    AMS (altered mental status)     Past Medical History:   Diagnosis Date    Anxiety     Arthritis     Back pain     Depression     Headache     Hyperlipidemia     Hypertension     Injury of back     Restless leg syndrome      Past Surgical History:   Procedure Laterality Date    ANKLE OPEN REDUCTION INTERNAL FIXATION Right     BUNIONECTOMY Left 12/15/2023    Procedure: BUNIONECTOMY JHONATAN;  Surgeon: GILBERTO Eldridge DPM;  Location: [facility] OR;  Service: Podiatry;  Laterality: Left;    FOOT SURGERY Bilateral     bunionectomy    SHOULDER ARTHROSCOPY W/ ROTATOR CUFF REPAIR Right 2019    Procedure: RIGHT SHOULDER ARTHROSCOPY  WITH ROTATOR CUFF REPAIR and Subacromial decompression;  Surgeon: Gino Ackerman MD;  Location: [facility] MAIN OR;  Service: Orthopedics    TUBAL ABDOMINAL LIGATION        General Information       Row Name 06/10/24 1557          Physical Therapy Time and Intention    Document Type evaluation  -BR     Mode of Treatment physical therapy  -BR       Row Name 06/10/24 1554          General Information    Patient Profile Reviewed yes  -BR     Prior Level of Function independent:;all household mobility;community mobility;gait;transfer  -BR     Existing Precautions/Restrictions fall  -BR     Barriers to Rehab cognitive status;environmental barriers  Neither pt nor son has a car.  -BR       Row Name 06/10/24 1558          Living Environment    People in Home child(maite), adult  -BR       Row Name 06/10/24 1555          Home Main Entrance    Number of Stairs, Main Entrance four  -BR       Row Name 06/10/24 1555          Stairs Within Home, Primary    Number of Stairs, Within Home, Primary none  -BR       Row Name 06/10/24 1556          Cognition    Orientation Status (Cognition) oriented to;person;place;situation  Pt disoriented to month.  -BR       Row Name 06/10/24 1550          Safety Issues, Functional Mobility    Safety Issues Affecting Function (Mobility) sequencing abilities  -BR     Impairments Affecting Function (Mobility) balance;cognition;endurance/activity tolerance;pain;visual/perceptual  -BR     Cognitive Impairments, Mobility Safety/Performance problem-solving/reasoning  -BR               User Key  (r) = Recorded By, (t) = Taken By, (c) = Cosigned By      Initials Name Provider Type    BR Lis Palmer PT Physical Therapist                   Mobility       Row Name 06/10/24 3709          Bed Mobility    Bed Mobility supine-sit;sit-supine  -BR     Supine-Sit [place] (Bed Mobility) supervision  -BR     Sit-Supine [place] (Bed Mobility) supervision  -BR     Assistive Device (Bed Mobility) head  of bed elevated  -BR       Row Name 06/10/24 1557          Sit-Stand Transfer    Sit-Stand [place] (Transfers) contact guard  -BR       Row Name 06/10/24 1557          Gait/Stairs (Locomotion)    [place] Level (Gait) contact guard;supervision  -BR     Patient was able to Ambulate yes  -BR     Distance in Feet (Gait) 150  -BR     Bilateral Gait Deviations decreased arm swing  -BR     Comment, (Gait/Stairs) Pt had hesitation with dynamic gait assessment. Pt alwys turned head to right when asked to turn head to left.  -BR               User Key  (r) = Recorded By, (t) = Taken By, (c) = Cosigned By      Initials Name Provider Type    Lis Tesfaye PT Physical Therapist                   Obj/Interventions       Row Name 06/10/24 1559          Range of Motion Comprehensive    General Range of Motion bilateral lower extremity ROM WFL  -BR       Row Name 06/10/24 1559          Strength Comprehensive (MMT)    Comment, General Manual Muscle Testing (MMT) Assessment BLE strength grossly 4-/5  -BR       Row Name 06/10/24 1559          Balance    Balance Assessment sitting static balance;sitting dynamic balance;standing static balance;standing dynamic balance  -BR     Static Sitting Balance standby assist  -BR     Dynamic Sitting Balance contact guard  -BR     Position, Sitting Balance unsupported;sitting edge of bed  -BR     Static Standing Balance contact guard  -BR     Dynamic Standing Balance contact guard;minimal assist  -BR     Position/Device Used, Standing Balance unsupported  -BR       Row Name 06/10/24 1559          Sensory Assessment (Somatosensory)    Sensory Assessment (Somatosensory) sensation intact  -BR               User Key  (r) = Recorded By, (t) = Taken By, (c) = Cosigned By      Initials Name Provider Type    Lis Tesfaye PT Physical Therapist                   Goals/Plan       Row Name 06/10/24 1618          Bed Mobility Goal 1 (PT)    Activity/Assistive Device (Bed Mobility Goal  1, PT) bed mobility activities, all  -BR     [place] Level/Cues Needed (Bed Mobility Goal 1, PT) independent  -BR     Time Frame (Bed Mobility Goal 1, PT) long term goal (LTG);2 weeks  -BR       Row Name 06/10/24 1618          Transfer Goal 1 (PT)    Activity/Assistive Device (Transfer Goal 1, PT) transfers, all  -BR     [place] Level/Cues Needed (Transfer Goal 1, PT) independent  -BR     Time Frame (Transfer Goal 1, PT) long term goal (LTG);2 weeks  -BR       Row Name 06/10/24 1618          Gait Training Goal 1 (PT)    Activity/Assistive Device (Gait Training Goal 1, PT) gait (walking locomotion)  -BR     [place] Level (Gait Training Goal 1, PT) independent  -BR     Distance (Gait Training Goal 1, PT) 150  -BR     Time Frame (Gait Training Goal 1, PT) long term goal (LTG);2 weeks  -BR       Row Name 06/10/24 1618          Stairs Goal 1 (PT)    Activity/Assistive Device (Stairs Goal 1, PT) stairs, all skills  -BR     [place] Level/Cues Needed (Stairs Goal 1, PT) standby assist  -BR     Number of Stairs (Stairs Goal 1, PT) 4  -BR     Time Frame (Stairs Goal 1, PT) long term goal (LTG);2 weeks  -BR       Row Name 06/10/24 1618          Therapy Assessment/Plan (PT)    Planned Therapy Interventions (PT) balance training;transfer training;stair training;patient/family education;gait training;vestibular therapy;strengthening;neuromuscular re-education  -BR               User Key  (r) = Recorded By, (t) = Taken By, (c) = Cosigned By      Initials Name Provider Type    BR Lis Palmer, PT Physical Therapist                   Clinical Impression       Row Name 06/10/24 1600          Pain    Pretreatment Pain Rating 8/10  -BR     Posttreatment Pain Rating 8/10  -BR     Pain Location - Side/Orientation Left  -BR     Pain Location - back;knee;toe  -BR       Row Name 06/10/24 1600          Plan of Care Review    Plan of Care Reviewed With patient  -BR     Outcome Evaluation Pt presents as a 59 y/o F  admitted to [facility] 6/10/24 with c/o AMS with slowed speech, L knee pain s/p fall. MRI brain pending. CT head: No acute process. X-ray left knee: No evidence of fracture or significant joint effusion. CXR: No acute radiographic abnormality is identified.  At baseline pt resides with adult son in mobile home with 4 JANIS. Pt typically independent with mobility without AD. Pt can drive, however does not have car, pt and son are reliable on others for transportation. This date pt on stretcher in ED and A and O x 3,disoriented to month, scored 9/28 on SBT per OT indicating questionable cognitive impairment. Pt demo slow speech at times and some difficulty with word finding, requiring cues to attend to verbal sequencing task. Pt demo left-right confusion in BUE/BLE and with visual scanning, demo poor insight to deficit. Pt requiring supervision for bed mobility, CGA to come to standing and CGA with ambulation, CGA-min A for dynamic gait activities. PT will follow pt for aforementioned deficits. PT recommendation at this time id Home with Assist and OP PT or HH PT.  -BR       Row Name 06/10/24 1600          Therapy Assessment/Plan (PT)    Rehab Potential (PT) good, to achieve stated therapy goals  -BR     Criteria for Skilled Interventions Met (PT) yes;meets criteria;skilled treatment is necessary  -BR     Therapy Frequency (PT) 3 times/wk  -BR     Predicted Duration of Therapy Intervention (PT) until D/C  -BR       Row Name 06/10/24 1600          Vital Signs    Pre Systolic BP Rehab 131  -BR     Pre Treatment Diastolic BP 69  -BR     Pretreatment Heart Rate (beats/min) 61  -BR     Posttreatment Heart Rate (beats/min) 56  -BR     Pretreatment Resp Rate (breaths/min) 14  -BR     Posttreatment Resp Rate (breaths/min) 11  -BR     Pre SpO2 (%) 95  -BR     O2 Delivery Pre Treatment room air  -BR     Post SpO2 (%) 95  -BR     O2 Delivery Post Treatment room air  -BR     Pre Patient Position Supine  -BR     Intra Patient Position  Standing  -BR     Post Patient Position Supine  -BR       Row Name 06/10/24 1600          Positioning and Restraints    Pre-Treatment Position in bed  -BR     Post Treatment Position bed  -BR     In Bed notified nsg;fowlers;call light within reach;encouraged to call for assist;side rails up x2  Pt on stretcher, no alarm available  -BR               User Key  (r) = Recorded By, (t) = Taken By, (c) = Cosigned By      Initials Name Provider Type    Lis Tesfaye, KIRA Physical Therapist                   Outcome Measures       Row Name 06/10/24 1619          How much help from another person do you currently need...    Turning from your back to your side while in flat bed without using bedrails? 4  -BR     Moving from lying on back to sitting on the side of a flat bed without bedrails? 4  -BR     Moving to and from a bed to a chair (including a wheelchair)? 3  -BR     Standing up from a chair using your arms (e.g., wheelchair, bedside chair)? 4  -BR     Climbing 3-5 steps with a railing? 3  -BR     To walk in hospital room? 3  -BR     AM-PAC 6 Clicks Score (PT) 21  -BR     Highest Level of Mobility Goal 6 --> Walk 10 steps or more  -BR       Row Name 06/10/24 1619          Modified Lisa Scale    Pre-Stroke Modified Lisa Scale 0 - No Symptoms at all.  -BR     Modified [place] Scale 3 - Moderate disability.  Requiring some help, but able to walk without assistance.  -BR       Row Name 06/10/24 1619 06/10/24 1523       Functional Assessment    Outcome Measure Options AM-PAC 6 Clicks Basic Mobility (PT);Modified [place]  -BR AM-PAC 6 Clicks Daily Activity (OT)  -MS              User Key  (r) = Recorded By, (t) = Taken By, (c) = Cosigned By      Initials Name Provider Type    Suzie Kelly, OT Occupational Therapist    Lis Tesfaye, PT Physical Therapist                                 Physical Therapy Education       Title: PT OT SLP Therapies (Done)       Topic: Physical Therapy (Done)       Point:  Mobility training (Done)       Learning Progress Summary             Patient Acceptance, E,D, VU,DU,NR by BR at 6/10/2024 1619                         Point: Home exercise program (Done)       Learning Progress Summary             Patient Acceptance, E,D, VU,DU,NR by BR at 6/10/2024 1619                         Point: Body mechanics (Done)       Learning Progress Summary             Patient Acceptance, E,D, VU,DU,NR by BR at 6/10/2024 1619                         Point: Precautions (Done)       Learning Progress Summary             Patient Acceptance, E,D, VU,DU,NR by BR at 6/10/2024 1619                                         User Key       Initials Effective Dates Name Provider Type Discipline    BR 02/01/22 -  Lis Palmer, PT Physical Therapist PT                  PT Recommendation and Plan  Planned Therapy Interventions (PT): balance training, transfer training, stair training, patient/family education, gait training, vestibular therapy, strengthening, neuromuscular re-education  Plan of Care Reviewed With: patient  Outcome Evaluation: Pt presents as a 59 y/o F admitted to [facility] 6/10/24 with c/o AMS with slowed speech, L knee pain s/p fall. MRI brain pending. CT head: No acute process. X-ray left knee: No evidence of fracture or significant joint effusion. CXR: No acute radiographic abnormality is identified.  At baseline pt resides with adult son in mobile home with 4 JANIS. Pt typically independent with mobility without AD. Pt can drive, however does not have car, pt and son are reliable on others for transportation. This date pt on stretcher in ED and A and O x 3,disoriented to month, scored 9/28 on SBT per OT indicating questionable cognitive impairment. Pt demo slow speech at times and some difficulty with word finding, requiring cues to attend to verbal sequencing task. Pt demo left-right confusion in BUE/BLE and with visual scanning, demo poor insight to deficit. Pt requiring supervision for bed  mobility, CGA to come to standing and CGA with ambulation, CGA-min A for dynamic gait activities. PT will follow pt for aforementioned deficits. PT recommendation at this time id Home with Assist and OP PT or HH PT.     Time Calculation:         PT Charges       Row Name 06/10/24 1619             Time Calculation    Start Time 1430  -BR      Stop Time 1452  -BR      Time Calculation (min) 22 min  -BR      PT Received On 06/10/24  -BR      PT - Next Appointment 06/11/24  -BR      PT Goal Re-Cert Due Date 06/24/24  -BR         Time Calculation- PT    Total Timed Code Minutes- PT 0 minute(s)  -BR                User Key  (r) = Recorded By, (t) = Taken By, (c) = Cosigned By      Initials Name Provider Type    Lis Tesfaye PT Physical Therapist                  Therapy Charges for Today       Code Description Service Date Service Provider Modifiers Qty    [id number] HC PT EVAL MOD COMPLEXITY 4 6/10/2024 Lis Palmer, PT GP 1            PT G-Codes  Outcome Measure Options: AM-PAC 6 Clicks Basic Mobility (PT), Modified Lisa  AM-PAC 6 Clicks Score (PT): 21  AM-PAC 6 Clicks Score (OT): 19  Modified [place] Scale: 3 - Moderate disability.  Requiring some help, but able to walk without assistance.  PT Discharge Summary  Anticipated Discharge Disposition (PT): home with assist, home with outpatient therapy services, home with home health    Lis Palmer, KIRA  6/10/2024

## 2024-06-10 NOTE — CASE MANAGEMENT/SOCIAL WORK
Discharge Planning Assessment   [place]     Patient Name: Briana Alas  MRN: [id number]  Today's Date: 6/10/2024    Admit Date: 6/10/2024    Plan: Home   Discharge Needs Assessment       Row Name 06/10/24 1737       Living Environment    People in Home child(maite), adult  Lives with dre Watters    Name(s) of People in Home Dre Watters    Current Living Arrangements home    Potentially Unsafe Housing Conditions none    In the past 12 months has the electric, gas, oil, or water company threatened to shut off services in your home? No    Primary Care Provided by self    Provides Primary Care For no one    Family Caregiver if Needed child(maite), adult    Quality of Family Relationships helpful;involved;supportive    Able to Return to Prior Arrangements no       Resource/Environmental Concerns    Resource/Environmental Concerns none       Transportation Needs    In the past 12 months, has lack of transportation kept you from medical appointments or from getting medications? no    In the past 12 months, has lack of transportation kept you from meetings, work, or from getting things needed for daily living? No       Food Insecurity    Within the past 12 months, you worried that your food would run out before you got the money to buy more. Never true    Within the past 12 months, the food you bought just didn't last and you didn't have money to get more. Never true       Transition Planning    Patient/Family Anticipates Transition to home with family    Transportation Anticipated family or friend will provide  Dre Watters       Discharge Needs Assessment    Readmission Within the Last 30 Days no previous admission in last 30 days    Equipment Currently Used at Home none                Discharge Plan       Row Name 06/10/24 9529       Plan    Plan Home    Patient/Family in Agreement with Plan yes    Plan Comments CM spoke to pt. at bedside. PCP and pharmacy confirmed. Pt. wants M2B. Pt. denies financial, transportation, or  medication issues. Pt.'s son Duong can transport at d/c. dc barriers: Neurology consult.                Demographic Summary       Row Name 06/10/24 2620       General Information    Admission Type observation    Arrived From home    Referral Source admission list    Reason for Consult discharge planning    Preferred Language English       Contact Information    Permission Granted to Share Info With     Contact Information Obtained for                    Functional Status       Row Name 06/10/24 1637       Functional Status    Usual Activity Tolerance good    Current Activity Tolerance good       Physical Activity    On average, how many days per week do you engage in moderate to strenuous exercise (like a brisk walk)? 7 days    On average, how many minutes do you engage in exercise at this level? 90 min    Number of minutes of exercise per week 630       Functional Status, IADL    Medications independent    Meal Preparation independent    Housekeeping independent    Laundry independent    Shopping independent                   Social Determinants of Health     Tobacco Use: High Risk (6/3/2024)    Patient History     Smoking Tobacco Use: Every Day     Smokeless Tobacco Use: Never     Passive Exposure: Never   Alcohol Use: Not At Risk (6/10/2024)    AUDIT-C     Frequency of Alcohol Consumption: Never     Average Number of Drinks: Patient does not drink     Frequency of Binge Drinking: Never   Financial Resource Strain: Low Risk  (6/10/2024)    Overall Financial Resource Strain (CARDIA)     Difficulty of Paying Living Expenses: Not hard at all   Food Insecurity: No Food Insecurity (6/10/2024)    Hunger Vital Sign     Worried About Running Out of Food in the Last Year: Never true     Ran Out of Food in the Last Year: Never true   Transportation Needs: No Transportation Needs (6/10/2024)    PRAPARE - Transportation     Lack of Transportation (Medical): No     Lack of Transportation (Non-Medical):  No   Physical Activity: Sufficiently Active (6/10/2024)    Exercise Vital Sign     Days of Exercise per Week: 7 days     Minutes of Exercise per Session: 90 min   Stress: No Stress Concern Present (6/10/2024)    [employer] [place] [employer] - Occupational Stress Questionnaire     Feeling of Stress : Not at all   Social Connections: Not At Risk (6/10/2024)    Family and Community Support     Help with Day-to-Day Activities: I don't need any help     Lonely or Isolated: Never   Interpersonal Safety: Not At Risk (6/10/2024)    Abuse Screen     Unsafe at Home or Work/School: no     Feels Threatened by Someone?: no     Does Anyone Keep You from Contacting Others or Doint Things Outside the Home?: no     Physical Sign of Abuse Present: no   Depression: Not at risk (6/10/2024)    PHQ-2     PHQ-2 Score: 0   Recent Concern: Depression - At risk (5/2/2024)    PHQ-2     PHQ-2 Score: 6   Housing Stability: Not At Risk (6/10/2024)    Housing Stability     Current Living Arrangements: home     Potentially Unsafe Housing Conditions: none   Utilities: Not At Risk (6/10/2024)    [facility] Utilities     Threatened with loss of utilities: No   Health Literacy: Not At Risk (6/10/2024)    Education     Help with school or training?: No     Preferred Language: English   Employment: Not At Risk (6/10/2024)    Employment     Do you want help finding or keeping work or a job?: I do not need or want help   Disabilities: Not At Risk (6/10/2024)    Disabilities     Concentrating, Remembering, or Making Decisions Difficulty: no     Doing Errands Independently Difficulty: no     Jenny Estevez RN    Office: [phone]  Fax: [phone]  [email or web address]

## 2024-06-10 NOTE — PLAN OF CARE
Goal Outcome Evaluation:  Plan of Care Reviewed With: patient        Progress: no change  Outcome Evaluation: Pt is a 57 y/o F admitted to [facility] 6/10/24 with c/o AMS with slowed speech, L knee pain s/p fall. MRI brain pending. At baseline pt resides with adult son in mobile home with 4 JANIS. Pt typically (I) with ADLs and mobility without AD. Pt can drive, however does not have car, pt and son are reliable on others for transportation. This date pt A&Ox3, disoriented to month, scored 9/28 on SBT indicating questionable cognitive impairment. Pt demo slowed speech at times and demo difficulty with word finding, requires cues to attend to verbal sequencing task. Pt demo left-right confusion in BUE/BLE and with visual scanning, demo poor insight to deficit. Pt requires supervision for bed mobility, CGA to come to standing and CGA with ambulation, CGA-min A for higher level balance task. Anticipate pt to progress and likely to be safe to dc home with assistance. OT recommending OPOT to improve executive function and safety awareness, pt expresses difficulty with transportation, may be agreeable to HHOT, will follow within acute setting.      Anticipated Discharge Disposition (OT): home with assist, home with outpatient therapy services

## 2024-06-10 NOTE — H&P
History and Physical   Briana Alas : 1965 MRN:[id number] LOS:0     Reason for admission: AMS (altered mental status)     Assessment / Plan     #Altered mental status with slow speech  -Patient presented with confusion   -she is back to the baseline but her speech is slow per the patient.  No significant neurological deficit.  -She has underlying hypertension hyperlipidemia  -UA without UTI.  Chest x-ray clear.  CT head clear.  -Will get MRI brain.  Neurology.  -DAPT and statin    #Hyperlipidemia  -Statin    #Hypertension  -Resume home medication once verified.  Currently blood pressure is normal    #Anxiety/depression  -Resume home medication once verified          Nutrition: Diet: Regular/House; Fluid Consistency: Thin (IDDSI 0)     DVT Prophylaxis:   Mechanical Order History:        Ordered        06/10/24 1221  Place Sequential Compression Device  Once            06/10/24 1221  Maintain Sequential Compression Device  Continuous                          Pharmalogical Order History:       None             History of Present illness     A 58 y.o. old female patient with PMH of anxiety depression hyperlipidemia hypertension presents to the hospital with complaints of confusion.  Per the patient she has been fine the whole day yesterday but in the evening she started having some weird talking and that she does not remember.  Currently in the ED patient is back to the baseline but she still feel she has slow speech.  CT head without any acute changes.  White count of 16.  Hemoglobin of 16. TSH normal.  Creatinine at baseline.  Troponin is normal.  UA without UTI.  Utox pos for the benzodiazepine.  Chest x-ray without any acute changes.  Will get MRI brain and neurology to involve    Patient will be admitted for mental status changes management.      Subjective / Review of systems     Review of Systems   Feeling fine   Slow speech     Past Medical/Surgical/Social/Family History & Allergies     Past Medical  History:   Diagnosis Date    Anxiety     Arthritis     Back pain     Depression     Headache     Hyperlipidemia     Hypertension     Injury of back     Restless leg syndrome       Past Surgical History:   Procedure Laterality Date    ANKLE OPEN REDUCTION INTERNAL FIXATION Right     BUNIONECTOMY Left 12/15/2023    Procedure: BUNIONECTOMY JHONATAN;  Surgeon: GILBERTO Eldridge DPM;  Location: [facility] MAIN OR;  Service: Podiatry;  Laterality: Left;    FOOT SURGERY Bilateral     bunionectomy    SHOULDER ARTHROSCOPY W/ ROTATOR CUFF REPAIR Right 12/13/2019    Procedure: RIGHT SHOULDER ARTHROSCOPY WITH ROTATOR CUFF REPAIR and Subacromial decompression;  Surgeon: Gino Ackerman MD;  Location: [facility] MAIN OR;  Service: Orthopedics    TUBAL ABDOMINAL LIGATION        Social History     Socioeconomic History    Marital status: Legally    Tobacco Use    Smoking status: Every Day     Current packs/day: 1.00     Average packs/day: 1 pack/day for 45.4 years (45.4 ttl pk-yrs)     Types: Cigarettes     Start date: 1979     Passive exposure: Never    Smokeless tobacco: Never    Tobacco comments:     Dont smoke dos    Vaping Use    Vaping status: Former    Quit date: 1/1/2020    Substances: Nicotine, Flavoring    Devices: Disposable   Substance and Sexual Activity    Alcohol use: No    Drug use: No    Sexual activity: Defer      Family History   Problem Relation Age of Onset    Hypertension Mother     Hyperlipidemia Mother     Depression Mother     Thyroid disease Mother     Atrial fibrillation Mother     Heart attack Father     Hypertension Father       Allergies   Allergen Reactions    Penicillins Rash        Home Medications     Prior to Admission medications    Medication Sig Start Date End Date Taking? Authorizing Provider   atenolol (TENORMIN) 50 MG tablet TAKE ONE (1) TABLET BY MOUTH DAILY  Patient taking differently: Take 0.5 tablets by mouth Daily. 8/2/23   Kamran Garcia MD   atorvastatin (LIPITOR) 40  MG tablet TAKE ONE TABLET BY MOUTH EVERY EVENING 5/31/24   Kamran Garcia MD   benzonatate (TESSALON) 200 MG capsule TAKE 1 CAPSULE BY MOUTH 3 TIMES A DAY AS NEEDED FOR COUGH 3/10/24   Kamran Garcia MD   Brexpiprazole (Rexulti) 2 MG tablet Take 1 tablet by mouth Every Morning. 5/2/24   Jade Ryder MD   busPIRone (BUSPAR) 15 MG tablet Take 1 tablet by mouth 2 (Two) Times a Day. 5/2/24   Jade Ryder MD   carisoprodol (SOMA) 350 MG tablet Take 1 tablet by mouth 3 (Three) Times a Day As Needed for Muscle Spasms. 5/24/24   Kamran Garcia MD   clonazePAM (KlonoPIN) 1 MG tablet Take 1 tablet by mouth 2 (Two) Times a Day As Needed for Anxiety. 5/2/24   Jade Ryder MD   DULoxetine (CYMBALTA) 60 MG capsule Take 1 capsule by mouth 2 (Two) Times a Day. 5/2/24   Jade Ryder MD   oxyCODONE (ROXICODONE) 15 MG immediate release tablet Take 1 tablet by mouth Every 8 (Eight) Hours As Needed for Moderate Pain. Code M79.7 5/24/24   Kamran Garcia MD   predniSONE (DELTASONE) 10 MG tablet Take 4 tabs po daily x 4 d, then 3 tabs po daily x 4 d , then 2 tabs po daily x 4 d, then 1 tab po daily x 4 d 1/31/24   Kamran Garcia MD   pregabalin (LYRICA) 150 MG capsule Take 1 capsule by mouth 2 (Two) Times a Day. 2/16/24   Kamran Garcia MD   rOPINIRole (REQUIP) 0.5 MG tablet TAKE ONE (1) TABLET BY MOUTH TWICE DAILY. TAKE ONE (1) TABLET ONE (1) HOUR BEFORE bedtime 1/22/24   Kamran Garcia MD   temazepam (RESTORIL) 30 MG capsule Take 1 capsule by mouth At Night As Needed for Sleep. 5/2/24   Jade Ryder MD   vitamin D (ERGOCALCIFEROL) 1.25 MG (11430 UT) capsule capsule TAKE ONE (1) CAPSULE BY MOUTH TWICE A WEEK THREE DAYS APART 5/31/24   Kamran Garcia MD      Objective / Physical Exam   Vital signs:  Temp: 98 °F (36.7 °C)  BP: 114/57  Heart Rate: 50  Resp: 16  SpO2: 95 %  Weight: 77.1 kg (170 lb)    Admission Weight: Weight: 77.1 kg (170  lb)    Physical Exam   Physical Exam  HENT:      Head: Normocephalic and atraumatic.      Nose: Nose normal.   Eyes:      Extraocular Movements: Extraocular movements intact.      Conjunctivae/sclera: Conjunctivae normal.      Pupils: Pupils are equal, round, and reactive to light.   Cardiovascular:      Rate and Rhythm: normal       Pulses: Normal pulses.      Heart sounds: Normal heart sounds.   Pulmonary:      Effort: normal      Breath sounds: normal   Abdominal:      General: Abdomen is flat. Bowel sounds are normal.      Palpations: Abdomen is soft.   Musculoskeletal:         General: Normal range of motion.      Cervical back: Normal range of motion and neck supple.   Skin:     General: Skin is dry.   Neurological:      General: No focal deficit present.      Mental Status: alert.   Psychiatric:         Mood and Affect: Mood normal.        Labs     Results from last 7 days   Lab Units 06/10/24  0720 06/03/24  1536   WBC 10*3/mm3 16.39* 10.20   HEMATOCRIT % 48.9* 46.6   PLATELETS 10*3/mm3 367 323      Results from last 7 days   Lab Units 06/10/24  0720 06/03/24  1536   SODIUM mmol/L 143 141   POTASSIUM mmol/L 4.4 5.1   CHLORIDE mmol/L 103 104   CO2 mmol/L 26.4 27.7   BUN mg/dL 13 12   CREATININE mg/dL 1.08* 1.01*        Current Medications   Scheduled Meds:aspirin, 81 mg, Oral, Daily  atorvastatin, 40 mg, Oral, Daily  clopidogrel, 75 mg, Oral, Daily  sodium chloride, 10 mL, Intravenous, Q12H         Continuous Infusions:      Tono Gibson MD  [facility]   06/10/24   13:23 EDT

## 2024-06-10 NOTE — ED PROVIDER NOTES
Subjective   History of Present Illness  Patient is a 58-year-old white female with a history of hypertension, hyperlipidemia, depression anxiety and chronic back pain.  She presents today from home by EMS with reports of confusion and altered mental status.  Patient states that she woke up yesterday morning confused and having difficulty getting her words out.  She states her symptoms were still present this morning so her son called EMS.  She states she feels okay otherwise and has no other specific complaints.  Denies any trauma to her head.  Denies any headache dizziness unilateral weakness or deficit.  She denies chest pain or shortness of breath.  No abdominal pain nausea vomiting or diarrhea.  No fever or chills.  She denies any new medications or changes in her current medications.      Review of Systems   Constitutional:  Negative for chills and fever.   HENT:  Negative for congestion.    Respiratory:  Negative for cough and shortness of breath.    Cardiovascular:  Negative for chest pain.   Gastrointestinal:  Negative for abdominal pain, diarrhea, nausea and vomiting.   Genitourinary:  Negative for dysuria, frequency and urgency.   Musculoskeletal:  Negative for neck pain and neck stiffness.   Skin:  Negative for rash.   Neurological:  Negative for dizziness, weakness, light-headedness and headaches.   Psychiatric/Behavioral:  Positive for confusion.        Past Medical History:   Diagnosis Date    Anxiety     Arthritis     Back pain     Depression     Headache     Hyperlipidemia     Hypertension     Injury of back     Restless leg syndrome        Allergies   Allergen Reactions    Penicillins Rash       Past Surgical History:   Procedure Laterality Date    ANKLE OPEN REDUCTION INTERNAL FIXATION Right     BUNIONECTOMY Left 12/15/2023    Procedure: BUNIONECTOMY JHONATAN;  Surgeon: GILBERTO Eldridge DPM;  Location: [facility] MAIN OR;  Service: Podiatry;  Laterality: Left;    FOOT SURGERY Bilateral     bunionectomy     SHOULDER ARTHROSCOPY W/ ROTATOR CUFF REPAIR Right 12/13/2019    Procedure: RIGHT SHOULDER ARTHROSCOPY WITH ROTATOR CUFF REPAIR and Subacromial decompression;  Surgeon: Gino Ackerman MD;  Location: [facility] MAIN OR;  Service: Orthopedics    TUBAL ABDOMINAL LIGATION         Family History   Problem Relation Age of Onset    Hypertension Mother     Hyperlipidemia Mother     Depression Mother     Thyroid disease Mother     Atrial fibrillation Mother     Heart attack Father     Hypertension Father        Social History     Socioeconomic History    Marital status: Legally    Tobacco Use    Smoking status: Every Day     Current packs/day: 1.00     Average packs/day: 1 pack/day for 45.4 years (45.4 ttl pk-yrs)     Types: Cigarettes     Start date: 1979     Passive exposure: Never    Smokeless tobacco: Never    Tobacco comments:     Dont smoke dos    Vaping Use    Vaping status: Former    Quit date: 1/1/2020    Substances: Nicotine, Flavoring    Devices: Disposable   Substance and Sexual Activity    Alcohol use: No    Drug use: No    Sexual activity: Defer           Objective   Physical Exam  Vital signs and triage nurse note reviewed.  Constitutional: Awake, alert; well-developed and well-nourished. No acute distress is noted.  HEENT: Normocephalic, atraumatic; pupils are PERRL with intact EOM; oropharynx is pink and moist without exudate or erythema.  No drooling or pooling of oral secretions.  Neck: Supple, full range of motion without pain; no cervical lymphadenopathy. Normal phonation.  Cardiovascular: Regular rate and rhythm, normal S1-S2.  No murmur noted.  Pulmonary: Respiratory effort regular nonlabored, breath sounds clear to auscultation all fields.  Abdomen: Soft, nontender, nondistended with normoactive bowel sounds; no rebound or guarding.  Musculoskeletal: Independent range of motion of all extremities with no palpable tenderness or edema.  Neuro: Alert oriented to person and place, speech  is clear and appropriate, GCS 15.    Skin: Flesh tone, warm, dry, intact; no erythematous or petechial rash or lesion.    Procedures           ED Course  ED Course as of 06/10/24 1157   Mon Jostin 10, 2024   1136 Son Duong [phone] [MD]      ED Course User Index  [MD] Erika Villegas APRN      Labs Reviewed   COMPREHENSIVE METABOLIC PANEL - Abnormal; Notable for the following components:       Result Value    Glucose 103 (*)     Creatinine 1.08 (*)     Calcium 10.6 (*)     eGFR 59.3 (*)     All other components within normal limits    Narrative:     GFR Normal >60  Chronic Kidney Disease <60  Kidney Failure <15     URINE DRUG SCREEN - Abnormal; Notable for the following components:    Benzodiazepine Screen, Urine Positive (*)     All other components within normal limits    Narrative:     Negative Thresholds Per Drugs Screened:    Amphetamines                 500 ng/ml  Barbiturates                 200 ng/ml  Benzodiazepines              100 ng/ml  Cocaine                      300 ng/ml  Methadone                    300 ng/ml  Opiates                      300 ng/ml  Oxycodone                    100 ng/ml  THC                           50 ng/ml    The Normal Value for all drugs tested is negative. This report includes final unconfirmed screening results to be used for medical treatment purposes only. Unconfirmed results must not be used for non-medical purposes such as employment or legal testing. Clinical consideration should be applied to any drug of abuse test, particularly when unconfirmed results are used.          All urine drugs of abuse requests without chain of custody are for medical screening purposes only.  False positives are possible.     CBC WITH AUTO DIFFERENTIAL - Abnormal; Notable for the following components:    WBC 16.39 (*)     Hemoglobin 16.1 (*)     Hematocrit 48.9 (*)     Neutrophil % 77.1 (*)     Lymphocyte % 14.2 (*)     Neutrophils, Absolute 12.66 (*)     Monocytes, Absolute 1.09 (*)      Immature Grans, Absolute 0.06 (*)     All other components within normal limits   POCT GLUCOSE FINGERSTICK - Abnormal; Notable for the following components:    Glucose 110 (*)     All other components within normal limits   PROTIME-INR - Normal   APTT - Normal   URINALYSIS W/ CULTURE IF INDICATED - Normal    Narrative:     In absence of clinical symptoms, the presence of pyuria, bacteria, and/or nitrites on the urinalysis result does not correlate with infection.  Urine microscopic not indicated.   TROPONIN - Normal    Narrative:     High Sensitive Troponin T Reference Range:  <14.0 ng/L- Negative Female for AMI  <22.0 ng/L- Negative Male for AMI  >=14 - Abnormal Female indicating possible myocardial injury.  >=22 - Abnormal Male indicating possible myocardial injury.   Clinicians would have to utilize clinical acumen, EKG, Troponin, and serial changes to determine if it is an Acute Myocardial Infarction or myocardial injury due to an underlying chronic condition.        TSH - Normal   HIGH SENSITIVITIY TROPONIN T 2HR - Normal    Narrative:     High Sensitive Troponin T Reference Range:  <14.0 ng/L- Negative Female for AMI  <22.0 ng/L- Negative Male for AMI  >=14 - Abnormal Female indicating possible myocardial injury.  >=22 - Abnormal Male indicating possible myocardial injury.   Clinicians would have to utilize clinical acumen, EKG, Troponin, and serial changes to determine if it is an Acute Myocardial Infarction or myocardial injury due to an underlying chronic condition.        ETHANOL    Narrative:     Plasma Ethanol Clinical Symptoms:    ETOH (%)               Clinical Symptom  .01-.05              No apparent influence  .03-.12              Euphoria, Diminished judgment and attention   .09-.25              Impaired comprehension, Muscle incoordination  .18-.30              Confusion, Staggered gait, Slurred speech  .25-.40              Markedly decreased response to stimuli, unable to stand or                         walk, vomitting, sleep or stupor  .35-.50              Comatose, Anesthesia, Subnormal body temperature       CBC AND DIFFERENTIAL    Narrative:     The following orders were created for panel order CBC & Differential.  Procedure                               Abnormality         Status                     ---------                               -----------         ------                     CBC Auto Differential[[id number]]        Abnormal            Final result                 Please view results for these tests on the individual orders.   EXTRA TUBES    Narrative:     The following orders were created for panel order Extra Tubes.  Procedure                               Abnormality         Status                     ---------                               -----------         ------                     Gold Top - SST[[id number]]                                   Final result                 Please view results for these tests on the individual orders.   GOLD TOP - SST     CT Head Without Contrast    Result Date: 6/10/2024  Impression: No acute process Electronically Signed: Juan Francisco Graham  6/10/2024 8:17 AM EDT  Workstation ID: [id number]    XR Knee 4+ View Left    Result Date: 6/10/2024  Impression: No evidence of fracture or significant joint effusion Electronically Signed: Juan Francisco Graham  6/10/2024 8:14 AM EDT  Workstation ID: [id number]    XR Chest 1 View    Result Date: 6/10/2024  Impression: 1.No acute radiographic abnormality is identified. Electronically Signed: Delio Pollard MD  6/10/2024 8:07 AM EDT  Workstation ID: [id number]   Medications   sodium chloride 0.9 % flush 10 mL (has no administration in time range)                                            Medical Decision Making  Patient presents today with the above complaint.    She had the above exam and evaluation.  She was placed on continuous cardiac monitor.  IV was established.  Labs EKG chest x-ray CT head were obtained.    Workup: EKG  independently interpreted by Dr. Jones and myself shows sinus bradycardia with ventricular rate of 51, no acute ST or T wave changes.  CBC is significant for WBC of 16.3, hemoglobin 16.1.  Metabolic panel is grossly unremarkable.  Ethanol less than 0.10% high sensitive troponin within normal limits at 9.  TSH within normal limits.  Urinalysis shows no blood or evidence of infection.  Chest x-ray independently interpreted by Dr. Jones and myself shows no infiltrate or other acute abnormality, this is corroborated by the radiologist.  CT head shows no acute process.    On examination patient resting quietly no distress.  No new complaints.  She remains well-appearing and hemodynamically stable.  She has no focal deficits on exam.  She still has difficulty remembering the year otherwise oriented to person and place.  She is also having some difficulty figuring out how to use her cell phone.  Attempt was made to speak with patient's son who called EMS this morning however was unable to reach him at the number provided.    Findings were discussed with the patient.  She will be admitted to hospital for further management.  Case and plan discussed with the hospitalist.    Problems Addressed:  Altered mental status, unspecified altered mental status type: complicated acute illness or injury    Amount and/or Complexity of Data Reviewed  Labs: ordered.  Radiology: ordered.  ECG/medicine tests: ordered.    Risk  Prescription drug management.  Decision regarding hospitalization.        Final diagnoses:   Altered mental status, unspecified altered mental status type       ED Disposition  ED Disposition       ED Disposition   Decision to Admit    Condition   --    Comment   Level of Care: Telemetry [5]   Admitting Physician: CARLEEN GARCIA [[id number]]   Attending Physician: CARLEEN GARCIA [[id number]]                 No follow-up provider specified.       Medication List      No changes were made to your prescriptions during this visit.             Erika Villegas, APRN  06/10/24 1150

## 2024-06-10 NOTE — PLAN OF CARE
Problem: Confusion Acute  Goal: Optimal Cognitive Function  Outcome: Ongoing, Progressing   Goal Outcome Evaluation:

## 2024-06-10 NOTE — PLAN OF CARE
Goal Outcome Evaluation:  Plan of Care Reviewed With: patient   Pt presents as a 57 y/o F admitted to [facility] 6/10/24 with c/o AMS with slowed speech, L knee pain s/p fall. MRI brain pending. CT head: No acute process. X-ray left knee: No evidence of fracture or significant joint effusion. CXR: No acute radiographic abnormality is identified.  At baseline pt resides with adult son in mobile home with 4 JANIS. Pt typically independent with mobility without AD. Pt can drive, however does not have car, pt and son are reliable on others for transportation. This date pt on stretcher in ED and A and O x 3,disoriented to month, scored 9/28 on SBT per OT indicating questionable cognitive impairment. Pt demo slow speech at times and some difficulty with word finding, requiring cues to attend to verbal sequencing task. Pt demo left-right confusion in BUE/BLE and with visual scanning, demo poor insight to deficit. Pt requiring supervision for bed mobility, CGA to come to standing and CGA with ambulation, CGA-min A for dynamic gait activities. PT will follow pt for aforementioned deficits. PT recommendation at this time id Home with Assist and OP PT or HH PT.          Anticipated Discharge Disposition (PT): home with assist, home with outpatient therapy services, home with home health

## 2024-06-10 NOTE — CONSULTS
Primary Care Provider: Kamran aGrcia*     Consult requested by:  Dr. Gibson     Reason for Consultation: Neurological evaluation      History taken from: patient chart RN    Chief complaint: Altered mental status with slow speech        SUBJECTIVE:    History of present illness: Background per H&P: Briana Alas is a 58 y.o. year old female who presented to the hospital on 6/10/24 with complaints of confusion.  Per the patient she has been fine the whole day yesterday but in the evening she started having some weird talking and that she does not remember.  Currently in the ED patient is back to the baseline but she still feel she has slow speech.  CT head without any acute changes.  White count of 16.  Hemoglobin of 16. TSH normal.  Creatinine at baseline.  Troponin is normal.  UA without UTI.  Utox pos for the benzodiazepine.  Chest x-ray without any acute changes.      Pt is on numerous medications including Rexulti, klonopin, oxycodone, Requip, Buspar, Soma, Cymbalta, Lyrica, and temazepam     Neurology attempted to evaluate pt on 6/10, but she was gone for testing.   - Portions of the above HPI were copied from previous encounters and edited as appropriate. PMH as detailed below.     Pt states she was confused on  evening and Monday morning. She states she was looking for her cigarettes which were right in front of her but she could not find them. She states her son told her she then started trying to light her cigarette with a fork and other odd objects. She had some confused speech, but denies any other focal deficits. She reports tingling in all extremities. Denies loss of consciousness or headache. She denies any hx of similar events.     Pt has a hx of depression which she states became more severe after her daughter  3 years ago and her father  last fall. She has been working with her psychiatrist to lower some of her mediations because she felt numb. She states she ran out of all of  her meds and has not had any for a few days now. It is unclear when she took her last dose prior to admission, but she thinks maybe Friday. She denies any loss of consciousness.     She feels well now and wants to go home, but would like to see cardiology first due to bradycardia and 4 second pause on telemetry overnight.     Review of Systems   Constitutional:  Negative for chills, diaphoresis and fatigue.   Eyes:  Negative for photophobia and visual disturbance.   Neurological:  Negative for dizziness, tremors, seizures, syncope, facial asymmetry, speech difficulty, weakness, light-headedness, numbness and headaches.          PATIENT HISTORY:  Past Medical History:   Diagnosis Date    Anxiety     Arthritis     Back pain     Depression     Headache     Hyperlipidemia     Hypertension     Injury of back     Restless leg syndrome    ,   Past Surgical History:   Procedure Laterality Date    ANKLE OPEN REDUCTION INTERNAL FIXATION Right     BUNIONECTOMY Left 12/15/2023    Procedure: BUNIONECTOMY JHONATAN;  Surgeon: GILBERTO Eldridge DPM;  Location: [facility];  Service: Podiatry;  Laterality: Left;    FOOT SURGERY Bilateral     bunionectomy    SHOULDER ARTHROSCOPY W/ ROTATOR CUFF REPAIR Right 12/13/2019    Procedure: RIGHT SHOULDER ARTHROSCOPY WITH ROTATOR CUFF REPAIR and Subacromial decompression;  Surgeon: Gino Ackerman MD;  Location: [facility];  Service: Orthopedics    TUBAL ABDOMINAL LIGATION     ,   Family History   Problem Relation Age of Onset    Hypertension Mother     Hyperlipidemia Mother     Depression Mother     Thyroid disease Mother     Atrial fibrillation Mother     Heart attack Father     Hypertension Father    ,   Social History     Tobacco Use    Smoking status: Every Day     Current packs/day: 1.00     Average packs/day: 1 pack/day for 45.4 years (45.4 ttl pk-yrs)     Types: Cigarettes     Start date: 1979     Passive exposure: Never    Smokeless tobacco: Never    Tobacco comments:      Dont smoke dos    Vaping Use    Vaping status: Former    Quit date: 1/1/2020    Substances: Nicotine, Flavoring    Devices: Disposable   Substance Use Topics    Alcohol use: No    Drug use: No   ,   Prior to Admission medications    Medication Sig Start Date End Date Taking? Authorizing Provider   atenolol (TENORMIN) 50 MG tablet Take 0.5 tablets by mouth Daily.   Yes Provider, MD Aamir   atorvastatin (LIPITOR) 40 MG tablet TAKE ONE TABLET BY MOUTH EVERY EVENING 5/31/24  Yes Kamran Garcia MD   Brexpiprazole (Rexulti) 2 MG tablet Take 1 tablet by mouth Every Morning. 5/2/24  Yes Jade Ryder MD   busPIRone (BUSPAR) 15 MG tablet Take 1 tablet by mouth 2 (Two) Times a Day. 5/2/24  Yes Jade Ryder MD   carisoprodol (SOMA) 350 MG tablet Take 1 tablet by mouth 3 (Three) Times a Day As Needed for Muscle Spasms. 5/24/24  Yes Kamran Garcia MD   clonazePAM (KlonoPIN) 1 MG tablet Take 1 tablet by mouth 2 (Two) Times a Day As Needed for Anxiety. 5/2/24  Yes Jade Ryder MD   DULoxetine (CYMBALTA) 60 MG capsule Take 1 capsule by mouth 2 (Two) Times a Day. 5/2/24  Yes Jade Ryder MD   oxyCODONE (ROXICODONE) 15 MG immediate release tablet Take 1 tablet by mouth Every 8 (Eight) Hours As Needed for Moderate Pain. Code M79.7 5/24/24  Yes Kamran Garcia MD   pregabalin (LYRICA) 150 MG capsule Take 1 capsule by mouth 2 (Two) Times a Day. 2/16/24  Yes Kamran Garcia MD   temazepam (RESTORIL) 30 MG capsule Take 1 capsule by mouth At Night As Needed for Sleep. 5/2/24  Yes Jade Ryder MD   rOPINIRole (REQUIP) 0.5 MG tablet TAKE ONE (1) TABLET BY MOUTH TWICE DAILY. TAKE ONE (1) TABLET ONE (1) HOUR BEFORE bedtime 1/22/24 6/10/24 Yes Kamran Garcia MD   vitamin D (ERGOCALCIFEROL) 1.25 MG (49229 UT) capsule capsule TAKE ONE (1) CAPSULE BY MOUTH TWICE A WEEK THREE DAYS APART 5/31/24 6/10/24 Yes Kamran Garcia MD   rOPINIRole (REQUIP) 0.5 MG tablet  Take 1 tablet by mouth 2 (Two) Times a Day. Take 1 hour before bedtime.    Aamir Mendez MD   vitamin D (ERGOCALCIFEROL) 1.25 MG (63824 UT) capsule capsule Take 1 capsule by mouth 1 (One) Time Per Week. Sundays    Aamir Mendez MD   atenolol (TENORMIN) 25 MG tablet Take 1 tablet by mouth Daily.  6/10/24  Aamir Mendez MD   atenolol (TENORMIN) 50 MG tablet TAKE ONE (1) TABLET BY MOUTH DAILY  Patient taking differently: Take 0.5 tablets by mouth Daily. 8/2/23 6/10/24  Kamran Garcia MD   benzonatate (TESSALON) 200 MG capsule TAKE 1 CAPSULE BY MOUTH 3 TIMES A DAY AS NEEDED FOR COUGH 3/10/24 6/10/24  Kamran Garcia MD   predniSONE (DELTASONE) 10 MG tablet Take 4 tabs po daily x 4 d, then 3 tabs po daily x 4 d , then 2 tabs po daily x 4 d, then 1 tab po daily x 4 d 1/31/24 6/10/24  Kamran Garcia MD    Allergies:  Penicillins    Current Facility-Administered Medications   Medication Dose Route Frequency Provider Last Rate Last Admin    acetaminophen (TYLENOL) tablet 650 mg  650 mg Oral Q4H PRN Tono Gibson MD        aluminum-magnesium hydroxide-simethicone (MAALOX MAX) 400-400-40 MG/5ML suspension 15 mL  15 mL Oral Q6H PRN Tono Gibson MD        aspirin EC tablet 81 mg  81 mg Oral Daily Tono Gibson MD   81 mg at 06/10/24 1234    atorvastatin (LIPITOR) tablet 40 mg  40 mg Oral Daily Tono Gibson MD   40 mg at 06/10/24 1347    sennosides-docusate (PERICOLACE) 8.6-50 MG per tablet 2 tablet  2 tablet Oral BID PRN Tono Gibson MD        And    polyethylene glycol (MIRALAX) packet 17 g  17 g Oral Daily PRN Tono Gibson MD        And    bisacodyl (DULCOLAX) EC tablet 5 mg  5 mg Oral Daily PRN Tono Gibson MD        And    bisacodyl (DULCOLAX) suppository 10 mg  10 mg Rectal Daily PRN Tono Gibson MD        Calcium Replacement - Follow Nurse / BPA Driven Protocol   Does not apply PRTono Chinchilla MD        clopidogrel (PLAVIX) tablet 75 mg  75 mg Oral Daily Tono Gibson MD   75 mg at  06/10/24 1347    Magnesium Cardiology Dose Replacement - Follow Nurse / BPA Driven Protocol   Does not apply Tono Clark MD        melatonin tablet 5 mg  5 mg Oral Nightly PRN Tono Gibson MD        nitroglycerin (NITROSTAT) SL tablet 0.4 mg  0.4 mg Sublingual Q5 Min PRN Tono Gibson MD        OLANZapine (zyPREXA) 5 mg in sterile water (preservative free) 1 mL injection  5 mg Intramuscular Once Tono Gibson MD        ondansetron ODT (ZOFRAN-ODT) disintegrating tablet 4 mg  4 mg Oral Q6H PRN Tono Gibson MD        Or    ondansetron (ZOFRAN) injection 4 mg  4 mg Intravenous Q6H PRN Tono Gibson MD        Phosphorus Replacement - Follow Nurse / BPA Driven Protocol   Does not apply Tono Clark MD        Potassium Replacement - Follow Nurse / BPA Driven Protocol   Does not apply PRTono Chinchilla MD        sodium chloride 0.9 % flush 10 mL  10 mL Intravenous PRN Erika Villegas, APRN        sodium chloride 0.9 % flush 10 mL  10 mL Intravenous Q12H Tono Gibson MD   10 mL at 06/10/24 1235    sodium chloride 0.9 % flush 10 mL  10 mL Intravenous PRN Tono Gibson MD        sodium chloride 0.9 % infusion 40 mL  40 mL Intravenous PRN Tono Gibson MD         Current Outpatient Medications   Medication Sig Dispense Refill    atenolol (TENORMIN) 50 MG tablet Take 0.5 tablets by mouth Daily.      atorvastatin (LIPITOR) 40 MG tablet TAKE ONE TABLET BY MOUTH EVERY EVENING 30 tablet 0    Brexpiprazole (Rexulti) 2 MG tablet Take 1 tablet by mouth Every Morning. 30 tablet 3    busPIRone (BUSPAR) 15 MG tablet Take 1 tablet by mouth 2 (Two) Times a Day. 60 tablet 3    carisoprodol (SOMA) 350 MG tablet Take 1 tablet by mouth 3 (Three) Times a Day As Needed for Muscle Spasms. 90 tablet 0    clonazePAM (KlonoPIN) 1 MG tablet Take 1 tablet by mouth 2 (Two) Times a Day As Needed for Anxiety. 60 tablet 3    DULoxetine (CYMBALTA) 60 MG capsule Take 1 capsule by mouth 2 (Two) Times a Day. 60 capsule 3    oxyCODONE (ROXICODONE) 15 MG immediate  release tablet Take 1 tablet by mouth Every 8 (Eight) Hours As Needed for Moderate Pain. Code M79.7 90 tablet 0    pregabalin (LYRICA) 150 MG capsule Take 1 capsule by mouth 2 (Two) Times a Day. 60 capsule 3    temazepam (RESTORIL) 30 MG capsule Take 1 capsule by mouth At Night As Needed for Sleep. 30 capsule 3    rOPINIRole (REQUIP) 0.5 MG tablet Take 1 tablet by mouth 2 (Two) Times a Day. Take 1 hour before bedtime.      vitamin D (ERGOCALCIFEROL) 1.25 MG (04917 UT) capsule capsule Take 1 capsule by mouth 1 (One) Time Per Week. Sundays          ________________________________________________________        OBJECTIVE:  Upon today's exam, pt is awake and alert      Neurologic Exam  PHYSICAL EXAM:    CONSTITUTIONAL: The patient is in no apparent distress, bright awake and alert.      HEENT: There is no tenderness over the temporal arteries bilaterally. Normocephalic, atraumatic. TMJ open symmetrically without tenderness.    NECK: ROM normal, supple    RESPIRATORY: Breathing unlabored, Breath sounds are normal    CARDIAC: Regular rate and rhythm.     EXTREMITIES:  No clubbing, cyanosis or edema.    PSYCHIATRIC: Mood/affect normal, judgement normal, appropriate    NEUROLOGICAL:    Cognition:   Fully oriented.  Fund of knowledge excellent.  Concentration and attention normal.   Language normal with normal comprehension, fluent speech, intact repetition and naming.   Short and long term memory appears intact    Cranial nerves;    II - pupils bilaterally equal reacting to light,  No new Visual field deficits;  Fundoscopic exam- Not able to be done, non-dilated exam  III,IV,VI: EOMI with no diplopia  V: Normal facial sensations  VII: No facial asymmetry,  VIII: No New hearing abnormality  IX, X, XI: normal gag and shoulder shrug;  XII: tongue is in the midline.    Sensory:  Intact to light touch in all extremities.     Motor: Strength 5/5 bilaterally upper and lower extremities. No involuntary movements present. Normal  tone and bulk.  Deep tendon reflexes: 2/4 and symmetrical in biceps, brachioradialis, triceps, bilateral 2/4 knees and ankles. Both plantars are flexor.    Cerebellar: Finger to nose and mirror movements normal bilaterally.    Gait and balance: deferred    ________________________________________________________   RESULTS REVIEW:    VITAL SIGNS:   Temp:  [98 °F (36.7 °C)] 98 °F (36.7 °C)  Heart Rate:  [50-95] 84  Resp:  [16-17] 17  BP: (114-130)/(56-85) 127/85     LABS:      Lab 06/10/24  0720   WBC 16.39*   HEMOGLOBIN 16.1*   HEMATOCRIT 48.9*   PLATELETS 367   NEUTROS ABS 12.66*   IMMATURE GRANS (ABS) 0.06*   LYMPHS ABS 2.32   MONOS ABS 1.09*   EOS ABS 0.19   MCV 95.1   PROTIME 10.8   APTT 27.9         Lab 06/10/24  0720   SODIUM 143   POTASSIUM 4.4   CHLORIDE 103   CO2 26.4   ANION GAP 13.6   BUN 13   CREATININE 1.08*   EGFR 59.3*   GLUCOSE 103*   CALCIUM 10.6*   HEMOGLOBIN A1C 5.71*   TSH 0.899         Lab 06/10/24  0720   TOTAL PROTEIN 7.7   ALBUMIN 5.0   GLOBULIN 2.7   ALT (SGPT) 31   AST (SGOT) 32   BILIRUBIN 0.8   ALK PHOS 83         Lab 06/10/24  0929 06/10/24  0720   HSTROP T 7 9   PROTIME  --  10.8   INR  --  0.99         Lab 06/10/24  0929   CHOLESTEROL 225*   LDL CHOL 151*   HDL CHOL 41   TRIGLYCERIDES 181*             UA          12/7/2023    13:11 6/10/2024    07:35   Urinalysis   Squamous Epithelial Cells, UA 7-12     Specific [place], UA 1.015  1.011    Ketones, UA Negative  Negative    Blood, UA Negative  Negative    Leukocytes, UA Small (1+)  Negative    Nitrite, UA Negative  Negative    RBC, UA 0-2     WBC, UA 11-20     Bacteria, UA None Seen         Lab Results   Component Value Date    TSH 0.899 06/10/2024     (H) 06/10/2024    HGBA1C 5.71 (H) 06/10/2024       IMAGING STUDIES:  CT Head Without Contrast    Result Date: 6/10/2024  Impression: No acute process Electronically Signed: Juan Francisco Graham  6/10/2024 8:17 AM EDT  Workstation ID: [id number]    XR Knee 4+ View Left    Result Date:  6/10/2024  Impression: No evidence of fracture or significant joint effusion Electronically Signed: Juan Francisco Graham  6/10/2024 8:14 AM EDT  Workstation ID: [id number]    XR Chest 1 View    Result Date: 6/10/2024  Impression: 1.No acute radiographic abnormality is identified. Electronically Signed: Delio Pollard MD  6/10/2024 8:07 AM EDT  Workstation ID: [id number]     I reviewed the patient's new clinical results.    ________________________________________________________     PROBLEM LIST:    AMS (altered mental status)      ASSESSMENT/PLAN:  1. Acute encephalopathy with slow speech--resolved. Strongly suspect polypharmacy is contributing as pt is on numerous medications including Rexulti, klonopin, oxycodone, Requip, Buspar, Soma, Cymbalta, Lyrica, and temazepam. She does state she ran out of all of her medications 2 days prior to admission so withdraw and untreated anxiety may be contributing factors. She is currently asymptomatic and has no hx of similar events.   Workup to r/o underlying infection is ongoing.  Nurse reported pt had episodes of bradycardia to the 30s with hypoxia to the 80s overnight which could have contributed her episode. Spell does not appear consistent with a TIA or seizure. No clinical findings to suggest CNS infection.   - CT head: No acute process  - MRI brain: Motion-degraded exam. Within limitations, no acute MRI findings. Specifically no findings to suggest a recent (acute or subacute) infarct. Mild global volume loss. Minimal areas of gliosis, most likely representing sequelae of chronic microvascular ischemic change. Images reviewed: No acute changes, small area of gliosis in the right parietal cortex, likely old ischemia with no DWI/ADC/SWI correlation.   - Labs: A1C: 5.71, B12: 331, LDL: 151, TSH: 0.899, UA negative, UDS +Benzos, EtOH <0.010 ESR: 17, CRP: 1.86, ma.9, phos: 3.8, ammonia,:30, RVP neg  - PT/OT/ST as appropriate, fall precautions as appropriate, Neuro checks per  protocol, DVT prophylaxis, Stroke education  - She is working with her psychiatrist to decrease and wean off some of her medications outpt     2. Leukocytosis   - WBC 16.39 on admit, 11.21 today. Remains afebrile, no signs of illness, no meningismus   - UA negative, RVP negative, blood cultures pending  - Per primary     3. B12 Deficiency, mild  - Replacement ordered  - Recommend cyanocobalamin 500mcg PO daily or IM monthly    4. Bradycardia  - HR dropped into 40s while asleep and down to 30s while up ad salvatore per RN  - Telemetry showing pauses confirmed by CMU with one pause lasting 4.5 seconds with O2 dropping to 80s.   - Consult cardiology     Modification of stroke risk factors:   - Blood pressure should be less than 130/80 outpatient, HbA1c less than 6.5, LDL less than 70; b12>500 and smoking cessation if applicable. We would be grateful if the primary team / primary care physician would keep a close watch on the above targets.  - Stroke education  - Follow up with neurologist of choice    Will sign off. Please call with questions or concerns.       I discussed the patient's findings and my recommendations with patient, nursing staff, and consulting provider    FRANCESCO Parks  06/10/24  16:01 EDT

## 2024-06-11 LAB
AMMONIA BLD-SCNC: 30 UMOL/L (ref 11–51)
ANION GAP SERPL CALCULATED.3IONS-SCNC: 11.9 MMOL/L (ref 5–15)
BASOPHILS # BLD AUTO: 0.08 10*3/MM3 (ref 0–0.2)
BASOPHILS NFR BLD AUTO: 0.7 % (ref 0–1.5)
BUN SERPL-MCNC: 25 MG/DL (ref 6–20)
BUN/CREAT SERPL: 24.5 (ref 7–25)
CALCIUM SPEC-SCNC: 9.6 MG/DL (ref 8.6–10.5)
CHLORIDE SERPL-SCNC: 106 MMOL/L (ref 98–107)
CO2 SERPL-SCNC: 23.1 MMOL/L (ref 22–29)
CREAT SERPL-MCNC: 1.02 MG/DL (ref 0.57–1)
DEPRECATED RDW RBC AUTO: 44.7 FL (ref 37–54)
EGFRCR SERPLBLD CKD-EPI 2021: 63.5 ML/MIN/1.73
EOSINOPHIL # BLD AUTO: 0.25 10*3/MM3 (ref 0–0.4)
EOSINOPHIL NFR BLD AUTO: 2.2 % (ref 0.3–6.2)
ERYTHROCYTE [DISTWIDTH] IN BLOOD BY AUTOMATED COUNT: 13.1 % (ref 12.3–15.4)
GLUCOSE SERPL-MCNC: 112 MG/DL (ref 65–99)
HCT VFR BLD AUTO: 44.5 % (ref 34–46.6)
HGB BLD-MCNC: 14.8 G/DL (ref 12–15.9)
IMM GRANULOCYTES # BLD AUTO: 0.04 10*3/MM3 (ref 0–0.05)
IMM GRANULOCYTES NFR BLD AUTO: 0.4 % (ref 0–0.5)
LYMPHOCYTES # BLD AUTO: 3.61 10*3/MM3 (ref 0.7–3.1)
LYMPHOCYTES NFR BLD AUTO: 32.2 % (ref 19.6–45.3)
MAGNESIUM SERPL-MCNC: 1.9 MG/DL (ref 1.6–2.6)
MCH RBC QN AUTO: 30.8 PG (ref 26.6–33)
MCHC RBC AUTO-ENTMCNC: 33.3 G/DL (ref 31.5–35.7)
MCV RBC AUTO: 92.7 FL (ref 79–97)
MONOCYTES # BLD AUTO: 0.77 10*3/MM3 (ref 0.1–0.9)
MONOCYTES NFR BLD AUTO: 6.9 % (ref 5–12)
NEUTROPHILS NFR BLD AUTO: 57.6 % (ref 42.7–76)
NEUTROPHILS NFR BLD AUTO: 6.46 10*3/MM3 (ref 1.7–7)
NRBC BLD AUTO-RTO: 0 /100 WBC (ref 0–0.2)
PHOSPHATE SERPL-MCNC: 3.8 MG/DL (ref 2.5–4.5)
PLATELET # BLD AUTO: 305 10*3/MM3 (ref 140–450)
PMV BLD AUTO: 9.6 FL (ref 6–12)
POTASSIUM SERPL-SCNC: 4.1 MMOL/L (ref 3.5–5.2)
QT INTERVAL: 433 MS
QTC INTERVAL: 399 MS
RBC # BLD AUTO: 4.8 10*6/MM3 (ref 3.77–5.28)
SODIUM SERPL-SCNC: 141 MMOL/L (ref 136–145)
WBC NRBC COR # BLD AUTO: 11.21 10*3/MM3 (ref 3.4–10.8)

## 2024-06-11 PROCEDURE — 83735 ASSAY OF MAGNESIUM: CPT | Performed by: INTERNAL MEDICINE

## 2024-06-11 PROCEDURE — G0378 HOSPITAL OBSERVATION PER HR: HCPCS

## 2024-06-11 PROCEDURE — 99214 OFFICE O/P EST MOD 30 MIN: CPT | Performed by: INTERNAL MEDICINE

## 2024-06-11 PROCEDURE — 25010000002 MAGNESIUM SULFATE 2 GM/50ML SOLUTION: Performed by: INTERNAL MEDICINE

## 2024-06-11 PROCEDURE — 85025 COMPLETE CBC W/AUTO DIFF WBC: CPT | Performed by: INTERNAL MEDICINE

## 2024-06-11 PROCEDURE — 97116 GAIT TRAINING THERAPY: CPT

## 2024-06-11 PROCEDURE — 84100 ASSAY OF PHOSPHORUS: CPT | Performed by: INTERNAL MEDICINE

## 2024-06-11 PROCEDURE — 93010 ELECTROCARDIOGRAM REPORT: CPT | Performed by: INTERNAL MEDICINE

## 2024-06-11 PROCEDURE — 99214 OFFICE O/P EST MOD 30 MIN: CPT | Performed by: NURSE PRACTITIONER

## 2024-06-11 PROCEDURE — 80048 BASIC METABOLIC PNL TOTAL CA: CPT | Performed by: INTERNAL MEDICINE

## 2024-06-11 PROCEDURE — 82140 ASSAY OF AMMONIA: CPT | Performed by: NURSE PRACTITIONER

## 2024-06-11 PROCEDURE — 93005 ELECTROCARDIOGRAM TRACING: CPT | Performed by: STUDENT IN AN ORGANIZED HEALTH CARE EDUCATION/TRAINING PROGRAM

## 2024-06-11 RX ORDER — CLONAZEPAM 1 MG/1
0.5 TABLET ORAL 2 TIMES DAILY PRN
Status: DISCONTINUED | OUTPATIENT
Start: 2024-06-11 | End: 2024-06-12 | Stop reason: HOSPADM

## 2024-06-11 RX ORDER — OXYCODONE HYDROCHLORIDE 5 MG/1
5 TABLET ORAL EVERY 8 HOURS PRN
Status: DISCONTINUED | OUTPATIENT
Start: 2024-06-11 | End: 2024-06-12 | Stop reason: HOSPADM

## 2024-06-11 RX ORDER — MAGNESIUM SULFATE HEPTAHYDRATE 40 MG/ML
4 INJECTION, SOLUTION INTRAVENOUS ONCE
Status: COMPLETED | OUTPATIENT
Start: 2024-06-11 | End: 2024-06-11

## 2024-06-11 RX ORDER — ATORVASTATIN CALCIUM 40 MG/1
40 TABLET, FILM COATED ORAL EVERY EVENING
Status: DISCONTINUED | OUTPATIENT
Start: 2024-06-11 | End: 2024-06-12 | Stop reason: HOSPADM

## 2024-06-11 RX ORDER — ERGOCALCIFEROL 1.25 MG/1
50000 CAPSULE ORAL WEEKLY
Status: DISCONTINUED | OUTPATIENT
Start: 2024-06-11 | End: 2024-06-12 | Stop reason: HOSPADM

## 2024-06-11 RX ORDER — ATENOLOL 25 MG/1
25 TABLET ORAL DAILY
Status: DISCONTINUED | OUTPATIENT
Start: 2024-06-11 | End: 2024-06-11

## 2024-06-11 RX ORDER — DULOXETIN HYDROCHLORIDE 30 MG/1
60 CAPSULE, DELAYED RELEASE ORAL 2 TIMES DAILY
Status: DISCONTINUED | OUTPATIENT
Start: 2024-06-11 | End: 2024-06-12 | Stop reason: HOSPADM

## 2024-06-11 RX ADMIN — MAGNESIUM SULFATE HEPTAHYDRATE 4 G: 40 INJECTION, SOLUTION INTRAVENOUS at 05:25

## 2024-06-11 RX ADMIN — CLOPIDOGREL BISULFATE 75 MG: 75 TABLET ORAL at 08:33

## 2024-06-11 RX ADMIN — OXYCODONE HYDROCHLORIDE 5 MG: 5 TABLET ORAL at 20:47

## 2024-06-11 RX ADMIN — ASPIRIN 81 MG: 81 TABLET, COATED ORAL at 08:33

## 2024-06-11 RX ADMIN — Medication 10 ML: at 20:47

## 2024-06-11 RX ADMIN — ATORVASTATIN CALCIUM 40 MG: 40 TABLET, FILM COATED ORAL at 08:33

## 2024-06-11 RX ADMIN — BREXPIPRAZOLE 2 MG: 1 TABLET ORAL at 12:00

## 2024-06-11 RX ADMIN — DULOXETINE HYDROCHLORIDE 60 MG: 30 CAPSULE, DELAYED RELEASE ORAL at 20:47

## 2024-06-11 RX ADMIN — Medication 10 ML: at 08:33

## 2024-06-11 RX ADMIN — OXYCODONE HYDROCHLORIDE 5 MG: 5 TABLET ORAL at 12:00

## 2024-06-11 RX ADMIN — ERGOCALCIFEROL 50000 UNITS: 1.25 CAPSULE ORAL at 12:00

## 2024-06-11 RX ADMIN — Medication 5 MG: at 22:26

## 2024-06-11 RX ADMIN — ATORVASTATIN CALCIUM 40 MG: 40 TABLET, FILM COATED ORAL at 17:22

## 2024-06-11 RX ADMIN — DULOXETINE HYDROCHLORIDE 60 MG: 30 CAPSULE, DELAYED RELEASE ORAL at 12:00

## 2024-06-11 NOTE — CASE MANAGEMENT/SOCIAL WORK
Continued Stay Note  JEANIE Garrett     Patient Name: Briana Alas  MRN: [id number]  Today's Date: 6/11/2024    Admit Date: 6/10/2024  Plan: Return home   Discharge Plan       Row Name 06/11/24 1337       Plan    Plan Return home    Patient/Family in Agreement with Plan yes    Plan Comments DC barriers: Cardiology consult pending, nephrology consult pending, monitor labs, and cardaic monitoring.             Expected Discharge Date and Time       Expected Discharge Date Expected Discharge Time    Jun 11, 2024        Dayami Foster RN      Office Phone [phone]

## 2024-06-11 NOTE — CONSULTS
Referring Provider: Joseph Buchanan MD  Reason for Consultation:  Nocturnal sinus pauses    Patient Care Team:  Kamran Garcia MD as PCP - General  Kamran Garcia MD as PCP - Family Medicine    Chief complaint  Altered mental status    Subjective .     History of present illness:  Briana Alas is a 58 y.o. female who presents with history of altered mental status and confusion.  Patient has improved.  Patient was noted to have sinus pauses (nocturnal) 3 to 4 seconds  Patient is asymptomatic.    Cardiology consultation was requested.  Patient is not on any medications to cause bradycardia..     ROS      Today, the patient has been without any chest discomfort, shortness of breath, palpitations, dizziness or syncope.  Denies having any headache, abdominal pain, nausea, vomiting, diarrhea, constipation, loss of weight or loss of appetite.  Denies having any excessive bruising, hematuria or blood in the stool.    Review of all systems negative except as indicated      History  Past Medical History:   Diagnosis Date    Anxiety     Arthritis     Back pain     Depression     Headache     Hyperlipidemia     Hypertension     Injury of back     Restless leg syndrome        Past Surgical History:   Procedure Laterality Date    ANKLE OPEN REDUCTION INTERNAL FIXATION Right     BUNIONECTOMY Left 12/15/2023    Procedure: BUNIONECTOMY JHONATAN;  Surgeon: GILBERTO Eldridge DPM;  Location: [facility];  Service: Podiatry;  Laterality: Left;    FOOT SURGERY Bilateral     bunionectomy    SHOULDER ARTHROSCOPY W/ ROTATOR CUFF REPAIR Right 12/13/2019    Procedure: RIGHT SHOULDER ARTHROSCOPY WITH ROTATOR CUFF REPAIR and Subacromial decompression;  Surgeon: Gino Ackerman MD;  Location: [facility] OR;  Service: Orthopedics    TUBAL ABDOMINAL LIGATION         Family History   Problem Relation Age of Onset    Hypertension Mother     Hyperlipidemia Mother     Depression Mother     Thyroid disease Mother      Atrial fibrillation Mother     Heart attack Father     Hypertension Father        Social History     Tobacco Use    Smoking status: Every Day     Current packs/day: 1.00     Average packs/day: 1 pack/day for 45.4 years (45.4 ttl pk-yrs)     Types: Cigarettes     Start date: 1979     Passive exposure: Never    Smokeless tobacco: Never    Tobacco comments:     Dont smoke dos    Vaping Use    Vaping status: Former    Quit date: 1/1/2020    Substances: Nicotine, Flavoring    Devices: Disposable   Substance Use Topics    Alcohol use: No    Drug use: No        Medications Prior to Admission   Medication Sig Dispense Refill Last Dose    atenolol (TENORMIN) 50 MG tablet Take 0.5 tablets by mouth Daily.       atorvastatin (LIPITOR) 40 MG tablet TAKE ONE TABLET BY MOUTH EVERY EVENING 30 tablet 0     Brexpiprazole (Rexulti) 2 MG tablet Take 1 tablet by mouth Every Morning. 30 tablet 3     busPIRone (BUSPAR) 15 MG tablet Take 1 tablet by mouth 2 (Two) Times a Day. 60 tablet 3     carisoprodol (SOMA) 350 MG tablet Take 1 tablet by mouth 3 (Three) Times a Day As Needed for Muscle Spasms. 90 tablet 0     clonazePAM (KlonoPIN) 1 MG tablet Take 1 tablet by mouth 2 (Two) Times a Day As Needed for Anxiety. 60 tablet 3     DULoxetine (CYMBALTA) 60 MG capsule Take 1 capsule by mouth 2 (Two) Times a Day. 60 capsule 3     oxyCODONE (ROXICODONE) 15 MG immediate release tablet Take 1 tablet by mouth Every 8 (Eight) Hours As Needed for Moderate Pain. Code M79.7 90 tablet 0     pregabalin (LYRICA) 150 MG capsule Take 1 capsule by mouth 2 (Two) Times a Day. 60 capsule 3     temazepam (RESTORIL) 30 MG capsule Take 1 capsule by mouth At Night As Needed for Sleep. 30 capsule 3     rOPINIRole (REQUIP) 0.5 MG tablet Take 1 tablet by mouth 2 (Two) Times a Day. Take 1 hour before bedtime.       vitamin D (ERGOCALCIFEROL) 1.25 MG (87437 UT) capsule capsule Take 1 capsule by mouth 1 (One) Time Per Week. Sundays            Penicillins    Scheduled  "Meds:aspirin, 81 mg, Oral, Daily  atenolol, 25 mg, Oral, Daily  atorvastatin, 40 mg, Oral, Q PM  Brexpiprazole, 2 mg, Oral, QAM  clopidogrel, 75 mg, Oral, Daily  DULoxetine, 60 mg, Oral, BID  sodium chloride, 10 mL, Intravenous, Q12H  vitamin D, 50,000 Units, Oral, Weekly      Continuous Infusions:   PRN Meds:.  acetaminophen    aluminum-magnesium hydroxide-simethicone    senna-docusate sodium **AND** polyethylene glycol **AND** bisacodyl **AND** bisacodyl    Calcium Replacement - Follow Nurse / BPA Driven Protocol    clonazePAM    Magnesium Cardiology Dose Replacement - Follow Nurse / BPA Driven Protocol    melatonin    nitroglycerin    ondansetron ODT **OR** ondansetron    oxyCODONE    Phosphorus Replacement - Follow Nurse / BPA Driven Protocol    Potassium Replacement - Follow Nurse / BPA Driven Protocol    [COMPLETED] Insert Peripheral IV **AND** sodium chloride    sodium chloride    sodium chloride    Objective     VITAL SIGNS  Vitals:    06/11/24 0524 06/11/24 0700 06/11/24 1100 06/11/24 1200   BP: 116/45 127/77  120/53   BP Location: Left arm Right arm  Right arm   Patient Position: Lying Lying  Lying   Pulse: 61  57    Resp: 13 17  13   Temp: 97.9 °F (36.6 °C) 98.6 °F (37 °C)  98 °F (36.7 °C)   TempSrc: Oral Oral  Oral   SpO2: 100%      Weight: 76.9 kg (169 lb 8.5 oz)      Height:           Flowsheet Rows      Flowsheet Row First Filed Value   Admission Height 162.6 cm (64\") Documented at 06/10/2024 0623   Admission Weight 77.1 kg (170 lb) Documented at 06/10/2024 0623              Intake/Output Summary (Last 24 hours) at 6/11/2024 1223  Last data filed at 6/10/2024 1900  Gross per 24 hour   Intake 240 ml   Output --   Net 240 ml        TELEMETRY: Sinus rhythm    Physical Exam:  The patient is alert, oriented and in no distress.  Vital signs as noted above.  Head and neck revealed no carotid bruits or jugular venous distention.  No thyromegaly or lymphadenopathy is present  Lungs clear.  No wheezing.  Breath " sounds are normal bilaterally.  Heart normal first and second heart sounds. No murmur.  No precordial rub is present.  No gallop is present.  Abdomen soft and nontender.  No organomegaly is present.  Extremities with good peripheral pulses without any pedal edema.  Skin warm and dry.  Musculoskeletal system is grossly normal  CNS grossly normal      Results Review:   I reviewed the patient's new clinical results.  Lab Results (last 24 hours)       Procedure Component Value Units Date/Time    Basic Metabolic Panel [[id number]]  (Abnormal) Collected: 06/11/24 0408    Specimen: Blood Updated: 06/11/24 0450     Glucose 112 mg/dL      BUN 25 mg/dL      Creatinine 1.02 mg/dL      Sodium 141 mmol/L      Potassium 4.1 mmol/L      Comment: Specimen hemolyzed.  Result may be falsely elevated.        Chloride 106 mmol/L      CO2 23.1 mmol/L      Calcium 9.6 mg/dL      BUN/Creatinine Ratio 24.5     Anion Gap 11.9 mmol/L      eGFR 63.5 mL/min/1.73     Narrative:      GFR Normal >60  Chronic Kidney Disease <60  Kidney Failure <15      Magnesium [[id number]]  (Normal) Collected: 06/11/24 0408    Specimen: Blood Updated: 06/11/24 0450     Magnesium 1.9 mg/dL     Phosphorus [[id number]]  (Normal) Collected: 06/11/24 0408    Specimen: Blood Updated: 06/11/24 0448     Phosphorus 3.8 mg/dL     Ammonia [[id number]]  (Normal) Collected: 06/11/24 0404    Specimen: Blood from Arm, Right Updated: 06/11/24 0436     Ammonia 30 umol/L     CBC & Differential [[id number]]  (Abnormal) Collected: 06/11/24 0408    Specimen: Blood Updated: 06/11/24 0419    Narrative:      The following orders were created for panel order CBC & Differential.  Procedure                               Abnormality         Status                     ---------                               -----------         ------                     CBC Auto Differential[[id number]]        Abnormal            Final result                 Please view results for these tests on the individual  orders.    CBC Auto Differential [[id number]]  (Abnormal) Collected: 06/11/24 0408    Specimen: Blood Updated: 06/11/24 0419     WBC 11.21 10*3/mm3      RBC 4.80 10*6/mm3      Hemoglobin 14.8 g/dL      Hematocrit 44.5 %      MCV 92.7 fL      MCH 30.8 pg      MCHC 33.3 g/dL      RDW 13.1 %      RDW-SD 44.7 fl      MPV 9.6 fL      Platelets 305 10*3/mm3      Neutrophil % 57.6 %      Lymphocyte % 32.2 %      Monocyte % 6.9 %      Eosinophil % 2.2 %      Basophil % 0.7 %      Immature Grans % 0.4 %      Neutrophils, Absolute 6.46 10*3/mm3      Lymphocytes, Absolute 3.61 10*3/mm3      Monocytes, Absolute 0.77 10*3/mm3      Eosinophils, Absolute 0.25 10*3/mm3      Basophils, Absolute 0.08 10*3/mm3      Immature Grans, Absolute 0.04 10*3/mm3      nRBC 0.0 /100 WBC     Vitamin B12 [[id number]]  (Normal) Collected: 06/10/24 0720    Specimen: Blood Updated: 06/10/24 2013     Vitamin B-12 331 pg/mL     Narrative:      Results may be falsely increased if patient taking Biotin.      Extra Tubes [[id number]] Collected: 06/10/24 1819    Specimen: Blood from Arm, Right Updated: 06/10/24 1900    Narrative:      The following orders were created for panel order Extra Tubes.  Procedure                               Abnormality         Status                     ---------                               -----------         ------                     Lavender Top[[id number]]                                     Final result                 Please view results for these tests on the individual orders.    Lavender Top [[id number]] Collected: 06/10/24 1819    Specimen: Blood from Arm, Right Updated: 06/10/24 1900     Extra Tube hold for add-on     Comment: Auto resulted       Respiratory Panel PCR w/COVID-19(SARS-CoV-2) VASQUEZ/VEENA/RENÉ/PAD/COR/WARD In-House, NP Swab in UTM/VTM, 2 HR TAT - Swab, Nasopharynx [[id number]]  (Normal) Collected: 06/10/24 1722    Specimen: Swab from Nasopharynx Updated: 06/10/24 1831     ADENOVIRUS, PCR Not Detected      Coronavirus 229E Not Detected     Coronavirus HKU1 Not Detected     Coronavirus NL63 Not Detected     Coronavirus OC43 Not Detected     COVID19 Not Detected     Human Metapneumovirus Not Detected     Human Rhinovirus/Enterovirus Not Detected     Influenza A PCR Not Detected     Influenza B PCR Not Detected     Parainfluenza Virus 1 Not Detected     Parainfluenza Virus 2 Not Detected     Parainfluenza Virus 3 Not Detected     Parainfluenza Virus 4 Not Detected     RSV, PCR Not Detected     Bordetella pertussis pcr Not Detected     Bordetella parapertussis PCR Not Detected     Chlamydophila pneumoniae PCR Not Detected     Mycoplasma pneumo by PCR Not Detected    Narrative:      In the setting of a positive respiratory panel with a viral infection PLUS a negative procalcitonin without other underlying concern for bacterial infection, consider observing off antibiotics or discontinuation of antibiotics and continue supportive care. If the respiratory panel is positive for atypical bacterial infection (Bordetella pertussis, Chlamydophila pneumoniae, or Mycoplasma pneumoniae), consider antibiotic de-escalation to target atypical bacterial infection.    Blood Culture - Blood, Arm, Right [[id number]] Collected: 06/10/24 1819    Specimen: Blood from Arm, Right Updated: 06/10/24 1830    Blood Culture - Blood, Wrist, Left [[id number]] Collected: 06/10/24 1819    Specimen: Blood from Wrist, Left Updated: 06/10/24 1830    Phosphorus [[id number]]  (Normal) Collected: 06/10/24 0929    Specimen: Blood Updated: 06/10/24 1637     Phosphorus 3.9 mg/dL     Magnesium [[id number]]  (Normal) Collected: 06/10/24 0929    Specimen: Blood Updated: 06/10/24 1637     Magnesium 2.0 mg/dL     C-reactive Protein [[id number]]  (Abnormal) Collected: 06/10/24 0929    Specimen: Blood Updated: 06/10/24 1637     C-Reactive Protein 1.86 mg/dL     Sedimentation Rate [[id number]]  (Normal) Collected: 06/10/24 0720    Specimen: Blood Updated: 06/10/24 1631      Sed Rate 17 mm/hr     Lipid Panel [[id number]]  (Abnormal) Collected: 06/10/24 0929    Specimen: Blood Updated: 06/10/24 1331     Total Cholesterol 225 mg/dL      Triglycerides 181 mg/dL      HDL Cholesterol 41 mg/dL      LDL Cholesterol  151 mg/dL      VLDL Cholesterol 33 mg/dL      LDL/HDL Ratio 3.60    Narrative:      Cholesterol Reference Ranges  (U.S. Department of Health and Human Services ATP III Classifications)    Desirable          <200 mg/dL  Borderline High    200-239 mg/dL  High Risk          >240 mg/dL      Triglyceride Reference Ranges  (U.S. Department of Health and Human Services ATP III Classifications)    Normal           <150 mg/dL  Borderline High  150-199 mg/dL  High             200-499 mg/dL  Very High        >500 mg/dL    HDL Reference Ranges  (U.S. Department of Health and Human Services ATP III Classifications)    Low     <40 mg/dl (major risk factor for CHD)  High    >60 mg/dl ('negative' risk factor for CHD)        LDL Reference Ranges  (U.S. Department of Health and Human Services ATP III Classifications)    Optimal          <100 mg/dL  Near Optimal     100-129 mg/dL  Borderline High  130-159 mg/dL  High             160-189 mg/dL  Very High        >189 mg/dL    Hemoglobin A1c [[id number]]  (Abnormal) Collected: 06/10/24 0720    Specimen: Blood Updated: 06/10/24 1245     Hemoglobin A1C 5.71 %             Imaging Results (Last 24 Hours)       Procedure Component Value Units Date/Time    MRI Brain Without Contrast [[id number]] Collected: 06/10/24 1710     Updated: 06/10/24 1717    Narrative:      MRI BRAIN WO CONTRAST    Date of Exam: 6/10/2024 4:45 PM EDT    Indication: AMS. slow speech.     Comparison: Head CT 10/6/2024    Technique:  Routine multiplanar/multisequence sequence images of the brain were obtained without contrast administration.      Findings:  No areas of diffusion restriction to suggest a recent infarct. Motion-degraded exam. Negative for acute intracranial hemorrhage, large  mass lesion, midline shift or hydrocephalus. Minimal periventricular and subcortical T2/FLAIR hyperintense signal   suggesting sequelae of chronic microvascular ischemic change. No large extra-axial collection. No suspicious susceptibility artifact. Major intracranial flow voids appear preserved. Distal right vertebral artery appears diminutive, possibly congenital.   Maintained posterior pituitary T1 bright spot. Negative for cerebellar tonsillar ectopia. Mild prominence of the extra-axial fluid spaces suggesting mild global volume loss. No obstructive sinus disease or air-fluid level. Small right mastoid effusion.      Impression:      Impression:  1. Motion-degraded exam.  2. Within limitations, no acute MRI findings. Specifically no findings to suggest a recent (acute or subacute) infarct.  3. Mild global volume loss. Minimal areas of gliosis, most likely representing sequelae of chronic microvascular ischemic change.        Electronically Signed: Philip Bravo MD    6/10/2024 5:15 PM EDT    Workstation ID: [id number]        LAB RESULTS (LAST 7 DAYS)    CBC  Results from last 7 days   Lab Units 06/11/24  0408 06/10/24  0720   WBC 10*3/mm3 11.21* 16.39*   RBC 10*6/mm3 4.80 5.14   HEMOGLOBIN g/dL 14.8 16.1*   HEMATOCRIT % 44.5 48.9*   MCV fL 92.7 95.1   PLATELETS 10*3/mm3 305 367       BMP  Results from last 7 days   Lab Units 06/11/24  0408 06/10/24  0929 06/10/24  0720   SODIUM mmol/L 141  --  143   POTASSIUM mmol/L 4.1  --  4.4   CHLORIDE mmol/L 106  --  103   CO2 mmol/L 23.1  --  26.4   BUN mg/dL 25*  --  13   CREATININE mg/dL 1.02*  --  1.08*   GLUCOSE mg/dL 112*  --  103*   MAGNESIUM mg/dL 1.9 2.0  --    PHOSPHORUS mg/dL 3.8 3.9  --        CMP   Results from last 7 days   Lab Units 06/11/24 0408 06/11/24  0404 06/10/24  0720   SODIUM mmol/L 141  --  143   POTASSIUM mmol/L 4.1  --  4.4   CHLORIDE mmol/L 106  --  103   CO2 mmol/L 23.1  --  26.4   BUN mg/dL 25*  --  13   CREATININE mg/dL 1.02*  --  1.08*    GLUCOSE mg/dL 112*  --  103*   ALBUMIN g/dL  --   --  5.0   BILIRUBIN mg/dL  --   --  0.8   ALK PHOS U/L  --   --  83   AST (SGOT) U/L  --   --  32   ALT (SGPT) U/L  --   --  31   AMMONIA umol/L  --  30  --          BNP        TROPONIN  Results from last 7 days   Lab Units 06/10/24  0929   HSTROP T ng/L 7       CoAg  Results from last 7 days   Lab Units 06/10/24  0720   INR  0.99   APTT seconds 27.9       Creatinine Clearance  Estimated Creatinine Clearance: 59.6 mL/min (A) (by C-G formula based on SCr of 1.02 mg/dL (H)).    ABG        Radiology  MRI Brain Without Contrast    Result Date: 6/10/2024  Impression: 1. Motion-degraded exam. 2. Within limitations, no acute MRI findings. Specifically no findings to suggest a recent (acute or subacute) infarct. 3. Mild global volume loss. Minimal areas of gliosis, most likely representing sequelae of chronic microvascular ischemic change. Electronically Signed: Philip Bravo MD  6/10/2024 5:15 PM EDT  Workstation ID: [id number]    CT Head Without Contrast    Result Date: 6/10/2024  Impression: No acute process Electronically Signed: Juan Francisco Graham  6/10/2024 8:17 AM EDT  Workstation ID: [id number]    XR Knee 4+ View Left    Result Date: 6/10/2024  Impression: No evidence of fracture or significant joint effusion Electronically Signed: Juan Francisco Graham  6/10/2024 8:14 AM EDT  Workstation ID: [id number]    XR Chest 1 View    Result Date: 6/10/2024  Impression: 1.No acute radiographic abnormality is identified. Electronically Signed: Delio Pollard MD  6/10/2024 8:07 AM EDT  Workstation ID: [id number]       EKG                I personally viewed and interpreted the patient's EKG/Telemetry data:    ECHOCARDIOGRAM:    Results for orders placed during the hospital encounter of 01/08/24    Adult Transthoracic Echo Complete W/ Cont if Necessary Per Protocol    Interpretation Summary    Left ventricular systolic function is normal. Calculated left ventricular EF = 51% Left ventricular  ejection fraction appears to be 51 - 55%.    Estimated right ventricular systolic pressure from tricuspid regurgitation is normal (<35 mmHg).    Indications  Hypertension    Technically satisfactory study.  Mitral valve is structurally normal.  Tricuspid valve is structurally normal.  Aortic valve is structurally normal.  Minimal aortic regurgitation.  Pulmonic valve could not be well visualized.  No evidence for mitral tricuspid regurgitation is seen by Doppler study.  Left atrium is normal in size.  Right atrium is normal in size.  Left ventricle is normal in size and contractility with ejection fraction of 60%.  Peak systolic longitudinal strain is within normal limits.  Right ventricle is normal in size.  Atrial septum is intact.  Aorta is normal.  No pericardial effusion or intracardiac thrombus is seen.    Impression  Structurally and functionally normal cardiac valves except for minimal aortic regurgitation..  Left ventricular size and contractility is normal with ejection fraction of 60%.      STRESS TEST        Cardiolite (Tc-99m sestamibi) stress test    HEART CATHETERIZATION  No results found for this or any previous visit.      OTHER:     Assessment & Plan     Principal Problem:    AMS (altered mental status)    ]]]]]]]]]]]]]]]]]]]]  Impression  =============  - Altered mental status and confusion-better.    - Nocturnal sinus pauses.-Asymptomatic    - Patient had left foot surgery since last visit without any perioperative cardiovascular problems.     Echocardiogram 1/8/2024   Structurally and functionally normal cardiac valves except for minimal aortic regurgitation..  Left ventricular size and contractility is normal with ejection fraction of 60%.     - Dyslipidemia hypertension     - Abnormal EKG-12/7/2023.  Poor R wave progression anteriorly not rule out anteroseptal infarction age undetermined.     - Status post right ankle surgery abdominal tubal ligation right arthroscopic shoulder surgery.     -  Smoker     - Family history of coronary artery disease     - Allergic to penicillin     ============  Plan  ===============  - Altered mental status and confusion-better.    - Nocturnal sinus pauses.-Asymptomatic    Echocardiogram 1/8/2024-as above.  Echocardiogram results were discussed and educated patient.     Hypertension-well-controlled     Dyslipidemia-continue atorvastatin     Medications were reviewed and updated.     Close cardiac monitoring.  Patient would benefit from extended Holter monitor.    Follow-up in the office in 4 weeks.     Further plan will depend on patient's progress.     Reviewed and updated 6/11/2024.  ]]]]]]]]]]]]]]]]]]]]]]]]]]]]]]             Rafi Haney MD  06/11/24  12:23 EDT

## 2024-06-11 NOTE — THERAPY TREATMENT NOTE
Subjective: Pt agreeable to therapeutic plan of care. Pt stated she was walking around unit earlier by herself.     Objective:     Bed mobility - Independent  Transfers - Independent  Ambulation - 600 feet SBA      Vitals: WNL   /59 after 600' amb    Pain: 0 VAS       Education: Provided education on the importance of mobility in the acute care setting    Assessment: Briana Alas is a 57y/o F admitted to [facility] on 6/10/24 d/t AMS with slowed speech. PMH of HTN, hyperlipidemia, depression, anxiety and chronic back pain. Pt felt she did not need PT and was independent for bed mobility and STS. Amb 600' with SBA. Had decreased arm swing and stride length. /59 after amb. Pt safe to return home with no further PT once d/c since pt is at baseline .       Plan/Recommendations:   If medically appropriate, No ongoing therapy recommended post-acute care. No therapy needs. Pt requires no DME at discharge.     Pt desires Home at discharge. Pt cooperative; agreeable to therapeutic recommendations and plan of care.             Post-Tx Position: Supine with HOB Elevated and Call light and personal items within reach  PPE: gloves

## 2024-06-11 NOTE — PLAN OF CARE
Goal Outcome Evaluation:  Plan of Care Reviewed With: patient     Progress: improving  Outcome Evaluation: Patient alert, oriented x3-4. Intermittent confusion, gentle reorientation. Vitals stable. Up ad salvatore. Falls precautions in place. Patient heartrate dropping into 40s consistently while asleep, into 30s a few times. CMU confirmed pauses, one being 4.5 seconds. O2 dropping into 80s. MD called, EKG ordered. Patient c/o pain in back. Tylenol, heat pack offered, but refused. RN explained reasoning for Narcotics being held with AMS diagnosis. Patient agreeable to plan of care. Continue to monitor.    Problem: Confusion Acute  Goal: Optimal Cognitive Function  Outcome: Ongoing, Progressing  Intervention: Minimize Contributing Factors  Recent Flowsheet Documentation  Taken 6/10/2024 2042 by Christina Huang, RN  Sensory Stimulation Regulation:   care clustered   lighting decreased     Problem: Adult Inpatient Plan of Care  Goal: Plan of Care Review  Outcome: Ongoing, Progressing  Flowsheets (Taken 6/11/2024 0241)  Progress: improving  Plan of Care Reviewed With: patient  Outcome Evaluation: Patient alert, oriented x3-4. Intermittent confusion, gentle reorientation. Vitals stable. Up ad salvatore. Falls precautions in place. Patient heartrate dropping into 40s consistently while asleep, into 30s a few times. CMU confirmed pauses, one being 4.5 seconds. O2 dropping into 80s. MD called, EKG ordered. Patient c/o pain in back. Tylenol, heat pack offered, but refused. RN explained reasoning for Narcotics being held with AMS diagnosis. Patient agreeable to plan of care. Continue to monitor.  Goal: Patient-Specific Goal (Individualized)  Outcome: Ongoing, Progressing  Goal: Absence of Hospital-Acquired Illness or Injury  Outcome: Ongoing, Progressing  Intervention: Identify and Manage Fall Risk  Flowsheets  Taken 6/11/2024 0224  Safety Promotion/Fall Prevention: safety round/check completed  Taken 6/11/2024 0014  Safety Promotion/Fall  Prevention: safety round/check completed  Taken 6/10/2024 2209  Safety Promotion/Fall Prevention:   safety round/check completed   room organization consistent   nonskid shoes/slippers when out of bed   fall prevention program maintained   clutter free environment maintained   assistive device/personal items within reach  Taken 6/10/2024 2042  Safety Promotion/Fall Prevention:   safety round/check completed   room organization consistent   nonskid shoes/slippers when out of bed   lighting adjusted   fall prevention program maintained   clutter free environment maintained   assistive device/personal items within reach   activity supervised  Intervention: Prevent Skin Injury  Flowsheets  Taken 6/11/2024 0224  Body Position: position changed independently  Taken 6/11/2024 0014  Body Position: position changed independently  Taken 6/10/2024 2209  Body Position: position changed independently  Taken 6/10/2024 2042  Body Position: position changed independently  Skin Protection: adhesive use limited  Intervention: Prevent and Manage VTE (Venous Thromboembolism) Risk  Flowsheets  Taken 6/11/2024 0224  Activity Management: up ad salvatore  Taken 6/11/2024 0014  Activity Management: up ad salvatore  Taken 6/10/2024 2209  Activity Management: up ad salvatore  Taken 6/10/2024 2042  Activity Management: up ad salvatore  VTE Prevention/Management: patient refused intervention  Intervention: Prevent Infection  Flowsheets  Taken 6/11/2024 0224  Infection Prevention: hand hygiene promoted  Taken 6/11/2024 0014  Infection Prevention:   hand hygiene promoted   personal protective equipment utilized  Taken 6/10/2024 2209  Infection Prevention:   hand hygiene promoted   personal protective equipment utilized  Taken 6/10/2024 2042  Infection Prevention:   hand hygiene promoted   personal protective equipment utilized  Goal: Optimal Comfort and Wellbeing  Outcome: Ongoing, Progressing  Intervention: Monitor Pain and Promote Comfort  Recent Flowsheet  Documentation  Taken 6/10/2024 2042 by Christina Huang, RN  Pain Management Interventions: (Tylenol offered, heat pack offered) position adjusted  Intervention: Provide Person-Centered Care  Flowsheets (Taken 6/10/2024 1533 by Cindi Ríos, RN)  Trust Relationship/Rapport:   care explained   choices provided   reassurance provided   thoughts/feelings acknowledged  Goal: Readiness for Transition of Care  Outcome: Ongoing, Progressing  Intervention: Mutually Develop Transition Plan  Flowsheets  Taken 6/10/2024 1818 by Anastasiya Ramirez, RN  Transportation Anticipated: family or friend will provide  Patient/Family Anticipated Services at Transition: none  Patient/Family Anticipates Transition to: home with family  Taken 6/10/2024 1815 by Anastasiya Ramirez, RN  Equipment Currently Used at Home: none  Taken 6/10/2024 1737 by Perri Estevez  Readmission Within the Last 30 Days: no previous admission in last 30 days

## 2024-06-11 NOTE — PROGRESS NOTES
[facility] MEDICINE SERVICE  DAILY PROGRESS NOTE    NAME: Briana Alas  : 1965  MRN: [id number]      LOS: 0 days     PROVIDER OF SERVICE: Joseph Buchanan MD    Chief Complaint: AMS (altered mental status)    History of Present Illness: A 58 y.o. old female patient with PMH of anxiety depression hyperlipidemia hypertension presents to the hospital with complaints of confusion.  Per the patient she has been fine the whole day yesterday but in the evening she started having some weird talking and that she does not remember.  Currently in the ED patient is back to the baseline but she still feel she has slow speech.  CT head without any acute changes.  White count of 16.  Hemoglobin of 16. TSH normal.  Creatinine at baseline.  Troponin is normal.  UA without UTI.  Utox pos for the benzodiazepine.  Chest x-ray without any acute changes.  Will get MRI brain and neurology to involve     Subjective:     Interval History:      - patient was admitted with altered mental status.  On review of medication she is on large amount of psych medication and pain medication. Initially had some leukocytosis which has improved.  MRI brain is normal.  Overnight patient had episodes of sinus pauses.  Cardiology was consulted    Review of Systems:   Negative except what is listed above     Objective:     Vital Signs  Temp:  [97.1 °F (36.2 °C)-98.6 °F (37 °C)] 98 °F (36.7 °C)  Heart Rate:  [51-84] 57  Resp:  [13-17] 13  BP: ()/(45-85) 120/53   Body mass index is 29.1 kg/m².    Physical Exam    General Appearance:    Alert, cooperative, in no acute distress   Head:    Normocephalic, without obvious abnormality, atraumatic   Eyes:            conjunctivae and sclerae normal, no   icterus, no pallor, corneas  clear, PERRLA   Neck:   No adenopathy, supple, trachea midline, no thyromegaly, no   carotid bruit, no JVD   Lungs:     Clear to auscultation,respirations regular, even and                  unlabored    Heart:     Regular rhythm and normal rate, normal S1 and S2, no            murmur, no gallop, no rub, no click   Abdomen:     Normal bowel sounds, no masses, no organomegaly, soft        non-tender, non-distended, no guarding, no rebound                No tenderness   Extremities:   Moves all extremities well, no edema, no cyanosis, no             redness   Lymph nodes:   No palpable adenopathy   Neurologic:   Cranial nerves 2 - 12 grossly intact, sensation intact, DTR       present and equal bilaterally       Scheduled Meds   aspirin, 81 mg, Oral, Daily  atenolol, 25 mg, Oral, Daily  atorvastatin, 40 mg, Oral, Q PM  Brexpiprazole, 2 mg, Oral, QAM  clopidogrel, 75 mg, Oral, Daily  DULoxetine, 60 mg, Oral, BID  sodium chloride, 10 mL, Intravenous, Q12H  vitamin D, 50,000 Units, Oral, Weekly       PRN Meds     acetaminophen    aluminum-magnesium hydroxide-simethicone    senna-docusate sodium **AND** polyethylene glycol **AND** bisacodyl **AND** bisacodyl    Calcium Replacement - Follow Nurse / BPA Driven Protocol    clonazePAM    Magnesium Cardiology Dose Replacement - Follow Nurse / BPA Driven Protocol    melatonin    nitroglycerin    ondansetron ODT **OR** ondansetron    oxyCODONE    Phosphorus Replacement - Follow Nurse / BPA Driven Protocol    Potassium Replacement - Follow Nurse / BPA Driven Protocol    [COMPLETED] Insert Peripheral IV **AND** sodium chloride    sodium chloride    sodium chloride   Infusions         Diagnostic Data    Results from last 7 days   Lab Units 06/11/24  0408 06/10/24  0720   WBC 10*3/mm3 11.21* 16.39*   HEMOGLOBIN g/dL 14.8 16.1*   HEMATOCRIT % 44.5 48.9*   PLATELETS 10*3/mm3 305 367   GLUCOSE mg/dL 112* 103*   CREATININE mg/dL 1.02* 1.08*   BUN mg/dL 25* 13   SODIUM mmol/L 141 143   POTASSIUM mmol/L 4.1 4.4   AST (SGOT) U/L  --  32   ALT (SGPT) U/L  --  31   ALK PHOS U/L  --  83   BILIRUBIN mg/dL  --  0.8   ANION GAP mmol/L 11.9 13.6       MRI Brain Without Contrast    Result Date:  6/10/2024  Impression: 1. Motion-degraded exam. 2. Within limitations, no acute MRI findings. Specifically no findings to suggest a recent (acute or subacute) infarct. 3. Mild global volume loss. Minimal areas of gliosis, most likely representing sequelae of chronic microvascular ischemic change. Electronically Signed: Philip Bravo MD  6/10/2024 5:15 PM EDT  Workstation ID: [id number]    CT Head Without Contrast    Result Date: 6/10/2024  Impression: No acute process Electronically Signed: Juan Francisco Graahm  6/10/2024 8:17 AM EDT  Workstation ID: [id number]    XR Knee 4+ View Left    Result Date: 6/10/2024  Impression: No evidence of fracture or significant joint effusion Electronically Signed: Juan Francisco Graham  6/10/2024 8:14 AM EDT  Workstation ID: [id number]    XR Chest 1 View    Result Date: 6/10/2024  Impression: 1.No acute radiographic abnormality is identified. Electronically Signed: Delio Pollard MD  6/10/2024 8:07 AM EDT  Workstation ID: [id number]       I have personally reviewed the patient's new results.     Assessment/Plan:     Active and Resolved Problems    Altered mental status seems due to meds  Toxic metabolic encephalopathy seems due to meds  Sinus pauses on atenolol  History of anxiety depression  Hypertension  Mixed hyperlipidemia  History of RLS    Suggestion:    6/11-at this time I reviewed her home medication and continue some of the medication and decrease some of the doses.  I think her mental status changes were due to polypharmacy.  Neurology consult is pending.  Hold beta-blockers.    VTE Prophylaxis:  Mechanical VTE prophylaxis orders are present.         Code status is   There are no questions and answers to display.       Plan for disposition: 6/13    Time: 30 minutes    Signature: Electronically signed by Joseph Buchanan MD, 06/11/24, 13:47 EDT.  [facility] ([facility]

## 2024-06-11 NOTE — PLAN OF CARE
Goal Outcome Evaluation:      Assessment: Briana Alas is a 59y/o F admitted to [facility] on 6/10/24 d/t AMS with slowed speech. PMH of HTN, hyperlipidemia, depression, anxiety and chronic back pain. Pt felt she did not need PT and was independent for bed mobility and STS. Amb 600' with SBA. Had decreased arm swing and stride length. /59 after amb. Pt safe to return home with no further PT once d/c since pt is at baseline .       Plan/Recommendations:   If medically appropriate, No ongoing therapy recommended post-acute care. No therapy needs. Pt requires no DME at discharge.     Pt desires Home at discharge. Pt cooperative; agreeable to therapeutic recommendations and plan of care.

## 2024-06-12 ENCOUNTER — APPOINTMENT (OUTPATIENT)
Dept: RESPIRATORY THERAPY | Facility: HOSPITAL | Age: 59
End: 2024-06-12
Payer: MEDICAID

## 2024-06-12 ENCOUNTER — READMISSION MANAGEMENT (OUTPATIENT)
Dept: CALL CENTER | Facility: HOSPITAL | Age: 59
End: 2024-06-12
Payer: MEDICAID

## 2024-06-12 VITALS
DIASTOLIC BLOOD PRESSURE: 46 MMHG | BODY MASS INDEX: 29.43 KG/M2 | RESPIRATION RATE: 13 BRPM | HEIGHT: 64 IN | HEART RATE: 77 BPM | SYSTOLIC BLOOD PRESSURE: 103 MMHG | WEIGHT: 172.4 LBS | TEMPERATURE: 97.8 F | OXYGEN SATURATION: 97 %

## 2024-06-12 LAB
ANION GAP SERPL CALCULATED.3IONS-SCNC: 10.6 MMOL/L (ref 5–15)
BASOPHILS # BLD AUTO: 0.06 10*3/MM3 (ref 0–0.2)
BASOPHILS NFR BLD AUTO: 0.6 % (ref 0–1.5)
BUN SERPL-MCNC: 25 MG/DL (ref 6–20)
BUN/CREAT SERPL: 25.5 (ref 7–25)
CALCIUM SPEC-SCNC: 9.3 MG/DL (ref 8.6–10.5)
CHLORIDE SERPL-SCNC: 103 MMOL/L (ref 98–107)
CO2 SERPL-SCNC: 25.4 MMOL/L (ref 22–29)
CREAT SERPL-MCNC: 0.98 MG/DL (ref 0.57–1)
DEPRECATED RDW RBC AUTO: 44.5 FL (ref 37–54)
EGFRCR SERPLBLD CKD-EPI 2021: 66.6 ML/MIN/1.73
EOSINOPHIL # BLD AUTO: 0.25 10*3/MM3 (ref 0–0.4)
EOSINOPHIL NFR BLD AUTO: 2.6 % (ref 0.3–6.2)
ERYTHROCYTE [DISTWIDTH] IN BLOOD BY AUTOMATED COUNT: 12.9 % (ref 12.3–15.4)
GLUCOSE SERPL-MCNC: 166 MG/DL (ref 65–99)
HCT VFR BLD AUTO: 40.7 % (ref 34–46.6)
HGB BLD-MCNC: 13.2 G/DL (ref 12–15.9)
IMM GRANULOCYTES # BLD AUTO: 0.03 10*3/MM3 (ref 0–0.05)
IMM GRANULOCYTES NFR BLD AUTO: 0.3 % (ref 0–0.5)
LYMPHOCYTES # BLD AUTO: 2.96 10*3/MM3 (ref 0.7–3.1)
LYMPHOCYTES NFR BLD AUTO: 30.5 % (ref 19.6–45.3)
MAGNESIUM SERPL-MCNC: 1.8 MG/DL (ref 1.6–2.6)
MCH RBC QN AUTO: 30.6 PG (ref 26.6–33)
MCHC RBC AUTO-ENTMCNC: 32.4 G/DL (ref 31.5–35.7)
MCV RBC AUTO: 94.2 FL (ref 79–97)
MONOCYTES # BLD AUTO: 0.56 10*3/MM3 (ref 0.1–0.9)
MONOCYTES NFR BLD AUTO: 5.8 % (ref 5–12)
NEUTROPHILS NFR BLD AUTO: 5.85 10*3/MM3 (ref 1.7–7)
NEUTROPHILS NFR BLD AUTO: 60.2 % (ref 42.7–76)
NRBC BLD AUTO-RTO: 0 /100 WBC (ref 0–0.2)
PHOSPHATE SERPL-MCNC: 3.4 MG/DL (ref 2.5–4.5)
PLATELET # BLD AUTO: 257 10*3/MM3 (ref 140–450)
PMV BLD AUTO: 10 FL (ref 6–12)
POTASSIUM SERPL-SCNC: 3.6 MMOL/L (ref 3.5–5.2)
QT INTERVAL: 477 MS
QTC INTERVAL: 462 MS
RBC # BLD AUTO: 4.32 10*6/MM3 (ref 3.77–5.28)
SODIUM SERPL-SCNC: 139 MMOL/L (ref 136–145)
WBC NRBC COR # BLD AUTO: 9.71 10*3/MM3 (ref 3.4–10.8)

## 2024-06-12 PROCEDURE — 25010000002 MAGNESIUM SULFATE 2 GM/50ML SOLUTION: Performed by: INTERNAL MEDICINE

## 2024-06-12 PROCEDURE — 85025 COMPLETE CBC W/AUTO DIFF WBC: CPT | Performed by: INTERNAL MEDICINE

## 2024-06-12 PROCEDURE — 84100 ASSAY OF PHOSPHORUS: CPT | Performed by: INTERNAL MEDICINE

## 2024-06-12 PROCEDURE — 83735 ASSAY OF MAGNESIUM: CPT | Performed by: INTERNAL MEDICINE

## 2024-06-12 PROCEDURE — 80048 BASIC METABOLIC PNL TOTAL CA: CPT | Performed by: INTERNAL MEDICINE

## 2024-06-12 PROCEDURE — 99214 OFFICE O/P EST MOD 30 MIN: CPT | Performed by: INTERNAL MEDICINE

## 2024-06-12 PROCEDURE — G0378 HOSPITAL OBSERVATION PER HR: HCPCS

## 2024-06-12 PROCEDURE — 93246 EXT ECG>7D<15D RECORDING: CPT

## 2024-06-12 RX ORDER — POTASSIUM CHLORIDE 20 MEQ/1
40 TABLET, EXTENDED RELEASE ORAL EVERY 4 HOURS
Status: COMPLETED | OUTPATIENT
Start: 2024-06-12 | End: 2024-06-12

## 2024-06-12 RX ORDER — MAGNESIUM SULFATE HEPTAHYDRATE 40 MG/ML
4 INJECTION, SOLUTION INTRAVENOUS ONCE
Status: COMPLETED | OUTPATIENT
Start: 2024-06-12 | End: 2024-06-12

## 2024-06-12 RX ORDER — PREGABALIN 150 MG/1
150 CAPSULE ORAL DAILY
Start: 2024-06-12

## 2024-06-12 RX ORDER — CLONAZEPAM 0.5 MG/1
0.5 TABLET ORAL 2 TIMES DAILY PRN
Start: 2024-06-12 | End: 2024-06-16

## 2024-06-12 RX ORDER — BISACODYL 5 MG/1
5 TABLET, DELAYED RELEASE ORAL DAILY PRN
Start: 2024-06-12

## 2024-06-12 RX ORDER — OXYCODONE HYDROCHLORIDE 15 MG/1
7.5 TABLET ORAL EVERY 12 HOURS PRN
Start: 2024-06-12 | End: 2024-06-20 | Stop reason: SDUPTHER

## 2024-06-12 RX ORDER — DULOXETIN HYDROCHLORIDE 60 MG/1
60 CAPSULE, DELAYED RELEASE ORAL 2 TIMES DAILY
Start: 2024-06-12

## 2024-06-12 RX ORDER — ASPIRIN 81 MG/1
81 TABLET ORAL DAILY
Start: 2024-06-13

## 2024-06-12 RX ORDER — POLYETHYLENE GLYCOL 3350 17 G/17G
17 POWDER, FOR SOLUTION ORAL DAILY PRN
Start: 2024-06-12

## 2024-06-12 RX ADMIN — Medication 10 ML: at 09:03

## 2024-06-12 RX ADMIN — OXYCODONE HYDROCHLORIDE 5 MG: 5 TABLET ORAL at 09:01

## 2024-06-12 RX ADMIN — ASPIRIN 81 MG: 81 TABLET, COATED ORAL at 09:02

## 2024-06-12 RX ADMIN — POTASSIUM CHLORIDE 40 MEQ: 1500 TABLET, EXTENDED RELEASE ORAL at 11:49

## 2024-06-12 RX ADMIN — POTASSIUM CHLORIDE 40 MEQ: 1500 TABLET, EXTENDED RELEASE ORAL at 09:01

## 2024-06-12 RX ADMIN — MAGNESIUM SULFATE HEPTAHYDRATE 4 G: 40 INJECTION, SOLUTION INTRAVENOUS at 09:02

## 2024-06-12 RX ADMIN — DULOXETINE HYDROCHLORIDE 60 MG: 30 CAPSULE, DELAYED RELEASE ORAL at 09:01

## 2024-06-12 RX ADMIN — BREXPIPRAZOLE 2 MG: 1 TABLET ORAL at 09:02

## 2024-06-12 RX ADMIN — CLOPIDOGREL BISULFATE 75 MG: 75 TABLET ORAL at 09:01

## 2024-06-12 NOTE — CASE MANAGEMENT/SOCIAL WORK
Continued Stay Note  JEANIE Gerry     Patient Name: Briana Alas  MRN: [id number]  Today's Date: 6/12/2024    Admit Date: 6/10/2024    Plan: Rtn home. Family will transport   Discharge Plan       Row Name 06/12/24 1112       Plan    Plan Rtn home. Family will transport    Patient/Family in Agreement with Plan yes    Plan Comments Pt will need holter monitor at d/c. Pt expressed some of her medications are not covered by insurance. CM added medication assistance information to AVS. Pt states she does not need any assistance at this time. Family will provide tranportation at d/c.             Expected Discharge Date and Time       Expected Discharge Date Expected Discharge Time    Jun 13, 2024         Angelica Juares RN    phone [phone]  fax [phone]

## 2024-06-12 NOTE — PLAN OF CARE
Goal Outcome Evaluation:              Outcome Evaluation: Patient currently abed, no distress noted. Patient alert and oriented, able to make needs known. Patient appears to have slept about an hour to two hours throughout the night. Patient stated she always has trouble sleeping. Patient's heart rate in th eupper 50s to lower 60s throughout the night.

## 2024-06-12 NOTE — DISCHARGE SUMMARY
[facility] Medicine Services  Discharge Summary    Date of Service: 24  Patient Name: Briana Alas  : 1965  MRN: [id number]    Date of Admission: 6/10/2024  Date of Discharge:  24  Primary Care Physician: Kamran Garcia MD      Presenting Problem:   Altered mental status, unspecified altered mental status type [R41.82]  AMS (altered mental status) [R41.82]    Active and Resolved Hospital Problems:  Active Hospital Problems    Diagnosis POA    **AMS (altered mental status) [R41.82] Yes      Resolved Hospital Problems   No resolved problems to display.         Hospital Course     HPI:A 58 y.o. old female patient with PMH of anxiety depression hyperlipidemia hypertension presents to the hospital with complaints of confusion.  Per the patient she has been fine the whole day yesterday but in the evening she started having some weird talking and that she does not remember.  Currently in the ED patient is back to the baseline but she still feel she has slow speech.  CT head without any acute changes.  White count of 16.  Hemoglobin of 16. TSH normal.  Creatinine at baseline.  Troponin is normal.  UA without UTI.  Utox pos for the benzodiazepine.  Chest x-ray without any acute changes.  Will get MRI brain and neurology to involve      Subjective:      Interval History:       - patient was admitted with altered mental status.  On review of medication she is on large amount of psych medication and pain medication. Initially had some leukocytosis which has improved.  MRI brain is normal.  Overnight patient had episodes of sinus pauses.  Cardiology was consulted      Hospital Course:    Active and Resolved Problems     Altered mental status seems due to meds  Toxic metabolic encephalopathy seems due to meds  Sinus pauses on atenolol  History of anxiety depression  Hypertension  Mixed hyperlipidemia  History of RLS     Suggestion:    -patient remained stable overnight.   Heart rate is better.  I have discontinued atenolol.  Patient will be on Holter monitor and needs to follow with cardiology regarding sinus pauses.  Her mentation was due to polypharmacy.  I try to decrease the doses as much as I can.     6/11-at this time I reviewed her home medication and continue some of the medication and decrease some of the doses.  I think her mental status changes were due to polypharmacy.  Neurology consult is pending.  Hold beta-blockers.         Day of Discharge     Vital Signs:  Temp:  [97.6 °F (36.4 °C)-98.1 °F (36.7 °C)] 97.8 °F (36.6 °C)  Heart Rate:  [60-77] 77  Resp:  [10-15] 13  BP: (100-128)/(46-68) 103/46    Physical Exam:  General Appearance:    Alert, cooperative, in no acute distress   Head:    Normocephalic, without obvious abnormality, atraumatic   Eyes:            conjunctivae and sclerae normal, no   icterus, no pallor, corneas  clear, PERRLA   Neck:   No adenopathy, supple, trachea midline, no thyromegaly, no   carotid bruit, no JVD   Lungs:     Clear to auscultation,respirations regular, even and                  unlabored    Heart:    Regular rhythm and normal rate, normal S1 and S2, no            murmur, no gallop, no rub, no click   Abdomen:     Normal bowel sounds, no masses, no organomegaly, soft        non-tender, non-distended, no guarding, no rebound                No tenderness   Extremities:   Moves all extremities well, no edema, no cyanosis, no             redness   Lymph nodes:   No palpable adenopathy   Neurologic:   Cranial nerves 2 - 12 grossly intact, sensation intact, DTR       present and equal bilaterally          Pertinent  and/or Most Recent Results     LAB RESULTS:      Lab 06/12/24  0116 06/11/24  0408 06/10/24  0929 06/10/24  0720   WBC 9.71 11.21*  --  16.39*   HEMOGLOBIN 13.2 14.8  --  16.1*   HEMATOCRIT 40.7 44.5  --  48.9*   PLATELETS 257 305  --  367   NEUTROS ABS 5.85 6.46  --  12.66*   IMMATURE GRANS (ABS) 0.03 0.04  --  0.06*   LYMPHS ABS  2.96 3.61*  --  2.32   MONOS ABS 0.56 0.77  --  1.09*   EOS ABS 0.25 0.25  --  0.19   MCV 94.2 92.7  --  95.1   SED RATE  --   --   --  17   CRP  --   --  1.86*  --    PROTIME  --   --   --  10.8   APTT  --   --   --  27.9         Lab 06/12/24  0116 06/11/24  0408 06/10/24  0929 06/10/24  0720   SODIUM 139 141  --  143   POTASSIUM 3.6 4.1  --  4.4   CHLORIDE 103 106  --  103   CO2 25.4 23.1  --  26.4   ANION GAP 10.6 11.9  --  13.6   BUN 25* 25*  --  13   CREATININE 0.98 1.02*  --  1.08*   EGFR 66.6 63.5  --  59.3*   GLUCOSE 166* 112*  --  103*   CALCIUM 9.3 9.6  --  10.6*   MAGNESIUM 1.8 1.9 2.0  --    PHOSPHORUS 3.4 3.8 3.9  --    HEMOGLOBIN A1C  --   --   --  5.71*   TSH  --   --   --  0.899         Lab 06/10/24  0720   TOTAL PROTEIN 7.7   ALBUMIN 5.0   GLOBULIN 2.7   ALT (SGPT) 31   AST (SGOT) 32   BILIRUBIN 0.8   ALK PHOS 83         Lab 06/10/24  0929 06/10/24  0720   HSTROP T 7 9   PROTIME  --  10.8   INR  --  0.99         Lab 06/10/24  0929   CHOLESTEROL 225*   LDL CHOL 151*   HDL CHOL 41   TRIGLYCERIDES 181*         Lab 06/10/24  0720   VITAMIN B 12 331         Brief Urine Lab Results  (Last result in the past 365 days)        Color   Clarity   Blood   Leuk Est   Nitrite   Protein   CREAT   Urine HCG        06/10/24 0735 Yellow   Clear   Negative   Negative   Negative   Negative                 Microbiology Results (last 10 days)       Procedure Component Value - Date/Time    Blood Culture - Blood, Arm, Right [[id number]]  (Normal) Collected: 06/10/24 1819    Lab Status: Preliminary result Specimen: Blood from Arm, Right Updated: 06/11/24 1831     Blood Culture No growth at 24 hours    Blood Culture - Blood, Wrist, Left [[id number]]  (Normal) Collected: 06/10/24 1819    Lab Status: Preliminary result Specimen: Blood from Wrist, Left Updated: 06/11/24 1831     Blood Culture No growth at 24 hours    Respiratory Panel PCR w/COVID-19(SARS-CoV-2) [facility] In-House, NP Swab in UTM/VTM, 2 HR TAT -  Swab, Nasopharynx [[id number]]  (Normal) Collected: 06/10/24 1722    Lab Status: Final result Specimen: Swab from Nasopharynx Updated: 06/10/24 1831     ADENOVIRUS, PCR Not Detected     Coronavirus 229E Not Detected     Coronavirus HKU1 Not Detected     Coronavirus NL63 Not Detected     Coronavirus OC43 Not Detected     COVID19 Not Detected     Human Metapneumovirus Not Detected     Human Rhinovirus/Enterovirus Not Detected     Influenza A PCR Not Detected     Influenza B PCR Not Detected     Parainfluenza Virus 1 Not Detected     Parainfluenza Virus 2 Not Detected     Parainfluenza Virus 3 Not Detected     Parainfluenza Virus 4 Not Detected     RSV, PCR Not Detected     Bordetella pertussis pcr Not Detected     Bordetella parapertussis PCR Not Detected     Chlamydophila pneumoniae PCR Not Detected     Mycoplasma pneumo by PCR Not Detected    Narrative:      In the setting of a positive respiratory panel with a viral infection PLUS a negative procalcitonin without other underlying concern for bacterial infection, consider observing off antibiotics or discontinuation of antibiotics and continue supportive care. If the respiratory panel is positive for atypical bacterial infection (Bordetella pertussis, Chlamydophila pneumoniae, or Mycoplasma pneumoniae), consider antibiotic de-escalation to target atypical bacterial infection.            MRI Brain Without Contrast    Result Date: 6/10/2024  Impression: Impression: 1. Motion-degraded exam. 2. Within limitations, no acute MRI findings. Specifically no findings to suggest a recent (acute or subacute) infarct. 3. Mild global volume loss. Minimal areas of gliosis, most likely representing sequelae of chronic microvascular ischemic change. Electronically Signed: Philip Bravo MD  6/10/2024 5:15 PM EDT  Workstation ID: [id number]    CT Head Without Contrast    Result Date: 6/10/2024  Impression: Impression: No acute process Electronically Signed: Juan Francisco Graham  6/10/2024  8:17 AM EDT  Workstation ID: [id number]    XR Knee 4+ View Left    Result Date: 6/10/2024  Impression: Impression: No evidence of fracture or significant joint effusion Electronically Signed: Juan Francisco Graham  6/10/2024 8:14 AM EDT  Workstation ID: [id number]    XR Chest 1 View    Result Date: 6/10/2024  Impression: Impression: 1.No acute radiographic abnormality is identified. Electronically Signed: Delio Pollard MD  6/10/2024 8:07 AM EDT  Workstation ID: [id number]             Results for orders placed during the hospital encounter of 01/08/24    Adult Transthoracic Echo Complete W/ Cont if Necessary Per Protocol    Interpretation Summary    Left ventricular systolic function is normal. Calculated left ventricular EF = 51% Left ventricular ejection fraction appears to be 51 - 55%.    Estimated right ventricular systolic pressure from tricuspid regurgitation is normal (<35 mmHg).    Indications  Hypertension    Technically satisfactory study.  Mitral valve is structurally normal.  Tricuspid valve is structurally normal.  Aortic valve is structurally normal.  Minimal aortic regurgitation.  Pulmonic valve could not be well visualized.  No evidence for mitral tricuspid regurgitation is seen by Doppler study.  Left atrium is normal in size.  Right atrium is normal in size.  Left ventricle is normal in size and contractility with ejection fraction of 60%.  Peak systolic longitudinal strain is within normal limits.  Right ventricle is normal in size.  Atrial septum is intact.  Aorta is normal.  No pericardial effusion or intracardiac thrombus is seen.    Impression  Structurally and functionally normal cardiac valves except for minimal aortic regurgitation..  Left ventricular size and contractility is normal with ejection fraction of 60%.      Labs Pending at Discharge:  Pending Labs       Order Current Status    Blood Culture - Blood, Arm, Right Preliminary result    Blood Culture - Blood, Wrist, Left Preliminary result             Procedures Performed           Consults:   Consults       Date and Time Order Name Status Description    6/11/2024 10:58 AM Inpatient Cardiology Consult      6/10/2024 12:21 PM Inpatient Neurology Consult General Completed     6/10/2024 11:16 AM Hospitalist (on-call MD unless specified)                Discharge Details        Discharge Medications        New Medications        Instructions Start Date   aspirin 81 MG EC tablet   81 mg, Oral, Daily   Start Date: June 13, 2024     bisacodyl 5 MG EC tablet  Commonly known as: DULCOLAX   5 mg, Oral, Daily PRN      polyethylene glycol 17 g packet  Commonly known as: MIRALAX   17 g, Oral, Daily PRN             Changes to Medications        Instructions Start Date   clonazePAM 0.5 MG tablet  Commonly known as: KlonoPIN  What changed:   medication strength  how much to take   0.5 mg, Oral, 2 Times Daily PRN      oxyCODONE 15 MG immediate release tablet  Commonly known as: ROXICODONE  What changed:   how much to take  when to take this   7.5 mg, Oral, Every 12 Hours PRN, Code M79.7      pregabalin 150 MG capsule  Commonly known as: LYRICA  What changed: when to take this   150 mg, Oral, Daily             Continue These Medications        Instructions Start Date   atorvastatin 40 MG tablet  Commonly known as: LIPITOR   40 mg, Oral, Every Evening      busPIRone 15 MG tablet  Commonly known as: BUSPAR   15 mg, Oral, 2 Times Daily      DULoxetine 60 MG capsule  Commonly known as: CYMBALTA   60 mg, Oral, 2 Times Daily      Rexulti 2 MG tablet  Generic drug: Brexpiprazole   2 mg, Oral, Every Morning      rOPINIRole 0.5 MG tablet  Commonly known as: REQUIP   0.5 mg, Oral, 2 Times Daily, Take 1 hour before bedtime.      vitamin D 1.25 MG (63868 UT) capsule capsule  Commonly known as: ERGOCALCIFEROL   50,000 Units, Oral, Weekly, Sundays              Stop These Medications      atenolol 50 MG tablet  Commonly known as: TENORMIN     carisoprodol 350 MG tablet  Commonly known as:  SOMA     temazepam 30 MG capsule  Commonly known as: RESTORIL              Allergies   Allergen Reactions    Penicillins Rash         Discharge Disposition:   Home or Self Care    Diet:  Hospital:  Diet Order   Procedures    Diet: Regular/House; Fluid Consistency: Thin (IDDSI 0)         Discharge Activity:         CODE STATUS:  There are no questions and answers to display.         Future Appointments   Date Time Provider Department Center   9/4/2024 11:00 AM Jade Ryder MD [facility]   11/4/2024  1:15 PM Kamran Garcia MD [facility]   1/9/2025  9:20 AM Rafi Haney MD [facility]       Additional Instructions for the Follow-ups that You Need to Schedule       Discharge Follow-up with PCP   As directed       Currently Documented PCP:    Kamran Garcia MD    PCP Phone Number:    [phone]     Follow Up Details: PCP next 1-2 week        Discharge Follow-up with Specified Provider: cardiology per the recommendation   As directed      To: cardiology per the recommendation                Time spent on Discharge including face to face service:  >30 minutes    Signature: Electronically signed by Joseph Buchanan MD, 06/12/24, 13:49 EDT.  [facility] Hospitalist Team

## 2024-06-12 NOTE — PROGRESS NOTES
Referring Provider: Joseph Buchanan MD    Reason for follow-up:  Sinus pauses     Patient Care Team:  Kamran Garcia MD as PCP - General  Kamran Garcia MD as PCP - Family Medicine    Subjective .      ROS    Today, the patient has been without any chest discomfort ,shortness of breath, palpitations, dizziness or syncope.  Denies having any headache ,abdominal pain ,nausea, vomiting , diarrhea constipation, loss of weight or loss of appetite.  Denies having any excessive bruising ,hematuria or blood in the stool.    Review of all systems negative except as indicated    History  Past Medical History:   Diagnosis Date    Anxiety     Arthritis     Back pain     Depression     Headache     Hyperlipidemia     Hypertension     Injury of back     Restless leg syndrome        Past Surgical History:   Procedure Laterality Date    ANKLE OPEN REDUCTION INTERNAL FIXATION Right     BUNIONECTOMY Left 12/15/2023    Procedure: BUNIONECTOMY JHONATAN;  Surgeon: GILBERTO Eldridge DPM;  Location: [facility];  Service: Podiatry;  Laterality: Left;    FOOT SURGERY Bilateral     bunionectomy    SHOULDER ARTHROSCOPY W/ ROTATOR CUFF REPAIR Right 12/13/2019    Procedure: RIGHT SHOULDER ARTHROSCOPY WITH ROTATOR CUFF REPAIR and Subacromial decompression;  Surgeon: Gino Ackerman MD;  Location: [facility];  Service: Orthopedics    TUBAL ABDOMINAL LIGATION         Family History   Problem Relation Age of Onset    Hypertension Mother     Hyperlipidemia Mother     Depression Mother     Thyroid disease Mother     Atrial fibrillation Mother     Heart attack Father     Hypertension Father        Social History     Tobacco Use    Smoking status: Every Day     Current packs/day: 1.00     Average packs/day: 1 pack/day for 45.4 years (45.4 ttl pk-yrs)     Types: Cigarettes     Start date: 1979     Passive exposure: Never    Smokeless tobacco: Never    Tobacco comments:     Dont smoke dos    Vaping Use    Vaping  status: Former    Quit date: 1/1/2020    Substances: Nicotine, Flavoring    Devices: Disposable   Substance Use Topics    Alcohol use: No    Drug use: No        Medications Prior to Admission   Medication Sig Dispense Refill Last Dose    atenolol (TENORMIN) 50 MG tablet Take 0.5 tablets by mouth Daily.       atorvastatin (LIPITOR) 40 MG tablet TAKE ONE TABLET BY MOUTH EVERY EVENING 30 tablet 0     Brexpiprazole (Rexulti) 2 MG tablet Take 1 tablet by mouth Every Morning. 30 tablet 3     busPIRone (BUSPAR) 15 MG tablet Take 1 tablet by mouth 2 (Two) Times a Day. 60 tablet 3     carisoprodol (SOMA) 350 MG tablet Take 1 tablet by mouth 3 (Three) Times a Day As Needed for Muscle Spasms. 90 tablet 0     clonazePAM (KlonoPIN) 1 MG tablet Take 1 tablet by mouth 2 (Two) Times a Day As Needed for Anxiety. 60 tablet 3     DULoxetine (CYMBALTA) 60 MG capsule Take 1 capsule by mouth 2 (Two) Times a Day. 60 capsule 3     oxyCODONE (ROXICODONE) 15 MG immediate release tablet Take 1 tablet by mouth Every 8 (Eight) Hours As Needed for Moderate Pain. Code M79.7 90 tablet 0     pregabalin (LYRICA) 150 MG capsule Take 1 capsule by mouth 2 (Two) Times a Day. 60 capsule 3     temazepam (RESTORIL) 30 MG capsule Take 1 capsule by mouth At Night As Needed for Sleep. 30 capsule 3     rOPINIRole (REQUIP) 0.5 MG tablet Take 1 tablet by mouth 2 (Two) Times a Day. Take 1 hour before bedtime.       vitamin D (ERGOCALCIFEROL) 1.25 MG (24740 UT) capsule capsule Take 1 capsule by mouth 1 (One) Time Per Week. Sundays          Allergies  Penicillins    Scheduled Meds:aspirin, 81 mg, Oral, Daily  atorvastatin, 40 mg, Oral, Q PM  Brexpiprazole, 2 mg, Oral, QAM  clopidogrel, 75 mg, Oral, Daily  DULoxetine, 60 mg, Oral, BID  sodium chloride, 10 mL, Intravenous, Q12H  vitamin D, 50,000 Units, Oral, Weekly      Continuous Infusions:   PRN Meds:.  acetaminophen    aluminum-magnesium hydroxide-simethicone    senna-docusate sodium **AND** polyethylene glycol  "**AND** bisacodyl **AND** bisacodyl    Calcium Replacement - Follow Nurse / BPA Driven Protocol    clonazePAM    Magnesium Cardiology Dose Replacement - Follow Nurse / BPA Driven Protocol    melatonin    nitroglycerin    ondansetron ODT **OR** ondansetron    oxyCODONE    Phosphorus Replacement - Follow Nurse / BPA Driven Protocol    Potassium Replacement - Follow Nurse / BPA Driven Protocol    [COMPLETED] Insert Peripheral IV **AND** sodium chloride    sodium chloride    sodium chloride    Objective     VITAL SIGNS  Vitals:    06/11/24 1500 06/11/24 2114 06/12/24 0112 06/12/24 0530   BP: 128/58 101/68 105/53 127/51   BP Location: Right arm Right arm Right arm Right arm   Patient Position: Lying Lying Lying Lying   Pulse:  66 60 60   Resp: 15 14 10 11   Temp: 98.1 °F (36.7 °C) 97.6 °F (36.4 °C) 97.7 °F (36.5 °C) 98 °F (36.7 °C)   TempSrc: Oral Oral Oral Oral   SpO2:  96% 99% 99%   Weight:    78.2 kg (172 lb 6.4 oz)   Height:           Flowsheet Rows      Flowsheet Row First Filed Value   Admission Height 162.6 cm (64\") Documented at 06/10/2024 0623   Admission Weight 77.1 kg (170 lb) Documented at 06/10/2024 0623              Intake/Output Summary (Last 24 hours) at 6/12/2024 0632  Last data filed at 6/11/2024 2047  Gross per 24 hour   Intake 240 ml   Output --   Net 240 ml        TELEMETRY: Sinus rhythm    Physical Exam:  The patient is alert, oriented and in no distress.  Vital signs as noted above.  Head and neck revealed no carotid bruits or jugular venous distention.  No thyromegaly or lymphadenopathy is present  Lungs clear.  No wheezing.  Breath sounds are normal bilaterally.  Heart normal first and second heart sounds.  No murmur. No precordial rub is present.  No gallop is present.  Abdomen soft and nontender.  No organomegaly is present.  Extremities with good peripheral pulses without any pedal edema.  Skin warm and dry.  Musculoskeletal system is grossly normal  CNS grossly normal    Reviewed and " updated.    Results Review:   I reviewed the patient's new clinical results.  Lab Results (last 24 hours)       Procedure Component Value Units Date/Time    Magnesium [[id number]]  (Normal) Collected: 06/12/24 0116    Specimen: Blood Updated: 06/12/24 0220     Magnesium 1.8 mg/dL     Basic Metabolic Panel [[id number]]  (Abnormal) Collected: 06/12/24 0116    Specimen: Blood Updated: 06/12/24 0220     Glucose 166 mg/dL      BUN 25 mg/dL      Creatinine 0.98 mg/dL      Sodium 139 mmol/L      Potassium 3.6 mmol/L      Chloride 103 mmol/L      CO2 25.4 mmol/L      Calcium 9.3 mg/dL      BUN/Creatinine Ratio 25.5     Anion Gap 10.6 mmol/L      eGFR 66.6 mL/min/1.73     Narrative:      GFR Normal >60  Chronic Kidney Disease <60  Kidney Failure <15      Phosphorus [[id number]]  (Normal) Collected: 06/12/24 0116    Specimen: Blood Updated: 06/12/24 0220     Phosphorus 3.4 mg/dL     CBC & Differential [[id number]]  (Normal) Collected: 06/12/24 0116    Specimen: Blood Updated: 06/12/24 0159    Narrative:      The following orders were created for panel order CBC & Differential.  Procedure                               Abnormality         Status                     ---------                               -----------         ------                     CBC Auto Differential[[id number]]        Normal              Final result                 Please view results for these tests on the individual orders.    CBC Auto Differential [[id number]]  (Normal) Collected: 06/12/24 0116    Specimen: Blood Updated: 06/12/24 0159     WBC 9.71 10*3/mm3      RBC 4.32 10*6/mm3      Hemoglobin 13.2 g/dL      Hematocrit 40.7 %      MCV 94.2 fL      MCH 30.6 pg      MCHC 32.4 g/dL      RDW 12.9 %      RDW-SD 44.5 fl      MPV 10.0 fL      Platelets 257 10*3/mm3      Neutrophil % 60.2 %      Lymphocyte % 30.5 %      Monocyte % 5.8 %      Eosinophil % 2.6 %      Basophil % 0.6 %      Immature Grans % 0.3 %      Neutrophils, Absolute 5.85 10*3/mm3       Lymphocytes, Absolute 2.96 10*3/mm3      Monocytes, Absolute 0.56 10*3/mm3      Eosinophils, Absolute 0.25 10*3/mm3      Basophils, Absolute 0.06 10*3/mm3      Immature Grans, Absolute 0.03 10*3/mm3      nRBC 0.0 /100 WBC     Blood Culture - Blood, Arm, Right [[id number]]  (Normal) Collected: 06/10/24 1819    Specimen: Blood from Arm, Right Updated: 06/11/24 1831     Blood Culture No growth at 24 hours    Blood Culture - Blood, Wrist, Left [[id number]]  (Normal) Collected: 06/10/24 1819    Specimen: Blood from Wrist, Left Updated: 06/11/24 1831     Blood Culture No growth at 24 hours            Imaging Results (Last 24 Hours)       ** No results found for the last 24 hours. **        LAB RESULTS (LAST 7 DAYS)    CBC  Results from last 7 days   Lab Units 06/12/24  0116 06/11/24  0408 06/10/24  0720   WBC 10*3/mm3 9.71 11.21* 16.39*   RBC 10*6/mm3 4.32 4.80 5.14   HEMOGLOBIN g/dL 13.2 14.8 16.1*   HEMATOCRIT % 40.7 44.5 48.9*   MCV fL 94.2 92.7 95.1   PLATELETS 10*3/mm3 257 305 367       BMP  Results from last 7 days   Lab Units 06/12/24  0116 06/11/24  0408 06/10/24  0929 06/10/24  0720   SODIUM mmol/L 139 141  --  143   POTASSIUM mmol/L 3.6 4.1  --  4.4   CHLORIDE mmol/L 103 106  --  103   CO2 mmol/L 25.4 23.1  --  26.4   BUN mg/dL 25* 25*  --  13   CREATININE mg/dL 0.98 1.02*  --  1.08*   GLUCOSE mg/dL 166* 112*  --  103*   MAGNESIUM mg/dL 1.8 1.9 2.0  --    PHOSPHORUS mg/dL 3.4 3.8 3.9  --        CMP   Results from last 7 days   Lab Units 06/12/24 0116 06/11/24 0408 06/11/24  0404 06/10/24  0720   SODIUM mmol/L 139 141  --  143   POTASSIUM mmol/L 3.6 4.1  --  4.4   CHLORIDE mmol/L 103 106  --  103   CO2 mmol/L 25.4 23.1  --  26.4   BUN mg/dL 25* 25*  --  13   CREATININE mg/dL 0.98 1.02*  --  1.08*   GLUCOSE mg/dL 166* 112*  --  103*   ALBUMIN g/dL  --   --   --  5.0   BILIRUBIN mg/dL  --   --   --  0.8   ALK PHOS U/L  --   --   --  83   AST (SGOT) U/L  --   --   --  32   ALT (SGPT) U/L  --   --   --  31   AMMONIA  umol/L  --   --  30  --          BNP        TROPONIN  Results from last 7 days   Lab Units 06/10/24  0929   HSTROP T ng/L 7       CoAg  Results from last 7 days   Lab Units 06/10/24  0720   INR  0.99   APTT seconds 27.9       Creatinine Clearance  Estimated Creatinine Clearance: 62.5 mL/min (by C-G formula based on SCr of 0.98 mg/dL).    ABG        Radiology  MRI Brain Without Contrast    Result Date: 6/10/2024  Impression: 1. Motion-degraded exam. 2. Within limitations, no acute MRI findings. Specifically no findings to suggest a recent (acute or subacute) infarct. 3. Mild global volume loss. Minimal areas of gliosis, most likely representing sequelae of chronic microvascular ischemic change. Electronically Signed: Philip Bravo MD  6/10/2024 5:15 PM EDT  Workstation ID: [id number]    CT Head Without Contrast    Result Date: 6/10/2024  Impression: No acute process Electronically Signed: Juan Francisco Graham  6/10/2024 8:17 AM EDT  Workstation ID: [id number]    XR Knee 4+ View Left    Result Date: 6/10/2024  Impression: No evidence of fracture or significant joint effusion Electronically Signed: Juan Francisco Graham  6/10/2024 8:14 AM EDT  Workstation ID: [id number]    XR Chest 1 View    Result Date: 6/10/2024  Impression: 1.No acute radiographic abnormality is identified. Electronically Signed: Delio Pollard MD  6/10/2024 8:07 AM EDT  Workstation ID: [id number]         EKG                I personally viewed and interpreted the patient's EKG/Telemetry data:    ECHOCARDIOGRAM:    Results for orders placed during the hospital encounter of 01/08/24    Adult Transthoracic Echo Complete W/ Cont if Necessary Per Protocol    Interpretation Summary    Left ventricular systolic function is normal. Calculated left ventricular EF = 51% Left ventricular ejection fraction appears to be 51 - 55%.    Estimated right ventricular systolic pressure from tricuspid regurgitation is normal (<35 mmHg).    Indications  Hypertension    Technically  satisfactory study.  Mitral valve is structurally normal.  Tricuspid valve is structurally normal.  Aortic valve is structurally normal.  Minimal aortic regurgitation.  Pulmonic valve could not be well visualized.  No evidence for mitral tricuspid regurgitation is seen by Doppler study.  Left atrium is normal in size.  Right atrium is normal in size.  Left ventricle is normal in size and contractility with ejection fraction of 60%.  Peak systolic longitudinal strain is within normal limits.  Right ventricle is normal in size.  Atrial septum is intact.  Aorta is normal.  No pericardial effusion or intracardiac thrombus is seen.    Impression  Structurally and functionally normal cardiac valves except for minimal aortic regurgitation..  Left ventricular size and contractility is normal with ejection fraction of 60%.          STRESS TEST        Cardiolite (Tc-99m sestamibi) stress test    CARDIAC CATHETERIZATION  No results found for this or any previous visit.                OTHER:         Assessment & Plan     Principal Problem:    AMS (altered mental status)      ]]]]]]]]]]]]]]]]]]]]  Impression  =============  - Altered mental status and confusion-better.     - Nocturnal sinus pauses.-Asymptomatic     - Patient had left foot surgery since last visit without any perioperative cardiovascular problems.     Echocardiogram 1/8/2024   Structurally and functionally normal cardiac valves except for minimal aortic regurgitation..  Left ventricular size and contractility is normal with ejection fraction of 60%.     - Dyslipidemia hypertension     - Abnormal EKG-12/7/2023.  Poor R wave progression anteriorly not rule out anteroseptal infarction age undetermined.     - Status post right ankle surgery abdominal tubal ligation right arthroscopic shoulder surgery.     - Smoker     - Family history of coronary artery disease     - Allergic to penicillin     ============  Plan  ===============  - Altered mental status and  confusion-better.     - Nocturnal sinus pauses.-Asymptomatic-6/10/2024.  No further episodes since then.     Echocardiogram 1/8/2024-as above.  Echocardiogram results were discussed and educated patient.     Hypertension-well-controlled     Dyslipidemia-continue atorvastatin     Medications were reviewed and updated.     Close cardiac monitoring.  Patient would benefit from extended Holter monitor.    Have discussed with attending nurse for coordination of care.     Follow-up in the office in 4 weeks.     Further plan will depend on patient's progress.     Reviewed and updated 6/12/2024.  ]]]]]]]]]]]]]]]]]]]]]]]]]]]]]]           Rafi Haney MD  06/12/24  06:32 EDT

## 2024-06-12 NOTE — THERAPY DISCHARGE NOTE
Pt has progressed to independent level. Ambulating 600' without assist. No concerns for bathing, dressing, grooming. Pt reports being at/near baseline. Educated patient on OT role, goals, and progression of care. Pt reports no current OT needs. Will sign off at this time.

## 2024-06-12 NOTE — OUTREACH NOTE
Prep Survey      Flowsheet Row Responses   [facility] patient discharged from? [facility]   Is LACE score < 7 ? Yes   Eligibility [facility]   Date of Admission 06/10/24   Date of Discharge 06/12/24   Discharge Disposition Home or Self Care   Discharge diagnosis AMS (altered mental status)   Does the patient have one of the following disease processes/diagnoses(primary or secondary)? Other   Does the patient have Home health ordered? No   Is there a DME ordered? No   Prep survey completed? Yes            Nohemi LEONARD - Registered Nurse

## 2024-06-13 ENCOUNTER — TRANSITIONAL CARE MANAGEMENT TELEPHONE ENCOUNTER (OUTPATIENT)
Dept: CALL CENTER | Facility: HOSPITAL | Age: 59
End: 2024-06-13
Payer: MEDICAID

## 2024-06-13 NOTE — OUTREACH NOTE
Call Center TCM Note      Flowsheet Row Responses   [facility] patient discharged from? [facility]   Does the patient have one of the following disease processes/diagnoses(primary or secondary)? Other   TCM attempt successful? No   Unsuccessful attempts Attempt 2            Angelika Diego RN    6/13/2024, 16:19 EDT

## 2024-06-13 NOTE — OUTREACH NOTE
Call Center TCM Note      Flowsheet Row Responses   [facility] patient discharged from? [facility]   Does the patient have one of the following disease processes/diagnoses(primary or secondary)? Other   TCM attempt successful? No   Unsuccessful attempts Attempt 1  [No person listed on most recent PCP verbal release]            Angelika Diego RN    6/13/2024, 15:49 EDT

## 2024-06-14 ENCOUNTER — TELEPHONE (OUTPATIENT)
Dept: CARDIOLOGY | Facility: CLINIC | Age: 59
End: 2024-06-14
Payer: MEDICAID

## 2024-06-14 ENCOUNTER — TRANSITIONAL CARE MANAGEMENT TELEPHONE ENCOUNTER (OUTPATIENT)
Dept: CALL CENTER | Facility: HOSPITAL | Age: 59
End: 2024-06-14
Payer: MEDICAID

## 2024-06-14 NOTE — TELEPHONE ENCOUNTER
Caller: Briana Alas    Relationship to patient: Self    Best call back number: [phone]     Chief complaint: NA    Type of visit: HFU    Requested date: 4 WEEKS / 07.11.24     If rescheduling, when is the original appointment: NA     Additional notes: PT WAS RECENTLY IN HOSPITAL AND CONSULTED WITH DR SALGAOD WHILE IN THERE, PT WAS ALREADY EST AND TOLD TO FU IN 4 WEEKS - PT WEARING A HOLTER MONITOR FOR TWO WEEKS - NO AVAILABILITY UNTIL SEPTEMBER -

## 2024-06-14 NOTE — OUTREACH NOTE
Call Center TCM Note      Flowsheet Row Responses   [facility] patient discharged from? [facility]   Does the patient have one of the following disease processes/diagnoses(primary or secondary)? Other   TCM attempt successful? No   Unsuccessful attempts Attempt 3            Symone Lopes RN    6/14/2024, 09:34 EDT

## 2024-06-15 LAB
BACTERIA SPEC AEROBE CULT: NORMAL
BACTERIA SPEC AEROBE CULT: NORMAL

## 2024-06-20 DIAGNOSIS — G89.29 CHRONIC BILATERAL LOW BACK PAIN WITHOUT SCIATICA: ICD-10-CM

## 2024-06-20 DIAGNOSIS — M54.50 CHRONIC BILATERAL LOW BACK PAIN WITHOUT SCIATICA: ICD-10-CM

## 2024-06-20 RX ORDER — OXYCODONE HYDROCHLORIDE 15 MG/1
7.5 TABLET ORAL EVERY 12 HOURS PRN
Start: 2024-06-20 | End: 2024-06-20 | Stop reason: SDUPTHER

## 2024-06-20 RX ORDER — OXYCODONE HYDROCHLORIDE 15 MG/1
7.5 TABLET ORAL EVERY 12 HOURS PRN
Qty: 30 TABLET | Refills: 0 | Status: SHIPPED | OUTPATIENT
Start: 2024-06-20 | End: 2024-06-24

## 2024-06-20 NOTE — TELEPHONE ENCOUNTER
Requested Prescriptions:   Requested Prescriptions     Pending Prescriptions Disp Refills    oxyCODONE (ROXICODONE) 15 MG immediate release tablet       Sig: Take 0.5 tablets by mouth Every 12 (Twelve) Hours As Needed for Moderate Pain. Code M79.7        Pharmacy where request should be sent: [facility] [id number] - [place], [place] - [address] AT [facility] [address] - [phone] [facility] [phone] FX     Last office visit with prescribing clinician: 6/3/2024   Last telemedicine visit with prescribing clinician: Visit date not found   Next office visit with prescribing clinician: 8/12/2024     Additional details provided by patient: ALSO REQUESTING carisoprodol (SOMA) 350 MG tablet  BUT WAS TAKEN OFF CHART    Does the patient have less than a 3 day supply:  [x] Yes  [] No    Digna Saenz Rep   06/20/24 11:53 EDT

## 2024-06-24 ENCOUNTER — OFFICE VISIT (OUTPATIENT)
Dept: FAMILY MEDICINE CLINIC | Facility: CLINIC | Age: 59
End: 2024-06-24
Payer: MEDICAID

## 2024-06-24 VITALS
SYSTOLIC BLOOD PRESSURE: 130 MMHG | HEART RATE: 65 BPM | BODY MASS INDEX: 30.52 KG/M2 | OXYGEN SATURATION: 95 % | HEIGHT: 64 IN | TEMPERATURE: 96.6 F | WEIGHT: 178.8 LBS | DIASTOLIC BLOOD PRESSURE: 55 MMHG

## 2024-06-24 DIAGNOSIS — E78.5 HYPERLIPIDEMIA, UNSPECIFIED HYPERLIPIDEMIA TYPE: ICD-10-CM

## 2024-06-24 DIAGNOSIS — F51.01 PRIMARY INSOMNIA: Chronic | ICD-10-CM

## 2024-06-24 DIAGNOSIS — Z12.31 OTHER SCREENING MAMMOGRAM: ICD-10-CM

## 2024-06-24 DIAGNOSIS — G89.29 CHRONIC LOW BACK PAIN WITH SCIATICA, SCIATICA LATERALITY UNSPECIFIED, UNSPECIFIED BACK PAIN LATERALITY: ICD-10-CM

## 2024-06-24 DIAGNOSIS — G45.9 TIA (TRANSIENT ISCHEMIC ATTACK): Primary | ICD-10-CM

## 2024-06-24 DIAGNOSIS — M54.50 CHRONIC BILATERAL LOW BACK PAIN WITHOUT SCIATICA: ICD-10-CM

## 2024-06-24 DIAGNOSIS — M54.40 CHRONIC LOW BACK PAIN WITH SCIATICA, SCIATICA LATERALITY UNSPECIFIED, UNSPECIFIED BACK PAIN LATERALITY: ICD-10-CM

## 2024-06-24 DIAGNOSIS — G89.29 CHRONIC BILATERAL LOW BACK PAIN WITHOUT SCIATICA: ICD-10-CM

## 2024-06-24 PROCEDURE — 1126F AMNT PAIN NOTED NONE PRSNT: CPT | Performed by: FAMILY MEDICINE

## 2024-06-24 PROCEDURE — 99214 OFFICE O/P EST MOD 30 MIN: CPT | Performed by: FAMILY MEDICINE

## 2024-06-24 PROCEDURE — 90677 PCV20 VACCINE IM: CPT | Performed by: FAMILY MEDICINE

## 2024-06-24 PROCEDURE — 3075F SYST BP GE 130 - 139MM HG: CPT | Performed by: FAMILY MEDICINE

## 2024-06-24 PROCEDURE — 90471 IMMUNIZATION ADMIN: CPT | Performed by: FAMILY MEDICINE

## 2024-06-24 PROCEDURE — 3078F DIAST BP <80 MM HG: CPT | Performed by: FAMILY MEDICINE

## 2024-06-24 RX ORDER — OXYCODONE HYDROCHLORIDE 15 MG/1
15 TABLET ORAL EVERY 12 HOURS PRN
Qty: 60 TABLET | Refills: 0 | Status: SHIPPED | OUTPATIENT
Start: 2024-06-24

## 2024-06-24 RX ORDER — TEMAZEPAM 30 MG/1
30 CAPSULE ORAL NIGHTLY PRN
Qty: 30 CAPSULE | Refills: 3 | Status: SHIPPED | OUTPATIENT
Start: 2024-06-24

## 2024-06-24 RX ORDER — ATORVASTATIN CALCIUM 80 MG/1
80 TABLET, FILM COATED ORAL EVERY EVENING
Qty: 90 TABLET | Refills: 1 | Status: SHIPPED | OUTPATIENT
Start: 2024-06-24

## 2024-06-24 RX ORDER — CARISOPRODOL 350 MG/1
350 TABLET ORAL 3 TIMES DAILY PRN
Qty: 90 TABLET | Refills: 0 | Status: SHIPPED | OUTPATIENT
Start: 2024-06-24

## 2024-06-24 NOTE — PROGRESS NOTES
"Subjective   Briana Alas is a 58 y.o. female.     History of Present Illness  Briana Alas is in for follow up on a recent hospital visit for altered mental status.  She is concerned because some of her medication was discontinued.  They presumed that her confusion was related to medication issues but at no point were her carotids checked.  She has not had any return of the confusion or slurred speech that she suffered with.  She would like to go back on all of her regular medication, as it helps with her pain and mobility, and she had been on the medication for years without any change recently to spark an episode of confusion.  There is no history of chest pain or dyspnea. There is no history of issue with bowel or bladder dysfunction. There is no history of dizziness or lightheadedness. There is no history of issue with sleep or mood. There is no history of issue with present medication.              /55 (BP Location: Left arm, Patient Position: Sitting, Cuff Size: Large Adult)   Pulse 65   Temp 96.6 °F (35.9 °C) (Temporal)   Ht 162.6 cm (64.02\")   Wt 81.1 kg (178 lb 12.8 oz)   SpO2 95%   BMI 30.68 kg/m²       Chief Complaint   Patient presents with    discuss medication    Hospital Follow Up Visit     [facility] 6/10/24 to 6/12/24 - hospital doctor discontinued some medicine and she is not happy about it            Current Outpatient Medications:     aspirin 81 MG EC tablet, Take 1 tablet by mouth Daily., Disp: , Rfl:     atorvastatin (LIPITOR) 80 MG tablet, Take 1 tablet by mouth Every Evening., Disp: 90 tablet, Rfl: 1    bisacodyl (DULCOLAX) 5 MG EC tablet, Take 1 tablet by mouth Daily As Needed for Constipation (Use if polyethylene glycol is ineffective)., Disp: , Rfl:     Brexpiprazole (Rexulti) 2 MG tablet, Take 1 tablet by mouth Every Morning., Disp: 30 tablet, Rfl: 3    busPIRone (BUSPAR) 15 MG tablet, Take 1 tablet by mouth 2 (Two) Times a Day., Disp: 60 tablet, Rfl: 3    carisoprodol (SOMA) " 350 MG tablet, Take 1 tablet by mouth 3 (Three) Times a Day As Needed for Muscle Spasms., Disp: 90 tablet, Rfl: 0    DULoxetine (CYMBALTA) 60 MG capsule, Take 1 capsule by mouth 2 (Two) Times a Day., Disp: , Rfl:     oxyCODONE (ROXICODONE) 15 MG immediate release tablet, Take 1 tablet by mouth Every 12 (Twelve) Hours As Needed for Moderate Pain. Code M79.7, Disp: 60 tablet, Rfl: 0    polyethylene glycol (MIRALAX) 17 g packet, Take 17 g by mouth Daily As Needed (Use if senna-docusate is ineffective)., Disp: , Rfl:     pregabalin (LYRICA) 150 MG capsule, Take 1 capsule by mouth Daily., Disp: , Rfl:     rOPINIRole (REQUIP) 0.5 MG tablet, Take 1 tablet by mouth 2 (Two) Times a Day. Take 1 hour before bedtime., Disp: , Rfl:     temazepam (RESTORIL) 30 MG capsule, Take 1 capsule by mouth At Night As Needed for Sleep., Disp: 30 capsule, Rfl: 3    vitamin D (ERGOCALCIFEROL) 1.25 MG (13089 UT) capsule capsule, Take 1 capsule by mouth 1 (One) Time Per Week. Sundays, Disp: , Rfl:     clonazePAM (KlonoPIN) 0.5 MG tablet, Take 1 tablet by mouth 2 (Two) Times a Day As Needed for Anxiety for up to 4 days., Disp: , Rfl:             The following portions of the patient's history were reviewed and updated as appropriate: allergies, current medications, past family history, past medical history, past social history, past surgical history, and problem list.    Review of Systems   Constitutional:  Negative for activity change, fatigue and fever.   HENT:  Negative for congestion, sinus pressure, sinus pain, sore throat and trouble swallowing.    Eyes:  Negative for visual disturbance.   Respiratory:  Negative for cough, chest tightness, shortness of breath and wheezing.    Cardiovascular:  Negative for chest pain.   Gastrointestinal:  Negative for abdominal distention, abdominal pain, constipation, diarrhea, nausea and vomiting.   Genitourinary:  Negative for difficulty urinating, dysuria and urgency.   Musculoskeletal:  Positive for  arthralgias, back pain (Worse than before), gait problem, myalgias and neck pain.   Skin:  Positive for rash.   Neurological:  Negative for dizziness, weakness, light-headedness and numbness.   Psychiatric/Behavioral:  Positive for agitation and sleep disturbance (Off of her sleep aid). Negative for dysphoric mood, hallucinations and suicidal ideas. The patient is not nervous/anxious.        Objective   Physical Exam  Vitals and nursing note reviewed.   Constitutional:       Comments: Overweight   Neck:      Vascular: No carotid bruit.   Cardiovascular:      Rate and Rhythm: Normal rate and regular rhythm.      Heart sounds: Normal heart sounds.   Pulmonary:      Effort: Pulmonary effort is normal.      Breath sounds: No wheezing or rales.   Abdominal:      General: Bowel sounds are normal.      Palpations: Abdomen is soft.      Tenderness: There is no abdominal tenderness. There is no guarding.   Musculoskeletal:      Cervical back: Normal range of motion.      Right lower leg: No edema.      Left lower leg: No edema.   Lymphadenopathy:      Cervical: No cervical adenopathy.   Neurological:      Mental Status: She is alert and oriented to person, place, and time. Mental status is at baseline.   Psychiatric:         Mood and Affect: Mood normal.           Assessment & Plan   Problems Addressed this Visit          Cardiac and Vasculature    Hyperlipidemia    Relevant Medications    atorvastatin (LIPITOR) 80 MG tablet    Other Relevant Orders    Comprehensive metabolic panel    Lipid panel       Musculoskeletal and Injuries    Chronic low back pain    Relevant Medications    oxyCODONE (ROXICODONE) 15 MG immediate release tablet    carisoprodol (SOMA) 350 MG tablet       Sleep    Primary insomnia (Chronic)    Relevant Medications    temazepam (RESTORIL) 30 MG capsule     Other Visit Diagnoses       TIA (transient ischemic attack)    -  Primary    Relevant Orders    Duplex Carotid Ultrasound CAR    Other screening  mammogram        Relevant Orders    Mammo Screening Digital Tomosynthesis Bilateral With CAD          Diagnoses         Codes Comments    TIA (transient ischemic attack)    -  Primary ICD-10-CM: G45.9  ICD-9-CM: 435.9     Chronic bilateral low back pain without sciatica     ICD-10-CM: M54.50, G89.29  ICD-9-CM: 724.2, 338.29     Chronic low back pain with sciatica, sciatica laterality unspecified, unspecified back pain laterality     ICD-10-CM: M54.40, G89.29  ICD-9-CM: 724.2, 724.3, 338.29 INSPECT reviewed.   Stable.   Cont. current medication.   Refills sent.     Primary insomnia     ICD-10-CM: F51.01  ICD-9-CM: 307.42     Other screening mammogram     ICD-10-CM: Z12.31  ICD-9-CM: V76.12     Hyperlipidemia, unspecified hyperlipidemia type     ICD-10-CM: E78.5  ICD-9-CM: 272.4           I am more concerned about the cholesterol and the fact that it has not been treated  I will get carotid arteries checked and I will increase her atorvastatin to 80 mg  I will have her repeat some labs in a couple months and I will consider alternative therapies if still not to goal  Her total cholesterol was 225 at the hospital 2 weeks ago  I have asked her to start some chair exercises to supplement her walking  I plan to see her back in November for routine checkup, sooner if she has new concerns or if the September labs I am going to get are not to goal  I will update her mammogram  I gave her pneumonia vaccine today

## 2024-07-03 DIAGNOSIS — G89.4 CHRONIC PAIN SYNDROME: ICD-10-CM

## 2024-07-03 RX ORDER — PREGABALIN 150 MG/1
150 CAPSULE ORAL 2 TIMES DAILY
Qty: 60 CAPSULE | Refills: 3 | Status: SHIPPED | OUTPATIENT
Start: 2024-07-03

## 2024-07-09 ENCOUNTER — TELEPHONE (OUTPATIENT)
Dept: FAMILY MEDICINE CLINIC | Facility: CLINIC | Age: 59
End: 2024-07-09

## 2024-07-09 DIAGNOSIS — G89.29 CHRONIC LOW BACK PAIN WITH SCIATICA, SCIATICA LATERALITY UNSPECIFIED, UNSPECIFIED BACK PAIN LATERALITY: ICD-10-CM

## 2024-07-09 DIAGNOSIS — M54.40 CHRONIC LOW BACK PAIN WITH SCIATICA, SCIATICA LATERALITY UNSPECIFIED, UNSPECIFIED BACK PAIN LATERALITY: ICD-10-CM

## 2024-07-09 DIAGNOSIS — G89.29 CHRONIC BILATERAL LOW BACK PAIN WITHOUT SCIATICA: ICD-10-CM

## 2024-07-09 DIAGNOSIS — M54.50 CHRONIC BILATERAL LOW BACK PAIN WITHOUT SCIATICA: ICD-10-CM

## 2024-07-09 RX ORDER — OXYCODONE HYDROCHLORIDE 15 MG/1
15 TABLET ORAL EVERY 8 HOURS PRN
Qty: 90 TABLET | Refills: 0 | Status: SHIPPED | OUTPATIENT
Start: 2024-07-09

## 2024-07-09 RX ORDER — CARISOPRODOL 350 MG/1
350 TABLET ORAL 3 TIMES DAILY PRN
Qty: 90 TABLET | Refills: 0 | Status: SHIPPED | OUTPATIENT
Start: 2024-07-09

## 2024-07-09 NOTE — TELEPHONE ENCOUNTER
Caller: Briana Alas    Relationship: Self    Best call back number: [phone]    What medication are you requesting: oxyCODONE (ROXICODONE) 15 MG immediate release tablet     If a prescription is needed, what is your preferred pharmacy and phone number: [facility] [id number] - [place], [place] - [address] AT [facility] [address] - [phone] [facility] [phone] FX     Additional notes:  PATIENT STATES DECREASED DOSAGE OF 2 TABLETS DAILY IS NOT WORKING FOR HER, ASKS TO INCREASE TO 3 TIMES DAILY

## 2024-07-10 PROBLEM — Z72.0 TOBACCO ABUSE: Chronic | Status: ACTIVE | Noted: 2024-07-10

## 2024-07-10 PROBLEM — I45.5 SINUS PAUSE: Status: ACTIVE | Noted: 2024-06-11

## 2024-07-10 PROBLEM — R41.82 AMS (ALTERED MENTAL STATUS): Status: RESOLVED | Noted: 2024-06-10 | Resolved: 2024-07-10

## 2024-07-10 PROBLEM — I45.5 SINUS PAUSE: Status: ACTIVE | Noted: 2024-07-10

## 2024-07-10 PROBLEM — E66.9 OBESITY (BMI 30-39.9): Status: ACTIVE | Noted: 2017-02-17

## 2024-07-10 NOTE — PROGRESS NOTES
Subjective:     Encounter Date:07/11/2024        Patient ID: Briana Alas [id number]     Chief Complaint:  hospital discharge follow-up     History of Present Illness    Briana Alas is a 58 y.o. female with a history of hypertension, hyperlipidemia, anxiety, depression, insomnia, Fibromyalgia, chronic back pain, and tobacco abuse who was recently hospitalized at [facility] from 6/10-6/12/2024 for altered mental status. The patient was ruled out for an acute CVA. Her altered mentation was thought to be secondary to polypharmacy. While hospitalized she was noted to have nocturnal sinus pauses. She was asymptomatic, but cardiology was consulted for further evaluation. She was taken off of her beta blocker initially, but then restarted on it at a lower dose. She was discharged with a 14-day Holter monitor. She presents to the office today for hospital discharge follow-up. She reports intermittent palpitations. She denies any chest pain, shortness of breath or dizziness. She denies any exacerbating or alleviating factors.     The patient's Holter monitor showed:   Basic rhythm is sinus with rates varying between 58 to 150/min.  Occasional premature atrial and ventricular contractions are seen.  1 episode of 4 beat run of ventricular tachycardia and 7 beat run of SVT was noted.  No pauses were noted.  No evidence for AV blocks was seen.      Review of systems:  Review of Systems   Constitutional: Negative for malaise/fatigue.   Cardiovascular:  Positive for palpitations. Negative for chest pain, dyspnea on exertion and leg swelling.   Respiratory:  Negative for cough and shortness of breath.    Gastrointestinal:  Negative for abdominal pain, nausea and vomiting.   Neurological:  Negative for dizziness, focal weakness, headaches, light-headedness and numbness.   All other systems reviewed and are negative.        The following portions of the patient's history were reviewed and updated as appropriate:  allergies, current medications, past family history, past medical history, past social history, past surgical history and problem list.    Past Medical History:  Past Medical History:   Diagnosis Date    AMS (altered mental status) 06/10/2024    Anxiety     Arthritis     Back pain     Depression     Headache     Hyperlipidemia     Hypertension     Injury of back     Restless leg syndrome     Sinus pause 06/11/2024       Past Surgical History:  Past Surgical History:   Procedure Laterality Date    ANKLE OPEN REDUCTION INTERNAL FIXATION Right     BUNIONECTOMY Left 12/15/2023    Procedure: BUNIONECTOMY JHONATAN;  Surgeon: GILBERTO Eldridge DPM;  Location: [facility] MAIN OR;  Service: Podiatry;  Laterality: Left;    FOOT SURGERY Bilateral     bunionectomy    SHOULDER ARTHROSCOPY W/ ROTATOR CUFF REPAIR Right 12/13/2019    Procedure: RIGHT SHOULDER ARTHROSCOPY WITH ROTATOR CUFF REPAIR and Subacromial decompression;  Surgeon: Gino Ackerman MD;  Location: [facility];  Service: Orthopedics    TUBAL ABDOMINAL LIGATION          Allergies:  Allergies   Allergen Reactions    Penicillins Rash       Current Meds:     Current Outpatient Medications:     aspirin 81 MG EC tablet, Take 1 tablet by mouth Daily., Disp: , Rfl:     atorvastatin (LIPITOR) 80 MG tablet, Take 1 tablet by mouth Every Evening., Disp: 90 tablet, Rfl: 1    bisacodyl (DULCOLAX) 5 MG EC tablet, Take 1 tablet by mouth Daily As Needed for Constipation (Use if polyethylene glycol is ineffective)., Disp: , Rfl:     Brexpiprazole (Rexulti) 2 MG tablet, Take 1 tablet by mouth Every Morning., Disp: 30 tablet, Rfl: 3    busPIRone (BUSPAR) 15 MG tablet, Take 1 tablet by mouth 2 (Two) Times a Day., Disp: 60 tablet, Rfl: 3    carisoprodol (SOMA) 350 MG tablet, Take 1 tablet by mouth 3 (Three) Times a Day As Needed for Muscle Spasms., Disp: 90 tablet, Rfl: 0    DULoxetine (CYMBALTA) 60 MG capsule, Take 1 capsule by mouth 2 (Two) Times a Day., Disp: , Rfl:      oxyCODONE (ROXICODONE) 15 MG immediate release tablet, Take 1 tablet by mouth Every 8 (Eight) Hours As Needed for Moderate Pain. Code M79.7, Disp: 90 tablet, Rfl: 0    polyethylene glycol (MIRALAX) 17 g packet, Take 17 g by mouth Daily As Needed (Use if senna-docusate is ineffective)., Disp: , Rfl:     pregabalin (LYRICA) 150 MG capsule, TAKE 1 CAPSULE BY MOUTH TWICE A DAY, Disp: 60 capsule, Rfl: 3    rOPINIRole (REQUIP) 0.5 MG tablet, Take 1 tablet by mouth 2 (Two) Times a Day. Take 1 hour before bedtime., Disp: , Rfl:     temazepam (RESTORIL) 30 MG capsule, Take 1 capsule by mouth At Night As Needed for Sleep., Disp: 30 capsule, Rfl: 3    vitamin D (ERGOCALCIFEROL) 1.25 MG (38491 UT) capsule capsule, Take 1 capsule by mouth 1 (One) Time Per Week. Sundays, Disp: , Rfl:     atenolol (TENORMIN) 25 MG tablet, Take 1 tablet by mouth Daily., Disp: , Rfl:     clonazePAM (KlonoPIN) 0.5 MG tablet, Take 1 tablet by mouth 2 (Two) Times a Day As Needed for Anxiety for up to 4 days., Disp: , Rfl:     Social History:   Social History     Tobacco Use    Smoking status: Every Day     Current packs/day: 1.00     Average packs/day: 1 pack/day for 45.5 years (45.5 ttl pk-yrs)     Types: Cigarettes     Start date: 1979     Passive exposure: Current    Smokeless tobacco: Never    Tobacco comments:     Dont smoke dos    Substance Use Topics    Alcohol use: No        Family History:  Family History   Problem Relation Age of Onset    Hypertension Mother     Hyperlipidemia Mother     Depression Mother     Thyroid disease Mother     Atrial fibrillation Mother     Heart attack Father     Hypertension Father                  Objective:       Vitals reviewed.   Constitutional:       Appearance: Healthy appearance. Overweight.   Eyes:      Conjunctiva/sclera: Conjunctivae normal.      Pupils: Pupils are equal, round, and reactive to light.   HENT:      Head: Normocephalic and atraumatic.    Mouth/Throat:      Mouth: Mucous membranes are moist.  "     Pharynx: Oropharynx is clear.   Pulmonary:      Effort: Pulmonary effort is normal.      Breath sounds: Normal breath sounds.   Cardiovascular:      PMI at left midclavicular line. Normal rate. Regular rhythm.   Pulses:     Intact distal pulses.   Edema:     Peripheral edema absent.   Abdominal:      General: Bowel sounds are normal.      Palpations: Abdomen is soft.   Musculoskeletal: Normal range of motion.      Cervical back: Normal range of motion and neck supple. Skin:     General: Skin is warm and dry.   Neurological:      Mental Status: Alert and oriented to person, place, and time.   Psychiatric:         Mood and Affect: Mood normal.         Behavior: Behavior normal.         /61 (BP Location: Left arm, Patient Position: Sitting, Cuff Size: Adult)   Pulse 62   Ht 162.6 cm (64\")   Wt 78.4 kg (172 lb 12.8 oz)   SpO2 97%   BMI 29.66 kg/m²       Lab Reviewed:     CBC        BMP        CMP       BNP        TROPONIN        CoAg        Creatinine Clearance  Estimated Creatinine Clearance: 62.6 mL/min (by C-G formula based on SCr of 0.98 mg/dL).    ABG        Radiology  No radiology results for the last day    Cardiology    14 day Holter monitor:  Basic rhythm is sinus with rates varying between 58 to 150/min.  Occasional premature atrial and ventricular contractions are seen.  1 episode of 4 beat run of ventricular tachycardia and 7 beat run of SVT was noted.  No pauses were noted.  No evidence for AV blocks was seen.             Assessment:         Diagnoses and all orders for this visit:    1. Sinus pause (Primary)    2. Hospital discharge follow-up    3. Mixed hyperlipidemia    4. Hypertension, essential    5. Tobacco abuse    6. Generalized anxiety disorder    7. Major depressive disorder, recurrent episode, moderate    8. Primary insomnia    9. Fibromyalgia    10. Chronic bilateral low back pain without sciatica    11. Obesity (BMI 30-39.9)    Other orders  -     atenolol (TENORMIN) 25 MG " tablet; Take 1 tablet by mouth Daily.                Plan:       Sinus pause   Hospital discharge follow-up  Pauses were nocturnal and patient was asymptomatic  Patient was initially taken off atenolol on 6/10/24, but it was restarted at a lower dose  14-day Holter showed occasional PACs and PVCs, and no pauses or heart blocks.   Patient reports intermittent palpitations  Continue atenolol 25 mg daily  Patient will notify us if her palpitations become more frequent or associated with other symptoms.   Patient states her PCP ordered a carotid duplex, which will be done at [facility].     Mixed hyperlipidemia  , total cholesterol 225, triglycerides 181  Atorvastatin recently increased to 80 mg  Recommend repeat lipid panel in 3 months    Hypertension, essential, chronic  Blood pressure is controlled  Continue atenolol     Tobacco abuse  Smoking cessation recommended     Generalized anxiety disorder  Major depressive disorder, recurrent episode, moderate  Primary insomnia  On Rexulti, BuSpar, Klonopin, Cymbalta, and Restoril     Fibromyalgia  Chronic bilateral low back pain without sciatica  On Soma, oxycodone, Lyrica, and Requip    Overweight   BMI 29.66  Recent A1c 5.71%  Lifestyle modifications recommended       Electronically signed by FRANCESCO Elizondo, 07/11/24, 3:45 PM EDT.

## 2024-07-11 ENCOUNTER — TELEPHONE (OUTPATIENT)
Dept: FAMILY MEDICINE CLINIC | Facility: CLINIC | Age: 59
End: 2024-07-11

## 2024-07-11 ENCOUNTER — OFFICE VISIT (OUTPATIENT)
Dept: CARDIOLOGY | Facility: CLINIC | Age: 59
End: 2024-07-11
Payer: MEDICAID

## 2024-07-11 VITALS
OXYGEN SATURATION: 97 % | HEART RATE: 62 BPM | BODY MASS INDEX: 29.5 KG/M2 | SYSTOLIC BLOOD PRESSURE: 112 MMHG | WEIGHT: 172.8 LBS | DIASTOLIC BLOOD PRESSURE: 61 MMHG | HEIGHT: 64 IN

## 2024-07-11 DIAGNOSIS — G89.29 CHRONIC BILATERAL LOW BACK PAIN WITHOUT SCIATICA: Chronic | ICD-10-CM

## 2024-07-11 DIAGNOSIS — Z09 HOSPITAL DISCHARGE FOLLOW-UP: ICD-10-CM

## 2024-07-11 DIAGNOSIS — E78.2 MIXED HYPERLIPIDEMIA: Chronic | ICD-10-CM

## 2024-07-11 DIAGNOSIS — Z72.0 TOBACCO ABUSE: Chronic | ICD-10-CM

## 2024-07-11 DIAGNOSIS — F41.1 GENERALIZED ANXIETY DISORDER: Chronic | ICD-10-CM

## 2024-07-11 DIAGNOSIS — F33.1 MAJOR DEPRESSIVE DISORDER, RECURRENT EPISODE, MODERATE: Chronic | ICD-10-CM

## 2024-07-11 DIAGNOSIS — M79.7 FIBROMYALGIA: Chronic | ICD-10-CM

## 2024-07-11 DIAGNOSIS — F51.01 PRIMARY INSOMNIA: Chronic | ICD-10-CM

## 2024-07-11 DIAGNOSIS — M54.50 CHRONIC BILATERAL LOW BACK PAIN WITHOUT SCIATICA: Chronic | ICD-10-CM

## 2024-07-11 DIAGNOSIS — E66.9 OBESITY (BMI 30-39.9): Chronic | ICD-10-CM

## 2024-07-11 DIAGNOSIS — I45.5 SINUS PAUSE: Primary | ICD-10-CM

## 2024-07-11 DIAGNOSIS — I10 HYPERTENSION, ESSENTIAL: Chronic | ICD-10-CM

## 2024-07-11 PROCEDURE — 3074F SYST BP LT 130 MM HG: CPT | Performed by: NURSE PRACTITIONER

## 2024-07-11 PROCEDURE — 1160F RVW MEDS BY RX/DR IN RCRD: CPT | Performed by: NURSE PRACTITIONER

## 2024-07-11 PROCEDURE — 1159F MED LIST DOCD IN RCRD: CPT | Performed by: NURSE PRACTITIONER

## 2024-07-11 PROCEDURE — 99214 OFFICE O/P EST MOD 30 MIN: CPT | Performed by: NURSE PRACTITIONER

## 2024-07-11 PROCEDURE — 3078F DIAST BP <80 MM HG: CPT | Performed by: NURSE PRACTITIONER

## 2024-07-11 RX ORDER — ATENOLOL 25 MG/1
25 TABLET ORAL DAILY
Start: 2024-07-11

## 2024-07-11 NOTE — TELEPHONE ENCOUNTER
Caller: Briana Alas    Relationship: Self    Best call back number: [phone]     What is the medical concern/diagnosis:      What specialty or service is being requested:      What is the provider, practice or medical service name:      What is the office location:      What is the office phone number:      Any additional details:  PATIENT CALLED TO GET AN UPDATE ON THE REFERRAL FOR AN ORTHOPEDIC DOCTOR FOR BACK, WHICH SHE DISCUSSED THE REFERRAL WITH DR FLORES HEATON ON LAST VISIT 6/24/24.  SHE ALSO STATED THAT DR FLORES HEATON HAD MENTIONED ABOUT GETTING A MRI OF HER BACK HAS HE GOT THAT ORDERED.

## 2024-07-11 NOTE — TELEPHONE ENCOUNTER
PATIENT STATED THE REFERRAL SHOULD GO TO:  [facility].  PATIENT STATED THAT IT WAS IN [place] DIDN'T HAVE THE OTHER DETAILS.

## 2024-07-30 ENCOUNTER — TRANSCRIBE ORDERS (OUTPATIENT)
Dept: ADMINISTRATIVE | Facility: HOSPITAL | Age: 59
End: 2024-07-30
Payer: MEDICAID

## 2024-07-30 ENCOUNTER — HOSPITAL ENCOUNTER (OUTPATIENT)
Dept: GENERAL RADIOLOGY | Facility: HOSPITAL | Age: 59
Discharge: HOME | End: 2024-07-30
Admitting: INTERNAL MEDICINE

## 2024-07-30 DIAGNOSIS — Z02.71 DISABILITY EXAMINATION: ICD-10-CM

## 2024-07-30 DIAGNOSIS — Z02.71 ENCOUNTER FOR DISABILITY DETERMINATION: ICD-10-CM

## 2024-07-30 DIAGNOSIS — Z02.71 DISABILITY EXAMINATION: Primary | ICD-10-CM

## 2024-07-30 PROCEDURE — 72100 X-RAY EXAM L-S SPINE 2/3 VWS: CPT

## 2024-08-06 ENCOUNTER — LAB (OUTPATIENT)
Dept: FAMILY MEDICINE CLINIC | Facility: CLINIC | Age: 59
End: 2024-08-06
Payer: MEDICAID

## 2024-08-06 ENCOUNTER — OFFICE VISIT (OUTPATIENT)
Dept: FAMILY MEDICINE CLINIC | Facility: CLINIC | Age: 59
End: 2024-08-06
Payer: MEDICAID

## 2024-08-06 VITALS
HEART RATE: 58 BPM | SYSTOLIC BLOOD PRESSURE: 111 MMHG | DIASTOLIC BLOOD PRESSURE: 72 MMHG | WEIGHT: 174.2 LBS | BODY MASS INDEX: 29.74 KG/M2 | OXYGEN SATURATION: 98 % | HEIGHT: 64 IN

## 2024-08-06 DIAGNOSIS — E78.5 HYPERLIPIDEMIA, UNSPECIFIED HYPERLIPIDEMIA TYPE: ICD-10-CM

## 2024-08-06 DIAGNOSIS — K29.00 ACUTE GASTRITIS WITHOUT HEMORRHAGE, UNSPECIFIED GASTRITIS TYPE: ICD-10-CM

## 2024-08-06 DIAGNOSIS — M25.562 ACUTE PAIN OF LEFT KNEE: Primary | ICD-10-CM

## 2024-08-06 LAB
ALBUMIN SERPL-MCNC: 4.1 G/DL (ref 3.5–5.2)
ALBUMIN/GLOB SERPL: 1.7 G/DL
ALP SERPL-CCNC: 72 U/L (ref 39–117)
ALT SERPL W P-5'-P-CCNC: 15 U/L (ref 1–33)
ANION GAP SERPL CALCULATED.3IONS-SCNC: 11.4 MMOL/L (ref 5–15)
AST SERPL-CCNC: 19 U/L (ref 1–32)
BILIRUB SERPL-MCNC: 0.3 MG/DL (ref 0–1.2)
BUN SERPL-MCNC: 17 MG/DL (ref 6–20)
BUN/CREAT SERPL: 15.9 (ref 7–25)
CALCIUM SPEC-SCNC: 9.6 MG/DL (ref 8.6–10.5)
CHLORIDE SERPL-SCNC: 105 MMOL/L (ref 98–107)
CHOLEST SERPL-MCNC: 106 MG/DL (ref 0–200)
CO2 SERPL-SCNC: 26.6 MMOL/L (ref 22–29)
CREAT SERPL-MCNC: 1.07 MG/DL (ref 0.57–1)
EGFRCR SERPLBLD CKD-EPI 2021: 60 ML/MIN/1.73
GLOBULIN UR ELPH-MCNC: 2.4 GM/DL
GLUCOSE SERPL-MCNC: 90 MG/DL (ref 65–99)
HDLC SERPL-MCNC: 38 MG/DL (ref 40–60)
LDLC SERPL CALC-MCNC: 48 MG/DL (ref 0–100)
LDLC/HDLC SERPL: 1.24 {RATIO}
POTASSIUM SERPL-SCNC: 4.2 MMOL/L (ref 3.5–5.2)
PROT SERPL-MCNC: 6.5 G/DL (ref 6–8.5)
SODIUM SERPL-SCNC: 143 MMOL/L (ref 136–145)
TRIGL SERPL-MCNC: 104 MG/DL (ref 0–150)
VLDLC SERPL-MCNC: 20 MG/DL (ref 5–40)

## 2024-08-06 PROCEDURE — 80061 LIPID PANEL: CPT | Performed by: FAMILY MEDICINE

## 2024-08-06 PROCEDURE — 36415 COLL VENOUS BLD VENIPUNCTURE: CPT

## 2024-08-06 PROCEDURE — 80053 COMPREHEN METABOLIC PANEL: CPT | Performed by: FAMILY MEDICINE

## 2024-08-06 PROCEDURE — 3074F SYST BP LT 130 MM HG: CPT | Performed by: FAMILY MEDICINE

## 2024-08-06 PROCEDURE — 3078F DIAST BP <80 MM HG: CPT | Performed by: FAMILY MEDICINE

## 2024-08-06 PROCEDURE — 1126F AMNT PAIN NOTED NONE PRSNT: CPT | Performed by: FAMILY MEDICINE

## 2024-08-06 PROCEDURE — 99214 OFFICE O/P EST MOD 30 MIN: CPT | Performed by: FAMILY MEDICINE

## 2024-08-06 RX ORDER — PREDNISONE 10 MG/1
TABLET ORAL
Qty: 40 TABLET | Refills: 0 | Status: SHIPPED | OUTPATIENT
Start: 2024-08-06

## 2024-08-06 RX ORDER — PANTOPRAZOLE SODIUM 40 MG/1
40 TABLET, DELAYED RELEASE ORAL NIGHTLY
Qty: 30 TABLET | Refills: 1 | Status: SHIPPED | OUTPATIENT
Start: 2024-08-06

## 2024-08-06 NOTE — PROGRESS NOTES
"Subjective   Briana Alas is a 58 y.o. female.     History of Present Illness  Briana Alas is in for some worsening pain in her left knee over the past couple weeks. She has not had any trauma of note. The knee is not swelling. There is no history of chest pain or dyspnea. There is no history of issue with bowel or bladder dysfunction. There is no history of dizziness or lightheadedness. There is no history of issue with sleep or mood. There is no history of issue with present medication.  She has noticed some stomach pain in the mornings.  She is stressed right now going to remove and that has put a little extra on her shoulders.  She denies any vomiting but has had nausea.  There are no particular food or drink choices that make things better or worse.  She has not tried any medication either prescription or over-the-counter.             /72 (BP Location: Left arm, Patient Position: Sitting, Cuff Size: Adult)   Pulse 58   Ht 162.6 cm (64.02\")   Wt 79 kg (174 lb 3.2 oz)   SpO2 98%   BMI 29.89 kg/m²       Chief Complaint   Patient presents with     knee pain     Left knee - notices when she walks goes up her knee            Current Outpatient Medications:     aspirin 81 MG EC tablet, Take 1 tablet by mouth Daily., Disp: , Rfl:     atenolol (TENORMIN) 25 MG tablet, Take 1 tablet by mouth Daily., Disp: , Rfl:     atorvastatin (LIPITOR) 80 MG tablet, Take 1 tablet by mouth Every Evening., Disp: 90 tablet, Rfl: 1    bisacodyl (DULCOLAX) 5 MG EC tablet, Take 1 tablet by mouth Daily As Needed for Constipation (Use if polyethylene glycol is ineffective)., Disp: , Rfl:     Brexpiprazole (Rexulti) 2 MG tablet, Take 1 tablet by mouth Every Morning., Disp: 30 tablet, Rfl: 3    busPIRone (BUSPAR) 15 MG tablet, Take 1 tablet by mouth 2 (Two) Times a Day., Disp: 60 tablet, Rfl: 3    carisoprodol (SOMA) 350 MG tablet, Take 1 tablet by mouth 3 (Three) Times a Day As Needed for Muscle Spasms., Disp: 90 tablet, Rfl: " 0    DULoxetine (CYMBALTA) 60 MG capsule, Take 1 capsule by mouth 2 (Two) Times a Day., Disp: , Rfl:     oxyCODONE (ROXICODONE) 15 MG immediate release tablet, Take 1 tablet by mouth Every 8 (Eight) Hours As Needed for Moderate Pain. Code M79.7, Disp: 90 tablet, Rfl: 0    polyethylene glycol (MIRALAX) 17 g packet, Take 17 g by mouth Daily As Needed (Use if senna-docusate is ineffective)., Disp: , Rfl:     pregabalin (LYRICA) 150 MG capsule, TAKE 1 CAPSULE BY MOUTH TWICE A DAY, Disp: 60 capsule, Rfl: 3    rOPINIRole (REQUIP) 0.5 MG tablet, Take 1 tablet by mouth 2 (Two) Times a Day. Take 1 hour before bedtime., Disp: , Rfl:     temazepam (RESTORIL) 30 MG capsule, Take 1 capsule by mouth At Night As Needed for Sleep., Disp: 30 capsule, Rfl: 3    vitamin D (ERGOCALCIFEROL) 1.25 MG (07754 UT) capsule capsule, Take 1 capsule by mouth 1 (One) Time Per Week. Sundays, Disp: , Rfl:     clonazePAM (KlonoPIN) 0.5 MG tablet, Take 1 tablet by mouth 2 (Two) Times a Day As Needed for Anxiety for up to 4 days., Disp: , Rfl:     pantoprazole (PROTONIX) 40 MG EC tablet, Take 1 tablet by mouth Every Night., Disp: 30 tablet, Rfl: 1    predniSONE (DELTASONE) 10 MG tablet, Take 4 tabs po daily x 4 d, then 3 tabs po daily x 4 d , then 2 tabs po daily x 4 d, then 1 tab po daily x 4 d, Disp: 40 tablet, Rfl: 0            The following portions of the patient's history were reviewed and updated as appropriate: allergies, current medications, past family history, past medical history, past social history, past surgical history, and problem list.    Review of Systems   Constitutional:  Negative for activity change, fatigue and fever.   HENT:  Negative for congestion, sinus pressure, sinus pain, sore throat and trouble swallowing.    Eyes:  Negative for visual disturbance.   Respiratory:  Negative for cough, chest tightness, shortness of breath and wheezing.    Cardiovascular:  Negative for chest pain.   Gastrointestinal:  Positive for abdominal  pain and nausea. Negative for abdominal distention, constipation, diarrhea and vomiting.   Genitourinary:  Negative for difficulty urinating, dysuria and urgency.   Musculoskeletal:  Positive for arthralgias, back pain (Worse than before), gait problem, myalgias and neck pain.   Skin:  Positive for rash.   Neurological:  Negative for dizziness, weakness, light-headedness and numbness.   Psychiatric/Behavioral:  Positive for sleep disturbance (Off of her sleep aid). Negative for agitation, dysphoric mood, hallucinations and suicidal ideas. The patient is not nervous/anxious.        Objective   Physical Exam  Vitals and nursing note reviewed.   Constitutional:       Appearance: Normal appearance.   Cardiovascular:      Rate and Rhythm: Normal rate and regular rhythm.      Heart sounds: Normal heart sounds. No murmur heard.  Pulmonary:      Effort: Pulmonary effort is normal.      Breath sounds: No wheezing or rales.   Abdominal:      General: Bowel sounds are normal.      Palpations: Abdomen is soft.      Tenderness: There is no abdominal tenderness. There is no guarding.   Musculoskeletal:      Cervical back: Neck supple.      Right lower leg: No edema.      Left lower leg: No edema.      Comments: Stiffness in the left knee but no swelling or laxity  Able to get up and bear weight on the leg without any instability   Lymphadenopathy:      Cervical: No cervical adenopathy.   Neurological:      General: No focal deficit present.      Mental Status: She is alert and oriented to person, place, and time.           Assessment & Plan   Problems Addressed this Visit          Musculoskeletal and Injuries    Knee pain - Primary     Other Visit Diagnoses       Acute gastritis without hemorrhage, unspecified gastritis type        Relevant Medications    pantoprazole (PROTONIX) 40 MG EC tablet          Diagnoses         Codes Comments    Acute pain of left knee    -  Primary ICD-10-CM: M25.562  ICD-9-CM: 719.46     Acute  gastritis without hemorrhage, unspecified gastritis type     ICD-10-CM: K29.00  ICD-9-CM: 535.00             I am going to put her on some steroids for the knee pain is a suspected cartilage injury and I have asked her to take some conjoint and glucosamine  I am going to put her on some pantoprazole for the stomach that I believe is just bothered by all the stress she is under  I have asked her to call back with new concerns  I have asked her to get her labs done that were ordered previously and I am getting a urine drug for compliance with her pain management program  I will see her again in a few months

## 2024-08-14 DIAGNOSIS — G89.29 CHRONIC LOW BACK PAIN WITH SCIATICA, SCIATICA LATERALITY UNSPECIFIED, UNSPECIFIED BACK PAIN LATERALITY: ICD-10-CM

## 2024-08-14 DIAGNOSIS — M54.40 CHRONIC LOW BACK PAIN WITH SCIATICA, SCIATICA LATERALITY UNSPECIFIED, UNSPECIFIED BACK PAIN LATERALITY: ICD-10-CM

## 2024-08-14 RX ORDER — CARISOPRODOL 350 MG/1
350 TABLET ORAL 3 TIMES DAILY PRN
Qty: 90 TABLET | Refills: 0 | Status: SHIPPED | OUTPATIENT
Start: 2024-08-14

## 2024-08-14 RX ORDER — PANTOPRAZOLE SODIUM 40 MG/1
40 TABLET, DELAYED RELEASE ORAL NIGHTLY
Qty: 30 TABLET | Refills: 1 | Status: SHIPPED | OUTPATIENT
Start: 2024-08-14

## 2024-08-14 NOTE — TELEPHONE ENCOUNTER
Caller: Briana Alas    Relationship: Self    Best call back number: [phone]     Requested Prescriptions:   Requested Prescriptions     Pending Prescriptions Disp Refills    pantoprazole (PROTONIX) 40 MG EC tablet 30 tablet 1     Sig: Take 1 tablet by mouth Every Night.    carisoprodol (SOMA) 350 MG tablet     Pharmacy where request should be sent: [facility] [id number] - [place], [place] - [address] AT [place] [address] - [phone] [facility] [phone] FX     Last office visit with prescribing clinician: 8/6/2024   Last telemedicine visit with prescribing clinician: Visit date not found   Next office visit with prescribing clinician: 11/4/2024     Does the patient have less than a 3 day supply:  [x] Yes  [] No    Digna Saenz Rep   08/14/24 11:54 EDT

## 2024-08-15 ENCOUNTER — HOSPITAL ENCOUNTER (OUTPATIENT)
Dept: RESPIRATORY THERAPY | Facility: HOSPITAL | Age: 59
Discharge: HOME | End: 2024-08-15

## 2024-08-15 DIAGNOSIS — Z02.71 ENCOUNTER FOR DISABILITY DETERMINATION: ICD-10-CM

## 2024-08-15 DIAGNOSIS — G89.29 CHRONIC BILATERAL LOW BACK PAIN WITHOUT SCIATICA: ICD-10-CM

## 2024-08-15 DIAGNOSIS — M54.50 CHRONIC BILATERAL LOW BACK PAIN WITHOUT SCIATICA: ICD-10-CM

## 2024-08-15 PROCEDURE — 94010 BREATHING CAPACITY TEST: CPT

## 2024-08-15 RX ORDER — OXYCODONE HYDROCHLORIDE 15 MG/1
15 TABLET ORAL EVERY 8 HOURS PRN
Qty: 90 TABLET | Refills: 0 | Status: SHIPPED | OUTPATIENT
Start: 2024-08-15

## 2024-08-15 NOTE — TELEPHONE ENCOUNTER
Caller: Briana Alas    Relationship: Self    Best call back number: [phone]    Requested Prescriptions:   Requested Prescriptions     Pending Prescriptions Disp Refills    oxyCODONE (ROXICODONE) 15 MG immediate release tablet 90 tablet 0     Sig: Take 1 tablet by mouth Every 8 (Eight) Hours As Needed for Moderate Pain. Code M79.7      Pharmacy where request should be sent: [facility] [id number] - [place], [place] - [address] AT [place] [address] - [phone] [facility] [phone] FX     Last office visit with prescribing clinician: 8/6/2024   Last telemedicine visit with prescribing clinician: Visit date not found   Next office visit with prescribing clinician: 11/4/2024     Additional details provided by patient: PATIENT NEEDS THIS REFILLED AND NOT THE PROTONIX. PLEASE CANCEL PROTONIX AND SEND OXYCODONE ASAP.    Does the patient have less than a 3 day supply:  [x] Yes  [] No    Would you like a call back once the refill request has been completed: [] Yes [x] No    If the office needs to give you a call back, can they leave a voicemail: [] Yes [x] No    Digna Humphrey   08/15/24 08:05 EDT

## 2024-09-03 RX ORDER — ROPINIROLE 0.5 MG/1
TABLET, FILM COATED ORAL
Qty: 60 TABLET | Refills: 0 | Status: SHIPPED | OUTPATIENT
Start: 2024-09-03

## 2024-09-04 ENCOUNTER — OFFICE VISIT (OUTPATIENT)
Dept: PSYCHIATRY | Facility: CLINIC | Age: 59
End: 2024-09-04
Payer: MEDICAID

## 2024-09-04 DIAGNOSIS — F51.01 PRIMARY INSOMNIA: Chronic | ICD-10-CM

## 2024-09-04 DIAGNOSIS — Z63.4 BEREAVEMENT: ICD-10-CM

## 2024-09-04 DIAGNOSIS — M54.89 OTHER DORSALGIA: ICD-10-CM

## 2024-09-04 DIAGNOSIS — F41.1 GENERALIZED ANXIETY DISORDER: Chronic | ICD-10-CM

## 2024-09-04 DIAGNOSIS — F33.1 MAJOR DEPRESSIVE DISORDER, RECURRENT EPISODE, MODERATE: Primary | Chronic | ICD-10-CM

## 2024-09-04 PROCEDURE — 1160F RVW MEDS BY RX/DR IN RCRD: CPT | Performed by: PSYCHIATRY & NEUROLOGY

## 2024-09-04 PROCEDURE — 99214 OFFICE O/P EST MOD 30 MIN: CPT | Performed by: PSYCHIATRY & NEUROLOGY

## 2024-09-04 PROCEDURE — 1159F MED LIST DOCD IN RCRD: CPT | Performed by: PSYCHIATRY & NEUROLOGY

## 2024-09-04 RX ORDER — TEMAZEPAM 30 MG
30 CAPSULE ORAL NIGHTLY PRN
Qty: 30 CAPSULE | Refills: 3 | Status: SHIPPED | OUTPATIENT
Start: 2024-09-04

## 2024-09-04 RX ORDER — CLONAZEPAM 1 MG/1
1 TABLET ORAL 3 TIMES DAILY PRN
Qty: 90 TABLET | Refills: 1 | Status: SHIPPED | OUTPATIENT
Start: 2024-09-04

## 2024-09-04 RX ORDER — BUSPIRONE HYDROCHLORIDE 15 MG/1
15 TABLET ORAL 2 TIMES DAILY
Qty: 60 TABLET | Refills: 3 | Status: SHIPPED | OUTPATIENT
Start: 2024-09-04

## 2024-09-04 RX ORDER — DULOXETIN HYDROCHLORIDE 60 MG/1
60 CAPSULE, DELAYED RELEASE ORAL 2 TIMES DAILY
Qty: 60 CAPSULE | Refills: 3 | Status: SHIPPED | OUTPATIENT
Start: 2024-09-04

## 2024-09-04 RX ORDER — BREXPIPRAZOLE 2 MG/1
1 TABLET ORAL EVERY MORNING
Qty: 30 TABLET | Refills: 3 | Status: SHIPPED | OUTPATIENT
Start: 2024-09-04

## 2024-09-04 SDOH — SOCIAL STABILITY - SOCIAL INSECURITY: DISSAPEARANCE AND DEATH OF FAMILY MEMBER: Z63.4

## 2024-09-04 NOTE — PROGRESS NOTES
Subjective   Briana Alas is a 59 y.o. 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 who is a fall risk , the pt has no time for herself      Depression is rated as 7/10 , still grieving about her father   Increased anxiety   denied feeling hopeless/helpless,  Anxiety is still intense ,  reduced  clonazepam is not helping   When anxious -  increased tension, irritability, unpleasant somatic sensations    denied AVH/SI/HI   Sleep - improved   on temazepam,  up to 6 hrs vs 2 hrs without it   Alleviating factors - to stay busy          The following portions of the patient's history were reviewed and updated as appropriate: allergies, current medications, past family history, past medical history, past social history, past surgical history and problem list.    PAST PSYCHIATRIC HISTORY  Axis I  Affective/Bipolar Disorder, Anxiety/Panic Disorder  No inpt. No SI/SA   Axis II  Defer     PAST OUTPATIENT TREATMENT  Diagnosis treated:  Affective Disorder, Anxiety/Panic Disorder  Treatment Type:  Medication Management  Psychotherapy - shante Osei at [facility]   Prior Psychiatric Medications:  lexapro - not effective  celexa -not effective  zoloft - not effective now    Support Groups:  None   Sequelae Of Mental Disorder:  emotional distress          Interval History  Increased anxiety      Side Effects  Denied       Past Medical History:  Past Medical History:    Diagnosis Date    AMS (altered mental status) 06/10/2024    Anxiety     Arthritis     Back pain     Depression     Headache     Hyperlipidemia     Hypertension     Injury of back     Restless leg syndrome     Sinus pause 06/11/2024     PCP - Dr Garcia     Social History:  Social History     Socioeconomic History    Marital status: Legally    Tobacco Use    Smoking status: Every Day     Current packs/day: 1.00     Average packs/day: 1 pack/day for 45.7 years (45.7 ttl pk-yrs)     Types: Cigarettes     Start date: 1979     Passive exposure: Current    Smokeless tobacco: Never    Tobacco comments:     Dont smoke dos    Vaping Use    Vaping status: Former    Quit date: 1/1/2020    Substances: Nicotine, Flavoring    Devices: Disposable   Substance and Sexual Activity    Alcohol use: No    Drug use: Never    Sexual activity: Defer       Family History:  Family History   Problem Relation Age of Onset    Hypertension Mother     Hyperlipidemia Mother     Depression Mother     Thyroid disease Mother     Atrial fibrillation Mother     Heart attack Father     Hypertension Father        Past Surgical History:  Past Surgical History:   Procedure Laterality Date    ANKLE OPEN REDUCTION INTERNAL FIXATION Right     BUNIONECTOMY Left 12/15/2023    Procedure: BUNIONECTOMY JHONATAN;  Surgeon: GILBERTO Eldridge DPM;  Location: [facility];  Service: Podiatry;  Laterality: Left;    FOOT SURGERY Bilateral     bunionectomy    SHOULDER ARTHROSCOPY W/ ROTATOR CUFF REPAIR Right 12/13/2019    Procedure: RIGHT SHOULDER ARTHROSCOPY WITH ROTATOR CUFF REPAIR and Subacromial decompression;  Surgeon: Gino Ackerman MD;  Location: [facility];  Service: Orthopedics    TUBAL ABDOMINAL LIGATION         Problem List:  Patient Active Problem List   Diagnosis    Fibromyalgia    Abnormal gait    Ankle pain, right    Knee pain    Leg pain, left    Annular tear of lumbar disc    Degeneration of lumbar intervertebral disc     Generalized anxiety disorder    Obesity (BMI 30-39.9)    Cervical myelopathy    Other dorsalgia    Chronic low back pain    Hx traumatic fracture    Mixed hyperlipidemia    Hypertension, essential    Hypovitaminosis D    Inflammatory arthritis    Joint pain    Leg weakness, bilateral    Lumbosacral radiculopathy    Malaise and fatigue    Neck pain, chronic    Osteoarthritis of knee    Pain in right shoulder    Pain due to internal orthopedic prosthetic devices, implants and grafts, subsequent encounter    Rotator cuff tendonitis    Scoliosis    Osteoarthritis of lumbosacral spine without myelopathy    Spondylosis of lumbosacral region    Tobacco abuse counseling    Upper respiratory tract infection    Major depressive disorder, recurrent episode, moderate    Primary insomnia    Bereavement    Acquired hallux valgus, left    Sinus pause    Tobacco abuse       Allergy:   Allergies   Allergen Reactions    Penicillins Rash        Discontinued Medications:  Medications Discontinued During This Encounter   Medication Reason    predniSONE (DELTASONE) 10 MG tablet *Therapy completed    polyethylene glycol (MIRALAX) 17 g packet *Therapy completed    Brexpiprazole (Rexulti) 2 MG tablet Reorder    busPIRone (BUSPAR) 15 MG tablet Reorder    clonazePAM (KlonoPIN) 0.5 MG tablet Reorder    DULoxetine (CYMBALTA) 60 MG capsule Reorder    temazepam (RESTORIL) 30 MG capsule Reorder               Current Medications:   Current Outpatient Medications   Medication Sig Dispense Refill    Brexpiprazole (Rexulti) 2 MG tablet Take 1 tablet by mouth Every Morning. 30 tablet 3    busPIRone (BUSPAR) 15 MG tablet Take 1 tablet by mouth 2 (Two) Times a Day. 60 tablet 3    clonazePAM (KlonoPIN) 1 MG tablet Take 1 tablet by mouth 3 (Three) Times a Day As Needed for Anxiety. 90 tablet 1    DULoxetine (CYMBALTA) 60 MG capsule Take 1 capsule by mouth 2 (Two) Times a Day. 60 capsule 3    temazepam (RESTORIL) 30 MG capsule Take 1 capsule by mouth At  Night As Needed for Sleep. 30 capsule 3    aspirin 81 MG EC tablet Take 1 tablet by mouth Daily.      atenolol (TENORMIN) 25 MG tablet Take 1 tablet by mouth Daily.      atorvastatin (LIPITOR) 80 MG tablet Take 1 tablet by mouth Every Evening. 90 tablet 1    bisacodyl (DULCOLAX) 5 MG EC tablet Take 1 tablet by mouth Daily As Needed for Constipation (Use if polyethylene glycol is ineffective).      carisoprodol (SOMA) 350 MG tablet Take 1 tablet by mouth 3 (Three) Times a Day As Needed for Muscle Spasms. 90 tablet 0    oxyCODONE (ROXICODONE) 15 MG immediate release tablet Take 1 tablet by mouth Every 8 (Eight) Hours As Needed for Moderate Pain. Code M79.7 90 tablet 0    pantoprazole (PROTONIX) 40 MG EC tablet Take 1 tablet by mouth Every Night. 30 tablet 1    pregabalin (LYRICA) 150 MG capsule TAKE 1 CAPSULE BY MOUTH TWICE A DAY 60 capsule 3    rOPINIRole (REQUIP) 0.5 MG tablet TAKE ONE (1) TABLET BY MOUTH TWICE DAILY. TAKE ONE (1) TABLET ONE (1) HOUR BEFORE BEDTIME 60 tablet 0    vitamin D (ERGOCALCIFEROL) 1.25 MG (24780 UT) capsule capsule Take 1 capsule by mouth 1 (One) Time Per Week. Sundays       No current facility-administered medications for this visit.         Psychological ROS: positive for - anxiety, depression   negative for - hallucinations, hostility, irritability, memory difficulties, mood swings, obsessive thoughts or suicidal ideation      Physical Exam:   There were no vitals taken for this visit.    Mental Status Exam:   Hygiene:   good  Cooperation:  Cooperative  Eye Contact:  Good  Psychomotor Behavior:  Appropriate  Affect:  Appropriate and congruent with mood   Mood: sad, depressed, anxious, and fluctates  Hopelessness: Denies  Speech:  Normal  Thought Process:  Goal directed and Linear  Thought Content:  Mood congruent  Suicidal:  None  Homicidal:  None  Hallucinations:  None  Delusion:  None  Memory:  Intact  Orientation:  Person, Place, Time and Situation  Reliability:  good  Insight:   Good  Judgement:  Good  Impulse Control:  Good  Physical/Medical Issues:  Yes        MSE from 5/2/24  reviewed and accepted without changes     PHQ-9 Depression Screening  Little interest or pleasure in doing things? 2-->more than half the days   Feeling down, depressed, or hopeless? 2-->more than half the days   Trouble falling or staying asleep, or sleeping too much? 1-->several days   Feeling tired or having little energy? 2-->more than half the days   Poor appetite or overeating? 0-->not at all   Feeling bad about yourself - or that you are a failure or have let yourself or your family down? 2-->more than half the days   Trouble concentrating on things, such as reading the newspaper or watching television? 2-->more than half the days   Moving or speaking so slowly that other people could have noticed? Or the opposite - being so fidgety or restless that you have been moving around a lot more than usual? 0-->not at all   Thoughts that you would be better off dead, or of hurting yourself in some way? 0-->not at all   PHQ-9 Total Score 11   If you checked off any problems, how difficult have these problems made it for you to do your work, take care of things at home, or get along with other people? very difficult     Briana Alas  reports that she has been smoking cigarettes. She started smoking about 45 years ago. She has a 45.7 pack-year smoking history. She has been exposed to tobacco smoke. She has never used smokeless tobacco.. I have educated her on the risk of diseases from using tobacco products such as cancer, COPD and heart disease.     I advised her to quit and she is not willing to quit.    I spent 3  minutes counseling the patient.           Current every day smoker 3-10 mintues spent counseling Not agreeable to stopping    I advised Briana of the risks of tobacco use.     Lab Results:   Lab on 08/06/2024   Component Date Value Ref Range Status    Glucose 08/06/2024 90  65 - 99 mg/dL Final    BUN  08/06/2024 17  6 - 20 mg/dL Final    Creatinine 08/06/2024 1.07 (H)  0.57 - 1.00 mg/dL Final    Sodium 08/06/2024 143  136 - 145 mmol/L Final    Potassium 08/06/2024 4.2  3.5 - 5.2 mmol/L Final    Chloride 08/06/2024 105  98 - 107 mmol/L Final    CO2 08/06/2024 26.6  22.0 - 29.0 mmol/L Final    Calcium 08/06/2024 9.6  8.6 - 10.5 mg/dL Final    Total Protein 08/06/2024 6.5  6.0 - 8.5 g/dL Final    Albumin 08/06/2024 4.1  3.5 - 5.2 g/dL Final    ALT (SGPT) 08/06/2024 15  1 - 33 U/L Final    AST (SGOT) 08/06/2024 19  1 - 32 U/L Final    Alkaline Phosphatase 08/06/2024 72  39 - 117 U/L Final    Total Bilirubin 08/06/2024 0.3  0.0 - 1.2 mg/dL Final    Globulin 08/06/2024 2.4  gm/dL Final    A/G Ratio 08/06/2024 1.7  g/dL Final    BUN/Creatinine Ratio 08/06/2024 15.9  7.0 - 25.0 Final    Anion Gap 08/06/2024 11.4  5.0 - 15.0 mmol/L Final    eGFR 08/06/2024 60.0 (L)  >60.0 mL/min/1.73 Final    Total Cholesterol 08/06/2024 106  0 - 200 mg/dL Final    Triglycerides 08/06/2024 104  0 - 150 mg/dL Final    HDL Cholesterol 08/06/2024 38 (L)  40 - 60 mg/dL Final    LDL Cholesterol  08/06/2024 48  0 - 100 mg/dL Final    VLDL Cholesterol 08/06/2024 20  5 - 40 mg/dL Final    LDL/HDL Ratio 08/06/2024 1.24   Final   Admission on 06/10/2024, Discharged on 06/12/2024   Component Date Value Ref Range Status    Glucose 06/10/2024 110 (H)  70 - 105 mg/dL Final    Serial Number: [id number]  [id number]    Glucose 06/10/2024 103 (H)  65 - 99 mg/dL Final    BUN 06/10/2024 13  6 - 20 mg/dL Final    Creatinine 06/10/2024 1.08 (H)  0.57 - 1.00 mg/dL Final    Sodium 06/10/2024 143  136 - 145 mmol/L Final    Potassium 06/10/2024 4.4  3.5 - 5.2 mmol/L Final    Chloride 06/10/2024 103  98 - 107 mmol/L Final    CO2 06/10/2024 26.4  22.0 - 29.0 mmol/L Final    Calcium 06/10/2024 10.6 (H)  8.6 - 10.5 mg/dL Final    Total Protein 06/10/2024 7.7  6.0 - 8.5 g/dL Final    Albumin 06/10/2024 5.0  3.5 - 5.2 g/dL Final    ALT (SGPT) 06/10/2024 31  1 -  33 U/L Final    AST (SGOT) 06/10/2024 32  1 - 32 U/L Final    Alkaline Phosphatase 06/10/2024 83  39 - 117 U/L Final    Total Bilirubin 06/10/2024 0.8  0.0 - 1.2 mg/dL Final    Globulin 06/10/2024 2.7  gm/dL Final    A/G Ratio 06/10/2024 1.9  g/dL Final    BUN/Creatinine Ratio 06/10/2024 12.0  7.0 - 25.0 Final    Anion Gap 06/10/2024 13.6  5.0 - 15.0 mmol/L Final    eGFR 06/10/2024 59.3 (L)  >60.0 mL/min/1.73 Final    Protime 06/10/2024 10.8  9.6 - 11.7 Seconds Final    INR 06/10/2024 0.99  0.93 - 1.10 Final    PTT 06/10/2024 27.9  24.0 - 31.0 seconds Final    Color, UA 06/10/2024 Yellow  Yellow, Straw Final    Appearance, UA 06/10/2024 Clear  Clear Final    pH, UA 06/10/2024 <=5.0  5.0 - 8.0 Final    Specific Gravity, UA 06/10/2024 1.011  1.005 - 1.030 Final    Glucose, UA 06/10/2024 Negative  Negative Final    Ketones, UA 06/10/2024 Negative  Negative Final    Bilirubin, UA 06/10/2024 Negative  Negative Final    Blood, UA 06/10/2024 Negative  Negative Final    Protein, UA 06/10/2024 Negative  Negative Final    Leuk Esterase, UA 06/10/2024 Negative  Negative Final    Nitrite, UA 06/10/2024 Negative  Negative Final    Urobilinogen, UA 06/10/2024 1.0 E.U./dL  0.2 - 1.0 E.U./dL Final    HS Troponin T 06/10/2024 9  <14 ng/L Final    Amphet/Methamphet, Screen 06/10/2024 Negative  Negative Final    Barbiturates Screen, Urine 06/10/2024 Negative  Negative Final    Benzodiazepine Screen, Urine 06/10/2024 Positive (A)  Negative Final    Cocaine Screen, Urine 06/10/2024 Negative  Negative Final    Opiate Screen 06/10/2024 Negative  Negative Final    THC, Screen, Urine 06/10/2024 Negative  Negative Final    Methadone Screen, Urine 06/10/2024 Negative  Negative Final    Oxycodone Screen, Urine 06/10/2024 Negative  Negative Final    Ethanol % 06/10/2024 <0.010  % Final    TSH 06/10/2024 0.899  0.270 - 4.200 uIU/mL Final    QT Interval 06/10/2024 433  ms Final    QTC Interval 06/10/2024 399  ms Final    WBC 06/10/2024 16.39 (H)   3.40 - 10.80 10*3/mm3 Final    RBC 06/10/2024 5.14  3.77 - 5.28 10*6/mm3 Final    Hemoglobin 06/10/2024 16.1 (H)  12.0 - 15.9 g/dL Final    Hematocrit 06/10/2024 48.9 (H)  34.0 - 46.6 % Final    MCV 06/10/2024 95.1  79.0 - 97.0 fL Final    MCH 06/10/2024 31.3  26.6 - 33.0 pg Final    MCHC 06/10/2024 32.9  31.5 - 35.7 g/dL Final    RDW 06/10/2024 13.2  12.3 - 15.4 % Final    RDW-SD 06/10/2024 46.6  37.0 - 54.0 fl Final    MPV 06/10/2024 9.9  6.0 - 12.0 fL Final    Platelets 06/10/2024 367  140 - 450 10*3/mm3 Final    Neutrophil % 06/10/2024 77.1 (H)  42.7 - 76.0 % Final    Lymphocyte % 06/10/2024 14.2 (L)  19.6 - 45.3 % Final    Monocyte % 06/10/2024 6.7  5.0 - 12.0 % Final    Eosinophil % 06/10/2024 1.2  0.3 - 6.2 % Final    Basophil % 06/10/2024 0.4  0.0 - 1.5 % Final    Immature Grans % 06/10/2024 0.4  0.0 - 0.5 % Final    Neutrophils, Absolute 06/10/2024 12.66 (H)  1.70 - 7.00 10*3/mm3 Final    Lymphocytes, Absolute 06/10/2024 2.32  0.70 - 3.10 10*3/mm3 Final    Monocytes, Absolute 06/10/2024 1.09 (H)  0.10 - 0.90 10*3/mm3 Final    Eosinophils, Absolute 06/10/2024 0.19  0.00 - 0.40 10*3/mm3 Final    Basophils, Absolute 06/10/2024 0.07  0.00 - 0.20 10*3/mm3 Final    Immature Grans, Absolute 06/10/2024 0.06 (H)  0.00 - 0.05 10*3/mm3 Final    nRBC 06/10/2024 0.0  0.0 - 0.2 /100 WBC Final    Extra Tube 06/10/2024 Hold for add-ons.   Final    Auto resulted.    HS Troponin T 06/10/2024 7  <14 ng/L Final    Troponin T Delta 06/10/2024 -2  >=-4 - <+4 ng/L Final    Hemoglobin A1C 06/10/2024 5.71 (H)  4.80 - 5.60 % Final    Total Cholesterol 06/10/2024 225 (H)  0 - 200 mg/dL Final    Triglycerides 06/10/2024 181 (H)  0 - 150 mg/dL Final    HDL Cholesterol 06/10/2024 41  40 - 60 mg/dL Final    LDL Cholesterol  06/10/2024 151 (H)  0 - 100 mg/dL Final    VLDL Cholesterol 06/10/2024 33  5 - 40 mg/dL Final    LDL/HDL Ratio 06/10/2024 3.60   Final    ADENOVIRUS, PCR 06/10/2024 Not Detected  Not Detected Final    Coronavirus 229E  06/10/2024 Not Detected  Not Detected Final    Coronavirus HKU1 06/10/2024 Not Detected  Not Detected Final    Coronavirus NL63 06/10/2024 Not Detected  Not Detected Final    Coronavirus OC43 06/10/2024 Not Detected  Not Detected Final    COVID19 06/10/2024 Not Detected  Not Detected - Ref. Range Final    Human Metapneumovirus 06/10/2024 Not Detected  Not Detected Final    Human Rhinovirus/Enterovirus 06/10/2024 Not Detected  Not Detected Final    Influenza A PCR 06/10/2024 Not Detected  Not Detected Final    Influenza B PCR 06/10/2024 Not Detected  Not Detected Final    Parainfluenza Virus 1 06/10/2024 Not Detected  Not Detected Final    Parainfluenza Virus 2 06/10/2024 Not Detected  Not Detected Final    Parainfluenza Virus 3 06/10/2024 Not Detected  Not Detected Final    Parainfluenza Virus 4 06/10/2024 Not Detected  Not Detected Final    RSV, PCR 06/10/2024 Not Detected  Not Detected Final    Bordetella pertussis pcr 06/10/2024 Not Detected  Not Detected Final    Bordetella parapertussis PCR 06/10/2024 Not Detected  Not Detected Final    Chlamydophila pneumoniae PCR 06/10/2024 Not Detected  Not Detected Final    Mycoplasma pneumo by PCR 06/10/2024 Not Detected  Not Detected Final    Blood Culture 06/10/2024 No growth at 5 days   Final    Blood Culture 06/10/2024 No growth at 5 days   Final    Vitamin B-12 06/10/2024 331  211 - 946 pg/mL Final    Sed Rate 06/10/2024 17  0 - 30 mm/hr Final    Phosphorus 06/10/2024 3.9  2.5 - 4.5 mg/dL Final    Magnesium 06/10/2024 2.0  1.6 - 2.6 mg/dL Final    C-Reactive Protein 06/10/2024 1.86 (H)  0.00 - 0.50 mg/dL Final    Ammonia 06/11/2024 30  11 - 51 umol/L Final    Extra Tube 06/10/2024 hold for add-on   Final    Auto resulted    Glucose 06/11/2024 112 (H)  65 - 99 mg/dL Final    BUN 06/11/2024 25 (H)  6 - 20 mg/dL Final    Creatinine 06/11/2024 1.02 (H)  0.57 - 1.00 mg/dL Final    Sodium 06/11/2024 141  136 - 145 mmol/L Final    Potassium 06/11/2024 4.1  3.5 - 5.2 mmol/L  Final    Specimen hemolyzed.  Result may be falsely elevated.    Chloride 06/11/2024 106  98 - 107 mmol/L Final    CO2 06/11/2024 23.1  22.0 - 29.0 mmol/L Final    Calcium 06/11/2024 9.6  8.6 - 10.5 mg/dL Final    BUN/Creatinine Ratio 06/11/2024 24.5  7.0 - 25.0 Final    Anion Gap 06/11/2024 11.9  5.0 - 15.0 mmol/L Final    eGFR 06/11/2024 63.5  >60.0 mL/min/1.73 Final    Magnesium 06/11/2024 1.9  1.6 - 2.6 mg/dL Final    Phosphorus 06/11/2024 3.8  2.5 - 4.5 mg/dL Final    WBC 06/11/2024 11.21 (H)  3.40 - 10.80 10*3/mm3 Final    RBC 06/11/2024 4.80  3.77 - 5.28 10*6/mm3 Final    Hemoglobin 06/11/2024 14.8  12.0 - 15.9 g/dL Final    Hematocrit 06/11/2024 44.5  34.0 - 46.6 % Final    MCV 06/11/2024 92.7  79.0 - 97.0 fL Final    MCH 06/11/2024 30.8  26.6 - 33.0 pg Final    MCHC 06/11/2024 33.3  31.5 - 35.7 g/dL Final    RDW 06/11/2024 13.1  12.3 - 15.4 % Final    RDW-SD 06/11/2024 44.7  37.0 - 54.0 fl Final    MPV 06/11/2024 9.6  6.0 - 12.0 fL Final    Platelets 06/11/2024 305  140 - 450 10*3/mm3 Final    Neutrophil % 06/11/2024 57.6  42.7 - 76.0 % Final    Lymphocyte % 06/11/2024 32.2  19.6 - 45.3 % Final    Monocyte % 06/11/2024 6.9  5.0 - 12.0 % Final    Eosinophil % 06/11/2024 2.2  0.3 - 6.2 % Final    Basophil % 06/11/2024 0.7  0.0 - 1.5 % Final    Immature Grans % 06/11/2024 0.4  0.0 - 0.5 % Final    Neutrophils, Absolute 06/11/2024 6.46  1.70 - 7.00 10*3/mm3 Final    Lymphocytes, Absolute 06/11/2024 3.61 (H)  0.70 - 3.10 10*3/mm3 Final    Monocytes, Absolute 06/11/2024 0.77  0.10 - 0.90 10*3/mm3 Final    Eosinophils, Absolute 06/11/2024 0.25  0.00 - 0.40 10*3/mm3 Final    Basophils, Absolute 06/11/2024 0.08  0.00 - 0.20 10*3/mm3 Final    Immature Grans, Absolute 06/11/2024 0.04  0.00 - 0.05 10*3/mm3 Final    nRBC 06/11/2024 0.0  0.0 - 0.2 /100 WBC Final    QT Interval 06/11/2024 477  ms Final    QTC Interval 06/11/2024 462  ms Final    Magnesium 06/12/2024 1.8  1.6 - 2.6 mg/dL Final    Glucose 06/12/2024 166 (H)   65 - 99 mg/dL Final    BUN 06/12/2024 25 (H)  6 - 20 mg/dL Final    Creatinine 06/12/2024 0.98  0.57 - 1.00 mg/dL Final    Sodium 06/12/2024 139  136 - 145 mmol/L Final    Potassium 06/12/2024 3.6  3.5 - 5.2 mmol/L Final    Chloride 06/12/2024 103  98 - 107 mmol/L Final    CO2 06/12/2024 25.4  22.0 - 29.0 mmol/L Final    Calcium 06/12/2024 9.3  8.6 - 10.5 mg/dL Final    BUN/Creatinine Ratio 06/12/2024 25.5 (H)  7.0 - 25.0 Final    Anion Gap 06/12/2024 10.6  5.0 - 15.0 mmol/L Final    eGFR 06/12/2024 66.6  >60.0 mL/min/1.73 Final    Phosphorus 06/12/2024 3.4  2.5 - 4.5 mg/dL Final    WBC 06/12/2024 9.71  3.40 - 10.80 10*3/mm3 Final    RBC 06/12/2024 4.32  3.77 - 5.28 10*6/mm3 Final    Hemoglobin 06/12/2024 13.2  12.0 - 15.9 g/dL Final    Hematocrit 06/12/2024 40.7  34.0 - 46.6 % Final    MCV 06/12/2024 94.2  79.0 - 97.0 fL Final    MCH 06/12/2024 30.6  26.6 - 33.0 pg Final    MCHC 06/12/2024 32.4  31.5 - 35.7 g/dL Final    RDW 06/12/2024 12.9  12.3 - 15.4 % Final    RDW-SD 06/12/2024 44.5  37.0 - 54.0 fl Final    MPV 06/12/2024 10.0  6.0 - 12.0 fL Final    Platelets 06/12/2024 257  140 - 450 10*3/mm3 Final    Neutrophil % 06/12/2024 60.2  42.7 - 76.0 % Final    Lymphocyte % 06/12/2024 30.5  19.6 - 45.3 % Final    Monocyte % 06/12/2024 5.8  5.0 - 12.0 % Final    Eosinophil % 06/12/2024 2.6  0.3 - 6.2 % Final    Basophil % 06/12/2024 0.6  0.0 - 1.5 % Final    Immature Grans % 06/12/2024 0.3  0.0 - 0.5 % Final    Neutrophils, Absolute 06/12/2024 5.85  1.70 - 7.00 10*3/mm3 Final    Lymphocytes, Absolute 06/12/2024 2.96  0.70 - 3.10 10*3/mm3 Final    Monocytes, Absolute 06/12/2024 0.56  0.10 - 0.90 10*3/mm3 Final    Eosinophils, Absolute 06/12/2024 0.25  0.00 - 0.40 10*3/mm3 Final    Basophils, Absolute 06/12/2024 0.06  0.00 - 0.20 10*3/mm3 Final    Immature Grans, Absolute 06/12/2024 0.03  0.00 - 0.05 10*3/mm3 Final    nRBC 06/12/2024 0.0  0.0 - 0.2 /100 WBC Final       Assessment & Plan   Problems Addressed this Visit        Generalized anxiety disorder (Chronic)    Relevant Medications    temazepam (RESTORIL) 30 MG capsule    DULoxetine (CYMBALTA) 60 MG capsule    busPIRone (BUSPAR) 15 MG tablet    Brexpiprazole (Rexulti) 2 MG tablet    Other dorsalgia    Relevant Medications    clonazePAM (KlonoPIN) 1 MG tablet    Major depressive disorder, recurrent episode, moderate - Primary (Chronic)    Relevant Medications    temazepam (RESTORIL) 30 MG capsule    DULoxetine (CYMBALTA) 60 MG capsule    busPIRone (BUSPAR) 15 MG tablet    Brexpiprazole (Rexulti) 2 MG tablet    Primary insomnia (Chronic)    Relevant Medications    temazepam (RESTORIL) 30 MG capsule    Bereavement     Diagnoses         Codes Comments    Major depressive disorder, recurrent episode, moderate    -  Primary ICD-10-CM: F33.1  ICD-9-CM: 296.32     Generalized anxiety disorder     ICD-10-CM: F41.1  ICD-9-CM: 300.02     Bereavement     ICD-10-CM: Z63.4  ICD-9-CM: V62.82     Primary insomnia     ICD-10-CM: F51.01  ICD-9-CM: 307.42     Other dorsalgia     ICD-10-CM: M54.89  ICD-9-CM: 724.5             Visit Diagnoses:    ICD-10-CM ICD-9-CM   1. Major depressive disorder, recurrent episode, moderate  F33.1 296.32   2. Generalized anxiety disorder  F41.1 300.02   3. Bereavement  Z63.4 V62.82   4. Primary insomnia  F51.01 307.42   5. Other dorsalgia  M54.89 724.5     Correct ds f33.1, f41.1, z63.4, f51.1            TREATMENT PLAN/GOALS: Continue supportive psychotherapy efforts and medications as indicated. Treatment and medication options discussed during today's visit. Patient ackowledged and verbally consented to continue with current treatment plan and was educated on the importance of compliance with treatment and follow-up appointments.    MEDICATION ISSUES:    INSPECT reviewed as expected - on pain meds,    Clonazepam 8/7/4  and temazepam on  8/5/24            1. Major depressive d/o moderate recurrent - cont cymbalta 120 mg BP is stable, cont  rexulti   2 mg , no  changes necessary       Cont therapy  2. Generalized anxiety - cont cymbalta at 120 mg , increase   clonazepam back to 1 mg to TID PRN for 1-2 months during transitioning period, the pt was on TID in the past, GDR failed   the pt is on opioids, will closely monitor    3 Insomnia - cont temazepam 30 mg po QHS      Labs 12/7/23 - WNL      Lipids 8/21/23 -  , chol 148 , did not have any new labs        UDS 7/26/22 -consistent for clonazepam, temazepam and + fentanyl (?) , will repeat today , the denied fentanyl use   2/28/23 - consistent ,    LABORATORY - SCAN - MULTIPLE LAB REPORT, [facility], 02/28/2023 (02/28/2023)   UDS 1/3/24 - consistent   LABORATORY - SCAN - FULL URINE DRUG SCREEN, [facility], 01/03/2024 (01/03/2024)   UDS 8/6/24 + temazepam, neg for clonazepam - the pt stated she ran out because she still needed TID (GDR failed)   LABORATORY - SCAN - DRUG SCREEN, [facility], 08/06/2024 (08/06/2024)       PHQ scored 11 and indicated moderate  depression   STEWART 7 scored 13    Patient screened positive for depression based on a PHQ-9 score of 11 on 9/4/2024. Follow-up recommendations include: Prescribed antidepressant medication treatment.      Discussed medication options and treatment plan of prescribed medication as well as the risks, benefits, and side effects including potential falls, possible impaired driving and metabolic adversities among others. Patient is agreeable to call the office with any worsening of symptoms or onset of side effects. Patient is agreeable to call 911 or go to the nearest ER should he/she begin having SI/HI. No medication side effects or related complaints today.     MEDS ORDERED DURING VISIT:  New Medications Ordered This Visit   Medications    temazepam (RESTORIL) 30 MG capsule     Sig: Take 1 capsule by mouth At Night As Needed for Sleep.     Dispense:  30 capsule     Refill:  3     Please dispense when it is due    DULoxetine (CYMBALTA) 60 MG capsule     Sig: Take 1  capsule by mouth 2 (Two) Times a Day.     Dispense:  60 capsule     Refill:  3    clonazePAM (KlonoPIN) 1 MG tablet     Sig: Take 1 tablet by mouth 3 (Three) Times a Day As Needed for Anxiety.     Dispense:  90 tablet     Refill:  1    busPIRone (BUSPAR) 15 MG tablet     Sig: Take 1 tablet by mouth 2 (Two) Times a Day.     Dispense:  60 tablet     Refill:  3    Brexpiprazole (Rexulti) 2 MG tablet     Sig: Take 1 tablet by mouth Every Morning.     Dispense:  30 tablet     Refill:  3       Return in about 4 months (around 1/4/2025).         This document has been electronically signed by Jade Ryder MD  September 4, 2024 11:01 EDT    EMR Dragon transcription disclaimer:  Some of this encounter note is an electronic transcription translation of spoken language to printed text. The electronic translation of spoken language may permit erroneous, or at times, nonsensical words or phrases to be inadvertently transcribed; Although I have reviewed the note for such errors some may still exist.

## 2024-09-10 ENCOUNTER — OFFICE VISIT (OUTPATIENT)
Dept: FAMILY MEDICINE CLINIC | Facility: CLINIC | Age: 59
End: 2024-09-10
Payer: MEDICAID

## 2024-09-10 VITALS
OXYGEN SATURATION: 95 % | TEMPERATURE: 97.8 F | WEIGHT: 178.4 LBS | HEIGHT: 64 IN | BODY MASS INDEX: 30.46 KG/M2 | DIASTOLIC BLOOD PRESSURE: 72 MMHG | HEART RATE: 76 BPM | SYSTOLIC BLOOD PRESSURE: 106 MMHG

## 2024-09-10 DIAGNOSIS — G89.29 CHRONIC BILATERAL LOW BACK PAIN WITHOUT SCIATICA: ICD-10-CM

## 2024-09-10 DIAGNOSIS — G89.29 CHRONIC LOW BACK PAIN WITH SCIATICA, SCIATICA LATERALITY UNSPECIFIED, UNSPECIFIED BACK PAIN LATERALITY: ICD-10-CM

## 2024-09-10 DIAGNOSIS — M54.50 CHRONIC BILATERAL LOW BACK PAIN WITHOUT SCIATICA: ICD-10-CM

## 2024-09-10 DIAGNOSIS — M54.40 CHRONIC LOW BACK PAIN WITH SCIATICA, SCIATICA LATERALITY UNSPECIFIED, UNSPECIFIED BACK PAIN LATERALITY: ICD-10-CM

## 2024-09-10 DIAGNOSIS — M25.562 ACUTE PAIN OF LEFT KNEE: Primary | ICD-10-CM

## 2024-09-10 PROCEDURE — 99213 OFFICE O/P EST LOW 20 MIN: CPT | Performed by: FAMILY MEDICINE

## 2024-09-10 PROCEDURE — 3074F SYST BP LT 130 MM HG: CPT | Performed by: FAMILY MEDICINE

## 2024-09-10 PROCEDURE — 1126F AMNT PAIN NOTED NONE PRSNT: CPT | Performed by: FAMILY MEDICINE

## 2024-09-10 PROCEDURE — 3078F DIAST BP <80 MM HG: CPT | Performed by: FAMILY MEDICINE

## 2024-09-10 RX ORDER — CARISOPRODOL 350 MG/1
350 TABLET ORAL 3 TIMES DAILY PRN
Qty: 90 TABLET | Refills: 0 | Status: SHIPPED | OUTPATIENT
Start: 2024-09-10 | End: 2024-09-16 | Stop reason: SDUPTHER

## 2024-09-10 RX ORDER — PREDNISONE 10 MG/1
TABLET ORAL
Qty: 40 TABLET | Refills: 0 | Status: SHIPPED | OUTPATIENT
Start: 2024-09-10

## 2024-09-10 RX ORDER — ATORVASTATIN CALCIUM 80 MG/1
80 TABLET, FILM COATED ORAL EVERY EVENING
Qty: 30 TABLET | Refills: 11 | Status: SHIPPED | OUTPATIENT
Start: 2024-09-10

## 2024-09-10 RX ORDER — OXYCODONE HYDROCHLORIDE 15 MG/1
15 TABLET ORAL EVERY 8 HOURS PRN
Qty: 90 TABLET | Refills: 0 | Status: SHIPPED | OUTPATIENT
Start: 2024-09-10

## 2024-09-10 NOTE — PROGRESS NOTES
"Subjective   Briana Alas is a 59 y.o. female.     History of Present Illness  Briana Alas is in for some pain in her left lower leg for the past week or so. She has had chronic pain issues for a long time, but this is a new issue. She denies any history of trauma or injury of note. There is no history of chest pain or dyspnea. There is no history of issue with bowel or bladder dysfunction. There is no history of dizziness or lightheadedness. There is no history of issue with sleep or mood. There is no history of issue with present medication.       Leg Pain            /72 (BP Location: Left arm, Patient Position: Sitting, Cuff Size: Large Adult)   Pulse 76   Temp 97.8 °F (36.6 °C) (Temporal)   Ht 162.6 cm (64.02\")   Wt 80.9 kg (178 lb 6.4 oz)   SpO2 95%   BMI 30.61 kg/m²       Chief Complaint   Patient presents with    Leg Pain     Left knee to toes - was just in knee but now its moved down to toes - throbbing ache and had for a couple weeks            Current Outpatient Medications:     aspirin 81 MG EC tablet, Take 1 tablet by mouth Daily., Disp: , Rfl:     atenolol (TENORMIN) 25 MG tablet, Take 1 tablet by mouth Daily., Disp: , Rfl:     atorvastatin (LIPITOR) 80 MG tablet, Take 1 tablet by mouth Every Evening., Disp: 90 tablet, Rfl: 1    bisacodyl (DULCOLAX) 5 MG EC tablet, Take 1 tablet by mouth Daily As Needed for Constipation (Use if polyethylene glycol is ineffective)., Disp: , Rfl:     Brexpiprazole (Rexulti) 2 MG tablet, Take 1 tablet by mouth Every Morning., Disp: 30 tablet, Rfl: 3    busPIRone (BUSPAR) 15 MG tablet, Take 1 tablet by mouth 2 (Two) Times a Day., Disp: 60 tablet, Rfl: 3    carisoprodol (SOMA) 350 MG tablet, Take 1 tablet by mouth 3 (Three) Times a Day As Needed for Muscle Spasms., Disp: 90 tablet, Rfl: 0    clonazePAM (KlonoPIN) 1 MG tablet, Take 1 tablet by mouth 3 (Three) Times a Day As Needed for Anxiety., Disp: 90 tablet, Rfl: 1    DULoxetine (CYMBALTA) 60 MG capsule, " Take 1 capsule by mouth 2 (Two) Times a Day., Disp: 60 capsule, Rfl: 3    oxyCODONE (ROXICODONE) 15 MG immediate release tablet, Take 1 tablet by mouth Every 8 (Eight) Hours As Needed for Moderate Pain. Code M79.7, Disp: 90 tablet, Rfl: 0    pantoprazole (PROTONIX) 40 MG EC tablet, Take 1 tablet by mouth Every Night., Disp: 30 tablet, Rfl: 1    pregabalin (LYRICA) 150 MG capsule, TAKE 1 CAPSULE BY MOUTH TWICE A DAY, Disp: 60 capsule, Rfl: 3    rOPINIRole (REQUIP) 0.5 MG tablet, TAKE ONE (1) TABLET BY MOUTH TWICE DAILY. TAKE ONE (1) TABLET ONE (1) HOUR BEFORE BEDTIME, Disp: 60 tablet, Rfl: 0    temazepam (RESTORIL) 30 MG capsule, Take 1 capsule by mouth At Night As Needed for Sleep., Disp: 30 capsule, Rfl: 3    vitamin D (ERGOCALCIFEROL) 1.25 MG (67182 UT) capsule capsule, Take 1 capsule by mouth 1 (One) Time Per Week. Sundays, Disp: , Rfl:             The following portions of the patient's history were reviewed and updated as appropriate: allergies, current medications, past family history, past medical history, past social history, past surgical history, and problem list.    Review of Systems   Unable to perform ROS: Acuity of condition     Objective   Physical Exam  Vitals and nursing note reviewed.   Musculoskeletal:      Right lower leg: No edema.      Left lower leg: No edema.      Comments: Severe pain in the left knee through the range of motion  No swelling  Able to stand and walk   Neurological:      Mental Status: She is alert and oriented to person, place, and time. Mental status is at baseline.   Psychiatric:         Mood and Affect: Mood normal.         Assessment & Plan   Problems Addressed this Visit          Musculoskeletal and Injuries    Chronic low back pain     Diagnoses         Codes Comments    Chronic bilateral low back pain without sciatica     ICD-10-CM: M54.50, G89.29  ICD-9-CM: 724.2, 338.29     Chronic low back pain with sciatica, sciatica laterality unspecified, unspecified back pain  laterality     ICD-10-CM: M54.40, G89.29  ICD-9-CM: 724.2, 724.3, 338.29 INSPECT reviewed.   Stable.   Cont. current medication.   Refills sent.           I will refer to our orthopedic office for an opinion  I have asked her to take some steroids and use ice  I have asked her to be careful about using her narcotic medication more than prescribed  I have asked her to see me as scheduled in November for her pain management appointment

## 2024-09-11 ENCOUNTER — TELEPHONE (OUTPATIENT)
Dept: FAMILY MEDICINE CLINIC | Facility: CLINIC | Age: 59
End: 2024-09-11
Payer: MEDICAID

## 2024-09-11 NOTE — TELEPHONE ENCOUNTER
Caller: Briana Alas    Relationship to patient: Self    Patient is needing: PATIENT STATES SHE PICKED UP HYDROCODONE YESTERDAY AND THE INSTRUCTIONS ARE WRONG.    SHE STATES SHE IS SUPPOSED TO TAKE 1 PILL THREE TIMES PER DAY, BUT THE INSTRUCTIONS ON THE BOTTLE YESTERDAY READ TO TAKE A 1/2 OF A PILL EVERY 12 HOURS.    PATIENT IS ASKING TO SEND A NEW PRESCRIPTION TO THE PHARMACY WITH CORRECT INSTRUCTIONS AND FOR A 90 DAY QUANTITY.

## 2024-09-13 ENCOUNTER — TELEPHONE (OUTPATIENT)
Dept: FAMILY MEDICINE CLINIC | Facility: CLINIC | Age: 59
End: 2024-09-13

## 2024-09-13 NOTE — TELEPHONE ENCOUNTER
Pharmacy Name: [facility] [id number] - [place], [place] - [address] - [phone]  - [phone] FX     What medication are you calling in regards to: carisoprodol (SOMA) 350 MG tablet     What question does the pharmacy have: [facility] IS OUT OF STOCK. REQUESTING TRANSFERRED TO [place] [facility]

## 2024-09-16 DIAGNOSIS — G89.29 CHRONIC LOW BACK PAIN WITH SCIATICA, SCIATICA LATERALITY UNSPECIFIED, UNSPECIFIED BACK PAIN LATERALITY: ICD-10-CM

## 2024-09-16 DIAGNOSIS — M54.40 CHRONIC LOW BACK PAIN WITH SCIATICA, SCIATICA LATERALITY UNSPECIFIED, UNSPECIFIED BACK PAIN LATERALITY: ICD-10-CM

## 2024-09-16 RX ORDER — CARISOPRODOL 350 MG/1
350 TABLET ORAL 3 TIMES DAILY PRN
Qty: 90 TABLET | Refills: 0 | Status: SHIPPED | OUTPATIENT
Start: 2024-09-16

## 2024-09-23 ENCOUNTER — TELEPHONE (OUTPATIENT)
Dept: FAMILY MEDICINE CLINIC | Facility: CLINIC | Age: 59
End: 2024-09-23
Payer: MEDICAID

## 2024-09-23 DIAGNOSIS — F40.240 CLAUSTROPHOBIA: Primary | ICD-10-CM

## 2024-09-23 RX ORDER — DIAZEPAM 5 MG
TABLET ORAL
Qty: 1 TABLET | Refills: 0 | Status: SHIPPED | OUTPATIENT
Start: 2024-09-23

## 2024-09-29 RX ORDER — ROPINIROLE 0.5 MG/1
TABLET, FILM COATED ORAL
Qty: 180 TABLET | Refills: 1 | Status: SHIPPED | OUTPATIENT
Start: 2024-09-29

## 2024-10-01 DIAGNOSIS — G89.29 CHRONIC BILATERAL LOW BACK PAIN WITHOUT SCIATICA: ICD-10-CM

## 2024-10-01 DIAGNOSIS — M54.50 CHRONIC BILATERAL LOW BACK PAIN WITHOUT SCIATICA: ICD-10-CM

## 2024-10-01 RX ORDER — OXYCODONE HYDROCHLORIDE 15 MG/1
15 TABLET ORAL EVERY 8 HOURS PRN
Qty: 90 TABLET | Refills: 0 | Status: SHIPPED | OUTPATIENT
Start: 2024-10-01

## 2024-10-01 NOTE — TELEPHONE ENCOUNTER
Caller: Briana Alas    Relationship: Self    Best call back number:     Requested Prescriptions:   Requested Prescriptions     Pending Prescriptions Disp Refills    oxyCODONE (ROXICODONE) 15 MG immediate release tablet 90 tablet 0     Sig: Take 1 tablet by mouth Every 8 (Eight) Hours As Needed for Moderate Pain. Code M79.7        Pharmacy where request should be sent: [facility] [id number] [place] - [address] AT [place] [address] - [phone] [facility] [phone] FX     Last office visit with prescribing clinician: 9/10/2024   Last telemedicine visit with prescribing clinician: Visit date not found   Next office visit with prescribing clinician: 11/4/2024     Additional details provided by patient:     Does the patient have less than a 3 day supply:  [x] Yes  [] No        Digna Martin Rep   10/01/24 11:32 EDT

## 2024-10-07 ENCOUNTER — TELEPHONE (OUTPATIENT)
Dept: FAMILY MEDICINE CLINIC | Facility: CLINIC | Age: 59
End: 2024-10-07
Payer: MEDICAID

## 2024-10-07 NOTE — TELEPHONE ENCOUNTER
Temazepam 30MG capsules PA denied. Denial letter scanned into chart. Goodrx coupon faxed to the pharmacy.     Med[employer] ePA Form  (Key: [id number])  PA Case ID #: [id number]  Rx #: [id number]

## 2024-10-14 DIAGNOSIS — M54.40 CHRONIC LOW BACK PAIN WITH SCIATICA, SCIATICA LATERALITY UNSPECIFIED, UNSPECIFIED BACK PAIN LATERALITY: ICD-10-CM

## 2024-10-14 DIAGNOSIS — G89.29 CHRONIC LOW BACK PAIN WITH SCIATICA, SCIATICA LATERALITY UNSPECIFIED, UNSPECIFIED BACK PAIN LATERALITY: ICD-10-CM

## 2024-10-14 RX ORDER — CARISOPRODOL 350 MG/1
350 TABLET ORAL 3 TIMES DAILY PRN
Qty: 90 TABLET | Refills: 0 | Status: SHIPPED | OUTPATIENT
Start: 2024-10-14

## 2024-10-14 NOTE — TELEPHONE ENCOUNTER
Caller:       Briana Alas (Self) [phone] (Mobile)       Requested Prescriptions:   Requested Prescriptions     Pending Prescriptions Disp Refills    carisoprodol (SOMA) 350 MG tablet 90 tablet 0     Sig: Take 1 tablet by mouth 3 (Three) Times a Day As Needed for Muscle Spasms.        Pharmacy where request should be sent: [facility] [id number] [place] - [address] AT [place] [address] - [phone] [facility] [phone] FX     Last office visit with prescribing clinician: 9/10/2024   Last telemedicine visit with prescribing clinician: Visit date not found   Next office visit with prescribing clinician: 11/4/2024     Additional details provided by patient:     Does the patient have less than a 3 day supply:  [x] Yes  [] No    Would you like a call back once the refill request has been completed: [] Yes [] No    If the office needs to give you a call back, can they leave a voicemail: [] Yes [] No    Digna Blake Rep   10/14/24 15:07 EDT

## 2024-10-24 ENCOUNTER — TELEPHONE (OUTPATIENT)
Dept: FAMILY MEDICINE CLINIC | Facility: CLINIC | Age: 59
End: 2024-10-24
Payer: MEDICAID

## 2024-10-24 NOTE — TELEPHONE ENCOUNTER
Pantoprazole Sodium 40MG dr tablets PA approved.   [employer] ePA Form  (Key: [id number])  PA Case ID #: [id number]  Rx #: [id number]  The request has been approved. The authorization is effective from 10/24/2024 to 10/23/2025, as long as the member is enrolled in their current health plan. The request was approved as submitted. This request has been approved with a quantity limit of 2 tablets per day. A written notification letter will follow with additional details.    Sent to pharmacy.

## 2024-10-28 DIAGNOSIS — G89.29 CHRONIC BILATERAL LOW BACK PAIN WITHOUT SCIATICA: ICD-10-CM

## 2024-10-28 DIAGNOSIS — M54.50 CHRONIC BILATERAL LOW BACK PAIN WITHOUT SCIATICA: ICD-10-CM

## 2024-10-28 DIAGNOSIS — G89.4 CHRONIC PAIN SYNDROME: ICD-10-CM

## 2024-10-28 DIAGNOSIS — M54.89 OTHER DORSALGIA: ICD-10-CM

## 2024-10-28 RX ORDER — OXYCODONE HYDROCHLORIDE 15 MG/1
15 TABLET ORAL EVERY 8 HOURS PRN
Qty: 90 TABLET | Refills: 0 | Status: SHIPPED | OUTPATIENT
Start: 2024-10-28

## 2024-10-28 RX ORDER — PREGABALIN 150 MG/1
150 CAPSULE ORAL 2 TIMES DAILY
Qty: 60 CAPSULE | Refills: 3 | Status: SHIPPED | OUTPATIENT
Start: 2024-10-28

## 2024-10-28 NOTE — TELEPHONE ENCOUNTER
Rx Refill Note  Requested Prescriptions     Pending Prescriptions Disp Refills    clonazePAM (KlonoPIN) 1 MG tablet [Pharmacy Med Name: clonazePAM 1 MG TABLET] 90 tablet      Sig: TAKE 1 TABLET BY MOUTH 3 TIMES A DAY AS NEEDED FOR ANXIETY      Last office visit with prescribing clinician: 9/4/2024   Last telemedicine visit with prescribing clinician: Visit date not found   Next office visit with prescribing clinician: 1/3/2025   Office Visit with Jade Ryder MD (09/04/2024)   LABS SCANNED (08/06/2024)                     Would you like a call back once the refill request has been completed: [] Yes [] No    If the office needs to give you a call back, can they leave a voicemail: [] Yes [] No    Symone Varela MA  10/28/24, 10:23 EDT    UPLOADED

## 2024-10-28 NOTE — TELEPHONE ENCOUNTER
Caller: Briana Alas    Relationship: Self    Best call back number: [phone]     Requested Prescriptions:   Requested Prescriptions     Pending Prescriptions Disp Refills    oxyCODONE (ROXICODONE) 15 MG immediate release tablet 90 tablet 0     Sig: Take 1 tablet by mouth Every 8 (Eight) Hours As Needed for Moderate Pain. Code M79.7        Pharmacy where request should be sent: [facility] [id number] - [place], [place] - [address] AT [place] [address] - [phone] [facility] [phone] FX     Last office visit with prescribing clinician: 9/10/2024   Last telemedicine visit with prescribing clinician: Visit date not found   Next office visit with prescribing clinician: 11/4/2024     Additional details provided by patient: WILL NEED NEW PRESCRIPTION    Does the patient have less than a 3 day supply:  [] Yes  [] No    Would you like a call back once the refill request has been completed: [] Yes [] No    If the office needs to give you a call back, can they leave a voicemail: [] Yes [] No    Digna Lucero Rep   10/28/24 09:17 EDT

## 2024-10-30 RX ORDER — CLONAZEPAM 1 MG/1
1 TABLET ORAL 3 TIMES DAILY PRN
Qty: 90 TABLET | Refills: 1 | Status: SHIPPED | OUTPATIENT
Start: 2024-10-30

## 2024-11-10 DIAGNOSIS — M54.40 CHRONIC LOW BACK PAIN WITH SCIATICA, SCIATICA LATERALITY UNSPECIFIED, UNSPECIFIED BACK PAIN LATERALITY: ICD-10-CM

## 2024-11-10 DIAGNOSIS — G89.29 CHRONIC LOW BACK PAIN WITH SCIATICA, SCIATICA LATERALITY UNSPECIFIED, UNSPECIFIED BACK PAIN LATERALITY: ICD-10-CM

## 2024-11-10 RX ORDER — CARISOPRODOL 350 MG/1
350 TABLET ORAL 3 TIMES DAILY PRN
Qty: 90 TABLET | Refills: 0 | Status: SHIPPED | OUTPATIENT
Start: 2024-11-10

## 2024-11-25 DIAGNOSIS — G89.29 CHRONIC BILATERAL LOW BACK PAIN WITHOUT SCIATICA: ICD-10-CM

## 2024-11-25 DIAGNOSIS — M54.50 CHRONIC BILATERAL LOW BACK PAIN WITHOUT SCIATICA: ICD-10-CM

## 2024-11-25 RX ORDER — OXYCODONE HYDROCHLORIDE 15 MG/1
15 TABLET ORAL EVERY 8 HOURS PRN
Qty: 90 TABLET | Refills: 0 | Status: SHIPPED | OUTPATIENT
Start: 2024-11-25

## 2024-11-25 NOTE — TELEPHONE ENCOUNTER
Caller: Briana Alas    Relationship: Self    Best call back number:     Requested Prescriptions:   Requested Prescriptions     Pending Prescriptions Disp Refills    oxyCODONE (ROXICODONE) 15 MG immediate release tablet 90 tablet 0     Sig: Take 1 tablet by mouth Every 8 (Eight) Hours As Needed for Moderate Pain. Code M79.7        Pharmacy where request should be sent: [facility] [id number] [place] - [address] AT [place] [address] - [phone] [facility] [phone] FX     Last office visit with prescribing clinician: 9/10/2024   Last telemedicine visit with prescribing clinician: Visit date not found   Next office visit with prescribing clinician: 2/13/2025     Additional details provided by patient:     Does the patient have less than a 3 day supply:  [x] Yes  [] No      Digna Martin Rep   11/25/24 09:15 EST

## 2024-12-05 RX ORDER — PANTOPRAZOLE SODIUM 40 MG/1
40 TABLET, DELAYED RELEASE ORAL NIGHTLY
Qty: 30 TABLET | Refills: 1 | Status: SHIPPED | OUTPATIENT
Start: 2024-12-05

## 2024-12-06 DIAGNOSIS — G89.29 CHRONIC LOW BACK PAIN WITH SCIATICA, SCIATICA LATERALITY UNSPECIFIED, UNSPECIFIED BACK PAIN LATERALITY: ICD-10-CM

## 2024-12-06 DIAGNOSIS — M54.40 CHRONIC LOW BACK PAIN WITH SCIATICA, SCIATICA LATERALITY UNSPECIFIED, UNSPECIFIED BACK PAIN LATERALITY: ICD-10-CM

## 2024-12-06 RX ORDER — CARISOPRODOL 350 MG/1
350 TABLET ORAL 3 TIMES DAILY PRN
Qty: 90 TABLET | Refills: 0 | Status: SHIPPED | OUTPATIENT
Start: 2024-12-06

## 2024-12-12 ENCOUNTER — OFFICE VISIT (OUTPATIENT)
Dept: FAMILY MEDICINE CLINIC | Facility: CLINIC | Age: 59
End: 2024-12-12
Payer: MEDICAID

## 2024-12-12 VITALS
SYSTOLIC BLOOD PRESSURE: 107 MMHG | HEART RATE: 62 BPM | TEMPERATURE: 97.3 F | BODY MASS INDEX: 31.41 KG/M2 | OXYGEN SATURATION: 100 % | HEIGHT: 64 IN | DIASTOLIC BLOOD PRESSURE: 67 MMHG | WEIGHT: 184 LBS

## 2024-12-12 DIAGNOSIS — I10 HYPERTENSION, ESSENTIAL: ICD-10-CM

## 2024-12-12 DIAGNOSIS — M54.40 CHRONIC LOW BACK PAIN WITH SCIATICA, SCIATICA LATERALITY UNSPECIFIED, UNSPECIFIED BACK PAIN LATERALITY: Primary | ICD-10-CM

## 2024-12-12 DIAGNOSIS — G89.29 CHRONIC LOW BACK PAIN WITH SCIATICA, SCIATICA LATERALITY UNSPECIFIED, UNSPECIFIED BACK PAIN LATERALITY: Primary | ICD-10-CM

## 2024-12-12 DIAGNOSIS — E78.5 HYPERLIPIDEMIA, UNSPECIFIED HYPERLIPIDEMIA TYPE: ICD-10-CM

## 2024-12-12 DIAGNOSIS — F41.1 GENERALIZED ANXIETY DISORDER: ICD-10-CM

## 2024-12-12 PROCEDURE — 3074F SYST BP LT 130 MM HG: CPT | Performed by: FAMILY MEDICINE

## 2024-12-12 PROCEDURE — 1126F AMNT PAIN NOTED NONE PRSNT: CPT | Performed by: FAMILY MEDICINE

## 2024-12-12 PROCEDURE — 3078F DIAST BP <80 MM HG: CPT | Performed by: FAMILY MEDICINE

## 2024-12-12 PROCEDURE — 1160F RVW MEDS BY RX/DR IN RCRD: CPT | Performed by: FAMILY MEDICINE

## 2024-12-12 PROCEDURE — 99214 OFFICE O/P EST MOD 30 MIN: CPT | Performed by: FAMILY MEDICINE

## 2024-12-12 PROCEDURE — 1159F MED LIST DOCD IN RCRD: CPT | Performed by: FAMILY MEDICINE

## 2024-12-12 RX ORDER — BENZONATATE 200 MG/1
200 CAPSULE ORAL 3 TIMES DAILY PRN
Qty: 30 CAPSULE | Refills: 1 | Status: SHIPPED | OUTPATIENT
Start: 2024-12-12

## 2024-12-12 RX ORDER — DOXYCYCLINE 100 MG/1
100 CAPSULE ORAL 2 TIMES DAILY
Qty: 14 CAPSULE | Refills: 0 | Status: SHIPPED | OUTPATIENT
Start: 2024-12-12 | End: 2024-12-19

## 2024-12-12 RX ORDER — PREDNISONE 10 MG/1
TABLET ORAL
Qty: 40 TABLET | Refills: 0 | Status: SHIPPED | OUTPATIENT
Start: 2024-12-12

## 2024-12-12 NOTE — PROGRESS NOTES
"Subjective   Briana Alas is a 59 y.o. female.     History of Present Illness  Briana Alas is in for follow up on her chronic issues with high blood pressure and high cholesterol.  She also is on medication for anxiety and chronic back pain.. Her current back pian is not well controlled on the current medication plan. MRI was done 6 mos ago , and was abnormal, but she does not want to do epidurals again, citing that they were ineffective and poorly tolerated. There is no history of chest pain or dyspnea. There is no history of issue with bowel or bladder dysfunction. There is no history of dizziness or lightheadedness. She is not sleeping well due to her uncontrolled pain, and it is affecting her mood and anxiety negatively as well. There is no history of issue with present medication.       Cough           /67 (BP Location: Left arm, Patient Position: Sitting, Cuff Size: Large Adult)   Pulse 62   Temp 97.3 °F (36.3 °C) (Temporal)   Ht 162.6 cm (64.02\")   Wt 83.5 kg (184 lb)   SpO2 100%   BMI 31.57 kg/m²       Chief Complaint   Patient presents with    medicine review     Cough     Coughing up mucus last 2 weeks but its getting better            Current Outpatient Medications:     aspirin 81 MG EC tablet, Take 1 tablet by mouth Daily., Disp: , Rfl:     atenolol (TENORMIN) 25 MG tablet, Take 1 tablet by mouth Daily., Disp: , Rfl:     atorvastatin (LIPITOR) 80 MG tablet, Take 1 tablet by mouth Every Evening., Disp: 30 tablet, Rfl: 11    bisacodyl (DULCOLAX) 5 MG EC tablet, Take 1 tablet by mouth Daily As Needed for Constipation (Use if polyethylene glycol is ineffective)., Disp: , Rfl:     Brexpiprazole (Rexulti) 2 MG tablet, Take 1 tablet by mouth Every Morning., Disp: 30 tablet, Rfl: 3    busPIRone (BUSPAR) 15 MG tablet, Take 1 tablet by mouth 2 (Two) Times a Day., Disp: 60 tablet, Rfl: 3    carisoprodol (SOMA) 350 MG tablet, TAKE ONE TABLET BY MOUTH THREE TIMES A DAY AS NEEDED FOR MUSCLE SPASMS, " Disp: 90 tablet, Rfl: 0    clonazePAM (KlonoPIN) 1 MG tablet, TAKE 1 TABLET BY MOUTH 3 TIMES A DAY AS NEEDED FOR ANXIETY, Disp: 90 tablet, Rfl: 1    diazePAM (Valium) 5 MG tablet, Take 1 tab by mouth 30 minutes prior to MRI., Disp: 1 tablet, Rfl: 0    DULoxetine (CYMBALTA) 60 MG capsule, Take 1 capsule by mouth 2 (Two) Times a Day., Disp: 60 capsule, Rfl: 3    oxyCODONE (ROXICODONE) 15 MG immediate release tablet, Take 1 tablet by mouth Every 8 (Eight) Hours As Needed for Moderate Pain. Code M79.7, Disp: 90 tablet, Rfl: 0    pantoprazole (PROTONIX) 40 MG EC tablet, TAKE ONE TABLET BY MOUTH ONCE NIGHTLY, Disp: 30 tablet, Rfl: 1    predniSONE (DELTASONE) 10 MG tablet, Take 4 tabs po daily x 4 d, then 3 tabs po daily x 4 d , then 2 tabs po daily x 4 d, then 1 tab po daily x 4 d, Disp: 40 tablet, Rfl: 0    pregabalin (LYRICA) 150 MG capsule, TAKE 1 CAPSULE BY MOUTH 2 TIMES A DAY, Disp: 60 capsule, Rfl: 3    rOPINIRole (REQUIP) 0.5 MG tablet, TAKE 1 TABLET BY MOUTH TWO TIMES A DAY ONE HOUR BEFORE BEDTIME, Disp: 180 tablet, Rfl: 1    temazepam (RESTORIL) 30 MG capsule, Take 1 capsule by mouth At Night As Needed for Sleep., Disp: 30 capsule, Rfl: 3    vitamin D (ERGOCALCIFEROL) 1.25 MG (97809 UT) capsule capsule, Take 1 capsule by mouth 1 (One) Time Per Week. Sundays, Disp: , Rfl:     benzonatate (TESSALON) 200 MG capsule, Take 1 capsule by mouth 3 (Three) Times a Day As Needed for Cough., Disp: 30 capsule, Rfl: 1    doxycycline (VIBRAMYCIN) 100 MG capsule, Take 1 capsule by mouth 2 (Two) Times a Day for 7 days., Disp: 14 capsule, Rfl: 0    predniSONE (DELTASONE) 10 MG tablet, Take 4 tabs po daily x 4 d, then 3 tabs po daily x 4 d , then 2 tabs po daily x 4 d, then 1 tab po daily x 4 d, Disp: 40 tablet, Rfl: 0            The following portions of the patient's history were reviewed and updated as appropriate: allergies, current medications, past family history, past medical history, past social history, past surgical  history, and problem list.    Review of Systems   Respiratory:  Positive for cough.    Musculoskeletal:  Positive for arthralgias, back pain and neck pain.   Psychiatric/Behavioral:  Positive for sleep disturbance. The patient is nervous/anxious.        Objective   Physical Exam      Assessment & Plan   Problems Addressed this Visit          Cardiac and Vasculature    Hypertension, essential       Mental Health    Generalized anxiety disorder (Chronic)       Musculoskeletal and Injuries    Chronic low back pain - Primary    Relevant Orders    Ambulatory Referral to Spine Surgery (Completed)     Other Visit Diagnoses       Hyperlipidemia, unspecified hyperlipidemia type              Diagnoses         Codes Comments    Chronic low back pain with sciatica, sciatica laterality unspecified, unspecified back pain laterality    -  Primary ICD-10-CM: M54.40, G89.29  ICD-9-CM: 724.2, 724.3, 338.29     Hyperlipidemia, unspecified hyperlipidemia type     ICD-10-CM: E78.5  ICD-9-CM: 272.4     Hypertension, essential     ICD-10-CM: I10  ICD-9-CM: 401.9     Generalized anxiety disorder     ICD-10-CM: F41.1  ICD-9-CM: 300.02           I have advised her to see spine and she is willing  I do not think she needs another MRI  I will review her medication plan and see if I can make an adjustment that is safe and effective  I have asked her to see me again in 4 months for routine follow-up  I have asked her to call with any new concerns  I did send out treatment for the upper respiratory symptoms

## 2024-12-19 ENCOUNTER — TELEPHONE (OUTPATIENT)
Dept: FAMILY MEDICINE CLINIC | Facility: CLINIC | Age: 59
End: 2024-12-19

## 2024-12-19 NOTE — TELEPHONE ENCOUNTER
Caller: Briana Alas    Relationship: Self    Best call back number:     Briana Alas (Self) [phone] (Mobile)       What was the call regarding: PATIENT DISCUSSED INCREASING HER PAIN MEDICATION WITH PCP AT LAST VISIT     SHE WAS WONDERING IF HE HAD LOOKED AT HER MEDICATIONS, AND MADE A DECISION YET     CAN YOU CALL HER AND DISCUSS     Is it okay if the provider responds through MyChart: CALL

## 2024-12-24 DIAGNOSIS — M54.50 CHRONIC BILATERAL LOW BACK PAIN WITHOUT SCIATICA: ICD-10-CM

## 2024-12-24 DIAGNOSIS — M54.89 OTHER DORSALGIA: ICD-10-CM

## 2024-12-24 DIAGNOSIS — G89.29 CHRONIC BILATERAL LOW BACK PAIN WITHOUT SCIATICA: ICD-10-CM

## 2024-12-24 RX ORDER — CLONAZEPAM 1 MG/1
1 TABLET ORAL 3 TIMES DAILY PRN
Qty: 90 TABLET | Refills: 0 | Status: SHIPPED | OUTPATIENT
Start: 2024-12-24

## 2024-12-24 NOTE — TELEPHONE ENCOUNTER
Rx Refill Note  Requested Prescriptions     Pending Prescriptions Disp Refills    clonazePAM (KlonoPIN) 1 MG tablet 90 tablet 1     Sig: Take 1 tablet by mouth 3 (Three) Times a Day As Needed for Anxiety.      Last office visit with prescribing clinician: 9/4/2024   Last telemedicine visit with prescribing clinician: Visit date not found   Next office visit with prescribing clinician: 1/3/2025   Office Visit with Jade Ryder MD (09/04/2024)   LABS SCANNED (08/06/2024)                     Would you like a call back once the refill request has been completed: [] Yes [] No    If the office needs to give you a call back, can they leave a voicemail: [] Yes [] No    Symone Varela MA  12/24/24, 10:56 EST    PT SAYS [facility] TOLD HER TO CALL US; LAST FILL 11-27-24; UPLOADED

## 2024-12-24 NOTE — TELEPHONE ENCOUNTER
Caller: Briana Alas    Relationship: Self    Best call back number: [phone]    Requested Prescriptions:   Requested Prescriptions     Pending Prescriptions Disp Refills    oxyCODONE (ROXICODONE) 15 MG immediate release tablet 90 tablet 0     Sig: Take 1 tablet by mouth Every 8 (Eight) Hours As Needed for Moderate Pain. Code M79.7        Pharmacy where request should be sent: [facility] [id number] - [place], [place] - [address] AT [place] [address] - [phone] [facility] [phone] FX     Last office visit with prescribing clinician: 12/12/2024   Last telemedicine visit with prescribing clinician: Visit date not found   Next office visit with prescribing clinician: 5/7/2025     Additional details provided by patient: WILL BE OUT 12/25/24    Does the patient have less than a 3 day supply:  [x] Yes  [] No    Would you like a call back once the refill request has been completed: [] Yes [x] No    If the office needs to give you a call back, can they leave a voicemail: [] Yes [x] No    Michelle Godoy   12/24/24 10:48 EST

## 2024-12-25 RX ORDER — OXYCODONE HYDROCHLORIDE 15 MG/1
15 TABLET ORAL EVERY 8 HOURS PRN
Qty: 90 TABLET | Refills: 0 | Status: SHIPPED | OUTPATIENT
Start: 2024-12-25

## 2025-01-03 DIAGNOSIS — G89.29 CHRONIC LOW BACK PAIN WITH SCIATICA, SCIATICA LATERALITY UNSPECIFIED, UNSPECIFIED BACK PAIN LATERALITY: ICD-10-CM

## 2025-01-03 DIAGNOSIS — M54.40 CHRONIC LOW BACK PAIN WITH SCIATICA, SCIATICA LATERALITY UNSPECIFIED, UNSPECIFIED BACK PAIN LATERALITY: ICD-10-CM

## 2025-01-03 RX ORDER — CARISOPRODOL 350 MG/1
350 TABLET ORAL 3 TIMES DAILY PRN
Qty: 90 TABLET | Refills: 0 | Status: SHIPPED | OUTPATIENT
Start: 2025-01-03

## 2025-01-03 NOTE — TELEPHONE ENCOUNTER
Caller: Briana Alas    Relationship: Self    Best call back number: [phone]     Requested Prescriptions:   Requested Prescriptions     Pending Prescriptions Disp Refills    carisoprodol (SOMA) 350 MG tablet 90 tablet 0     Sig: Take 1 tablet by mouth 3 (Three) Times a Day As Needed for Muscle Spasms.        Pharmacy where request should be sent: [facility] [id number] - [place], [place] - [address] AT [place] [address] - [phone] [facility] [phone]      Last office visit with prescribing clinician: 12/12/2024   Last telemedicine visit with prescribing clinician: Visit date not found   Next office visit with prescribing clinician: 5/7/2025     Additional details provided by patient:     Does the patient have less than a 3 day supply:  [x] Yes  [] No    Would you like a call back once the refill request has been completed: [] Yes [] No    If the office needs to give you a call back, can they leave a voicemail: [] Yes [] No    Digna Mahajan Rep   01/03/25 11:08 EST

## 2025-01-14 RX ORDER — ATORVASTATIN CALCIUM 80 MG/1
80 TABLET, FILM COATED ORAL EVERY EVENING
Qty: 30 TABLET | Refills: 11 | Status: SHIPPED | OUTPATIENT
Start: 2025-01-14

## 2025-01-14 NOTE — TELEPHONE ENCOUNTER
Caller: Briana Alas    Relationship: Self    Best call back number: [phone]     Requested Prescriptions:   Requested Prescriptions     Pending Prescriptions Disp Refills    atorvastatin (LIPITOR) 80 MG tablet 30 tablet 11     Sig: Take 1 tablet by mouth Every Evening.        Pharmacy where request should be sent: [facility] [id number] [facility] - [address] AT [facility] [address] - [phone] [facility] [phone] FX     Last office visit with prescribing clinician: 12/12/2024   Last telemedicine visit with prescribing clinician: Visit date not found   Next office visit with prescribing clinician: 5/7/2025     Additional details provided by patient:     Does the patient have less than a 3 day supply:  [x] Yes  [] No    Would you like a call back once the refill request has been completed: [] Yes [] No    If the office needs to give you a call back, can they leave a voicemail: [] Yes [] No    April Digna Rosas Rep   01/14/25 15:05 EST

## 2025-01-16 ENCOUNTER — OFFICE VISIT (OUTPATIENT)
Dept: PSYCHIATRY | Facility: CLINIC | Age: 60
End: 2025-01-16
Payer: MEDICAID

## 2025-01-16 DIAGNOSIS — F41.1 GENERALIZED ANXIETY DISORDER: Chronic | ICD-10-CM

## 2025-01-16 DIAGNOSIS — F33.1 MAJOR DEPRESSIVE DISORDER, RECURRENT EPISODE, MODERATE: Primary | Chronic | ICD-10-CM

## 2025-01-16 DIAGNOSIS — F51.01 PRIMARY INSOMNIA: Chronic | ICD-10-CM

## 2025-01-16 DIAGNOSIS — M54.89 OTHER DORSALGIA: ICD-10-CM

## 2025-01-16 RX ORDER — BUSPIRONE HYDROCHLORIDE 15 MG/1
15 TABLET ORAL 2 TIMES DAILY
Qty: 60 TABLET | Refills: 3 | Status: SHIPPED | OUTPATIENT
Start: 2025-01-16

## 2025-01-16 RX ORDER — TEMAZEPAM 30 MG/1
30 CAPSULE ORAL NIGHTLY PRN
Qty: 30 CAPSULE | Refills: 3 | Status: SHIPPED | OUTPATIENT
Start: 2025-01-16

## 2025-01-16 RX ORDER — CLONAZEPAM 1 MG/1
1 TABLET ORAL 3 TIMES DAILY PRN
Qty: 90 TABLET | Refills: 2 | Status: SHIPPED | OUTPATIENT
Start: 2025-01-16

## 2025-01-16 RX ORDER — DULOXETIN HYDROCHLORIDE 60 MG/1
60 CAPSULE, DELAYED RELEASE ORAL 2 TIMES DAILY
Qty: 60 CAPSULE | Refills: 3 | Status: SHIPPED | OUTPATIENT
Start: 2025-01-16

## 2025-01-16 RX ORDER — BREXPIPRAZOLE 2 MG/1
1 TABLET ORAL EVERY MORNING
Qty: 30 TABLET | Refills: 3 | Status: SHIPPED | OUTPATIENT
Start: 2025-01-16

## 2025-01-16 NOTE — PROGRESS NOTES
Subjective   Briana Alas is a 59 y.o. female who presents today for follow up    Chief Complaint:     Depression, anxiety, pain       History of Present Illness: the pt has a long hx of depression, no specific triggers or stressors, was on zoloft, celexa , few unsuccessful trials   She was relatively stable on meds until her daughter passed away  Last year , still difficult to cope  , her daughter was special needs, pt's life was revolving around her , now she feels she has no purpose     Last appt - clonazepam was increased back to TID after she failed GDR     Today the pt reported feeling better, still has chronic pain that affects her mood and anxiety  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 who is a fall risk , the pt has no time for herself      Depression is rated as 7/10 , still grieving about her father    anxiety intense and persistent,  clonazepam is effective   denied feeling hopeless/helpless,   When anxious -  increased tension, irritability, unpleasant somatic sensations    denied AVH/SI/HI   Sleep - improved   on temazepam,  up to 6 hrs vs 2 hrs without it   Alleviating factors - to stay busy          The following portions of the patient's history were reviewed and updated as appropriate: allergies, current medications, past family history, past medical history, past social history, past surgical history and problem list.    PAST PSYCHIATRIC HISTORY  Axis I  Affective/Bipolar Disorder, Anxiety/Panic Disorder  No inpt. No SI/SA   Axis II  Defer     PAST OUTPATIENT TREATMENT  Diagnosis treated:  Affective Disorder, Anxiety/Panic Disorder  Treatment Type:  Medication Management  Psychotherapy - shante Osei at [facility]   Prior Psychiatric Medications:  lexapro - not effective  celexa -not effective  zoloft - not effective now    Support Groups:  None   Sequelae Of Mental Disorder:  emotional distress    Interval History  No changes       Side Effects  Denied       Past Medical History:  Past Medical  History:   Diagnosis Date    AMS (altered mental status) 06/10/2024    Anxiety     Arthritis     Back pain     Depression     Headache     Hyperlipidemia     Hypertension     Injury of back     Restless leg syndrome     Sinus pause 06/11/2024     PCP - Dr Garcia     Social History:  Social History     Socioeconomic History    Marital status: Legally    Tobacco Use    Smoking status: Every Day     Current packs/day: 1.00     Average packs/day: 1 pack/day for 46.0 years (46.0 ttl pk-yrs)     Types: Cigarettes     Start date: 1979     Passive exposure: Current    Smokeless tobacco: Never    Tobacco comments:     Dont smoke dos    Vaping Use    Vaping status: Former    Quit date: 1/1/2020    Substances: Nicotine, Flavoring    Devices: Disposable   Substance and Sexual Activity    Alcohol use: No    Drug use: Never    Sexual activity: Defer       Family History:  Family History   Problem Relation Age of Onset    Hypertension Mother     Hyperlipidemia Mother     Depression Mother     Thyroid disease Mother     Atrial fibrillation Mother     Heart attack Father     Hypertension Father        Past Surgical History:  Past Surgical History:   Procedure Laterality Date    ANKLE OPEN REDUCTION INTERNAL FIXATION Right     BUNIONECTOMY Left 12/15/2023    Procedure: BUNIONECTOMY JHONATAN;  Surgeon: GILBERTO Eldridge DPM;  Location: [facility];  Service: Podiatry;  Laterality: Left;    FOOT SURGERY Bilateral     bunionectomy    SHOULDER ARTHROSCOPY W/ ROTATOR CUFF REPAIR Right 12/13/2019    Procedure: RIGHT SHOULDER ARTHROSCOPY WITH ROTATOR CUFF REPAIR and Subacromial decompression;  Surgeon: Gino Ackerman MD;  Location: [facility];  Service: Orthopedics    TUBAL ABDOMINAL LIGATION         Problem List:  Patient Active Problem List   Diagnosis    Fibromyalgia    Abnormal gait    Ankle pain, right    Knee pain    Leg pain, left    Annular tear of lumbar disc    Degeneration of lumbar intervertebral  disc    Generalized anxiety disorder    Obesity (BMI 30-39.9)    Cervical myelopathy    Other dorsalgia    Chronic low back pain    Hx traumatic fracture    Mixed hyperlipidemia    Hypertension, essential    Hypovitaminosis D    Inflammatory arthritis    Joint pain    Leg weakness, bilateral    Lumbosacral radiculopathy    Malaise and fatigue    Neck pain, chronic    Osteoarthritis of knee    Pain in right shoulder    Pain due to internal orthopedic prosthetic devices, implants and grafts, subsequent encounter    Rotator cuff tendonitis    Scoliosis    Osteoarthritis of lumbosacral spine without myelopathy    Spondylosis of lumbosacral region    Tobacco abuse counseling    Upper respiratory tract infection    Major depressive disorder, recurrent episode, moderate    Primary insomnia    Bereavement    Acquired hallux valgus, left    Sinus pause    Tobacco abuse       Allergy:   Allergies   Allergen Reactions    Penicillins Rash        Discontinued Medications:  Medications Discontinued During This Encounter   Medication Reason    diazePAM (Valium) 5 MG tablet *Therapy completed    predniSONE (DELTASONE) 10 MG tablet *Therapy completed    predniSONE (DELTASONE) 10 MG tablet *Therapy completed    temazepam (RESTORIL) 30 MG capsule Reorder    DULoxetine (CYMBALTA) 60 MG capsule Reorder    busPIRone (BUSPAR) 15 MG tablet Reorder    Brexpiprazole (Rexulti) 2 MG tablet Reorder    clonazePAM (KlonoPIN) 1 MG tablet Reorder                 Current Medications:   Current Outpatient Medications   Medication Sig Dispense Refill    Brexpiprazole (Rexulti) 2 MG tablet Take 1 tablet by mouth Every Morning. 30 tablet 3    busPIRone (BUSPAR) 15 MG tablet Take 1 tablet by mouth 2 (Two) Times a Day. 60 tablet 3    clonazePAM (KlonoPIN) 1 MG tablet Take 1 tablet by mouth 3 (Three) Times a Day As Needed for Anxiety. 90 tablet 2    DULoxetine (CYMBALTA) 60 MG capsule Take 1 capsule by mouth 2 (Two) Times a Day. 60 capsule 3    temazepam  (RESTORIL) 30 MG capsule Take 1 capsule by mouth At Night As Needed for Sleep. 30 capsule 3    aspirin 81 MG EC tablet Take 1 tablet by mouth Daily.      atenolol (TENORMIN) 25 MG tablet Take 1 tablet by mouth Daily.      atorvastatin (LIPITOR) 80 MG tablet Take 1 tablet by mouth Every Evening. 30 tablet 11    benzonatate (TESSALON) 200 MG capsule Take 1 capsule by mouth 3 (Three) Times a Day As Needed for Cough. 30 capsule 1    bisacodyl (DULCOLAX) 5 MG EC tablet Take 1 tablet by mouth Daily As Needed for Constipation (Use if polyethylene glycol is ineffective).      carisoprodol (SOMA) 350 MG tablet Take 1 tablet by mouth 3 (Three) Times a Day As Needed for Muscle Spasms. 90 tablet 0    oxyCODONE (ROXICODONE) 15 MG immediate release tablet Take 1 tablet by mouth Every 8 (Eight) Hours As Needed for Moderate Pain. Code M79.7 90 tablet 0    pantoprazole (PROTONIX) 40 MG EC tablet TAKE ONE TABLET BY MOUTH ONCE NIGHTLY 30 tablet 1    pregabalin (LYRICA) 150 MG capsule TAKE 1 CAPSULE BY MOUTH 2 TIMES A DAY 60 capsule 3    rOPINIRole (REQUIP) 0.5 MG tablet TAKE 1 TABLET BY MOUTH TWO TIMES A DAY ONE HOUR BEFORE BEDTIME 180 tablet 1    vitamin D (ERGOCALCIFEROL) 1.25 MG (24000 UT) capsule capsule Take 1 capsule by mouth 1 (One) Time Per Week. Sundays       No current facility-administered medications for this visit.         Psychological ROS: positive for - anxiety, depression   negative for - hallucinations, hostility, irritability, memory difficulties, mood swings, obsessive thoughts or suicidal ideation      Physical Exam:   There were no vitals taken for this visit.    Mental Status Exam:   Hygiene:   good  Cooperation:  Cooperative  Eye Contact:  Good  Psychomotor Behavior:  Appropriate  Affect:  Appropriate and congruent with mood   Mood: anxious and fluctates  Hopelessness: Denies  Speech:  Normal  Thought Process:  Goal directed and Linear  Thought Content:  Mood congruent  Suicidal:  None  Homicidal:   None  Hallucinations:  None  Delusion:  None  Memory:  Intact  Orientation:  Person, Place, Time and Situation  Reliability:  good  Insight:  Good  Judgement:  Good  Impulse Control:  Good  Physical/Medical Issues:  Yes        MSE from 9/4/24  reviewed and accepted with changes   PHQ-9 Depression Screening  Little interest or pleasure in doing things? Over half   Feeling down, depressed, or hopeless? Over half   PHQ-2 Total Score 4   Trouble falling or staying asleep, or sleeping too much? Several days   Feeling tired or having little energy? Several days   Poor appetite or overeating? Not at all   Feeling bad about yourself - or that you are a failure or have let yourself or your family down? Several days   Trouble concentrating on things, such as reading the newspaper or watching television? Several days   Moving or speaking so slowly that other people could have noticed? Or the opposite - being so fidgety or restless that you have been moving around a lot more than usual? Not at all   Thoughts that you would be better off dead, or of hurting yourself in some way? Not at all   PHQ-9 Total Score 8   If you checked off any problems, how difficult have these problems made it for you to do your work, take care of things at home, or get along with other people? Very difficult    Over the last two weeks, how often have you been bothered by the following problems?  Feeling nervous, anxious or on edge: More than half the days  Not being able to stop or control worrying: Several days  Worrying too much about different things: Several days  Trouble Relaxing: Several days  Being so restless that it is hard to sit still: Not at all  Becoming easily annoyed or irritable: Several days  Feeling afraid as if something awful might happen: More than half the days  STEWART 7 Total Score: 8  If you checked any problems, how difficult have these problems made it for you to do your work, take care of things at home, or get along with other  people: Very difficult     Brianadomenic Alas  reports that she has been smoking cigarettes. She started smoking about 46 years ago. She has a 46 pack-year smoking history. She has been exposed to tobacco smoke. She has never used smokeless tobacco.. I have educated her on the risk of diseases from using tobacco products such as cancer, COPD and heart disease.     I advised her to quit and she is not willing to quit.    I spent 3  minutes counseling the patient.           Current every day smoker 3-10 mintues spent counseling Not agreeable to stopping    I advised Briana of the risks of tobacco use.     Lab Results:   No visits with results within 3 Month(s) from this visit.   Latest known visit with results is:   Lab on 08/06/2024   Component Date Value Ref Range Status    Glucose 08/06/2024 90  65 - 99 mg/dL Final    BUN 08/06/2024 17  6 - 20 mg/dL Final    Creatinine 08/06/2024 1.07 (H)  0.57 - 1.00 mg/dL Final    Sodium 08/06/2024 143  136 - 145 mmol/L Final    Potassium 08/06/2024 4.2  3.5 - 5.2 mmol/L Final    Chloride 08/06/2024 105  98 - 107 mmol/L Final    CO2 08/06/2024 26.6  22.0 - 29.0 mmol/L Final    Calcium 08/06/2024 9.6  8.6 - 10.5 mg/dL Final    Total Protein 08/06/2024 6.5  6.0 - 8.5 g/dL Final    Albumin 08/06/2024 4.1  3.5 - 5.2 g/dL Final    ALT (SGPT) 08/06/2024 15  1 - 33 U/L Final    AST (SGOT) 08/06/2024 19  1 - 32 U/L Final    Alkaline Phosphatase 08/06/2024 72  39 - 117 U/L Final    Total Bilirubin 08/06/2024 0.3  0.0 - 1.2 mg/dL Final    Globulin 08/06/2024 2.4  gm/dL Final    A/G Ratio 08/06/2024 1.7  g/dL Final    BUN/Creatinine Ratio 08/06/2024 15.9  7.0 - 25.0 Final    Anion Gap 08/06/2024 11.4  5.0 - 15.0 mmol/L Final    eGFR 08/06/2024 60.0 (L)  >60.0 mL/min/1.73 Final    Total Cholesterol 08/06/2024 106  0 - 200 mg/dL Final    Triglycerides 08/06/2024 104  0 - 150 mg/dL Final    HDL Cholesterol 08/06/2024 38 (L)  40 - 60 mg/dL Final    LDL Cholesterol  08/06/2024 48  0 - 100 mg/dL  Final    VLDL Cholesterol 08/06/2024 20  5 - 40 mg/dL Final    LDL/HDL Ratio 08/06/2024 1.24   Final       Assessment & Plan   Problems Addressed this Visit       Generalized anxiety disorder (Chronic)    Relevant Medications    busPIRone (BUSPAR) 15 MG tablet    temazepam (RESTORIL) 30 MG capsule    DULoxetine (CYMBALTA) 60 MG capsule    Brexpiprazole (Rexulti) 2 MG tablet    Other dorsalgia    Relevant Medications    clonazePAM (KlonoPIN) 1 MG tablet    Major depressive disorder, recurrent episode, moderate - Primary (Chronic)    Relevant Medications    busPIRone (BUSPAR) 15 MG tablet    temazepam (RESTORIL) 30 MG capsule    DULoxetine (CYMBALTA) 60 MG capsule    Brexpiprazole (Rexulti) 2 MG tablet    Primary insomnia (Chronic)    Relevant Medications    temazepam (RESTORIL) 30 MG capsule     Diagnoses         Codes Comments    Major depressive disorder, recurrent episode, moderate    -  Primary ICD-10-CM: F33.1  ICD-9-CM: 296.32     Generalized anxiety disorder     ICD-10-CM: F41.1  ICD-9-CM: 300.02     Primary insomnia     ICD-10-CM: F51.01  ICD-9-CM: 307.42     Other dorsalgia     ICD-10-CM: M54.89  ICD-9-CM: 724.5             Visit Diagnoses:    ICD-10-CM ICD-9-CM   1. Major depressive disorder, recurrent episode, moderate  F33.1 296.32   2. Generalized anxiety disorder  F41.1 300.02   3. Primary insomnia  F51.01 307.42   4. Other dorsalgia  M54.89 724.5       Correct ds f33.1, f41.1,   f51.1            TREATMENT PLAN/GOALS: Continue supportive psychotherapy efforts and medications as indicated. Treatment and medication options discussed during today's visit. Patient ackowledged and verbally consented to continue with current treatment plan and was educated on the importance of compliance with treatment and follow-up appointments.    MEDICATION ISSUES:    INSPECT reviewed as expected - on pain meds,    Clonazepam 12/24/24  and temazepam on  12/30/24            1. Major depressive d/o moderate recurrent - cont  cymbalta 120 mg BP is stable, cont  rexulti   2 mg , no changes necessary , BP is stable       Cont therapy  2. Generalized anxiety - cont cymbalta at 120 mg , cont   clonazepam  1 mg  TID PRN for 3 more  months , then will make another GDR attempt   the pt is on opioids, will closely monitor, the pt is aware of additive properties of benzodiazepines and opioids , does not take all together     3 Insomnia - cont temazepam 30 mg po QHS      Labs 12/7/23 - WNL      Lipids 8/21/23 -  , chol 148 , did not have any new labs        UDS 7/26/22 -consistent for clonazepam, temazepam and + fentanyl (?) , will repeat today , the denied fentanyl use   2/28/23 - consistent ,    LABORATORY - SCAN - MULTIPLE LAB REPORT, [facility], 02/28/2023 (02/28/2023)   UDS 1/3/24 - consistent   LABORATORY - SCAN - FULL URINE DRUG SCREEN, [facility], 01/03/2024 (01/03/2024)   UDS 8/6/24 + temazepam, neg for clonazepam - the pt stated she ran out because she still needed TID (GDR failed)   LABORATORY - SCAN - DRUG SCREEN, [facility], 08/06/2024 (08/06/2024)       PHQ scored 11 and indicated moderate  depression   STEWART 7 scored 13    Patient screened positive for depression based on a PHQ-9 score of 8 on 1/16/2025. Follow-up recommendations include: Prescribed antidepressant medication treatment.      Discussed medication options and treatment plan of prescribed medication as well as the risks, benefits, and side effects including potential falls, possible impaired driving and metabolic adversities among others. Patient is agreeable to call the office with any worsening of symptoms or onset of side effects. Patient is agreeable to call 911 or go to the nearest ER should he/she begin having SI/HI. No medication side effects or related complaints today.     MEDS ORDERED DURING VISIT:  New Medications Ordered This Visit   Medications    busPIRone (BUSPAR) 15 MG tablet     Sig: Take 1 tablet by mouth 2 (Two) Times a Day.     Dispense:  60  tablet     Refill:  3    temazepam (RESTORIL) 30 MG capsule     Sig: Take 1 capsule by mouth At Night As Needed for Sleep.     Dispense:  30 capsule     Refill:  3     Please dispense when it is due    clonazePAM (KlonoPIN) 1 MG tablet     Sig: Take 1 tablet by mouth 3 (Three) Times a Day As Needed for Anxiety.     Dispense:  90 tablet     Refill:  2     Please dispense only when it is due, last fill was on 12/24/24    DULoxetine (CYMBALTA) 60 MG capsule     Sig: Take 1 capsule by mouth 2 (Two) Times a Day.     Dispense:  60 capsule     Refill:  3    Brexpiprazole (Rexulti) 2 MG tablet     Sig: Take 1 tablet by mouth Every Morning.     Dispense:  30 tablet     Refill:  3       Return in about 4 months (around 5/16/2025).         This document has been electronically signed by Jade Ryder MD  January 16, 2025 13:47 EST    EMR Dragon transcription disclaimer:  Some of this encounter note is an electronic transcription translation of spoken language to printed text. The electronic translation of spoken language may permit erroneous, or at times, nonsensical words or phrases to be inadvertently transcribed; Although I have reviewed the note for such errors some may still exist.

## 2025-01-21 RX ORDER — ATENOLOL 50 MG/1
50 TABLET ORAL DAILY
Qty: 30 TABLET | Refills: 3 | Status: SHIPPED | OUTPATIENT
Start: 2025-01-21

## 2025-01-22 DIAGNOSIS — M54.50 CHRONIC BILATERAL LOW BACK PAIN WITHOUT SCIATICA: ICD-10-CM

## 2025-01-22 DIAGNOSIS — G89.29 CHRONIC BILATERAL LOW BACK PAIN WITHOUT SCIATICA: ICD-10-CM

## 2025-01-22 RX ORDER — OXYCODONE HYDROCHLORIDE 15 MG/1
15 TABLET ORAL EVERY 8 HOURS PRN
Qty: 90 TABLET | Refills: 0 | Status: SHIPPED | OUTPATIENT
Start: 2025-01-22

## 2025-01-22 NOTE — TELEPHONE ENCOUNTER
Caller: Briana Alas    Relationship: Self    Best call back number: [phone]     Requested Prescriptions:   Requested Prescriptions     Pending Prescriptions Disp Refills    oxyCODONE (ROXICODONE) 15 MG immediate release tablet 90 tablet 0     Sig: Take 1 tablet by mouth Every 8 (Eight) Hours As Needed for Moderate Pain. Code M79.7        Pharmacy where request should be sent: [facility] [id number] - [place], [place] - [address] AT [place] [address] - [phone] [facility] [phone] FX     Last office visit with prescribing clinician: 12/12/2024   Last telemedicine visit with prescribing clinician: Visit date not found   Next office visit with prescribing clinician: 5/7/2025     Additional details provided by patient:     Does the patient have less than a 3 day supply:  [x] Yes  [] No    Would you like a call back once the refill request has been completed: [] Yes [] No    If the office needs to give you a call back, can they leave a voicemail: [] Yes [] No    April Digna Rosas Rep   01/22/25 10:12 EST

## 2025-02-03 DIAGNOSIS — M54.40 CHRONIC LOW BACK PAIN WITH SCIATICA, SCIATICA LATERALITY UNSPECIFIED, UNSPECIFIED BACK PAIN LATERALITY: ICD-10-CM

## 2025-02-03 DIAGNOSIS — G89.29 CHRONIC LOW BACK PAIN WITH SCIATICA, SCIATICA LATERALITY UNSPECIFIED, UNSPECIFIED BACK PAIN LATERALITY: ICD-10-CM

## 2025-02-03 RX ORDER — CARISOPRODOL 350 MG/1
350 TABLET ORAL 3 TIMES DAILY PRN
Qty: 90 TABLET | Refills: 0 | Status: SHIPPED | OUTPATIENT
Start: 2025-02-03

## 2025-02-03 RX ORDER — CARISOPRODOL 350 MG/1
350 TABLET ORAL 3 TIMES DAILY PRN
Qty: 90 TABLET | OUTPATIENT
Start: 2025-02-03

## 2025-02-03 NOTE — TELEPHONE ENCOUNTER
Caller: Briana Alas    Relationship: Self    Best call back number: [phone]    Requested Prescriptions:   Requested Prescriptions     Pending Prescriptions Disp Refills    carisoprodol (SOMA) 350 MG tablet 90 tablet 0     Sig: Take 1 tablet by mouth 3 (Three) Times a Day As Needed for Muscle Spasms.      Pharmacy where request should be sent: [facility] [id number] - [place], [place] - [address] AT [place] [address] - [phone] [facility] [phone] FX     Last office visit with prescribing clinician: 12/12/2024   Last telemedicine visit with prescribing clinician: Visit date not found   Next office visit with prescribing clinician: 5/7/2025     Additional details provided by patient: PLEASE ADVISE. PATIENT IS OUT OF THIS.     Does the patient have less than a 3 day supply:  [x] Yes  [] No        Digna Humphrey Rep   02/03/25 08:33 EST

## 2025-02-09 RX ORDER — PANTOPRAZOLE SODIUM 40 MG/1
40 TABLET, DELAYED RELEASE ORAL NIGHTLY
Qty: 30 TABLET | Refills: 1 | Status: SHIPPED | OUTPATIENT
Start: 2025-02-09

## 2025-02-18 ENCOUNTER — TELEPHONE (OUTPATIENT)
Dept: FAMILY MEDICINE CLINIC | Facility: CLINIC | Age: 60
End: 2025-02-18
Payer: MEDICAID

## 2025-02-18 DIAGNOSIS — G89.29 CHRONIC BILATERAL LOW BACK PAIN WITHOUT SCIATICA: ICD-10-CM

## 2025-02-18 DIAGNOSIS — M54.50 CHRONIC BILATERAL LOW BACK PAIN WITHOUT SCIATICA: ICD-10-CM

## 2025-02-18 RX ORDER — OXYCODONE HYDROCHLORIDE 15 MG/1
15 TABLET ORAL EVERY 8 HOURS PRN
Qty: 90 TABLET | Refills: 0 | Status: SHIPPED | OUTPATIENT
Start: 2025-02-18

## 2025-02-18 NOTE — TELEPHONE ENCOUNTER
Caller: Briana Alas    Relationship: Self    Best call back number:[phone]     Requested Prescriptions:   Requested Prescriptions     Pending Prescriptions Disp Refills    oxyCODONE (ROXICODONE) 15 MG immediate release tablet 90 tablet 0     Sig: Take 1 tablet by mouth Every 8 (Eight) Hours As Needed for Moderate Pain. Code M79.7        Pharmacy where request should be sent: [facility] [id number] - [place], [place] - [address] AT [place] [address] - [phone] [facility] [phone] FX     Last office visit with prescribing clinician: 12/12/2024   Last telemedicine visit with prescribing clinician: Visit date not found   Next office visit with prescribing clinician: 5/7/2025     Additional details provided by patient:     Does the patient have less than a 3 day supply:  [x] Yes  [] No    Would you like a call back once the refill request has been completed: [] Yes [x] No    If the office needs to give you a call back, can they leave a voicemail: [] Yes [x] No    Digna De Leon Rep   02/18/25 09:16 EST

## 2025-02-20 ENCOUNTER — OFFICE VISIT (OUTPATIENT)
Dept: FAMILY MEDICINE CLINIC | Facility: CLINIC | Age: 60
End: 2025-02-20
Payer: MEDICAID

## 2025-02-20 ENCOUNTER — TELEPHONE (OUTPATIENT)
Dept: FAMILY MEDICINE CLINIC | Facility: CLINIC | Age: 60
End: 2025-02-20

## 2025-02-20 VITALS
WEIGHT: 181 LBS | HEART RATE: 61 BPM | SYSTOLIC BLOOD PRESSURE: 125 MMHG | BODY MASS INDEX: 30.9 KG/M2 | OXYGEN SATURATION: 96 % | HEIGHT: 64 IN | TEMPERATURE: 97.5 F | DIASTOLIC BLOOD PRESSURE: 73 MMHG

## 2025-02-20 DIAGNOSIS — H10.023 MUCOPURULENT CONJUNCTIVITIS, BILATERAL: Primary | ICD-10-CM

## 2025-02-20 DIAGNOSIS — H57.13 EYE PAIN, BILATERAL: Primary | ICD-10-CM

## 2025-02-20 PROCEDURE — 3078F DIAST BP <80 MM HG: CPT | Performed by: FAMILY MEDICINE

## 2025-02-20 PROCEDURE — 3074F SYST BP LT 130 MM HG: CPT | Performed by: FAMILY MEDICINE

## 2025-02-20 PROCEDURE — 99212 OFFICE O/P EST SF 10 MIN: CPT | Performed by: FAMILY MEDICINE

## 2025-02-20 PROCEDURE — 1126F AMNT PAIN NOTED NONE PRSNT: CPT | Performed by: FAMILY MEDICINE

## 2025-02-20 RX ORDER — MELOXICAM 15 MG/1
TABLET ORAL
COMMUNITY
Start: 2025-01-23

## 2025-02-20 RX ORDER — TOBRAMYCIN AND DEXAMETHASONE 3; 1 MG/ML; MG/ML
1 SUSPENSION/ DROPS OPHTHALMIC
Qty: 5 ML | Refills: 0 | Status: SHIPPED | OUTPATIENT
Start: 2025-02-20

## 2025-02-20 NOTE — PROGRESS NOTES
"Subjective   Briana Alas is a 59 y.o. female.     History of Present Illness  Briana Alas is in for some lingering eye pain.  It is in both eyes and has been bothering her for 2 weeks.  She used over-the-counter drops without success.  The pain in her eyes is getting worse and is causing headache.  She denies any trauma to the eyes.  She had a normal eye exam about 2 months ago including a negative test for glaucoma.  There is no history of chest pain or dyspnea. There is no history of issue with bowel or bladder dysfunction. There is no history of dizziness or lightheadedness. There is no history of issue with sleep or mood. There is no history of issue with present medication.       Eye Pain            /73 (BP Location: Left arm, Patient Position: Sitting, Cuff Size: Large Adult)   Pulse 61   Temp 97.5 °F (36.4 °C) (Temporal)   Ht 162.6 cm (64.02\")   Wt 82.1 kg (181 lb)   SpO2 96%   BMI 31.05 kg/m²       Chief Complaint   Patient presents with    Eye Pain     Swollen and watery - both eyes - light hurts them - 2 weeks - she had medicine from pink eye but that didn't work            Current Outpatient Medications:     aspirin 81 MG EC tablet, Take 1 tablet by mouth Daily., Disp: , Rfl:     atenolol (TENORMIN) 25 MG tablet, Take 1 tablet by mouth Daily., Disp: , Rfl:     atenolol (TENORMIN) 50 MG tablet, TAKE ONE (1) TABLET BY MOUTH DAILY (Patient taking differently: Take 1 tablet by mouth Daily. Take 1/2 tablet by mouth daily), Disp: 30 tablet, Rfl: 3    atorvastatin (LIPITOR) 80 MG tablet, Take 1 tablet by mouth Every Evening., Disp: 30 tablet, Rfl: 11    benzonatate (TESSALON) 200 MG capsule, Take 1 capsule by mouth 3 (Three) Times a Day As Needed for Cough., Disp: 30 capsule, Rfl: 1    bisacodyl (DULCOLAX) 5 MG EC tablet, Take 1 tablet by mouth Daily As Needed for Constipation (Use if polyethylene glycol is ineffective)., Disp: , Rfl:     Brexpiprazole (Rexulti) 2 MG tablet, Take 1 tablet by " mouth Every Morning., Disp: 30 tablet, Rfl: 3    busPIRone (BUSPAR) 15 MG tablet, Take 1 tablet by mouth 2 (Two) Times a Day., Disp: 60 tablet, Rfl: 3    carisoprodol (SOMA) 350 MG tablet, Take 1 tablet by mouth 3 (Three) Times a Day As Needed for Muscle Spasms., Disp: 90 tablet, Rfl: 0    clonazePAM (KlonoPIN) 1 MG tablet, Take 1 tablet by mouth 3 (Three) Times a Day As Needed for Anxiety., Disp: 90 tablet, Rfl: 2    DULoxetine (CYMBALTA) 60 MG capsule, Take 1 capsule by mouth 2 (Two) Times a Day., Disp: 60 capsule, Rfl: 3    meloxicam (MOBIC) 15 MG tablet, , Disp: , Rfl:     oxyCODONE (ROXICODONE) 15 MG immediate release tablet, Take 1 tablet by mouth Every 8 (Eight) Hours As Needed for Moderate Pain. Code M79.7, Disp: 90 tablet, Rfl: 0    pantoprazole (PROTONIX) 40 MG EC tablet, TAKE ONE TABLET BY MOUTH ONCE NIGHTLY, Disp: 30 tablet, Rfl: 1    pregabalin (LYRICA) 150 MG capsule, TAKE 1 CAPSULE BY MOUTH 2 TIMES A DAY, Disp: 60 capsule, Rfl: 3    rOPINIRole (REQUIP) 0.5 MG tablet, TAKE 1 TABLET BY MOUTH TWO TIMES A DAY ONE HOUR BEFORE BEDTIME, Disp: 180 tablet, Rfl: 1    temazepam (RESTORIL) 30 MG capsule, Take 1 capsule by mouth At Night As Needed for Sleep., Disp: 30 capsule, Rfl: 3    vitamin D (ERGOCALCIFEROL) 1.25 MG (31762 UT) capsule capsule, Take 1 capsule by mouth 1 (One) Time Per Week. Sundays, Disp: , Rfl:     tobramycin-dexAMETHasone (TobraDex) 0.3-0.1 % ophthalmic suspension, Administer 1 drop to both eyes Every 4 (Four) Hours While Awake., Disp: 5 mL, Rfl: 0            The following portions of the patient's history were reviewed and updated as appropriate: allergies, current medications, past family history, past medical history, past social history, past surgical history, and problem list.    Review of Systems   Unable to perform ROS: Acuity of condition   Eyes:  Positive for pain.       Objective   Physical Exam  Vitals and nursing note reviewed.   Constitutional:       Appearance: Normal  appearance.   HENT:      Right Ear: Tympanic membrane and ear canal normal.      Left Ear: Tympanic membrane and ear canal normal.   Eyes:      General: Lids are normal.      Extraocular Movements:      Right eye: Abnormal extraocular motion present.      Left eye: Abnormal extraocular motion present.      Conjunctiva/sclera:      Right eye: Right conjunctiva is injected. No exudate.     Left eye: Left conjunctiva is injected. No exudate.     Pupils: Pupils are equal, round, and reactive to light.      Funduscopic exam:     Right eye: No exudate. Red reflex present.         Left eye: No exudate. Red reflex present.     Slit lamp exam:     Right eye: No corneal ulcer or foreign body.      Left eye: No corneal ulcer or foreign body.   Musculoskeletal:      Cervical back: Normal range of motion.   Lymphadenopathy:      Cervical: No cervical adenopathy.   Neurological:      Mental Status: She is alert.           Assessment & Plan   Problems Addressed this Visit    None  Visit Diagnoses       Mucopurulent conjunctivitis, bilateral    -  Primary    Relevant Medications    tobramycin-dexAMETHasone (TobraDex) 0.3-0.1 % ophthalmic suspension          Diagnoses         Codes Comments    Mucopurulent conjunctivitis, bilateral    -  Primary ICD-10-CM: H10.023  ICD-9-CM: 372.03           I did send out some drops to use  I encouraged her to go ahead and make an appointment with her ophthalmologist to have her eyes checked  I did ask her to let me know how she responds to the drops and to go to the emergency room if things worsen

## 2025-02-20 NOTE — TELEPHONE ENCOUNTER
Caller: Briana Alas    Relationship: Self    Best call back number: [phone]     What is the medical concern/diagnosis:     What specialty or service is being requested: EYE SPECIALIST     What is the provider, practice or medical service name: DR NEIL       Any additional details: PATIENT WAS TOLD BY DR DENG OFFICE THAT SHE NEEDS A REFERRAL

## 2025-02-24 DIAGNOSIS — M54.40 CHRONIC LOW BACK PAIN WITH SCIATICA, SCIATICA LATERALITY UNSPECIFIED, UNSPECIFIED BACK PAIN LATERALITY: ICD-10-CM

## 2025-02-24 DIAGNOSIS — G89.29 CHRONIC LOW BACK PAIN WITH SCIATICA, SCIATICA LATERALITY UNSPECIFIED, UNSPECIFIED BACK PAIN LATERALITY: ICD-10-CM

## 2025-02-24 RX ORDER — CARISOPRODOL 350 MG/1
350 TABLET ORAL 3 TIMES DAILY PRN
Qty: 90 TABLET | Refills: 0 | Status: SHIPPED | OUTPATIENT
Start: 2025-02-24

## 2025-02-24 NOTE — TELEPHONE ENCOUNTER
Caller: Briana Alas    Relationship: Self    Best call back number: [phone]     Requested Prescriptions:   Requested Prescriptions     Pending Prescriptions Disp Refills    carisoprodol (SOMA) 350 MG tablet 90 tablet 0     Sig: Take 1 tablet by mouth 3 (Three) Times a Day As Needed for Muscle Spasms.        Pharmacy where request should be sent: [facility] [id number] - [place], [place] - [address] AT [place] [address] - [phone] [facility] [phone] FX     Last office visit with prescribing clinician: 2/20/2025   Last telemedicine visit with prescribing clinician: Visit date not found   Next office visit with prescribing clinician: 5/7/2025     Additional details provided by patient:     Does the patient have less than a 3 day supply:  [x] Yes  [] No    Would you like a call back once the refill request has been completed: [] Yes [x] No    If the office needs to give you a call back, can they leave a voicemail: [x] Yes [] No    Digna Johnston Rep   02/24/25 11:13 EST

## 2025-03-03 ENCOUNTER — TELEPHONE (OUTPATIENT)
Dept: FAMILY MEDICINE CLINIC | Facility: CLINIC | Age: 60
End: 2025-03-03
Payer: MEDICAID

## 2025-03-03 NOTE — TELEPHONE ENCOUNTER
Carisoprodol 350MG tablets PA denied.   [place] Medicaid Electronic PA Form  (Key: [id number])  PA Case ID #: [id number]  Rx #: [id number]  Member ID:[id number]  Faxed denial to pharmacy

## 2025-03-11 ENCOUNTER — OFFICE VISIT (OUTPATIENT)
Dept: FAMILY MEDICINE CLINIC | Facility: CLINIC | Age: 60
End: 2025-03-11
Payer: MEDICAID

## 2025-03-11 VITALS
BODY MASS INDEX: 31.65 KG/M2 | TEMPERATURE: 97.3 F | OXYGEN SATURATION: 98 % | DIASTOLIC BLOOD PRESSURE: 73 MMHG | HEIGHT: 64 IN | HEART RATE: 64 BPM | WEIGHT: 185.4 LBS | SYSTOLIC BLOOD PRESSURE: 116 MMHG

## 2025-03-11 DIAGNOSIS — G89.29 CHRONIC BILATERAL LOW BACK PAIN WITHOUT SCIATICA: ICD-10-CM

## 2025-03-11 DIAGNOSIS — M54.50 CHRONIC BILATERAL LOW BACK PAIN WITHOUT SCIATICA: ICD-10-CM

## 2025-03-11 DIAGNOSIS — H60.501 ACUTE OTITIS EXTERNA OF RIGHT EAR, UNSPECIFIED TYPE: Primary | ICD-10-CM

## 2025-03-11 PROCEDURE — 1159F MED LIST DOCD IN RCRD: CPT | Performed by: FAMILY MEDICINE

## 2025-03-11 PROCEDURE — 1160F RVW MEDS BY RX/DR IN RCRD: CPT | Performed by: FAMILY MEDICINE

## 2025-03-11 PROCEDURE — 3078F DIAST BP <80 MM HG: CPT | Performed by: FAMILY MEDICINE

## 2025-03-11 PROCEDURE — 99213 OFFICE O/P EST LOW 20 MIN: CPT | Performed by: FAMILY MEDICINE

## 2025-03-11 PROCEDURE — 3074F SYST BP LT 130 MM HG: CPT | Performed by: FAMILY MEDICINE

## 2025-03-11 PROCEDURE — 1126F AMNT PAIN NOTED NONE PRSNT: CPT | Performed by: FAMILY MEDICINE

## 2025-03-11 RX ORDER — SULFAMETHOXAZOLE AND TRIMETHOPRIM 800; 160 MG/1; MG/1
1 TABLET ORAL 2 TIMES DAILY
Qty: 20 TABLET | Refills: 0 | Status: SHIPPED | OUTPATIENT
Start: 2025-03-11 | End: 2025-03-21

## 2025-03-11 RX ORDER — TOBRAMYCIN AND DEXAMETHASONE 3; 1 MG/ML; MG/ML
SUSPENSION/ DROPS OPHTHALMIC
Qty: 5 ML | Refills: 0 | Status: SHIPPED | OUTPATIENT
Start: 2025-03-11

## 2025-03-11 RX ORDER — OXYCODONE HYDROCHLORIDE 15 MG/1
15 TABLET ORAL EVERY 8 HOURS PRN
Qty: 90 TABLET | Refills: 0 | Status: SHIPPED | OUTPATIENT
Start: 2025-03-11

## 2025-03-11 NOTE — TELEPHONE ENCOUNTER
Caller: Briana Alas    Relationship: Self    Best call back number: [phone]     Requested Prescriptions:   Requested Prescriptions     Pending Prescriptions Disp Refills    oxyCODONE (ROXICODONE) 15 MG immediate release tablet 90 tablet 0     Sig: Take 1 tablet by mouth Every 8 (Eight) Hours As Needed for Moderate Pain. Code M79.7        Pharmacy where request should be sent: [facility] [id number] - [place], [place] - [address] AT [place] [address] - [phone] [facility] [phone] FX     Last office visit with prescribing clinician: 2/20/2025   Last telemedicine visit with prescribing clinician: Visit date not found   Next office visit with prescribing clinician: 3/11/2025     Additional details provided by patient:     Does the patient have less than a 3 day supply:  [x] Yes  [] No    Digna Blount Rep   03/11/25 11:06 EDT

## 2025-03-11 NOTE — PROGRESS NOTES
"Subjective   Briana Alas is a 59 y.o. female.     History of Present Illness  Briana Alas is in for some acute ear pain for the last few days.  It is just her right ear.  She has not had a fever.  She has had some crusting and drainage from the right ear and this is a problem she has had in the past.  She has not been traveling or swimming of late.. There is no history of chest pain or dyspnea. There is no history of issue with bowel or bladder dysfunction. There is no history of dizziness or lightheadedness. There is no history of issue with sleep or mood. There is no history of issue with present medication.       Ear Drainage            /73 (BP Location: Left arm, Patient Position: Sitting, Cuff Size: Large Adult)   Pulse 64   Temp 97.3 °F (36.3 °C) (Temporal)   Ht 162.6 cm (64.02\")   Wt 84.1 kg (185 lb 6.4 oz)   SpO2 98%   BMI 31.81 kg/m²       Chief Complaint   Patient presents with    Ear Drainage     Right ear - going on for 4-5 days, crusty, sore to touch - she didn't clean in it out so you can see - this ear always gets infections     eye appt     She saw eye doctor and she has ulcers in her eyes - gave her drops and she follows up with them next week            Current Outpatient Medications:     aspirin 81 MG EC tablet, Take 1 tablet by mouth Daily., Disp: , Rfl:     atenolol (TENORMIN) 25 MG tablet, Take 1 tablet by mouth Daily., Disp: , Rfl:     atenolol (TENORMIN) 50 MG tablet, TAKE ONE (1) TABLET BY MOUTH DAILY (Patient taking differently: Take 1 tablet by mouth Daily. Take 1/2 tablet by mouth daily), Disp: 30 tablet, Rfl: 3    atorvastatin (LIPITOR) 80 MG tablet, Take 1 tablet by mouth Every Evening., Disp: 30 tablet, Rfl: 11    benzonatate (TESSALON) 200 MG capsule, Take 1 capsule by mouth 3 (Three) Times a Day As Needed for Cough., Disp: 30 capsule, Rfl: 1    bisacodyl (DULCOLAX) 5 MG EC tablet, Take 1 tablet by mouth Daily As Needed for Constipation (Use if polyethylene glycol is " ineffective)., Disp: , Rfl:     Brexpiprazole (Rexulti) 2 MG tablet, Take 1 tablet by mouth Every Morning., Disp: 30 tablet, Rfl: 3    busPIRone (BUSPAR) 15 MG tablet, Take 1 tablet by mouth 2 (Two) Times a Day., Disp: 60 tablet, Rfl: 3    carisoprodol (SOMA) 350 MG tablet, Take 1 tablet by mouth 3 (Three) Times a Day As Needed for Muscle Spasms., Disp: 90 tablet, Rfl: 0    clonazePAM (KlonoPIN) 1 MG tablet, Take 1 tablet by mouth 3 (Three) Times a Day As Needed for Anxiety., Disp: 90 tablet, Rfl: 2    DULoxetine (CYMBALTA) 60 MG capsule, Take 1 capsule by mouth 2 (Two) Times a Day., Disp: 60 capsule, Rfl: 3    meloxicam (MOBIC) 15 MG tablet, , Disp: , Rfl:     moxifloxacin (VIGAMOX) 0.5 % ophthalmic solution, , Disp: , Rfl:     oxyCODONE (ROXICODONE) 15 MG immediate release tablet, Take 1 tablet by mouth Every 8 (Eight) Hours As Needed for Moderate Pain. Code M79.7, Disp: 90 tablet, Rfl: 0    pantoprazole (PROTONIX) 40 MG EC tablet, TAKE ONE TABLET BY MOUTH ONCE NIGHTLY, Disp: 30 tablet, Rfl: 1    pregabalin (LYRICA) 150 MG capsule, TAKE 1 CAPSULE BY MOUTH 2 TIMES A DAY, Disp: 60 capsule, Rfl: 3    rOPINIRole (REQUIP) 0.5 MG tablet, TAKE 1 TABLET BY MOUTH TWO TIMES A DAY ONE HOUR BEFORE BEDTIME, Disp: 180 tablet, Rfl: 1    temazepam (RESTORIL) 30 MG capsule, Take 1 capsule by mouth At Night As Needed for Sleep., Disp: 30 capsule, Rfl: 3    tobramycin-dexAMETHasone (TobraDex) 0.3-0.1 % ophthalmic suspension, Administer 1 drop to both eyes Every 4 (Four) Hours While Awake., Disp: 5 mL, Rfl: 0    vitamin D (ERGOCALCIFEROL) 1.25 MG (19138 UT) capsule capsule, Take 1 capsule by mouth 1 (One) Time Per Week. Sundays, Disp: , Rfl:             The following portions of the patient's history were reviewed and updated as appropriate: allergies, current medications, past family history, past medical history, past social history, past surgical history, and problem list.    Review of Systems   Unable to perform ROS: Acuity of  condition       Objective   Physical Exam  Vitals and nursing note reviewed.   Constitutional:       Appearance: She is obese.   HENT:      Right Ear: There is no impacted cerumen.      Left Ear: There is no impacted cerumen.      Ears:      Comments: Right ear cartilage very angry and inflamed  Right ear canal little swollen but no significant discharge or drainage that I can see     Nose: Nose normal.      Mouth/Throat:      Pharynx: Oropharynx is clear.   Eyes:      Conjunctiva/sclera: Conjunctivae normal.      Pupils: Pupils are equal, round, and reactive to light.   Neurological:      Mental Status: She is alert.           Assessment & Plan   Problems Addressed this Visit    None  Visit Diagnoses         Acute otitis externa of right ear, unspecified type    -  Primary          Diagnoses         Codes Comments      Acute otitis externa of right ear, unspecified type    -  Primary ICD-10-CM: H60.501  ICD-9-CM: 380.10             I am going to refer to ENT if she does not respond to initial treatment  I am sending out Bactrim orally and TobraDex to treat ear canal and I have asked her to update me on response

## 2025-03-26 RX ORDER — ROPINIROLE 0.5 MG/1
TABLET, FILM COATED ORAL
Qty: 180 TABLET | Refills: 1 | Status: SHIPPED | OUTPATIENT
Start: 2025-03-26

## 2025-04-01 DIAGNOSIS — M54.40 CHRONIC LOW BACK PAIN WITH SCIATICA, SCIATICA LATERALITY UNSPECIFIED, UNSPECIFIED BACK PAIN LATERALITY: ICD-10-CM

## 2025-04-01 DIAGNOSIS — G89.29 CHRONIC LOW BACK PAIN WITH SCIATICA, SCIATICA LATERALITY UNSPECIFIED, UNSPECIFIED BACK PAIN LATERALITY: ICD-10-CM

## 2025-04-01 RX ORDER — CARISOPRODOL 350 MG/1
350 TABLET ORAL 3 TIMES DAILY PRN
Qty: 90 TABLET | Refills: 0 | Status: SHIPPED | OUTPATIENT
Start: 2025-04-01

## 2025-04-07 ENCOUNTER — TELEPHONE (OUTPATIENT)
Dept: PSYCHIATRY | Facility: CLINIC | Age: 60
End: 2025-04-07
Payer: MEDICAID

## 2025-04-10 ENCOUNTER — OFFICE VISIT (OUTPATIENT)
Dept: ORTHOPEDIC SURGERY | Facility: CLINIC | Age: 60
End: 2025-04-10
Payer: MEDICAID

## 2025-04-10 VITALS — HEIGHT: 64 IN | OXYGEN SATURATION: 95 % | WEIGHT: 185 LBS | BODY MASS INDEX: 31.58 KG/M2

## 2025-04-10 DIAGNOSIS — M25.511 RIGHT SHOULDER PAIN, UNSPECIFIED CHRONICITY: Primary | ICD-10-CM

## 2025-04-10 PROCEDURE — 99203 OFFICE O/P NEW LOW 30 MIN: CPT | Performed by: ORTHOPAEDIC SURGERY

## 2025-04-10 RX ORDER — PANTOPRAZOLE SODIUM 40 MG/1
40 TABLET, DELAYED RELEASE ORAL NIGHTLY
Qty: 30 TABLET | Refills: 1 | Status: SHIPPED | OUTPATIENT
Start: 2025-04-10

## 2025-04-10 NOTE — PATIENT INSTRUCTIONS
MRI follow-up instructions    Today at your office visit, Dr. Ackerman recommended an MRI (magnetic resonance imaging) to evaluate your joint pain.  This requires a precertification process, which our office will do, and then we will contact you when it is approved and go over scheduling options.  We typically recommend these to be performed at [facility] or [facility].  If for some reason it is performed elsewhere please arrange to have that facility give you a disc with your images on it so Dr. Ackerman can review it at your follow-up visit.    When checking out today we recommend making an appointment to go over your results in approximately two weeks.  If your MRI is done sooner than that we would be happy to schedule you sooner to go over your results, just contact us at [phone] or through the [employer] portal to let us know your MRI is completed.  Seeing you in person for the results gives us the best opportunity to look at your images together and explain the diagnosis and treatment options to best help you.

## 2025-04-10 NOTE — PROGRESS NOTES
Patient ID: Briana Alas is a 59 y.o. female.    Chief Complaint:    No chief complaint on file.      HPI:  This is a 59-year-old female with right shoulder pain, she had a right shoulder arthroscopy with upper subscapularis and supraspinatus pair with me in 2019 and unfortunately feels like she reinjured it lifting a heavy item recently.  She felt a pop and has had significant difficulty lifting her arm pain is referred to the bicep and deltoid.  She has been using over-the-counter medication ice heat and stretching without relief  Past Medical History:   Diagnosis Date    AMS (altered mental status) 06/10/2024    Anxiety     Arthritis     Back pain     Depression     Headache     Hyperlipidemia     Hypertension     Injury of back     Restless leg syndrome     Sinus pause 06/11/2024       Past Surgical History:   Procedure Laterality Date    ANKLE OPEN REDUCTION INTERNAL FIXATION Right     BUNIONECTOMY Left 12/15/2023    Procedure: BUNIONECTOMY JHONATAN;  Surgeon: GILBERTO Eldridge DPM;  Location: [facility];  Service: Podiatry;  Laterality: Left;    FOOT SURGERY Bilateral     bunionectomy    SHOULDER ARTHROSCOPY W/ ROTATOR CUFF REPAIR Right 12/13/2019    Procedure: RIGHT SHOULDER ARTHROSCOPY WITH ROTATOR CUFF REPAIR and Subacromial decompression;  Surgeon: Gino Ackerman MD;  Location: [facility] OR;  Service: Orthopedics    TUBAL ABDOMINAL LIGATION         Family History   Problem Relation Age of Onset    Hypertension Mother     Hyperlipidemia Mother     Depression Mother     Thyroid disease Mother     Atrial fibrillation Mother     Heart attack Father     Hypertension Father           Social History     Occupational History    Not on file   Tobacco Use    Smoking status: Every Day     Current packs/day: 1.00     Average packs/day: 1 pack/day for 46.3 years (46.3 ttl pk-yrs)     Types: Cigarettes     Start date: 1979     Passive exposure: Current    Smokeless tobacco: Never    Tobacco comments:      Dont smoke dos    Vaping Use    Vaping status: Former    Quit date: 1/1/2020    Substances: Nicotine, Flavoring    Devices: Disposable   Substance and Sexual Activity    Alcohol use: No    Drug use: Never    Sexual activity: Defer      Review of Systems   Cardiovascular:  Negative for chest pain.   Musculoskeletal:  Positive for arthralgias.     Objective:    There were no vitals taken for this visit.    Physical Examination:  Right shoulder demonstrates healed arthroscopic portals moderate pain over the bicep groove and impingement area passive elevation 170 abduction 140 external rotation 50 internal rotation S1 with pain and weakness on Speed [place] and supraspinatus testing.  Belly press and liftoff are 5/5 and 4/5.  Sensory and motor exam are intact all distributions. Radial pulse is palpable and capillary refill is less than two seconds to all digits.    Imaging:  right Shoulder X-Ray  Indication: Shoulder pain history of cuff repair  AP Y and Lateral views  Findings: Well-maintained joint spaces history of composite anchor insertion in the proximal humerus  no bony lesion  Soft tissues normal  normal joint spaces  Hardware appropriately positioned not applicable      no prior studies available for comparison    Assessment:  Pain and weakness after cuff repair possible repeat injury    Plan:  I recommend MRI      Procedures         Disclaimer: Part of this note may be an electronic transcription/translation of spoken language to printed text using the Dragon Dictation System

## 2025-04-11 ENCOUNTER — PATIENT ROUNDING (BHMG ONLY) (OUTPATIENT)
Dept: ORTHOPEDIC SURGERY | Facility: CLINIC | Age: 60
End: 2025-04-11
Payer: MEDICAID

## 2025-04-15 DIAGNOSIS — M54.89 OTHER DORSALGIA: ICD-10-CM

## 2025-04-15 NOTE — TELEPHONE ENCOUNTER
Rx Refill Note  Requested Prescriptions     Pending Prescriptions Disp Refills    clonazePAM (KlonoPIN) 1 MG tablet [Pharmacy Med Name: clonazePAM 1 MG TABLET] 90 tablet      Sig: TAKE 1 TABLET BY MOUTH 3 TIMES A DAY AS NEEDED FOR ANXIETY      Last office visit with prescribing clinician: 1/16/2025   Last telemedicine visit with prescribing clinician: Visit date not found   Next office visit with prescribing clinician: 5/8/2025   Office Visit with Jade Ryder MD (01/16/2025)   LABS SCANNED (08/06/2024)                     Would you like a call back once the refill request has been completed: [] Yes [] No    If the office needs to give you a call back, can they leave a voicemail: [] Yes [] No    Symone Varela MA  04/15/25, 08:55 EDT  UPLOADED

## 2025-04-16 DIAGNOSIS — G89.4 CHRONIC PAIN SYNDROME: ICD-10-CM

## 2025-04-16 DIAGNOSIS — G89.29 CHRONIC BILATERAL LOW BACK PAIN WITHOUT SCIATICA: ICD-10-CM

## 2025-04-16 DIAGNOSIS — M54.50 CHRONIC BILATERAL LOW BACK PAIN WITHOUT SCIATICA: ICD-10-CM

## 2025-04-16 RX ORDER — PREGABALIN 150 MG/1
150 CAPSULE ORAL 2 TIMES DAILY
Qty: 60 CAPSULE | Refills: 2 | Status: SHIPPED | OUTPATIENT
Start: 2025-04-16

## 2025-04-16 RX ORDER — CLONAZEPAM 1 MG/1
1 TABLET ORAL 3 TIMES DAILY PRN
Qty: 90 TABLET | Refills: 0 | Status: SHIPPED | OUTPATIENT
Start: 2025-04-16

## 2025-04-16 RX ORDER — OXYCODONE HYDROCHLORIDE 15 MG/1
15 TABLET ORAL EVERY 8 HOURS PRN
Qty: 90 TABLET | Refills: 0 | Status: SHIPPED | OUTPATIENT
Start: 2025-04-16

## 2025-04-16 NOTE — TELEPHONE ENCOUNTER
Caller: Briana Alas    Relationship: Self    Best call back number:     [phone]        Requested Prescriptions:   Requested Prescriptions     Pending Prescriptions Disp Refills    oxyCODONE (ROXICODONE) 15 MG immediate release tablet 90 tablet 0     Sig: Take 1 tablet by mouth Every 8 (Eight) Hours As Needed for Moderate Pain. Code M79.7        Pharmacy where request should be sent: [facility] [id number] - [place], [place] - [address] AT [place] [address] - [phone] [facility] [phone] FX     Last office visit with prescribing clinician: 3/11/2025   Last telemedicine visit with prescribing clinician: Visit date not found   Next office visit with prescribing clinician: 5/7/2025     Additional details provided by patient:     Does the patient have less than a 3 day supply:  [x] Yes  [] No    Would you like a call back once the refill request has been completed: [] Yes [] No    If the office needs to give you a call back, can they leave a voicemail: [] Yes [] No    Digna Mahajan Rep   04/16/25 08:56 EDT

## 2025-04-28 DIAGNOSIS — G89.29 CHRONIC LOW BACK PAIN WITH SCIATICA, SCIATICA LATERALITY UNSPECIFIED, UNSPECIFIED BACK PAIN LATERALITY: ICD-10-CM

## 2025-04-28 DIAGNOSIS — M54.40 CHRONIC LOW BACK PAIN WITH SCIATICA, SCIATICA LATERALITY UNSPECIFIED, UNSPECIFIED BACK PAIN LATERALITY: ICD-10-CM

## 2025-04-28 DIAGNOSIS — M54.50 CHRONIC BILATERAL LOW BACK PAIN WITHOUT SCIATICA: ICD-10-CM

## 2025-04-28 DIAGNOSIS — G89.29 CHRONIC BILATERAL LOW BACK PAIN WITHOUT SCIATICA: ICD-10-CM

## 2025-04-28 RX ORDER — CARISOPRODOL 350 MG/1
350 TABLET ORAL 3 TIMES DAILY PRN
Qty: 90 TABLET | Refills: 1 | Status: SHIPPED | OUTPATIENT
Start: 2025-04-28

## 2025-04-28 RX ORDER — OXYCODONE HYDROCHLORIDE 15 MG/1
15 TABLET ORAL EVERY 8 HOURS PRN
Qty: 90 TABLET | Refills: 0 | Status: SHIPPED | OUTPATIENT
Start: 2025-04-28

## 2025-04-28 NOTE — TELEPHONE ENCOUNTER
Caller: Briana Alas    Relationship: Self    Best call back number: [phone]     Requested Prescriptions:   Requested Prescriptions     Pending Prescriptions Disp Refills    oxyCODONE (ROXICODONE) 15 MG immediate release tablet 90 tablet 0     Sig: Take 1 tablet by mouth Every 8 (Eight) Hours As Needed for Moderate Pain. Code M79.7        Pharmacy where request should be sent: [facility] [id number] - [place], [place] - [address] AT [place] [address] - [phone] [facility] [phone] FX     Last office visit with prescribing clinician: 3/11/2025   Last telemedicine visit with prescribing clinician: Visit date not found   Next office visit with prescribing clinician: 5/7/2025     Additional details provided by patient: PATIENT HAS A COUPLE OF DAYS LEFT OF MEDICINE    Does the patient have less than a 3 day supply:  [x] Yes  [] No        Digna Hood Rep   04/28/25 15:31 EDT

## 2025-04-29 ENCOUNTER — TELEPHONE (OUTPATIENT)
Dept: FAMILY MEDICINE CLINIC | Facility: CLINIC | Age: 60
End: 2025-04-29

## 2025-04-29 NOTE — TELEPHONE ENCOUNTER
HUB TO RELAY     Cannot have an early, nothing we can do  She will have to pick it up when it is due, or when she gets back in town per Dr. Thacker

## 2025-04-29 NOTE — TELEPHONE ENCOUNTER
Caller: Briana Alas    Relationship to patient: Self    Best call back number: [phone]     Patient is needing: PATIENT CALLED IN TRYING TO GET AN UPDATE ON THIS, PHONE SIGNAL WAS UNSTABLE AND HUB WAS UNABLE TO PROCEED. PATIENT WAS INSTRUCTED TO CALL BACK WHEN SHE HAD BETTER SERVICE, AND PATIENT COMPLIED.

## 2025-04-29 NOTE — TELEPHONE ENCOUNTER
Caller: Briana Alas    Relationship: Self    Best call back number: [phone]    Who are you requesting to speak with (clinical staff, provider,  specific staff member): CLINICAL STAFF    What was the call regarding: STATED THAT THEY WERE TOLD THAT THE MEDICATION WAS FILLED ON 4/17 BUT THEY HAD NOT GOTTEN THE MEDICATION. STATED THAT THEY WERE TOLD BY THE PHARMACY THAT THEY CANNOT FILL IT WITHOUT DR. HEATON CALLING AND STATING IT IS OKAY TO GET FILLED EARLY. STATED THAT THEY ARE GOING TO BE OUT OF TOWN AND THE MEDICATION BEFORE THE TIME THAT THEY WOULD BE ABLE TO REFILL THE MEDICATION WITH THE PHARMACY AGAIN. PLEASE CALL AND ADVISE

## 2025-05-05 ENCOUNTER — HOSPITAL ENCOUNTER (OUTPATIENT)
Dept: MRI IMAGING | Facility: HOSPITAL | Age: 60
Discharge: HOME | End: 2025-05-05
Admitting: ORTHOPAEDIC SURGERY
Payer: MEDICAID

## 2025-05-05 DIAGNOSIS — M25.511 RIGHT SHOULDER PAIN, UNSPECIFIED CHRONICITY: ICD-10-CM

## 2025-05-05 PROCEDURE — 73221 MRI JOINT UPR EXTREM W/O DYE: CPT

## 2025-05-12 DIAGNOSIS — M54.89 OTHER DORSALGIA: ICD-10-CM

## 2025-05-13 DIAGNOSIS — M54.89 OTHER DORSALGIA: ICD-10-CM

## 2025-05-13 RX ORDER — CLONAZEPAM 1 MG/1
1 TABLET ORAL 3 TIMES DAILY PRN
Qty: 90 TABLET | Refills: 0 | Status: SHIPPED | OUTPATIENT
Start: 2025-05-13

## 2025-05-13 NOTE — TELEPHONE ENCOUNTER
Rx Refill Note  Requested Prescriptions     Pending Prescriptions Disp Refills    clonazePAM (KlonoPIN) 1 MG tablet 90 tablet 0     Sig: Take 1 tablet by mouth 3 (Three) Times a Day As Needed for Anxiety.      Last office visit with prescribing clinician: 1/16/2025     Next office visit with prescribing clinician: 7/15/2025     Office Visit with Jade Ryder MD (01/16/2025)     LABS SCANNED (08/06/2024)     BEHAVIORAL HEALTH - SCAN - Inspect, maximiliano, 1965, ranjan (05/13/2025)     Med check - 7-    Last Inspect fill: 4-    Patricia Todd MA  05/13/25, 16:13 EDT

## 2025-05-14 DIAGNOSIS — G89.29 CHRONIC BILATERAL LOW BACK PAIN WITHOUT SCIATICA: ICD-10-CM

## 2025-05-14 DIAGNOSIS — M54.50 CHRONIC BILATERAL LOW BACK PAIN WITHOUT SCIATICA: ICD-10-CM

## 2025-05-14 RX ORDER — CLONAZEPAM 1 MG/1
1 TABLET ORAL 3 TIMES DAILY PRN
Qty: 90 TABLET | Refills: 0 | OUTPATIENT
Start: 2025-05-14

## 2025-05-14 RX ORDER — OXYCODONE HYDROCHLORIDE 15 MG/1
15 TABLET ORAL EVERY 8 HOURS PRN
Qty: 90 TABLET | Refills: 0 | Status: SHIPPED | OUTPATIENT
Start: 2025-05-14

## 2025-05-14 NOTE — TELEPHONE ENCOUNTER
Caller: Briana Alas    Relationship: Self    Best call back number: [phone]    Requested Prescriptions:   Requested Prescriptions     Pending Prescriptions Disp Refills    oxyCODONE (ROXICODONE) 15 MG immediate release tablet 90 tablet 0     Sig: Take 1 tablet by mouth Every 8 (Eight) Hours As Needed for Moderate Pain. Code M79.7        Pharmacy where request should be sent: [facility] [id number] - [place], [place] - [address] AT [place] [address] - [phone] [facility] [phone] FX     Last office visit with prescribing clinician: 3/11/2025   Last telemedicine visit with prescribing clinician: Visit date not found   Next office visit with prescribing clinician: 6/23/2025     PATIENT IS WANTING TO LET DR. HEATON KNOW THEY RESCHEDULED THE APPOINTMENT THEY MISSED     Does the patient have less than a 3 day supply:  [x] Yes  [] No    Would you like a call back once the refill request has been completed: [] Yes [x] No    If the office needs to give you a call back, can they leave a voicemail: [] Yes [x] No    Digna Bates Rep   05/14/25 08:24 EDT

## 2025-05-14 NOTE — TELEPHONE ENCOUNTER
Clonazepam was filled on 4/17/25   Rx was sent on 5/13/25 to be filled on 5/15/25     Outpatient Medication Detail    clonazePAM (KlonoPIN) 1 MG tablet        Sig: TAKE 1 TABLET BY MOUTH 3 TIMES A DAY AS NEEDED FOR ANXIETY        Sent to pharmacy as: clonazePAM 1 MG Oral Tablet (KlonoPIN)        Class: Normal        Notes to Pharmacy: Please dispense on or after 5/15/25        Route: Oral        E-Prescribing Status: Receipt confirmed by pharmacy (5/13/2025  3:17 PM EDT)

## 2025-05-15 ENCOUNTER — OFFICE VISIT (OUTPATIENT)
Dept: ORTHOPEDIC SURGERY | Facility: CLINIC | Age: 60
End: 2025-05-15
Payer: MEDICAID

## 2025-05-15 VITALS — HEART RATE: 73 BPM | HEIGHT: 64 IN | BODY MASS INDEX: 31.58 KG/M2 | WEIGHT: 185 LBS

## 2025-05-15 DIAGNOSIS — F33.1 MAJOR DEPRESSIVE DISORDER, RECURRENT EPISODE, MODERATE: Chronic | ICD-10-CM

## 2025-05-15 DIAGNOSIS — M25.511 RIGHT SHOULDER PAIN, UNSPECIFIED CHRONICITY: Primary | ICD-10-CM

## 2025-05-15 PROCEDURE — 99213 OFFICE O/P EST LOW 20 MIN: CPT | Performed by: ORTHOPAEDIC SURGERY

## 2025-05-15 RX ORDER — BREXPIPRAZOLE 2 MG/1
1 TABLET ORAL EVERY MORNING
Qty: 30 TABLET | Refills: 3 | Status: SHIPPED | OUTPATIENT
Start: 2025-05-15

## 2025-05-15 RX ORDER — MELOXICAM 7.5 MG/1
7.5 TABLET ORAL DAILY
Qty: 30 TABLET | Refills: 4 | Status: SHIPPED | OUTPATIENT
Start: 2025-05-15

## 2025-05-15 RX ORDER — DULOXETIN HYDROCHLORIDE 60 MG/1
60 CAPSULE, DELAYED RELEASE ORAL 2 TIMES DAILY
Qty: 180 CAPSULE | Refills: 0 | Status: SHIPPED | OUTPATIENT
Start: 2025-05-15

## 2025-05-15 NOTE — PROGRESS NOTES
"     Patient ID: Briana Alas is a 59 y.o. female.  Right shoulder pain  Prior cuff repair in 2019    Review of Systems:        Objective:    Pulse 73   Ht 162.6 cm (64.02\")   Wt 83.9 kg (185 lb)   BMI 31.74 kg/m²     Physical Examination:      Right shoulder demonstrates healed arthroscopic portals moderate pain over the bicep groove and impingement area passive elevation 170 abduction 140 external rotation 50 internal rotation S1 with pain and weakness on [place] and supraspinatus testing.  Belly press and liftoff are 5/5 and 4/5.  Sensory and motor exam are intact all distributions. Radial pulse is palpable and capillary refill is less than two seconds to all digits    Imaging:   MRI demonstrates intact subscapularis and supraspinatus repair mild bursitis    Assessment:    Bursitis after cuff repair    Plan:   Options discussed Zilretta for meloxicam she declined injection and see me as needed      Procedures          Disclaimer: Part of this note may be an electronic transcription/translation of spoken language to printed text using the Dragon Dictation System  "

## 2025-05-15 NOTE — TELEPHONE ENCOUNTER
Rx Refill Note  Requested Prescriptions     Pending Prescriptions Disp Refills    DULoxetine (CYMBALTA) 60 MG capsule [Pharmacy Med Name: DULoxetine HCL DR 60 MG CAPSULE] 180 capsule      Sig: TAKE 1 CAPSULE BY MOUTH 2 TIMES A DAY    Rexulti 2 MG tablet [Pharmacy Med Name: REXULTI 2 MG TABLET] 30 tablet 3     Sig: TAKE 1 TABLET BY MOUTH EVERY MORNING        Last office visit with prescribing clinician: 1/16/2025     Next office visit with prescribing clinician: 7/15/2025     Office Visit with Jade Ryder MD (01/16/2025)     Manisha Mcgee  05/15/25, 09:55 EDT

## 2025-05-24 DIAGNOSIS — F51.01 PRIMARY INSOMNIA: Chronic | ICD-10-CM

## 2025-05-27 ENCOUNTER — TELEPHONE (OUTPATIENT)
Dept: FAMILY MEDICINE CLINIC | Facility: CLINIC | Age: 60
End: 2025-05-27
Payer: MEDICAID

## 2025-05-27 RX ORDER — TEMAZEPAM 30 MG/1
30 CAPSULE ORAL NIGHTLY PRN
Qty: 30 CAPSULE | Refills: 0 | Status: SHIPPED | OUTPATIENT
Start: 2025-05-27

## 2025-05-27 NOTE — TELEPHONE ENCOUNTER
Pantoprazole Sodium 40MG dr tablets  [employer] Electronic PA Form  (Key: [id number])  PA Case ID #: [id number]  Rx #: [id number]  PA Case: [id number], Status: Approved, Coverage Starts on: 5/27/2025 12:00:00 AM, Coverage Ends on: 5/27/2026 12:00:00 AM.  Member ID:[id number]

## 2025-06-11 DIAGNOSIS — M54.50 CHRONIC BILATERAL LOW BACK PAIN WITHOUT SCIATICA: ICD-10-CM

## 2025-06-11 DIAGNOSIS — G89.29 CHRONIC BILATERAL LOW BACK PAIN WITHOUT SCIATICA: ICD-10-CM

## 2025-06-11 RX ORDER — OXYCODONE HYDROCHLORIDE 15 MG/1
15 TABLET ORAL EVERY 8 HOURS PRN
Qty: 90 TABLET | Refills: 0 | Status: SHIPPED | OUTPATIENT
Start: 2025-06-11

## 2025-06-11 NOTE — TELEPHONE ENCOUNTER
Caller: Briana Alas    Relationship: Self    Best call back number:     [phone]       Requested Prescriptions:   Requested Prescriptions     Pending Prescriptions Disp Refills    oxyCODONE (ROXICODONE) 15 MG immediate release tablet 90 tablet 0     Sig: Take 1 tablet by mouth Every 8 (Eight) Hours As Needed for Moderate Pain. Code M79.7        Pharmacy where request should be sent: [facility] [id number] - [place], [place] - [address] AT [place] [address] - [phone] [facility] [phone] FX     Last office visit with prescribing clinician: 3/11/2025   Last telemedicine visit with prescribing clinician: Visit date not found   Next office visit with prescribing clinician: 6/23/2025     Additional details provided by patient:     Does the patient have less than a 3 day supply:  [x] Yes  [] No    Would you like a call back once the refill request has been completed: [] Yes [] No    If the office needs to give you a call back, can they leave a voicemail: [] Yes [] No    Digna Mahajan Rep   06/11/25 09:18 EDT

## 2025-06-12 ENCOUNTER — TELEPHONE (OUTPATIENT)
Dept: FAMILY MEDICINE CLINIC | Facility: CLINIC | Age: 60
End: 2025-06-12
Payer: OTHER GOVERNMENT

## 2025-06-12 NOTE — TELEPHONE ENCOUNTER
oxyCODONE HCl 15MG tablets  [place] Medicaid Electronic PA Form  (Key: [id number])  PA Case ID #: [id number]  Rx #: [id number]  Member ID:[id number]

## 2025-06-12 NOTE — TELEPHONE ENCOUNTER
Denied. Faxed denial letter to pharmacy and scanned into chart. Pt will need to pay out of pocket.

## 2025-06-18 DIAGNOSIS — M54.89 OTHER DORSALGIA: ICD-10-CM

## 2025-06-18 NOTE — TELEPHONE ENCOUNTER
Rx Refill Note  Requested Prescriptions     Pending Prescriptions Disp Refills    clonazePAM (KlonoPIN) 1 MG tablet [Pharmacy Med Name: clonazePAM 1 MG TABLET] 45 tablet      Sig: TAKE 1 TABLET BY MOUTH 3 TIMES A DAY AS NEEDED FOR ANXIETY        Last office visit with prescribing clinician: 1/16/2025     Next office visit with prescribing clinician: 7/15/2025     Office Visit with Jade Ryder MD (01/16/2025)     LABS SCANNED (08/06/2024)  [facility] - SCAN - Inspect report, Darnell, 6/18/25 (06/18/2025)     Inspect Fill 6/2/25    Manisha Mcgee  06/18/25, 16:12 EDT

## 2025-06-19 RX ORDER — CLONAZEPAM 1 MG/1
1 TABLET ORAL 3 TIMES DAILY PRN
Qty: 45 TABLET | Refills: 0 | Status: SHIPPED | OUTPATIENT
Start: 2025-06-19

## 2025-06-23 ENCOUNTER — OFFICE VISIT (OUTPATIENT)
Dept: FAMILY MEDICINE CLINIC | Facility: CLINIC | Age: 60
End: 2025-06-23
Payer: OTHER GOVERNMENT

## 2025-06-23 VITALS
TEMPERATURE: 97.2 F | SYSTOLIC BLOOD PRESSURE: 118 MMHG | BODY MASS INDEX: 31.86 KG/M2 | HEIGHT: 64 IN | WEIGHT: 186.6 LBS | DIASTOLIC BLOOD PRESSURE: 72 MMHG | HEART RATE: 56 BPM | OXYGEN SATURATION: 96 %

## 2025-06-23 DIAGNOSIS — Z12.11 ENCOUNTER FOR SCREENING FOR MALIGNANT NEOPLASM OF COLON: ICD-10-CM

## 2025-06-23 DIAGNOSIS — M54.50 CHRONIC BILATERAL LOW BACK PAIN WITHOUT SCIATICA: ICD-10-CM

## 2025-06-23 DIAGNOSIS — M54.40 CHRONIC LOW BACK PAIN WITH SCIATICA, SCIATICA LATERALITY UNSPECIFIED, UNSPECIFIED BACK PAIN LATERALITY: ICD-10-CM

## 2025-06-23 DIAGNOSIS — G89.29 CHRONIC LOW BACK PAIN WITH SCIATICA, SCIATICA LATERALITY UNSPECIFIED, UNSPECIFIED BACK PAIN LATERALITY: ICD-10-CM

## 2025-06-23 DIAGNOSIS — G89.29 CHRONIC BILATERAL LOW BACK PAIN WITHOUT SCIATICA: ICD-10-CM

## 2025-06-23 DIAGNOSIS — Z12.31 ENCOUNTER FOR SCREENING MAMMOGRAM FOR MALIGNANT NEOPLASM OF BREAST: ICD-10-CM

## 2025-06-23 PROCEDURE — 1160F RVW MEDS BY RX/DR IN RCRD: CPT | Performed by: FAMILY MEDICINE

## 2025-06-23 PROCEDURE — 1125F AMNT PAIN NOTED PAIN PRSNT: CPT | Performed by: FAMILY MEDICINE

## 2025-06-23 PROCEDURE — 3078F DIAST BP <80 MM HG: CPT | Performed by: FAMILY MEDICINE

## 2025-06-23 PROCEDURE — 3074F SYST BP LT 130 MM HG: CPT | Performed by: FAMILY MEDICINE

## 2025-06-23 PROCEDURE — 99214 OFFICE O/P EST MOD 30 MIN: CPT | Performed by: FAMILY MEDICINE

## 2025-06-23 PROCEDURE — 1159F MED LIST DOCD IN RCRD: CPT | Performed by: FAMILY MEDICINE

## 2025-06-23 RX ORDER — CARISOPRODOL 350 MG/1
350 TABLET ORAL 3 TIMES DAILY PRN
Qty: 90 TABLET | Refills: 1 | Status: SHIPPED | OUTPATIENT
Start: 2025-06-23 | End: 2025-06-23

## 2025-06-23 RX ORDER — CARISOPRODOL 350 MG/1
350 TABLET ORAL 3 TIMES DAILY PRN
Qty: 90 TABLET | Refills: 0 | Status: SHIPPED | OUTPATIENT
Start: 2025-06-23

## 2025-06-23 RX ORDER — OXYCODONE HYDROCHLORIDE 15 MG/1
15 TABLET ORAL EVERY 6 HOURS PRN
COMMUNITY
Start: 2025-06-23

## 2025-06-23 NOTE — PROGRESS NOTES
"Subjective   Briana Alas is a 59 y.o. female.     History of Present Illness  Briana Alas is in for follow up on her chronic issues with high blood pressure and high cholesterol along with her chronic pain and anxiety.  She feels her chronic pain is worse of late and she would like a dose increase in her oxycodone.  Currently she is taking 15 mg every 8 hours but she would like to go to every 6 hours if I am agreeable.  She still has not done the Cologuard despite 2 kits being sent to her house and she is willing now to get a colonoscopy.. There is no history of chest pain or dyspnea. There is no history of issue with bowel or bladder dysfunction. There is no history of dizziness or lightheadedness. There is no history of issue with present medication.       Pain  Symptoms include neck pain.    Pertinent negative symptoms include no abdominal pain, no chest pain, no congestion, no fatigue, no fever, no headaches, no myalgias, no nausea, no sore throat, no dysuria and no vomiting.   Hyperlipidemia  Pertinent negatives include no chest pain, myalgias or shortness of breath.   Hypertension  Associated symptoms: anxiety and neck pain    Associated symptoms: no chest pain, no headaches, no shortness of breath and no dizziness    Anxiety    Symptoms: nervous/anxious      Symptoms: no chest pain, no dizziness, no nausea, no shortness of breath and no suicidal ideas           /72 (BP Location: Left arm, Patient Position: Sitting, Cuff Size: Large Adult)   Pulse 56   Temp 97.2 °F (36.2 °C) (Temporal)   Ht 162.6 cm (64.02\")   Wt 84.6 kg (186 lb 9.6 oz)   SpO2 96%   BMI 32.01 kg/m²       Chief Complaint   Patient presents with    Pain    Hyperlipidemia    Hypertension    Anxiety    eye ulcer     She has another and they told her its chronic and she is on eye drops and then after that another eye drop - only left eye            Current Outpatient Medications:     aspirin 81 MG EC tablet, Take 1 tablet by mouth " Daily., Disp: , Rfl:     atenolol (TENORMIN) 25 MG tablet, Take 1 tablet by mouth Daily., Disp: , Rfl:     atenolol (TENORMIN) 50 MG tablet, TAKE ONE (1) TABLET BY MOUTH DAILY (Patient taking differently: Take 1 tablet by mouth Daily. Take 1/2 tablet by mouth daily), Disp: 30 tablet, Rfl: 3    atorvastatin (LIPITOR) 80 MG tablet, Take 1 tablet by mouth Every Evening., Disp: 30 tablet, Rfl: 11    benzonatate (TESSALON) 200 MG capsule, Take 1 capsule by mouth 3 (Three) Times a Day As Needed for Cough., Disp: 30 capsule, Rfl: 1    bisacodyl (DULCOLAX) 5 MG EC tablet, Take 1 tablet by mouth Daily As Needed for Constipation (Use if polyethylene glycol is ineffective)., Disp: , Rfl:     busPIRone (BUSPAR) 15 MG tablet, Take 1 tablet by mouth 2 (Two) Times a Day., Disp: 60 tablet, Rfl: 3    carisoprodol (SOMA) 350 MG tablet, Take 1 tablet by mouth 3 (Three) Times a Day As Needed for Muscle Spasms., Disp: 90 tablet, Rfl: 1    clonazePAM (KlonoPIN) 1 MG tablet, TAKE 1 TABLET BY MOUTH 3 TIMES A DAY AS NEEDED FOR ANXIETY, Disp: 45 tablet, Rfl: 0    DULoxetine (CYMBALTA) 60 MG capsule, TAKE 1 CAPSULE BY MOUTH 2 TIMES A DAY, Disp: 180 capsule, Rfl: 0    meloxicam (MOBIC) 7.5 MG tablet, Take 1 tablet by mouth Daily., Disp: 30 tablet, Rfl: 4    moxifloxacin (VIGAMOX) 0.5 % ophthalmic solution, , Disp: , Rfl:     oxyCODONE (ROXICODONE) 15 MG immediate release tablet, Take 1 tablet by mouth Every 6 (Six) Hours As Needed for Moderate Pain. Code M79.7, Disp: , Rfl:     pantoprazole (PROTONIX) 40 MG EC tablet, TAKE 1 TABLET BY MOUTH ONCE NIGHTLY, Disp: 30 tablet, Rfl: 1    pregabalin (LYRICA) 150 MG capsule, TAKE 1 CAPSULE BY MOUTH 2 TIMES A DAY, Disp: 60 capsule, Rfl: 2    Rexulti 2 MG tablet, TAKE 1 TABLET BY MOUTH EVERY MORNING, Disp: 30 tablet, Rfl: 3    rOPINIRole (REQUIP) 0.5 MG tablet, TAKE 1 TABLET BY MOUTH 2 TIMES A DAY ONE HOUR BEFORE BEDTIME, Disp: 180 tablet, Rfl: 1    temazepam (RESTORIL) 30 MG capsule, TAKE 1 CAPSULE BY  MOUTH AT BEDTIME AS NEEDED FOR SLEEP, Disp: 30 capsule, Rfl: 0    tobramycin-dexAMETHasone (TobraDex) 0.3-0.1 % ophthalmic suspension, Administer 1 drop to both eyes Every 4 (Four) Hours While Awake., Disp: 5 mL, Rfl: 0    tobramycin-dexAMETHasone (TobraDex) 0.3-0.1 % ophthalmic suspension, Instill 3 drops into right ear canal twice a day for 7 days, Disp: 5 mL, Rfl: 0    vitamin D (ERGOCALCIFEROL) 1.25 MG (36486 UT) capsule capsule, Take 1 capsule by mouth 1 (One) Time Per Week. Sundays, Disp: , Rfl:     naloxone (NARCAN) 4 MG/0.1ML nasal spray, Call 911. Don't prime. [place] in 1 nostril for overdose. Repeat in 2-3 minutes in other nostril if no or minimal breathing/responsiveness., Disp: 2 each, Rfl: 0    BMI is >= 30 and <35. (Class 1 Obesity). The following options were offered after discussion;: exercise counseling/recommendations and nutrition counseling/recommendations       The following portions of the patient's history were reviewed and updated as appropriate: allergies, current medications, past family history, past medical history, past social history, past surgical history, and problem list.    Review of Systems   Constitutional:  Negative for activity change, fatigue and fever.   HENT:  Negative for congestion, sinus pressure, sinus pain, sore throat and trouble swallowing.    Eyes:  Negative for visual disturbance.   Respiratory:  Negative for chest tightness, shortness of breath and wheezing.    Cardiovascular:  Negative for chest pain.   Gastrointestinal:  Negative for abdominal distention, abdominal pain, constipation, diarrhea, nausea and vomiting.   Genitourinary:  Negative for difficulty urinating and dysuria.   Musculoskeletal:  Positive for arthralgias, back pain and neck pain. Negative for myalgias.   Neurological:  Negative for dizziness and headaches.   Psychiatric/Behavioral:  Positive for sleep disturbance. Negative for agitation, dysphoric mood, hallucinations and suicidal ideas. The  patient is nervous/anxious.        Objective   Physical Exam  Vitals and nursing note reviewed.   Constitutional:       Appearance: She is obese.   HENT:      Right Ear: Tympanic membrane, ear canal and external ear normal.      Left Ear: Tympanic membrane, ear canal and external ear normal.   Cardiovascular:      Rate and Rhythm: Normal rate and regular rhythm.      Heart sounds: Normal heart sounds. No murmur heard.  Pulmonary:      Effort: Pulmonary effort is normal.      Breath sounds: No wheezing or rales.   Abdominal:      General: Bowel sounds are normal.      Palpations: Abdomen is soft.      Tenderness: There is no abdominal tenderness. There is no guarding.   Musculoskeletal:      Cervical back: Neck supple.      Right lower leg: No edema.      Left lower leg: No edema.   Lymphadenopathy:      Cervical: No cervical adenopathy.   Skin:     Findings: No rash.   Neurological:      General: No focal deficit present.      Mental Status: She is alert and oriented to person, place, and time.   Psychiatric:         Mood and Affect: Mood normal.           Assessment & Plan   Problems Addressed this Visit          Musculoskeletal and Injuries    Chronic low back pain    Relevant Medications    carisoprodol (SOMA) 350 MG tablet    oxyCODONE (ROXICODONE) 15 MG immediate release tablet     Other Visit Diagnoses         Encounter for screening mammogram for malignant neoplasm of breast          Encounter for screening for malignant neoplasm of colon        Relevant Orders    Ambulatory Referral For Screening Colonoscopy (Completed)          Diagnoses         Codes Comments      Encounter for screening mammogram for malignant neoplasm of breast     ICD-10-CM: Z12.31  ICD-9-CM: V76.12       Encounter for screening for malignant neoplasm of colon     ICD-10-CM: Z12.11  ICD-9-CM: V76.51       Chronic low back pain with sciatica, sciatica laterality unspecified, unspecified back pain laterality     ICD-10-CM: M54.40,  G89.29  ICD-9-CM: 724.2, 724.3, 338.29 INSPECT reviewed.   Stable.   Cont. current medication.   Refills sent.       Chronic bilateral low back pain without sciatica     ICD-10-CM: M54.50, G89.29  ICD-9-CM: 724.2, 338.29           I will order her colonoscopy  I will raise the oxycodone on the next prescription and refill her Soma today  I asked her to take some precautions when she does home activities such as cleaning to limit the bending and torque on her waist  We discussed some diet adjustments  I asked her to see me again in 6 months  She had a mammogram done this past October and we will get that report and then get her queued up for her next 1 in the fall

## 2025-06-24 ENCOUNTER — TELEPHONE (OUTPATIENT)
Dept: FAMILY MEDICINE CLINIC | Facility: CLINIC | Age: 60
End: 2025-06-24
Payer: OTHER GOVERNMENT

## 2025-06-24 NOTE — TELEPHONE ENCOUNTER
Carisoprodol 350MG tablets  [employer] Electronic PA Form  (Key: [id number])  PA Case ID #: [id number]  Rx #: [id number]  Member ID:[id number]  PA Case: [id number], Status: Denied. Notification: Completed.

## 2025-06-25 DIAGNOSIS — F41.1 GENERALIZED ANXIETY DISORDER: Chronic | ICD-10-CM

## 2025-06-25 RX ORDER — BUSPIRONE HYDROCHLORIDE 15 MG/1
15 TABLET ORAL 2 TIMES DAILY
Qty: 60 TABLET | Refills: 0 | Status: SHIPPED | OUTPATIENT
Start: 2025-06-25

## 2025-06-25 NOTE — TELEPHONE ENCOUNTER
Rx Refill Note  Requested Prescriptions     Pending Prescriptions Disp Refills    busPIRone (BUSPAR) 15 MG tablet [Pharmacy Med Name: busPIRone HCL 15 MG TABLET] 60 tablet 3     Sig: TAKE 1 TABLET BY MOUTH 2 TIMES A DAY        Last office visit with prescribing clinician: 1/16/2025     Next office visit with prescribing clinician: 7/15/2025       Office Visit with Jade Ryder MD (01/16/2025)       Erin Grady MA  06/25/25, 10:39 EDT

## 2025-07-01 ENCOUNTER — TELEPHONE (OUTPATIENT)
Dept: FAMILY MEDICINE CLINIC | Facility: CLINIC | Age: 60
End: 2025-07-01

## 2025-07-01 DIAGNOSIS — M54.50 CHRONIC BILATERAL LOW BACK PAIN WITHOUT SCIATICA: ICD-10-CM

## 2025-07-01 DIAGNOSIS — G89.29 CHRONIC BILATERAL LOW BACK PAIN WITHOUT SCIATICA: ICD-10-CM

## 2025-07-01 RX ORDER — OXYCODONE HYDROCHLORIDE 15 MG/1
15 TABLET ORAL EVERY 8 HOURS PRN
Qty: 90 TABLET | Refills: 0 | Status: SHIPPED | OUTPATIENT
Start: 2025-07-01

## 2025-07-01 RX ORDER — OXYCODONE HYDROCHLORIDE 15 MG/1
15 TABLET ORAL EVERY 8 HOURS PRN
Qty: 90 TABLET | Refills: 0 | Status: SHIPPED | OUTPATIENT
Start: 2025-07-01 | End: 2025-07-01 | Stop reason: SDUPTHER

## 2025-07-01 NOTE — TELEPHONE ENCOUNTER
Caller: Briana Alas    Relationship: Self    Best call back number: [phone]     Which medication are you concerned about:     oxyCODONE (ROXICODONE) 15 MG immediate release tablet       Who prescribed you this medication: DR. HEATON        What are your concerns: PATIENT IS HAVING PROBLEMS WITH [facility] FILLING THIS MEDICATION.  ASKING IF IT CAN BE CANCELLED THERE AND CALLED INTO [facility]?  PLEASE CALL AND LET HER KNOW WHEN THIS IS DONE.    [facility] [address] [place] [address] - [phone]  - [phone] FX

## 2025-07-01 NOTE — TELEPHONE ENCOUNTER
Hub staff attempted to follow warm transfer process and was unsuccessful     Caller: Briana Alas    Relationship to patient: Self    Best call back number: [phone]     Patient is needing: PLEASE CALL PATIENT BACK AT NUMBER LISTED IN THIS MESSAGE.

## 2025-07-01 NOTE — TELEPHONE ENCOUNTER
Pharmacy Name:    [facility] [id number] - [place], [place] - [address] AT [facility] [address] - [phone] PH - [phone] FX (Pharmacy) [phone] (Work)       Reference Number (if applicable):     Pharmacy representative name: FREDY - PHARMACY MANAGER     Pharmacy representative phone number:      [phone]    What medication are you calling in regards to: OXYCODONE     What question does the pharmacy have: THEY ARE ASKING FOR A CALLBACK TO DISCUSS THIS MEDICATION AND THAT DR HEATON IS NOT A PAIN DR     SHE IS ALSO ON ALSO A LOT OF CONTROL DRUGS   CLONAZEPAM, GABAPENTIN, DULOXETINE, TEMAZEPAM, SOMA,     THEY ARE NOT GOING TO FILL FOR EVERY 6 HOURS - PUTTING ON HOLD     HER PAYING CASH FOR THIS IS ALSO A RED FLAG TO THEM       Who is the provider that prescribed the medication: DR CHRIS HEATON     Additional notes: CAN YOU CALL THEM PLEASE

## 2025-07-01 NOTE — TELEPHONE ENCOUNTER
Caller: Briana Alas    Relationship: Self    Best call back number: [phone]     Requested Prescriptions:   Requested Prescriptions     Pending Prescriptions Disp Refills    oxyCODONE (ROXICODONE) 15 MG immediate release tablet  0     Sig: Take 1 tablet by mouth Every 6 (Six) Hours As Needed for Moderate Pain. Code M79.7        Pharmacy where request should be sent: [facility] [id number] - [place], [place] - [address] AT [place] [address] - [phone] [facility] [phone] FX     Last office visit with prescribing clinician: 6/23/2025   Last telemedicine visit with prescribing clinician: Visit date not found   Next office visit with prescribing clinician: 1/15/2026     Additional details provided by patient: DOSAGE WAS CHANGED AND NEEDS NEW PRESCRIPTION    Does the patient have less than a 3 day supply:  [x] Yes  [] No    Would you like a call back once the refill request has been completed: [] Yes [x] No    If the office needs to give you a call back, can they leave a voicemail: [] Yes [x] No    Digna Downing   07/01/25 12:03 EDT

## 2025-07-02 ENCOUNTER — TELEPHONE (OUTPATIENT)
Dept: PSYCHIATRY | Facility: CLINIC | Age: 60
End: 2025-07-02
Payer: OTHER GOVERNMENT

## 2025-07-02 ENCOUNTER — TELEPHONE (OUTPATIENT)
Dept: FAMILY MEDICINE CLINIC | Facility: CLINIC | Age: 60
End: 2025-07-02
Payer: OTHER GOVERNMENT

## 2025-07-02 DIAGNOSIS — F51.01 PRIMARY INSOMNIA: Chronic | ICD-10-CM

## 2025-07-02 NOTE — TELEPHONE ENCOUNTER
Rx Refill Note  Requested Prescriptions     Pending Prescriptions Disp Refills    temazepam (RESTORIL) 30 MG capsule [Pharmacy Med Name: TEMAZEPAM 30 MG CAPSULE] 15 capsule      Sig: TAKE 1 CAPSULE BY MOUTH EVERY NIGHT AT BEDTIME AS NEEDED FOR SLEEP        Last office visit with prescribing clinician: 1/16/2025     Next office visit with prescribing clinician: 7/15/2025     Office Visit with Jade Ryder MD (01/16/2025)     LABS SCANNED (08/06/2024) [facility]    Telephone with Jade Ryder MD (07/02/2025)     BEHAVIORAL HEALTH - SCAN - Inspect report, Marcelo, 7/2/25 (07/02/2025)     Inspect Fill 6/20/25 15 for 15    Manisha Mcgee  07/02/25, 16:29 EDT

## 2025-07-02 NOTE — TELEPHONE ENCOUNTER
6/30/25 called Briana and a man answered her phone and said he would have Briana call me back.      7/2 @950 am called and LMTCB on VM

## 2025-07-03 DIAGNOSIS — M54.89 OTHER DORSALGIA: ICD-10-CM

## 2025-07-03 RX ORDER — CLONAZEPAM 1 MG/1
1 TABLET ORAL 3 TIMES DAILY PRN
Qty: 45 TABLET | Refills: 0 | Status: SHIPPED | OUTPATIENT
Start: 2025-07-03

## 2025-07-03 NOTE — TELEPHONE ENCOUNTER
Rx Refill Note  Requested Prescriptions     Pending Prescriptions Disp Refills    clonazePAM (KlonoPIN) 1 MG tablet [Pharmacy Med Name: clonazePAM 1 MG TABLET] 45 tablet      Sig: TAKE 1 TABLET BY MOUTH 3 TIMES A DAY AS NEEDED FOR ANXIETY        Last office visit with prescribing clinician: 1/16/2025     Next office visit with prescribing clinician: 7/15/2025     Office Visit with Jade Ryder MD (01/16/2025)   LABS SCANNED (08/06/2024)   BEHAVIORAL HEALTH - SCAN - INSPECT REPORT - ODALYS. MALDONADO - 07/03/2025 (07/03/2025)             Inspect Fill SOLD ON 6/20/2025 QTY 45 FOR 15 DAYS    Erin Grady MA  07/03/25, 14:23 EDT

## 2025-07-04 RX ORDER — TEMAZEPAM 30 MG/1
30 CAPSULE ORAL NIGHTLY PRN
Qty: 15 CAPSULE | Refills: 0 | Status: SHIPPED | OUTPATIENT
Start: 2025-07-04

## 2025-07-08 ENCOUNTER — PRIOR AUTHORIZATION (OUTPATIENT)
Dept: PSYCHIATRY | Facility: CLINIC | Age: 60
End: 2025-07-08
Payer: OTHER GOVERNMENT

## 2025-07-08 NOTE — TELEPHONE ENCOUNTER
oxyCODONE HCl 15MG tablets  [employer] Electronic PA Form  (Key: [id number])  PA Case ID #: [id number]  Rx #: [id number]  Member ID:[id number]  PA Case: [id number], Status: Approved, Coverage Starts on: 7/8/2025 12:00:00 AM, Coverage Ends on: 7/8/2026 12:00:00 AM.  Effective Date: 7/8/2025  Authorization Expiration Date: 7/8/2026  Sent approval to pharmacy so they can fill. Pt notified.

## 2025-07-08 NOTE — TELEPHONE ENCOUNTER
Caller: Briana Alas    Relationship to patient: Self      Best call back number: [phone]     Provider: DR HEATON     Medication PA needed: oxyCODONE (ROXICODONE) 15 MG immediate release tablet     Reason for call/Prior Auth: PRIOR AUTH     [facility] [address] [place] [address] - [phone]  - [phone] FX

## 2025-07-14 ENCOUNTER — TELEPHONE (OUTPATIENT)
Dept: FAMILY MEDICINE CLINIC | Facility: CLINIC | Age: 60
End: 2025-07-14

## 2025-07-14 DIAGNOSIS — L98.8 SKIN LESION OF BREAST: Primary | ICD-10-CM

## 2025-07-15 ENCOUNTER — OFFICE VISIT (OUTPATIENT)
Dept: PSYCHIATRY | Facility: CLINIC | Age: 60
End: 2025-07-15
Payer: OTHER GOVERNMENT

## 2025-07-15 ENCOUNTER — TELEPHONE (OUTPATIENT)
Dept: FAMILY MEDICINE CLINIC | Facility: CLINIC | Age: 60
End: 2025-07-15

## 2025-07-15 DIAGNOSIS — Z79.899 ENCOUNTER FOR LONG-TERM (CURRENT) USE OF MEDICATIONS: ICD-10-CM

## 2025-07-15 DIAGNOSIS — F41.1 GENERALIZED ANXIETY DISORDER: Chronic | ICD-10-CM

## 2025-07-15 DIAGNOSIS — M54.89 OTHER DORSALGIA: ICD-10-CM

## 2025-07-15 DIAGNOSIS — F51.01 PRIMARY INSOMNIA: Chronic | ICD-10-CM

## 2025-07-15 DIAGNOSIS — F33.1 MAJOR DEPRESSIVE DISORDER, RECURRENT EPISODE, MODERATE: Primary | Chronic | ICD-10-CM

## 2025-07-15 PROCEDURE — 1159F MED LIST DOCD IN RCRD: CPT | Performed by: PSYCHIATRY & NEUROLOGY

## 2025-07-15 PROCEDURE — 1160F RVW MEDS BY RX/DR IN RCRD: CPT | Performed by: PSYCHIATRY & NEUROLOGY

## 2025-07-15 PROCEDURE — 99214 OFFICE O/P EST MOD 30 MIN: CPT | Performed by: PSYCHIATRY & NEUROLOGY

## 2025-07-15 RX ORDER — DULOXETIN HYDROCHLORIDE 60 MG/1
60 CAPSULE, DELAYED RELEASE ORAL 2 TIMES DAILY
Qty: 180 CAPSULE | Refills: 1 | Status: SHIPPED | OUTPATIENT
Start: 2025-07-15

## 2025-07-15 RX ORDER — BREXPIPRAZOLE 2 MG/1
1 TABLET ORAL EVERY MORNING
Qty: 30 TABLET | Refills: 3 | Status: SHIPPED | OUTPATIENT
Start: 2025-07-15

## 2025-07-15 RX ORDER — BUSPIRONE HYDROCHLORIDE 15 MG/1
15 TABLET ORAL 2 TIMES DAILY
Qty: 60 TABLET | Refills: 3 | Status: SHIPPED | OUTPATIENT
Start: 2025-07-15

## 2025-07-15 RX ORDER — TEMAZEPAM 30 MG/1
30 CAPSULE ORAL NIGHTLY PRN
Qty: 30 CAPSULE | Refills: 2 | Status: SHIPPED | OUTPATIENT
Start: 2025-07-15

## 2025-07-15 RX ORDER — CLONAZEPAM 1 MG/1
1 TABLET ORAL 2 TIMES DAILY PRN
Qty: 60 TABLET | Refills: 3 | Status: SHIPPED | OUTPATIENT
Start: 2025-07-15

## 2025-07-15 NOTE — PROGRESS NOTES
Briana verified her medication list  Verified she does not do any form of THC or CBD  Gave adequate amount of specimen for UDS

## 2025-07-15 NOTE — PROGRESS NOTES
Subjective   Briana Alas is a 59 y.o. female who presents today for follow up    Chief Complaint:     Depression, anxiety      History of Present Illness: the pt has a long hx of depression, no specific triggers or stressors, was on zoloft, celexa , few unsuccessful trials   She was relatively stable on meds until her daughter passed away  Last year , still difficult to cope  , her daughter was special needs, pt's life was revolving around her , now she feels she has no purpose     Last appt - cont cymbalta, rexulti, clonazepam 0.5 mg po TID after she failed GDR     Today the pt reported feeling better, just came from vacation in [place] with friend   Depression 4/10, more good days overall  Mom's health is declining, the pt is her caregiver   Anxiety - manageable   still has chronic pain (8/10) that affects her mood and anxiety,on pain meds   Depression is associated with decreased E level, poor motivations, low drives   denied feeling hopeless/helpless,   When anxious -  increased tension, irritability, unpleasant somatic sensations   denied AVH/SI/HI   Sleep - improved   on temazepam,  up to 6 hrs vs 2 hrs without it   Alleviating factors - to stay busy          The following portions of the patient's history were reviewed and updated as appropriate: allergies, current medications, past family history, past medical history, past social history, past surgical history and problem list.    PAST PSYCHIATRIC HISTORY  Axis I  Affective/Bipolar Disorder, Anxiety/Panic Disorder  No inpt. No SI/SA   Axis II  Defer     PAST OUTPATIENT TREATMENT  Diagnosis treated:  Affective Disorder, Anxiety/Panic Disorder  Treatment Type:  Medication Management  Psychotherapy - shante Osei at [facility]   Prior Psychiatric Medications:  lexapro - not effective  celexa -not effective  zoloft - not effective now    Support Groups:  None   Sequelae Of Mental Disorder:  emotional distress    Interval History  No changes       Side  Effects  Denied       Past Medical History:  Past Medical History:   Diagnosis Date    AMS (altered mental status) 06/10/2024    Anxiety     Arthritis     Back pain     Depression     Headache     Hyperlipidemia     Hypertension     Injury of back     Marginal corneal ulcer of left eye 06/18/2025    Dr. Coreas    Restless leg syndrome     Sinus pause 06/11/2024     PCP - Dr Garcia     Social History:  Social History     Socioeconomic History    Marital status: Legally    Tobacco Use    Smoking status: Every Day     Current packs/day: 1.00     Average packs/day: 1 pack/day for 46.5 years (46.5 ttl pk-yrs)     Types: Cigarettes     Start date: 1979     Passive exposure: Current    Smokeless tobacco: Never    Tobacco comments:     Dont smoke dos    Vaping Use    Vaping status: Former    Quit date: 1/1/2020    Substances: Nicotine, Flavoring    Devices: Disposable   Substance and Sexual Activity    Alcohol use: No    Drug use: Never    Sexual activity: Defer       Family History:  Family History   Problem Relation Age of Onset    Hypertension Mother     Hyperlipidemia Mother     Depression Mother     Thyroid disease Mother     Atrial fibrillation Mother     Heart attack Father     Hypertension Father        Past Surgical History:  Past Surgical History:   Procedure Laterality Date    ANKLE OPEN REDUCTION INTERNAL FIXATION Right     BUNIONECTOMY Left 12/15/2023    Procedure: BUNIONECTOMY JHONATAN;  Surgeon: GILBERTO Eldridge DPM;  Location: [facility];  Service: Podiatry;  Laterality: Left;    FOOT SURGERY Bilateral     bunionectomy    SHOULDER ARTHROSCOPY W/ ROTATOR CUFF REPAIR Right 12/13/2019    Procedure: RIGHT SHOULDER ARTHROSCOPY WITH ROTATOR CUFF REPAIR and Subacromial decompression;  Surgeon: Gino Ackerman MD;  Location: [facility];  Service: Orthopedics    TUBAL ABDOMINAL LIGATION         Problem List:  Patient Active Problem List   Diagnosis    Fibromyalgia    Abnormal gait     Ankle pain, right    Knee pain    Leg pain, left    Annular tear of lumbar disc    Degeneration of lumbar intervertebral disc    Generalized anxiety disorder    Obesity (BMI 30-39.9)    Cervical myelopathy    Other dorsalgia    Chronic low back pain    Hx traumatic fracture    Mixed hyperlipidemia    Hypertension, essential    Hypovitaminosis D    Inflammatory arthritis    Joint pain    Leg weakness, bilateral    Lumbosacral radiculopathy    Malaise and fatigue    Neck pain, chronic    Osteoarthritis of knee    Pain in right shoulder    Pain due to internal orthopedic prosthetic devices, implants and grafts, subsequent encounter    Rotator cuff tendonitis    Scoliosis    Osteoarthritis of lumbosacral spine without myelopathy    Spondylosis of lumbosacral region    Tobacco abuse counseling    Upper respiratory tract infection    Major depressive disorder, recurrent episode, moderate    Primary insomnia    Bereavement    Acquired hallux valgus, left    Sinus pause    Tobacco abuse       Allergy:   Allergies   Allergen Reactions    Penicillins Rash        Discontinued Medications:  Medications Discontinued During This Encounter   Medication Reason    DULoxetine (CYMBALTA) 60 MG capsule Reorder    Rexulti 2 MG tablet Reorder    busPIRone (BUSPAR) 15 MG tablet Reorder    temazepam (RESTORIL) 30 MG capsule Reorder    clonazePAM (KlonoPIN) 1 MG tablet Reorder                   Current Medications:   Current Outpatient Medications   Medication Sig Dispense Refill    Brexpiprazole (Rexulti) 2 MG tablet Take 1 tablet by mouth Every Morning. 30 tablet 3    busPIRone (BUSPAR) 15 MG tablet Take 1 tablet by mouth 2 (Two) Times a Day. 60 tablet 3    clonazePAM (KlonoPIN) 1 MG tablet Take 1 tablet by mouth 2 (Two) Times a Day As Needed for Anxiety. 60 tablet 3    DULoxetine (CYMBALTA) 60 MG capsule Take 1 capsule by mouth 2 (Two) Times a Day. 180 capsule 1    temazepam (RESTORIL) 30 MG capsule Take 1 capsule by mouth At Night As  Needed for Sleep. 30 capsule 2    aspirin 81 MG EC tablet Take 1 tablet by mouth Daily.      atenolol (TENORMIN) 25 MG tablet Take 1 tablet by mouth Daily.      atenolol (TENORMIN) 50 MG tablet TAKE ONE (1) TABLET BY MOUTH DAILY (Patient taking differently: Take 1 tablet by mouth Daily. Take 1/2 tablet by mouth daily) 30 tablet 3    atorvastatin (LIPITOR) 80 MG tablet Take 1 tablet by mouth Every Evening. 30 tablet 11    benzonatate (TESSALON) 200 MG capsule Take 1 capsule by mouth 3 (Three) Times a Day As Needed for Cough. 30 capsule 1    bisacodyl (DULCOLAX) 5 MG EC tablet Take 1 tablet by mouth Daily As Needed for Constipation (Use if polyethylene glycol is ineffective).      meloxicam (MOBIC) 7.5 MG tablet Take 1 tablet by mouth Daily. 30 tablet 4    moxifloxacin (VIGAMOX) 0.5 % ophthalmic solution       naloxone (NARCAN) 4 MG/0.1ML nasal spray Call 911. Don't prime. [place] in 1 nostril for overdose. Repeat in 2-3 minutes in other nostril if no or minimal breathing/responsiveness. 2 each 0    oxyCODONE (ROXICODONE) 15 MG immediate release tablet Take 1 tablet by mouth Every 8 (Eight) Hours As Needed for Moderate Pain. Code M79.7 90 tablet 0    pantoprazole (PROTONIX) 40 MG EC tablet TAKE 1 TABLET BY MOUTH ONCE NIGHTLY 30 tablet 1    pregabalin (LYRICA) 150 MG capsule TAKE 1 CAPSULE BY MOUTH 2 TIMES A DAY 60 capsule 2    rOPINIRole (REQUIP) 0.5 MG tablet TAKE 1 TABLET BY MOUTH 2 TIMES A DAY ONE HOUR BEFORE BEDTIME 180 tablet 1    tobramycin-dexAMETHasone (TobraDex) 0.3-0.1 % ophthalmic suspension Administer 1 drop to both eyes Every 4 (Four) Hours While Awake. 5 mL 0    tobramycin-dexAMETHasone (TobraDex) 0.3-0.1 % ophthalmic suspension Instill 3 drops into right ear canal twice a day for 7 days 5 mL 0    vitamin D (ERGOCALCIFEROL) 1.25 MG (18304 UT) capsule capsule Take 1 capsule by mouth 1 (One) Time Per Week. Sundays       No current facility-administered medications for this visit.         Psychological ROS:  positive for - anxiety, depression   negative for - hallucinations, hostility, irritability, memory difficulties, mood swings, obsessive thoughts or suicidal ideation      Physical Exam:   There were no vitals taken for this visit.    Mental Status Exam:   Hygiene:   good  Cooperation:  Cooperative  Eye Contact:  Good  Psychomotor Behavior:  Appropriate  Affect:  Appropriate and congruent with mood   Mood: anxious and fluctates  Hopelessness: Denies  Speech:  Normal  Thought Process:  Goal directed and Linear  Thought Content:  Mood congruent  Suicidal:  None  Homicidal:  None  Hallucinations:  None  Delusion:  None  Memory:  Intact  Orientation:  Person, Place, Time and Situation  Reliability:  good  Insight:  Good  Judgement:  Good  Impulse Control:  Good  Physical/Medical Issues:  Yes       MSE from 1/16/25   reviewed and accepted without changes     PHQ-9 Depression Screening  Little interest or pleasure in doing things? Over half   Feeling down, depressed, or hopeless? Several days   PHQ-2 Total Score 3   Trouble falling or staying asleep, or sleeping too much? Over half   Feeling tired or having little energy? Several days   Poor appetite or overeating? Not at all   Feeling bad about yourself - or that you are a failure or have let yourself or your family down? Several days   Trouble concentrating on things, such as reading the newspaper or watching television? Several days   Moving or speaking so slowly that other people could have noticed? Or the opposite - being so fidgety or restless that you have been moving around a lot more than usual? Several days   Thoughts that you would be better off dead, or of hurting yourself in some way? Not at all   PHQ-9 Total Score 9   If you checked off any problems, how difficult have these problems made it for you to do your work, take care of things at home, or get along with other people? Very difficult    Over the last two weeks, how often have you been bothered by the  following problems?  Feeling nervous, anxious or on edge: More than half the days  Not being able to stop or control worrying: Several days  Worrying too much about different things: More than half the days  Trouble Relaxing: Several days  Being so restless that it is hard to sit still: Not at all  Becoming easily annoyed or irritable: Not at all  Feeling afraid as if something awful might happen: More than half the days  STEWART 7 Total Score: 8  If you checked any problems, how difficult have these problems made it for you to do your work, take care of things at home, or get along with other people: Very difficult     Briana Alas  reports that she has been smoking cigarettes. She started smoking about 46 years ago. She has a 46.5 pack-year smoking history. She has been exposed to tobacco smoke. She has never used smokeless tobacco.. I have educated her on the risk of diseases from using tobacco products such as cancer, COPD and heart disease.     I advised her to quit and she is not willing to quit.    I spent 3  minutes counseling the patient.           Current every day smoker 3-10 mintues spent counseling Not agreeable to stopping    I advised Briana of the risks of tobacco use.     Lab Results:   No visits with results within 3 Month(s) from this visit.   Latest known visit with results is:   Lab on 08/06/2024   Component Date Value Ref Range Status    Glucose 08/06/2024 90  65 - 99 mg/dL Final    BUN 08/06/2024 17  6 - 20 mg/dL Final    Creatinine 08/06/2024 1.07 (H)  0.57 - 1.00 mg/dL Final    Sodium 08/06/2024 143  136 - 145 mmol/L Final    Potassium 08/06/2024 4.2  3.5 - 5.2 mmol/L Final    Chloride 08/06/2024 105  98 - 107 mmol/L Final    CO2 08/06/2024 26.6  22.0 - 29.0 mmol/L Final    Calcium 08/06/2024 9.6  8.6 - 10.5 mg/dL Final    Total Protein 08/06/2024 6.5  6.0 - 8.5 g/dL Final    Albumin 08/06/2024 4.1  3.5 - 5.2 g/dL Final    ALT (SGPT) 08/06/2024 15  1 - 33 U/L Final    AST (SGOT) 08/06/2024 19   1 - 32 U/L Final    Alkaline Phosphatase 08/06/2024 72  39 - 117 U/L Final    Total Bilirubin 08/06/2024 0.3  0.0 - 1.2 mg/dL Final    Globulin 08/06/2024 2.4  gm/dL Final    A/G Ratio 08/06/2024 1.7  g/dL Final    BUN/Creatinine Ratio 08/06/2024 15.9  7.0 - 25.0 Final    Anion Gap 08/06/2024 11.4  5.0 - 15.0 mmol/L Final    eGFR 08/06/2024 60.0 (L)  >60.0 mL/min/1.73 Final    Total Cholesterol 08/06/2024 106  0 - 200 mg/dL Final    Triglycerides 08/06/2024 104  0 - 150 mg/dL Final    HDL Cholesterol 08/06/2024 38 (L)  40 - 60 mg/dL Final    LDL Cholesterol  08/06/2024 48  0 - 100 mg/dL Final    VLDL Cholesterol 08/06/2024 20  5 - 40 mg/dL Final    LDL/HDL Ratio 08/06/2024 1.24   Final       Assessment & Plan   Problems Addressed this Visit       Generalized anxiety disorder (Chronic)    Relevant Medications    temazepam (RESTORIL) 30 MG capsule    Brexpiprazole (Rexulti) 2 MG tablet    DULoxetine (CYMBALTA) 60 MG capsule    busPIRone (BUSPAR) 15 MG tablet    Other Relevant Orders    ToxAssure Flex 22, Ur w/DL -    Other dorsalgia    Relevant Medications    clonazePAM (KlonoPIN) 1 MG tablet    Major depressive disorder, recurrent episode, moderate - Primary (Chronic)    Relevant Medications    temazepam (RESTORIL) 30 MG capsule    Brexpiprazole (Rexulti) 2 MG tablet    DULoxetine (CYMBALTA) 60 MG capsule    busPIRone (BUSPAR) 15 MG tablet    Other Relevant Orders    ToxAssure Flex 22, Ur w/DL -    Primary insomnia (Chronic)    Relevant Medications    temazepam (RESTORIL) 30 MG capsule    Other Relevant Orders    ToxAssure Flex 22, Ur w/DL -     Other Visit Diagnoses         Encounter for long-term (current) use of medications        Relevant Orders    ToxAssure Flex 22, Ur w/DL -          Diagnoses         Codes Comments      Major depressive disorder, recurrent episode, moderate    -  Primary ICD-10-CM: F33.1  ICD-9-CM: 296.32       Generalized anxiety disorder     ICD-10-CM: F41.1  ICD-9-CM: 300.02       Primary  insomnia     ICD-10-CM: F51.01  ICD-9-CM: 307.42       Other dorsalgia     ICD-10-CM: M54.89  ICD-9-CM: 724.5       Encounter for long-term (current) use of medications     ICD-10-CM: Z79.899  ICD-9-CM: V58.69             Visit Diagnoses:    ICD-10-CM ICD-9-CM   1. Major depressive disorder, recurrent episode, moderate  F33.1 296.32   2. Generalized anxiety disorder  F41.1 300.02   3. Primary insomnia  F51.01 307.42   4. Other dorsalgia  M54.89 724.5   5. Encounter for long-term (current) use of medications  Z79.899 V58.69         Correct ds f33.1, f41.1,   f51.1 z79.899            TREATMENT PLAN/GOALS: Continue supportive psychotherapy efforts and medications as indicated. Treatment and medication options discussed during today's visit. Patient ackowledged and verbally consented to continue with current treatment plan and was educated on the importance of compliance with treatment and follow-up appointments.    MEDICATION ISSUES:    INSPECT reviewed as expected - on pain meds,    Clonazepam 7/5/25  and temazepam on  7/5/25 # 15             1. Major depressive d/o moderate recurrent - cont cymbalta 120 mg BP is stable, cont  rexulti   2 mg , no changes necessary , tolerates well     Cont therapy  2. Generalized anxiety - cont cymbalta at 120 mg , cont   clonazepam, change to   1 mg  BID  PRN    the pt is on opioids, will closely monitor, the pt is aware of additive properties of benzodiazepines and opioids , does not take all together     3 Insomnia - cont temazepam 30 mg po QHS      Labs 12/7/23 - WNL      Lipids 8/21/23 -  , chol 148 , did not have any new labs        UDS 7/26/22 -consistent for clonazepam, temazepam and + fentanyl (?) , will repeat today , the denied fentanyl use   2/28/23 - consistent ,    LABORATORY - SCAN - MULTIPLE LAB REPORT, [facility], 02/28/2023 (02/28/2023)   UDS 1/3/24 - consistent   LABORATORY - SCAN - FULL URINE DRUG SCREEN, [facility], 01/03/2024 (01/03/2024)   UDS 8/6/24 +  temazepam, neg for clonazepam - the pt stated she ran out because she still needed TID (GDR failed)   LABORATORY - SCAN - DRUG SCREEN, [facility], 08/06/2024 (08/06/2024)   Will repeat today     PHQ scored 9  and indicated mild   depression   STEWART 7 scored 8 - mild anxiety     Patient screened positive for depression based on a PHQ-9 score of 9 on 7/15/2025. Follow-up recommendations include: Prescribed antidepressant medication treatment.      Discussed medication options and treatment plan of prescribed medication as well as the risks, benefits, and side effects including potential falls, possible impaired driving and metabolic adversities among others. Patient is agreeable to call the office with any worsening of symptoms or onset of side effects. Patient is agreeable to call 911 or go to the nearest ER should he/she begin having SI/HI. No medication side effects or related complaints today.     MEDS ORDERED DURING VISIT:  New Medications Ordered This Visit   Medications    temazepam (RESTORIL) 30 MG capsule     Sig: Take 1 capsule by mouth At Night As Needed for Sleep.     Dispense:  30 capsule     Refill:  2    Brexpiprazole (Rexulti) 2 MG tablet     Sig: Take 1 tablet by mouth Every Morning.     Dispense:  30 tablet     Refill:  3    DULoxetine (CYMBALTA) 60 MG capsule     Sig: Take 1 capsule by mouth 2 (Two) Times a Day.     Dispense:  180 capsule     Refill:  1    clonazePAM (KlonoPIN) 1 MG tablet     Sig: Take 1 tablet by mouth 2 (Two) Times a Day As Needed for Anxiety.     Dispense:  60 tablet     Refill:  3     Please dispense when it is due    busPIRone (BUSPAR) 15 MG tablet     Sig: Take 1 tablet by mouth 2 (Two) Times a Day.     Dispense:  60 tablet     Refill:  3       Return in about 4 months (around 11/15/2025).         This document has been electronically signed by Jade Ryder MD  July 15, 2025 09:43 EDT    EMR Dragon transcription disclaimer:  Some of this encounter note is an electronic  transcription translation of spoken language to printed text. The electronic translation of spoken language may permit erroneous, or at times, nonsensical words or phrases to be inadvertently transcribed; Although I have reviewed the note for such errors some may still exist.

## 2025-07-21 ENCOUNTER — PRIOR AUTHORIZATION (OUTPATIENT)
Dept: PSYCHIATRY | Facility: CLINIC | Age: 60
End: 2025-07-21
Payer: OTHER GOVERNMENT

## 2025-07-21 LAB
1OH-MIDAZOLAM UR QL SCN: NOT DETECTED NG/MG CREAT
6MAM UR QL SCN: NEGATIVE NG/ML
7AMINOCLONAZEPAM/CREAT UR: 180 NG/MG CREAT
8OH-AMOXAPINE UR QL: NOT DETECTED
8OH-LOXAPINE UR QL SCN: NOT DETECTED
A-OH ALPRAZ/CREAT UR: NOT DETECTED NG/MG CREAT
A-OH-TRIAZOLAM/CREAT UR CFM: NOT DETECTED NG/MG CREAT
ALPRAZ/CREAT UR CFM: NOT DETECTED NG/MG CREAT
AMITRIP UR-MCNC: NOT DETECTED NG/ML
AMOXAPINE UR QL: NOT DETECTED
AMPHETAMINES UR QL SCN>500 NG/ML: NEGATIVE NG/ML
ANTICONVULSANTS UR: NORMAL
ANTIPSYCHOTICS UR: NEGATIVE
ARIPIPRAZOLE UR QL SCN: NOT DETECTED
ASENAPINE UR QL CFM: NOT DETECTED
BARBITURATES UR QL SCN: NEGATIVE NG/ML
BENZODIAZ SCN METH UR: NORMAL
BUPRENORPHINE UR QL SCN: NEGATIVE NG/ML
BUPROPION UR QL: NOT DETECTED
CANNABINOIDS UR QL SCN: NEGATIVE NG/ML
CARISOPRODOL UR QL: NEGATIVE NG/ML
CHLORPROMAZINE UR QL: NOT DETECTED
CITALOPRAM UR LC/MS/MS-MCNC: NOT DETECTED NG/ML
CLOMIPRAMINE UR-MCNC: NOT DETECTED NG/ML
CLONAZEPAM/CREAT UR CFM: NOT DETECTED NG/MG CREAT
CLOZAPINE UR QL: NOT DETECTED
COCAINE+BZE UR QL SCN: NEGATIVE NG/ML
CODEINE UR CFM-MCNC: NOT DETECTED NG/MG CREAT
CREAT UR-MCNC: 117 MG/DL
DESALKYLFLURAZ/CREAT UR: NOT DETECTED NG/MG CREAT
DESIPRAMINE UR-MCNC: NOT DETECTED NG/ML
DHC/CREAT UR: NOT DETECTED NG/MG CREAT
DIAZEPAM/CREAT UR: NOT DETECTED NG/MG CREAT
DOXEPIN UR-MCNC: NOT DETECTED NG/ML
DULOXETINE UR QL: PRESENT
ETHANOL UR QL SCN: NEGATIVE NG/ML
FENTANYL UR QL SCN: NEGATIVE NG/ML
FLUNITRAZEPAM UR QL SCN: NOT DETECTED NG/MG CREAT
FLUOXETINE UR QL SCN: NOT DETECTED
FLUPHENAZINE UR-MCNC: NOT DETECTED NG/ML
FLUVOXAMINE UR QL: NOT DETECTED
GABAPENTIN UR-MCNC: NEGATIVE UG/ML
HALOPERIDOL UR QL: NOT DETECTED
HYDROCODONE UR CFM-MCNC: NOT DETECTED NG/MG CREAT
HYDROMORPHONE UR CFM-MCNC: NOT DETECTED NG/MG CREAT
ILOPERIDONE UR QL CFM: NOT DETECTED
IMIPRAMINE UR-MCNC: NOT DETECTED NG/ML
LORAZEPAM/CREAT UR: NOT DETECTED NG/MG CREAT
LOXAPINE UR QL: NOT DETECTED
LURASIDONE UR QL CFM: NOT DETECTED
MAPROTILINE UR QL: NOT DETECTED
ME-PHENIDATE UR QL SCN: NEGATIVE NG/ML
MESORIDAZINE UR QL: NOT DETECTED
METHADONE UR QL SCN: NEGATIVE NG/ML
METHADONE+METAB UR QL SCN: NEGATIVE NG/ML
MIDAZOLAM/CREAT UR CFM: NOT DETECTED NG/MG CREAT
MIRTAZAPINE UR-MCNC: NOT DETECTED UG/ML
MITRAGYNINE UR QL SCN: NEGATIVE NG/ML
MOLINDONE UR QL SCN: NOT DETECTED
MORPHINE UR CFM-MCNC: NOT DETECTED NG/MG CREAT
NEFAZODONE UR QL: NOT DETECTED
NORCITALOPRAM UR QL: NOT DETECTED
NORCLOMIPRAMINE UR QL: NOT DETECTED
NORCLOZAPINE UR QL: NOT DETECTED
NORCODEINE/CREAT UR CFM: NOT DETECTED NG/MG CREAT
NORDIAZEPAM/CREAT UR: NOT DETECTED NG/MG CREAT
NORDOXEPIN UR QL: NOT DETECTED
NORFLUNITRAZEPAM UR-MCNC: NOT DETECTED NG/MG CREAT
NORFLUOXETINE UR-MCNC: NOT DETECTED NG/ML
NORHYDROCODONE UR CFM-MCNC: NOT DETECTED NG/MG CREAT
NORMORPHINE UR-MCNC: NOT DETECTED NG/MG CREAT
NOROXYCODONE UR CFM-MCNC: 7025 NG/MG CREAT
NOROXYMORPHONE/CREAT UR CFM: 488 NG/MG CREAT
NORSERTRALINE UR QL: NOT DETECTED
NORTRIP UR-MCNC: NOT DETECTED NG/ML
ODV UR-MCNC: NOT DETECTED NG/ML
OH-BUPROPION UR-MCNC: NOT DETECTED NG/ML
OLANZAPINE UR CFM-MCNC: NOT DETECTED NG/ML
OPIATES UR QL CFM: NEGATIVE
OPIATES UR SCN-MCNC: NORMAL NG/ML
OXAZEPAM/CREAT UR: 1389 NG/MG CREAT
OXYCODONE CTO UR SCN-MCNC: NORMAL NG/ML
OXYCODONE UR CFM-MCNC: 5767 NG/MG CREAT
OXYCODONE UR QL CFM: NORMAL
OXYMORPHONE UR CFM-MCNC: 3050 NG/MG CREAT
PAROXETINE UR-MCNC: NOT DETECTED NG/L
PCP UR QL SCN: NEGATIVE NG/ML
PERPHENAZINE UR QL: NOT DETECTED
PIMOZIDE, UR: NOT DETECTED
PREGABALIN UR QL SCN: PRESENT
PRESCRIBED MEDICATIONS: NORMAL
PROCHLORPERAZINE UR QL: NOT DETECTED
PROPOXYPH UR QL SCN: NEGATIVE NG/ML
PROTRIP UR QL: NOT DETECTED
QUETIAPINE CTO UR CFM-MCNC: NOT DETECTED NG/ML
RISPERIDONE UR QL: NOT DETECTED
SERTRALINE UR-MCNC: NOT DETECTED NG/ML
TAPENTADOL CTO UR SCN-MCNC: NEGATIVE NG/ML
TEMAZEPAM/CREAT UR: >1709 NG/MG CREAT
THIORIDAZINE UR-MCNC: NOT DETECTED UG/ML
THIOTHIXENE UR QL: NOT DETECTED
TRAMADOL UR QL SCN: NEGATIVE NG/ML
TRAZODONE UR QL: NOT DETECTED
TRAZODONE UR-MCNC: NOT DETECTED UG/ML
TRICYCLICS TESTED UR SCN: NORMAL
TRIFPERAZINE UR QL: NOT DETECTED
TRIMIPRAMINE UR QL: NOT DETECTED
VENLAFAXINE UR QL: NOT DETECTED
VILAZ UR QL SCN: NOT DETECTED
ZIPRASIDONE UR QL SCN: NOT DETECTED

## 2025-07-21 NOTE — TELEPHONE ENCOUNTER
PA started for Rexulti on cover my meds for [place] [employer].    Came back that PA not required at this time. Pharmacy needs to submit override codes for drug utilization review.    Printed page and faxed to pharmacy

## 2025-07-22 RX ORDER — PANTOPRAZOLE SODIUM 40 MG/1
40 TABLET, DELAYED RELEASE ORAL NIGHTLY
Qty: 30 TABLET | Refills: 1 | Status: SHIPPED | OUTPATIENT
Start: 2025-07-22

## 2025-07-29 DIAGNOSIS — G89.4 CHRONIC PAIN SYNDROME: ICD-10-CM

## 2025-07-29 RX ORDER — PREGABALIN 150 MG/1
150 CAPSULE ORAL 2 TIMES DAILY
Qty: 60 CAPSULE | Refills: 3 | Status: SHIPPED | OUTPATIENT
Start: 2025-07-29

## 2025-08-01 DIAGNOSIS — F41.1 GENERALIZED ANXIETY DISORDER: Chronic | ICD-10-CM

## 2025-08-01 RX ORDER — BUSPIRONE HYDROCHLORIDE 15 MG/1
15 TABLET ORAL 2 TIMES DAILY
Qty: 60 TABLET | Refills: 3 | Status: SHIPPED | OUTPATIENT
Start: 2025-08-01

## 2025-08-01 NOTE — TELEPHONE ENCOUNTER
Rx Refill Note  Requested Prescriptions     Pending Prescriptions Disp Refills    busPIRone (BUSPAR) 15 MG tablet [Pharmacy Med Name: busPIRone HCL 15 MG TABLET] 60 tablet 3     Sig: TAKE 1 TABLET BY MOUTH 2 TIMES A DAY      Last office visit with prescribing clinician: 7/15/2025     Next office visit with prescribing clinician: 11/14/2025     Office Visit with Jade Ryder MD (07/15/2025)     Patricia Todd MA  08/01/25, 10:57 EDT

## 2025-08-05 DIAGNOSIS — M54.50 CHRONIC BILATERAL LOW BACK PAIN WITHOUT SCIATICA: ICD-10-CM

## 2025-08-05 DIAGNOSIS — G89.29 CHRONIC BILATERAL LOW BACK PAIN WITHOUT SCIATICA: ICD-10-CM

## 2025-08-05 RX ORDER — OXYCODONE HYDROCHLORIDE 15 MG/1
15 TABLET ORAL EVERY 8 HOURS PRN
Qty: 90 TABLET | Refills: 0 | Status: SHIPPED | OUTPATIENT
Start: 2025-08-05

## 2025-08-12 RX ORDER — DULOXETIN HYDROCHLORIDE 60 MG/1
60 CAPSULE, DELAYED RELEASE ORAL 2 TIMES DAILY
Qty: 180 CAPSULE | Refills: 1 | Status: SHIPPED | OUTPATIENT
Start: 2025-08-12

## 2025-08-13 ENCOUNTER — OFFICE VISIT (OUTPATIENT)
Dept: FAMILY MEDICINE CLINIC | Facility: CLINIC | Age: 60
End: 2025-08-13
Payer: OTHER GOVERNMENT

## 2025-08-13 ENCOUNTER — LAB (OUTPATIENT)
Dept: FAMILY MEDICINE CLINIC | Facility: CLINIC | Age: 60
End: 2025-08-13
Payer: OTHER GOVERNMENT

## 2025-08-13 VITALS
WEIGHT: 187 LBS | SYSTOLIC BLOOD PRESSURE: 124 MMHG | OXYGEN SATURATION: 94 % | DIASTOLIC BLOOD PRESSURE: 78 MMHG | TEMPERATURE: 96.8 F | BODY MASS INDEX: 31.92 KG/M2 | HEIGHT: 64 IN | HEART RATE: 90 BPM

## 2025-08-13 DIAGNOSIS — E78.5 HYPERLIPIDEMIA, UNSPECIFIED HYPERLIPIDEMIA TYPE: ICD-10-CM

## 2025-08-13 DIAGNOSIS — I10 HYPERTENSION, ESSENTIAL: ICD-10-CM

## 2025-08-13 DIAGNOSIS — Z12.11 ENCOUNTER FOR SCREENING FOR MALIGNANT NEOPLASM OF COLON: ICD-10-CM

## 2025-08-13 DIAGNOSIS — L98.8 SKIN LESION OF BREAST: Primary | ICD-10-CM

## 2025-08-13 LAB
ALBUMIN SERPL-MCNC: 4 G/DL (ref 3.5–5.2)
ALBUMIN/GLOB SERPL: 1.5 G/DL
ALP SERPL-CCNC: 82 U/L (ref 39–117)
ALT SERPL W P-5'-P-CCNC: 17 U/L (ref 1–33)
ANION GAP SERPL CALCULATED.3IONS-SCNC: 10 MMOL/L (ref 5–15)
AST SERPL-CCNC: 21 U/L (ref 1–32)
BASOPHILS # BLD AUTO: 0.05 10*3/MM3 (ref 0–0.2)
BASOPHILS NFR BLD AUTO: 0.7 % (ref 0–1.5)
BILIRUB SERPL-MCNC: 0.3 MG/DL (ref 0–1.2)
BUN SERPL-MCNC: 14 MG/DL (ref 8–23)
BUN/CREAT SERPL: 14.6 (ref 7–25)
CALCIUM SPEC-SCNC: 8.8 MG/DL (ref 8.6–10.5)
CHLORIDE SERPL-SCNC: 100 MMOL/L (ref 98–107)
CHOLEST SERPL-MCNC: 123 MG/DL (ref 0–200)
CO2 SERPL-SCNC: 26 MMOL/L (ref 22–29)
CREAT SERPL-MCNC: 0.96 MG/DL (ref 0.57–1)
DEPRECATED RDW RBC AUTO: 43.6 FL (ref 37–54)
EGFRCR SERPLBLD CKD-EPI 2021: 67.9 ML/MIN/1.73
EOSINOPHIL # BLD AUTO: 0.19 10*3/MM3 (ref 0–0.4)
EOSINOPHIL NFR BLD AUTO: 2.5 % (ref 0.3–6.2)
ERYTHROCYTE [DISTWIDTH] IN BLOOD BY AUTOMATED COUNT: 13.1 % (ref 12.3–15.4)
GLOBULIN UR ELPH-MCNC: 2.7 GM/DL
GLUCOSE SERPL-MCNC: 92 MG/DL (ref 65–99)
HCT VFR BLD AUTO: 42 % (ref 34–46.6)
HDLC SERPL-MCNC: 43 MG/DL (ref 40–60)
HGB BLD-MCNC: 13.9 G/DL (ref 12–15.9)
IMM GRANULOCYTES # BLD AUTO: 0.03 10*3/MM3 (ref 0–0.05)
IMM GRANULOCYTES NFR BLD AUTO: 0.4 % (ref 0–0.5)
LDLC SERPL CALC-MCNC: 63 MG/DL (ref 0–100)
LDLC/HDLC SERPL: 1.44 {RATIO}
LYMPHOCYTES # BLD AUTO: 2.06 10*3/MM3 (ref 0.7–3.1)
LYMPHOCYTES NFR BLD AUTO: 26.9 % (ref 19.6–45.3)
MCH RBC QN AUTO: 30.7 PG (ref 26.6–33)
MCHC RBC AUTO-ENTMCNC: 33.1 G/DL (ref 31.5–35.7)
MCV RBC AUTO: 92.7 FL (ref 79–97)
MONOCYTES # BLD AUTO: 0.47 10*3/MM3 (ref 0.1–0.9)
MONOCYTES NFR BLD AUTO: 6.1 % (ref 5–12)
NEUTROPHILS NFR BLD AUTO: 4.85 10*3/MM3 (ref 1.7–7)
NEUTROPHILS NFR BLD AUTO: 63.4 % (ref 42.7–76)
NRBC BLD AUTO-RTO: 0 /100 WBC (ref 0–0.2)
PLATELET # BLD AUTO: 267 10*3/MM3 (ref 140–450)
PMV BLD AUTO: 9.7 FL (ref 6–12)
POTASSIUM SERPL-SCNC: 4.4 MMOL/L (ref 3.5–5.2)
PROT SERPL-MCNC: 6.7 G/DL (ref 6–8.5)
RBC # BLD AUTO: 4.53 10*6/MM3 (ref 3.77–5.28)
SODIUM SERPL-SCNC: 136 MMOL/L (ref 136–145)
T3FREE SERPL-MCNC: 3.51 PG/ML (ref 2–4.4)
T4 FREE SERPL-MCNC: 0.94 NG/DL (ref 0.92–1.68)
TRIGL SERPL-MCNC: 90 MG/DL (ref 0–150)
TSH SERPL DL<=0.05 MIU/L-ACNC: 0.71 UIU/ML (ref 0.27–4.2)
VLDLC SERPL-MCNC: 17 MG/DL (ref 5–40)
WBC NRBC COR # BLD AUTO: 7.65 10*3/MM3 (ref 3.4–10.8)

## 2025-08-13 PROCEDURE — 84443 ASSAY THYROID STIM HORMONE: CPT | Performed by: FAMILY MEDICINE

## 2025-08-13 PROCEDURE — 80053 COMPREHEN METABOLIC PANEL: CPT | Performed by: FAMILY MEDICINE

## 2025-08-13 PROCEDURE — 85025 COMPLETE CBC W/AUTO DIFF WBC: CPT | Performed by: FAMILY MEDICINE

## 2025-08-13 PROCEDURE — 84481 FREE ASSAY (FT-3): CPT | Performed by: FAMILY MEDICINE

## 2025-08-13 PROCEDURE — 36415 COLL VENOUS BLD VENIPUNCTURE: CPT | Performed by: FAMILY MEDICINE

## 2025-08-13 PROCEDURE — 80061 LIPID PANEL: CPT | Performed by: FAMILY MEDICINE

## 2025-08-13 PROCEDURE — 84439 ASSAY OF FREE THYROXINE: CPT | Performed by: FAMILY MEDICINE

## 2025-08-13 RX ORDER — ATENOLOL 25 MG/1
25 TABLET ORAL DAILY
Qty: 90 TABLET | Refills: 1
Start: 2025-08-13 | End: 2025-08-14 | Stop reason: SDUPTHER

## 2025-08-14 ENCOUNTER — TELEPHONE (OUTPATIENT)
Dept: FAMILY MEDICINE CLINIC | Facility: CLINIC | Age: 60
End: 2025-08-14
Payer: OTHER GOVERNMENT

## 2025-08-14 RX ORDER — ATENOLOL 25 MG/1
25 TABLET ORAL DAILY
Qty: 90 TABLET | Refills: 1 | Status: SHIPPED | OUTPATIENT
Start: 2025-08-14

## 2025-08-15 ENCOUNTER — TELEPHONE (OUTPATIENT)
Dept: FAMILY MEDICINE CLINIC | Facility: CLINIC | Age: 60
End: 2025-08-15
Payer: OTHER GOVERNMENT

## 2025-08-15 DIAGNOSIS — M54.40 CHRONIC LOW BACK PAIN WITH SCIATICA, SCIATICA LATERALITY UNSPECIFIED, UNSPECIFIED BACK PAIN LATERALITY: ICD-10-CM

## 2025-08-15 DIAGNOSIS — G89.29 CHRONIC LOW BACK PAIN WITH SCIATICA, SCIATICA LATERALITY UNSPECIFIED, UNSPECIFIED BACK PAIN LATERALITY: ICD-10-CM

## 2025-08-15 RX ORDER — CARISOPRODOL 350 MG/1
350 TABLET ORAL 3 TIMES DAILY PRN
Qty: 90 TABLET | Refills: 0 | Status: SHIPPED | OUTPATIENT
Start: 2025-08-15

## 2025-08-22 ENCOUNTER — HOSPITAL ENCOUNTER (EMERGENCY)
Facility: HOSPITAL | Age: 60
Discharge: HOME | End: 2025-08-22
Payer: OTHER GOVERNMENT

## 2025-08-22 ENCOUNTER — APPOINTMENT (OUTPATIENT)
Dept: CT IMAGING | Facility: HOSPITAL | Age: 60
End: 2025-08-22
Payer: OTHER GOVERNMENT

## 2025-08-26 ENCOUNTER — OFFICE VISIT (OUTPATIENT)
Dept: FAMILY MEDICINE CLINIC | Facility: CLINIC | Age: 60
End: 2025-08-26
Payer: OTHER GOVERNMENT

## 2025-08-26 VITALS
OXYGEN SATURATION: 98 % | TEMPERATURE: 97.3 F | HEIGHT: 64 IN | DIASTOLIC BLOOD PRESSURE: 65 MMHG | HEART RATE: 63 BPM | BODY MASS INDEX: 30.56 KG/M2 | WEIGHT: 179 LBS | SYSTOLIC BLOOD PRESSURE: 117 MMHG

## 2025-08-26 DIAGNOSIS — K04.7 DENTAL ABSCESS: Primary | ICD-10-CM

## 2025-08-26 PROCEDURE — 1125F AMNT PAIN NOTED PAIN PRSNT: CPT | Performed by: FAMILY MEDICINE

## 2025-08-26 PROCEDURE — 99213 OFFICE O/P EST LOW 20 MIN: CPT | Performed by: FAMILY MEDICINE

## 2025-08-26 PROCEDURE — 3074F SYST BP LT 130 MM HG: CPT | Performed by: FAMILY MEDICINE

## 2025-08-26 PROCEDURE — 3078F DIAST BP <80 MM HG: CPT | Performed by: FAMILY MEDICINE

## (undated) DEVICE — NDL HYPO PRECISIONGLIDE/REG 18G 1IN PNK

## (undated) DEVICE — GLV SURG TRIUMPH LT PF LTX 8.5 STRL

## (undated) DEVICE — BNDG ELAS ELITE V/CLOSE 4IN 5YD LF STRL

## (undated) DEVICE — PK EXTREM 50

## (undated) DEVICE — DRILL,  2.1 X 120MM, CANNULATED, AO: Brand: MONSTER SCREW SYSTEM

## (undated) DEVICE — SUTURELASSO CRV 25D RT

## (undated) DEVICE — SOLUTION,WATER,IRRIGATION,1000ML,STERILE: Brand: MEDLINE

## (undated) DEVICE — SUTURELASSO CRV 25D LT

## (undated) DEVICE — SOL IRRIG NACL 1000ML

## (undated) DEVICE — ADHS LIQ MASTISOL 2/3ML

## (undated) DEVICE — GLV SURG TRIUMPH GREEN W/ALOE PF LTX 8.5 STRL

## (undated) DEVICE — BNDG ESMARK 4IN 12FT LF STRL BLU

## (undated) DEVICE — SPNG GZ AVANT 6PLY 4X4IN STRL PK/2

## (undated) DEVICE — PAD,ABDOMINAL,5"X9",STERILE,LF,1/PK: Brand: MEDLINE INDUSTRIES, INC.

## (undated) DEVICE — CANN TRPL DAM 7X7MM

## (undated) DEVICE — PK PROC TURNOVER

## (undated) DEVICE — SUT VIC 2/0 CT2 27IN J269H

## (undated) DEVICE — TUBING, SUCTION, 1/4" X 12', STRAIGHT: Brand: MEDLINE

## (undated) DEVICE — DISPOSABLE TOURNIQUET CUFF 18"X4", 1-LINE, RED, STERILE, 1EA/PK, 10PK/CS: Brand: ASP MEDICAL

## (undated) DEVICE — TBG ARTHSCP PUMP W CONN/REDUC 8FT

## (undated) DEVICE — SLING & SWATHE: Brand: DEROYAL

## (undated) DEVICE — KT SURG TURNOVER 050

## (undated) DEVICE — 3M™ STERI-STRIP™ REINFORCED ADHESIVE SKIN CLOSURES, R1547, 1/2 IN X 4 IN (12 MM X 100 MM), 6 STRIPS/ENVELOPE: Brand: 3M™ STERI-STRIP™

## (undated) DEVICE — Device

## (undated) DEVICE — COUNTERSINK, 3.0 HEADED: Brand: MONSTER® SCREW SYSTEM

## (undated) DEVICE — BLD SHAVER EXCALIBUR CRV 4MM

## (undated) DEVICE — SUT ETHLN 3/0 FS1 30IN 669H

## (undated) DEVICE — CANN PASSPORT BUTN 8MM 3CM

## (undated) DEVICE — GOWN,REINFORCE,POLY,SIRUS,BREATH SLV,XLG: Brand: MEDLINE

## (undated) DEVICE — BANDAGE,GAUZE,BULKEE II,4.5"X4.1YD,STRL: Brand: MEDLINE

## (undated) DEVICE — TBG ARTHSCP PT W CONN/REDUC 8FT

## (undated) DEVICE — UNDERGLV SURG BIOGEL INDICAT PI SZ8 BLU

## (undated) DEVICE — ABL ASP APOLLO RF XL 90D

## (undated) DEVICE — CVR HNDL LT SURG ACCSSRY BLU STRL

## (undated) DEVICE — CUFF SCD HEMOFORCE SEQ CALF STD MD

## (undated) DEVICE — INTENDED FOR TISSUE SEPARATION, AND OTHER PROCEDURES THAT REQUIRE A SHARP SURGICAL BLADE TO PUNCTURE OR CUT.: Brand: BARD-PARKER ® CARBON RIB-BACK BLADES

## (undated) DEVICE — SOL IRRIG SOD CHL 0.9PCT 3000ML

## (undated) DEVICE — K-WIRE, SINGLE ENDED TROCAR TIP, SMOOTH, 1.1MM X 150 MM
Type: IMPLANTABLE DEVICE | Site: FOOT | Status: NON-FUNCTIONAL
Brand: MONSTER SCREW SYSTEM
Removed: 2023-12-15

## (undated) DEVICE — TP NDL SCORPION MULTIFIRE

## (undated) DEVICE — TBG ARTHSCP DUALWAVE

## (undated) DEVICE — APPL CHLORAPREP HI/LITE TINTED 10.5ML ORNG

## (undated) DEVICE — GLV SURG TRIUMPH ORTHO W/ALOE PF LTX 8 STRL

## (undated) DEVICE — DRSNG GZ CURAD XEROFORM PETROLTM OVERWRP 1X8IN STRL

## (undated) DEVICE — PK SHLDR ARTHROSCOPY 50

## (undated) DEVICE — GLV SURG TRIUMPH LT PF LTX 8 STRL

## (undated) DEVICE — GLV SURG BIOGEL M LTX PF 7 1/2